# Patient Record
Sex: FEMALE | Race: WHITE | NOT HISPANIC OR LATINO | Employment: UNEMPLOYED | URBAN - NONMETROPOLITAN AREA
[De-identification: names, ages, dates, MRNs, and addresses within clinical notes are randomized per-mention and may not be internally consistent; named-entity substitution may affect disease eponyms.]

---

## 2017-06-05 ENCOUNTER — APPOINTMENT (EMERGENCY)
Dept: RADIOLOGY | Facility: HOSPITAL | Age: 47
End: 2017-06-05
Payer: MEDICARE

## 2017-06-05 ENCOUNTER — HOSPITAL ENCOUNTER (EMERGENCY)
Facility: HOSPITAL | Age: 47
Discharge: HOME | End: 2017-06-05
Admitting: EMERGENCY MEDICINE
Payer: MEDICARE

## 2017-06-05 VITALS
BODY MASS INDEX: 28.25 KG/M2 | TEMPERATURE: 99.4 F | HEART RATE: 96 BPM | RESPIRATION RATE: 18 BRPM | DIASTOLIC BLOOD PRESSURE: 72 MMHG | HEIGHT: 67 IN | WEIGHT: 180 LBS | SYSTOLIC BLOOD PRESSURE: 119 MMHG | OXYGEN SATURATION: 98 %

## 2017-06-05 DIAGNOSIS — M62.830 BACK MUSCLE SPASM: Primary | ICD-10-CM

## 2017-06-05 PROCEDURE — 96372 THER/PROPH/DIAG INJ SC/IM: CPT

## 2017-06-05 PROCEDURE — 99284 EMERGENCY DEPT VISIT MOD MDM: CPT

## 2017-06-05 PROCEDURE — 72100 X-RAY EXAM L-S SPINE 2/3 VWS: CPT

## 2017-06-05 PROCEDURE — 96374 THER/PROPH/DIAG INJ IV PUSH: CPT

## 2017-06-05 RX ORDER — ONDANSETRON 4 MG/1
4 TABLET, ORALLY DISINTEGRATING ORAL ONCE
Status: COMPLETED | OUTPATIENT
Start: 2017-06-05 | End: 2017-06-05

## 2017-06-05 RX ORDER — METHOCARBAMOL 500 MG/1
500 TABLET, FILM COATED ORAL 4 TIMES DAILY
Qty: 12 TABLET | Refills: 0 | Status: SHIPPED | OUTPATIENT
Start: 2017-06-05 | End: 2017-06-07 | Stop reason: HOSPADM

## 2017-06-05 RX ORDER — LIDOCAINE 50 MG/G
1 PATCH TOPICAL EVERY 24 HOURS
Qty: 7 PATCH | Refills: 0 | Status: SHIPPED | OUTPATIENT
Start: 2017-06-05 | End: 2017-06-07 | Stop reason: HOSPADM

## 2017-06-05 RX ORDER — MORPHINE SULFATE 4 MG/ML
4 INJECTION, SOLUTION INTRAMUSCULAR; INTRAVENOUS ONCE
Status: COMPLETED | OUTPATIENT
Start: 2017-06-05 | End: 2017-06-05

## 2017-06-05 RX ORDER — FENTANYL CITRATE 50 UG/ML
100 INJECTION, SOLUTION INTRAMUSCULAR; INTRAVENOUS ONCE
Status: COMPLETED | OUTPATIENT
Start: 2017-06-05 | End: 2017-06-05

## 2017-06-05 RX ORDER — LIDOCAINE 50 MG/G
1 PATCH TOPICAL ONCE
Status: DISCONTINUED | OUTPATIENT
Start: 2017-06-05 | End: 2017-06-05 | Stop reason: HOSPADM

## 2017-06-05 RX ORDER — OXYCODONE HYDROCHLORIDE AND ACETAMINOPHEN 5; 325 MG/1; MG/1
1 TABLET ORAL EVERY 4 HOURS PRN
Qty: 12 TABLET | Refills: 0 | Status: SHIPPED | OUTPATIENT
Start: 2017-06-05 | End: 2017-06-07

## 2017-06-05 RX ORDER — NAPROXEN 500 MG/1
500 TABLET ORAL 2 TIMES DAILY WITH MEALS
Qty: 20 TABLET | Refills: 0 | Status: SHIPPED | OUTPATIENT
Start: 2017-06-05 | End: 2017-06-07 | Stop reason: HOSPADM

## 2017-06-05 RX ORDER — KETOROLAC TROMETHAMINE 30 MG/ML
30 INJECTION, SOLUTION INTRAMUSCULAR; INTRAVENOUS ONCE
Status: COMPLETED | OUTPATIENT
Start: 2017-06-05 | End: 2017-06-05

## 2017-06-05 RX ORDER — DIAZEPAM 5 MG/1
5 TABLET ORAL ONCE
Status: COMPLETED | OUTPATIENT
Start: 2017-06-05 | End: 2017-06-05

## 2017-06-05 RX ADMIN — KETOROLAC TROMETHAMINE 30 MG: 30 INJECTION, SOLUTION INTRAMUSCULAR at 17:32

## 2017-06-05 RX ADMIN — MORPHINE SULFATE 4 MG: 4 INJECTION, SOLUTION INTRAMUSCULAR; INTRAVENOUS at 17:34

## 2017-06-05 RX ADMIN — ONDANSETRON 4 MG: 4 TABLET, ORALLY DISINTEGRATING ORAL at 17:30

## 2017-06-05 RX ADMIN — FENTANYL CITRATE 100 MCG: 50 INJECTION INTRAMUSCULAR; INTRAVENOUS at 19:02

## 2017-06-05 RX ADMIN — LIDOCAINE 1 PATCH: 50 PATCH CUTANEOUS at 17:36

## 2017-06-05 RX ADMIN — HYDROMORPHONE HYDROCHLORIDE 1 MG: 1 INJECTION, SOLUTION INTRAMUSCULAR; INTRAVENOUS; SUBCUTANEOUS at 18:21

## 2017-06-05 RX ADMIN — DIAZEPAM 5 MG: 5 TABLET ORAL at 17:31

## 2017-06-06 ENCOUNTER — APPOINTMENT (OUTPATIENT)
Dept: MRI IMAGING | Facility: HOSPITAL | Age: 47
End: 2017-06-06

## 2017-06-06 ENCOUNTER — HOSPITAL ENCOUNTER (OUTPATIENT)
Facility: HOSPITAL | Age: 47
Setting detail: OBSERVATION
Discharge: HOME | End: 2017-06-07
Attending: INTERNAL MEDICINE | Admitting: INTERNAL MEDICINE

## 2017-06-06 ENCOUNTER — APPOINTMENT (EMERGENCY)
Dept: OCCUPATIONAL THERAPY | Facility: HOSPITAL | Age: 47
End: 2017-06-06

## 2017-06-06 DIAGNOSIS — D72.829 LEUKOCYTOSIS: ICD-10-CM

## 2017-06-06 DIAGNOSIS — M54.50 LOW BACK PAIN: Primary | ICD-10-CM

## 2017-06-06 LAB
ALBUMIN SERPL BCP-MCNC: 3.4 G/DL (ref 3.5–5)
ALP SERPL-CCNC: 100 U/L (ref 46–116)
ALT SERPL W P-5'-P-CCNC: 30 U/L (ref 12–78)
ANION GAP SERPL CALCULATED.3IONS-SCNC: 8 MMOL/L (ref 4–13)
AST SERPL W P-5'-P-CCNC: 17 U/L (ref 5–45)
BASOPHILS # BLD AUTO: 0.02 THOUSANDS/ΜL (ref 0–0.1)
BASOPHILS NFR BLD AUTO: 0 % (ref 0–1)
BILIRUB SERPL-MCNC: 0.6 MG/DL (ref 0.2–1)
BILIRUB UR QL STRIP: NEGATIVE
BUN SERPL-MCNC: 15 MG/DL (ref 5–25)
CALCIUM SERPL-MCNC: 9 MG/DL (ref 8.3–10.1)
CHLORIDE SERPL-SCNC: 104 MMOL/L (ref 100–108)
CLARITY UR: ABNORMAL
CO2 SERPL-SCNC: 27 MMOL/L (ref 21–32)
COLOR UR: YELLOW
CREAT SERPL-MCNC: 0.67 MG/DL (ref 0.6–1.3)
EOSINOPHIL # BLD AUTO: 0.02 THOUSAND/ΜL (ref 0–0.61)
EOSINOPHIL NFR BLD AUTO: 0 % (ref 0–6)
ERYTHROCYTE [DISTWIDTH] IN BLOOD BY AUTOMATED COUNT: 15 % (ref 11.6–15.1)
GFR SERPL CREATININE-BSD FRML MDRD: >60 ML/MIN/1.73SQ M
GLUCOSE SERPL-MCNC: 98 MG/DL (ref 65–140)
GLUCOSE UR STRIP-MCNC: NEGATIVE MG/DL
HCG UR QL: NEGATIVE
HCT VFR BLD AUTO: 45.6 % (ref 34.8–46.1)
HGB BLD-MCNC: 15 G/DL (ref 11.5–15.4)
HGB UR QL STRIP.AUTO: NEGATIVE
KETONES UR STRIP-MCNC: ABNORMAL MG/DL
LEUKOCYTE ESTERASE UR QL STRIP: NEGATIVE
LYMPHOCYTES # BLD AUTO: 1.6 THOUSANDS/ΜL (ref 0.6–4.47)
LYMPHOCYTES NFR BLD AUTO: 11 % (ref 14–44)
MCH RBC QN AUTO: 30.8 PG (ref 26.8–34.3)
MCHC RBC AUTO-ENTMCNC: 32.9 G/DL (ref 31.4–37.4)
MCV RBC AUTO: 94 FL (ref 82–98)
MONOCYTES # BLD AUTO: 1.47 THOUSAND/ΜL (ref 0.17–1.22)
MONOCYTES NFR BLD AUTO: 10 % (ref 4–12)
NEUTROPHILS # BLD AUTO: 11.61 THOUSANDS/ΜL (ref 1.85–7.62)
NEUTS SEG NFR BLD AUTO: 79 % (ref 43–75)
NITRITE UR QL STRIP: NEGATIVE
PH UR STRIP.AUTO: 7 [PH] (ref 4.5–8)
PLATELET # BLD AUTO: 261 THOUSANDS/UL (ref 149–390)
PMV BLD AUTO: 10.2 FL (ref 8.9–12.7)
POTASSIUM SERPL-SCNC: 3.6 MMOL/L (ref 3.5–5.3)
PROT SERPL-MCNC: 6.9 G/DL (ref 6.4–8.2)
PROT UR STRIP-MCNC: NEGATIVE MG/DL
RBC # BLD AUTO: 4.87 MILLION/UL (ref 3.81–5.12)
SODIUM SERPL-SCNC: 139 MMOL/L (ref 136–145)
SP GR UR STRIP.AUTO: 1.01 (ref 1–1.03)
UROBILINOGEN UR QL STRIP.AUTO: 1 E.U./DL
WBC # BLD AUTO: 14.72 THOUSAND/UL (ref 4.31–10.16)

## 2017-06-06 PROCEDURE — G8978 MOBILITY CURRENT STATUS: HCPCS | Performed by: PHYSICAL THERAPIST

## 2017-06-06 PROCEDURE — 94762 N-INVAS EAR/PLS OXIMTRY CONT: CPT

## 2017-06-06 PROCEDURE — 80053 COMPREHEN METABOLIC PANEL: CPT | Performed by: PHYSICIAN ASSISTANT

## 2017-06-06 PROCEDURE — 97530 THERAPEUTIC ACTIVITIES: CPT

## 2017-06-06 PROCEDURE — 94760 N-INVAS EAR/PLS OXIMETRY 1: CPT

## 2017-06-06 PROCEDURE — 96375 TX/PRO/DX INJ NEW DRUG ADDON: CPT

## 2017-06-06 PROCEDURE — 81025 URINE PREGNANCY TEST: CPT | Performed by: PHYSICIAN ASSISTANT

## 2017-06-06 PROCEDURE — A9585 GADOBUTROL INJECTION: HCPCS | Performed by: INTERNAL MEDICINE

## 2017-06-06 PROCEDURE — G8988 SELF CARE GOAL STATUS: HCPCS

## 2017-06-06 PROCEDURE — 36415 COLL VENOUS BLD VENIPUNCTURE: CPT | Performed by: PHYSICIAN ASSISTANT

## 2017-06-06 PROCEDURE — G8979 MOBILITY GOAL STATUS: HCPCS | Performed by: PHYSICAL THERAPIST

## 2017-06-06 PROCEDURE — 96374 THER/PROPH/DIAG INJ IV PUSH: CPT

## 2017-06-06 PROCEDURE — G8987 SELF CARE CURRENT STATUS: HCPCS

## 2017-06-06 PROCEDURE — 99284 EMERGENCY DEPT VISIT MOD MDM: CPT

## 2017-06-06 PROCEDURE — 85025 COMPLETE CBC W/AUTO DIFF WBC: CPT | Performed by: PHYSICIAN ASSISTANT

## 2017-06-06 PROCEDURE — 97166 OT EVAL MOD COMPLEX 45 MIN: CPT

## 2017-06-06 PROCEDURE — 97162 PT EVAL MOD COMPLEX 30 MIN: CPT | Performed by: PHYSICAL THERAPIST

## 2017-06-06 PROCEDURE — 81003 URINALYSIS AUTO W/O SCOPE: CPT | Performed by: PHYSICIAN ASSISTANT

## 2017-06-06 PROCEDURE — 72158 MRI LUMBAR SPINE W/O & W/DYE: CPT

## 2017-06-06 RX ORDER — KETOROLAC TROMETHAMINE 30 MG/ML
15 INJECTION, SOLUTION INTRAMUSCULAR; INTRAVENOUS ONCE
Status: COMPLETED | OUTPATIENT
Start: 2017-06-06 | End: 2017-06-06

## 2017-06-06 RX ORDER — MORPHINE SULFATE 100 MG/5ML
15 SOLUTION ORAL EVERY 4 HOURS PRN
Status: DISCONTINUED | OUTPATIENT
Start: 2017-06-06 | End: 2017-06-07

## 2017-06-06 RX ORDER — DIAZEPAM 5 MG/ML
5 INJECTION, SOLUTION INTRAMUSCULAR; INTRAVENOUS ONCE
Status: COMPLETED | OUTPATIENT
Start: 2017-06-06 | End: 2017-06-06

## 2017-06-06 RX ORDER — ACETAMINOPHEN 325 MG/1
650 TABLET ORAL EVERY 6 HOURS PRN
Status: DISCONTINUED | OUTPATIENT
Start: 2017-06-06 | End: 2017-06-07 | Stop reason: HOSPADM

## 2017-06-06 RX ORDER — DOCUSATE SODIUM 100 MG/1
100 CAPSULE, LIQUID FILLED ORAL 2 TIMES DAILY PRN
Status: DISCONTINUED | OUTPATIENT
Start: 2017-06-06 | End: 2017-06-07 | Stop reason: HOSPADM

## 2017-06-06 RX ORDER — MORPHINE SULFATE 2 MG/ML
2 INJECTION, SOLUTION INTRAMUSCULAR; INTRAVENOUS ONCE
Status: DISCONTINUED | OUTPATIENT
Start: 2017-06-06 | End: 2017-06-06

## 2017-06-06 RX ORDER — OXYCODONE HYDROCHLORIDE 5 MG/1
5 TABLET ORAL ONCE
Status: COMPLETED | OUTPATIENT
Start: 2017-06-06 | End: 2017-06-06

## 2017-06-06 RX ORDER — LIDOCAINE 50 MG/G
3 PATCH TOPICAL EVERY 24 HOURS
Status: DISCONTINUED | OUTPATIENT
Start: 2017-06-07 | End: 2017-06-07

## 2017-06-06 RX ORDER — FENTANYL CITRATE 50 UG/ML
50 INJECTION, SOLUTION INTRAMUSCULAR; INTRAVENOUS ONCE
Status: COMPLETED | OUTPATIENT
Start: 2017-06-06 | End: 2017-06-06

## 2017-06-06 RX ORDER — MORPHINE SULFATE 4 MG/ML
4 INJECTION, SOLUTION INTRAMUSCULAR; INTRAVENOUS
Status: DISCONTINUED | OUTPATIENT
Start: 2017-06-06 | End: 2017-06-07

## 2017-06-06 RX ORDER — LIDOCAINE 50 MG/G
1 PATCH TOPICAL ONCE
Status: COMPLETED | OUTPATIENT
Start: 2017-06-06 | End: 2017-06-06

## 2017-06-06 RX ORDER — NICOTINE 21 MG/24HR
1 PATCH, TRANSDERMAL 24 HOURS TRANSDERMAL DAILY
Status: DISCONTINUED | OUTPATIENT
Start: 2017-06-07 | End: 2017-06-07 | Stop reason: HOSPADM

## 2017-06-06 RX ORDER — IBUPROFEN 600 MG/1
600 TABLET ORAL EVERY 6 HOURS PRN
Status: ON HOLD | COMMUNITY
End: 2017-06-07

## 2017-06-06 RX ORDER — MORPHINE SULFATE 2 MG/ML
2 INJECTION, SOLUTION INTRAMUSCULAR; INTRAVENOUS
Status: DISCONTINUED | OUTPATIENT
Start: 2017-06-06 | End: 2017-06-06

## 2017-06-06 RX ORDER — ONDANSETRON 2 MG/ML
4 INJECTION INTRAMUSCULAR; INTRAVENOUS EVERY 6 HOURS PRN
Status: DISCONTINUED | OUTPATIENT
Start: 2017-06-06 | End: 2017-06-07 | Stop reason: HOSPADM

## 2017-06-06 RX ORDER — DIAZEPAM 5 MG/1
5 TABLET ORAL 3 TIMES DAILY
Status: DISCONTINUED | OUTPATIENT
Start: 2017-06-06 | End: 2017-06-07

## 2017-06-06 RX ADMIN — DIAZEPAM 5 MG: 5 INJECTION, SOLUTION INTRAMUSCULAR; INTRAVENOUS at 11:27

## 2017-06-06 RX ADMIN — HYDROMORPHONE HYDROCHLORIDE 0.5 MG: 1 INJECTION, SOLUTION INTRAMUSCULAR; INTRAVENOUS; SUBCUTANEOUS at 19:28

## 2017-06-06 RX ADMIN — OXYCODONE HYDROCHLORIDE 5 MG: 5 TABLET ORAL at 13:13

## 2017-06-06 RX ADMIN — FENTANYL CITRATE 50 MCG: 50 INJECTION INTRAMUSCULAR; INTRAVENOUS at 11:28

## 2017-06-06 RX ADMIN — KETOROLAC TROMETHAMINE 15 MG: 30 INJECTION, SOLUTION INTRAMUSCULAR at 13:13

## 2017-06-06 RX ADMIN — HYDROMORPHONE HYDROCHLORIDE 1 MG: 1 INJECTION, SOLUTION INTRAMUSCULAR; INTRAVENOUS; SUBCUTANEOUS at 16:31

## 2017-06-06 RX ADMIN — MORPHINE SULFATE 2 MG: 2 INJECTION, SOLUTION INTRAMUSCULAR; INTRAVENOUS at 21:14

## 2017-06-06 RX ADMIN — GADOBUTROL 10 ML: 604.72 INJECTION INTRAVENOUS at 17:40

## 2017-06-06 RX ADMIN — DIAZEPAM 5 MG: 5 TABLET ORAL at 21:51

## 2017-06-06 RX ADMIN — LIDOCAINE 1 PATCH: 50 PATCH CUTANEOUS at 11:29

## 2017-06-06 RX ADMIN — CEFEPIME HYDROCHLORIDE 2000 MG: 2 INJECTION, POWDER, FOR SOLUTION INTRAVENOUS at 16:52

## 2017-06-06 RX ADMIN — SODIUM CHLORIDE 1000 ML: 0.9 INJECTION, SOLUTION INTRAVENOUS at 16:31

## 2017-06-07 ENCOUNTER — APPOINTMENT (OUTPATIENT)
Dept: PHYSICAL THERAPY | Facility: HOSPITAL | Age: 47
End: 2017-06-07

## 2017-06-07 ENCOUNTER — APPOINTMENT (OUTPATIENT)
Dept: OCCUPATIONAL THERAPY | Facility: HOSPITAL | Age: 47
End: 2017-06-07

## 2017-06-07 VITALS
BODY MASS INDEX: 36.16 KG/M2 | RESPIRATION RATE: 18 BRPM | TEMPERATURE: 98.2 F | OXYGEN SATURATION: 96 % | SYSTOLIC BLOOD PRESSURE: 134 MMHG | HEIGHT: 67 IN | WEIGHT: 230.38 LBS | HEART RATE: 88 BPM | DIASTOLIC BLOOD PRESSURE: 72 MMHG

## 2017-06-07 PROBLEM — M54.50 LOW BACK PAIN: Status: ACTIVE | Noted: 2017-06-07

## 2017-06-07 PROBLEM — M51.26 LUMBAR DISC HERNIATION: Status: ACTIVE | Noted: 2017-06-07

## 2017-06-07 LAB
ALBUMIN SERPL BCP-MCNC: 2.9 G/DL (ref 3.5–5)
ALP SERPL-CCNC: 83 U/L (ref 46–116)
ALT SERPL W P-5'-P-CCNC: 22 U/L (ref 12–78)
ANION GAP SERPL CALCULATED.3IONS-SCNC: 10 MMOL/L (ref 4–13)
AST SERPL W P-5'-P-CCNC: 9 U/L (ref 5–45)
BASOPHILS # BLD AUTO: 0.01 THOUSANDS/ΜL (ref 0–0.1)
BASOPHILS NFR BLD AUTO: 0 % (ref 0–1)
BILIRUB SERPL-MCNC: 0.4 MG/DL (ref 0.2–1)
BUN SERPL-MCNC: 12 MG/DL (ref 5–25)
CALCIUM SERPL-MCNC: 8.5 MG/DL (ref 8.3–10.1)
CHLORIDE SERPL-SCNC: 103 MMOL/L (ref 100–108)
CO2 SERPL-SCNC: 25 MMOL/L (ref 21–32)
CREAT SERPL-MCNC: 0.59 MG/DL (ref 0.6–1.3)
EOSINOPHIL # BLD AUTO: 0.08 THOUSAND/ΜL (ref 0–0.61)
EOSINOPHIL NFR BLD AUTO: 1 % (ref 0–6)
ERYTHROCYTE [DISTWIDTH] IN BLOOD BY AUTOMATED COUNT: 14.9 % (ref 11.6–15.1)
GFR SERPL CREATININE-BSD FRML MDRD: >60 ML/MIN/1.73SQ M
GLUCOSE SERPL-MCNC: 102 MG/DL (ref 65–140)
HCT VFR BLD AUTO: 42.5 % (ref 34.8–46.1)
HGB BLD-MCNC: 13.9 G/DL (ref 11.5–15.4)
LYMPHOCYTES # BLD AUTO: 1.7 THOUSANDS/ΜL (ref 0.6–4.47)
LYMPHOCYTES NFR BLD AUTO: 18 % (ref 14–44)
MAGNESIUM SERPL-MCNC: 1.6 MG/DL (ref 1.6–2.6)
MCH RBC QN AUTO: 30.8 PG (ref 26.8–34.3)
MCHC RBC AUTO-ENTMCNC: 32.7 G/DL (ref 31.4–37.4)
MCV RBC AUTO: 94 FL (ref 82–98)
MONOCYTES # BLD AUTO: 1.13 THOUSAND/ΜL (ref 0.17–1.22)
MONOCYTES NFR BLD AUTO: 12 % (ref 4–12)
NEUTROPHILS # BLD AUTO: 6.43 THOUSANDS/ΜL (ref 1.85–7.62)
NEUTS SEG NFR BLD AUTO: 69 % (ref 43–75)
PHOSPHATE SERPL-MCNC: 2.7 MG/DL (ref 2.7–4.5)
PLATELET # BLD AUTO: 247 THOUSANDS/UL (ref 149–390)
PMV BLD AUTO: 9.8 FL (ref 8.9–12.7)
POTASSIUM SERPL-SCNC: 3.8 MMOL/L (ref 3.5–5.3)
PROT SERPL-MCNC: 6.5 G/DL (ref 6.4–8.2)
RBC # BLD AUTO: 4.52 MILLION/UL (ref 3.81–5.12)
SODIUM SERPL-SCNC: 138 MMOL/L (ref 136–145)
WBC # BLD AUTO: 9.35 THOUSAND/UL (ref 4.31–10.16)

## 2017-06-07 PROCEDURE — 94762 N-INVAS EAR/PLS OXIMTRY CONT: CPT

## 2017-06-07 PROCEDURE — 84100 ASSAY OF PHOSPHORUS: CPT | Performed by: HOSPITALIST

## 2017-06-07 PROCEDURE — 83735 ASSAY OF MAGNESIUM: CPT | Performed by: HOSPITALIST

## 2017-06-07 PROCEDURE — 97535 SELF CARE MNGMENT TRAINING: CPT

## 2017-06-07 PROCEDURE — 94760 N-INVAS EAR/PLS OXIMETRY 1: CPT

## 2017-06-07 PROCEDURE — 97116 GAIT TRAINING THERAPY: CPT

## 2017-06-07 PROCEDURE — 85025 COMPLETE CBC W/AUTO DIFF WBC: CPT | Performed by: HOSPITALIST

## 2017-06-07 PROCEDURE — 80053 COMPREHEN METABOLIC PANEL: CPT | Performed by: HOSPITALIST

## 2017-06-07 RX ORDER — METHYLPREDNISOLONE 4 MG/1
TABLET ORAL
Qty: 21 TABLET | Refills: 0 | Status: SHIPPED | OUTPATIENT
Start: 2017-06-07 | End: 2018-02-19

## 2017-06-07 RX ORDER — KETOROLAC TROMETHAMINE 30 MG/ML
30 INJECTION, SOLUTION INTRAMUSCULAR; INTRAVENOUS ONCE
Status: COMPLETED | OUTPATIENT
Start: 2017-06-07 | End: 2017-06-07

## 2017-06-07 RX ORDER — CYCLOBENZAPRINE HCL 5 MG
5 TABLET ORAL 3 TIMES DAILY PRN
Qty: 30 TABLET | Refills: 0 | Status: SHIPPED | OUTPATIENT
Start: 2017-06-07 | End: 2018-02-19

## 2017-06-07 RX ORDER — IBUPROFEN 600 MG/1
600 TABLET ORAL EVERY 6 HOURS PRN
Qty: 30 TABLET | Refills: 0 | Status: SHIPPED | OUTPATIENT
Start: 2017-06-07 | End: 2018-02-19

## 2017-06-07 RX ORDER — METHYLPREDNISOLONE SODIUM SUCCINATE 125 MG/2ML
125 INJECTION, POWDER, LYOPHILIZED, FOR SOLUTION INTRAMUSCULAR; INTRAVENOUS ONCE
Status: COMPLETED | OUTPATIENT
Start: 2017-06-07 | End: 2017-06-07

## 2017-06-07 RX ORDER — OXYCODONE HYDROCHLORIDE AND ACETAMINOPHEN 5; 325 MG/1; MG/1
1 TABLET ORAL EVERY 4 HOURS PRN
Status: DISCONTINUED | OUTPATIENT
Start: 2017-06-07 | End: 2017-06-07 | Stop reason: HOSPADM

## 2017-06-07 RX ORDER — CYCLOBENZAPRINE HCL 10 MG
5 TABLET ORAL 3 TIMES DAILY PRN
Status: DISCONTINUED | OUTPATIENT
Start: 2017-06-07 | End: 2017-06-07 | Stop reason: HOSPADM

## 2017-06-07 RX ORDER — DOCUSATE SODIUM 100 MG/1
100 CAPSULE, LIQUID FILLED ORAL 2 TIMES DAILY PRN
Qty: 10 CAPSULE | Refills: 0 | Status: SHIPPED | OUTPATIENT
Start: 2017-06-07 | End: 2018-02-19

## 2017-06-07 RX ORDER — LIDOCAINE 50 MG/G
2 PATCH TOPICAL DAILY
Qty: 30 PATCH | Refills: 0 | Status: SHIPPED | OUTPATIENT
Start: 2017-06-07 | End: 2018-02-19

## 2017-06-07 RX ORDER — OXYCODONE HYDROCHLORIDE AND ACETAMINOPHEN 5; 325 MG/1; MG/1
1 TABLET ORAL EVERY 4 HOURS PRN
Qty: 18 TABLET | Refills: 0 | Status: SHIPPED | OUTPATIENT
Start: 2017-06-07 | End: 2017-06-17

## 2017-06-07 RX ORDER — LIDOCAINE 50 MG/G
2 PATCH TOPICAL DAILY
Status: DISCONTINUED | OUTPATIENT
Start: 2017-06-07 | End: 2017-06-07 | Stop reason: HOSPADM

## 2017-06-07 RX ADMIN — OXYCODONE HYDROCHLORIDE AND ACETAMINOPHEN 1 TABLET: 5; 325 TABLET ORAL at 12:41

## 2017-06-07 RX ADMIN — MORPHINE SULFATE 4 MG: 4 INJECTION, SOLUTION INTRAMUSCULAR; INTRAVENOUS at 03:49

## 2017-06-07 RX ADMIN — DIAZEPAM 5 MG: 5 TABLET ORAL at 08:50

## 2017-06-07 RX ADMIN — KETOROLAC TROMETHAMINE 30 MG: 30 INJECTION, SOLUTION INTRAMUSCULAR at 11:53

## 2017-06-07 RX ADMIN — CYCLOBENZAPRINE HYDROCHLORIDE 5 MG: 10 TABLET, FILM COATED ORAL at 12:41

## 2017-06-07 RX ADMIN — MORPHINE SULFATE 4 MG: 4 INJECTION, SOLUTION INTRAMUSCULAR; INTRAVENOUS at 08:40

## 2017-06-07 RX ADMIN — LIDOCAINE 2 PATCH: 50 PATCH CUTANEOUS at 11:55

## 2017-06-07 RX ADMIN — METHYLPREDNISOLONE SODIUM SUCCINATE 125 MG: 125 INJECTION, POWDER, FOR SOLUTION INTRAMUSCULAR; INTRAVENOUS at 11:51

## 2017-06-12 ENCOUNTER — ALLSCRIPTS OFFICE VISIT (OUTPATIENT)
Dept: OTHER | Facility: OTHER | Age: 47
End: 2017-06-12

## 2018-01-14 VITALS
WEIGHT: 226 LBS | HEIGHT: 66 IN | BODY MASS INDEX: 36.32 KG/M2 | DIASTOLIC BLOOD PRESSURE: 60 MMHG | SYSTOLIC BLOOD PRESSURE: 136 MMHG

## 2018-01-23 NOTE — MISCELLANEOUS
Chief Complaint  Chief Complaint Free Text Note Form: Update 12/7/16 Patient was called several times for [facility] appt but was rescheduled and no show  [id number]        1 Amended By: Luigi Harry; Dec 07 2016 8:21 AM EST    History of Present Illness  TCM Communication [facility]: The patient is being contacted for Called and left msg to schedule [facility] appt  She was hospitalized at [facility]  The date of admission: 11/16/16, date of discharge: 11/18/16  Diagnosis: Abdominal pain; Diverticulitis of large intestine without perforation or abscess without bleeding D/C dx - Acute diverticulitis; Fistula; Renal calculi;  Supraumbilical 1  hernia; Adrenal adenoma; Sepsis; Hyperglycemia; Tobacco abuse; Vitamin D deficiency  She was discharged to home  Medications were not reviewed today  She refused a follow up appointment due to No show2   Communication performed and completed by1  Jacqueline Branch        1 Amended By: Luigi Harry; Dec 07 2016 8:23 AM EST   2 Amended By: Luigi Harry; Dec 07 2016 8:24 AM EST    Active Problems   1  Acute tracheobronchitis (466 0) (J20 9)  2  Dysfunction of both eustachian tubes (381 81) (H69 83)  3  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)  4  Need for influenza vaccination (V04 81) (Z23)    Past Medical History   1  History of Back Pain  2  History of Encounter for screening mammogram for malignant neoplasm of breast   (V76 12) (Z12 31)  3  History of Encounter for smoking cessation counseling (V65 42,305 1) (Z71 6,Z72 0)  4  History of chest pain (V13 89) (Z87 898)  5  History of Limb swelling (729 81) (M79 89)  6  History of Sinusitis (473 9) (J32 9)  7  History of Viral syndrome (079 99) (B34 9)    Surgical History   1  History of Back Surgery    Family History  Family History   1  Family history of Adopted    Social History    · Former smoker (I69 79) (L67 739)   · Never Drank Alcohol    Current Meds  1  Gabapentin 300 MG Oral Capsule; Take 1 capsule twice daily Recorded  2  Ibuprofen 600 MG Oral Tablet; TAKE 1 TABLET 3 times daily Recorded  3  LevoFLOXacin 750 MG Oral Tablet; take 1 tablet daily for 5 days MDD:5 TDD:5;   Therapy: 05GGV1046 to (Last Rx:[id number])  Requested for: 73HKU9798 Ordered  4  MethylPREDNISolone (Solomon) 4 MG TABS; TAKE AS DIRECTED ON PATIENT   INSTRUCTION CARD; Therapy: 19YHF6833 to (Last Rx:62Jkj5802)  Requested for: 58Ujx1014 Ordered  5  ProAir  (90 Base) MCG/ACT Inhalation Aerosol Solution; INHALE 2 PUFFS   EVERY 4 HOURS AS NEEDED; Therapy: 68VXV9203 to (Last Rx:[id number])  Requested for: 31Cgk1200 Ordered    Allergies   1   No Known Drug Allergies    Future Appointments    Date/Time Provider Specialty Site   11/29/2016 04:45 PM Rupinder Nj DO Family Medicine [address] [place] [address]     Signatures   Electronically signed by : Janae Mata DO; Nov 30 2016  6:49AM EST                       (Author)    Electronically signed by : Gina Lyle, ; Dec  7 2016  8:23AM EST                       (Author)    Electronically signed by : Janae Mata DO; Dec  7 2016  8:32AM EST                       (Author)

## 2018-02-19 ENCOUNTER — OFFICE VISIT (OUTPATIENT)
Dept: FAMILY MEDICINE CLINIC | Facility: CLINIC | Age: 48
End: 2018-02-19
Payer: COMMERCIAL

## 2018-02-19 VITALS
HEIGHT: 66 IN | BODY MASS INDEX: 35.52 KG/M2 | WEIGHT: 221 LBS | SYSTOLIC BLOOD PRESSURE: 118 MMHG | DIASTOLIC BLOOD PRESSURE: 86 MMHG

## 2018-02-19 DIAGNOSIS — G89.29 CHRONIC LOW BACK PAIN, UNSPECIFIED BACK PAIN LATERALITY, WITH SCIATICA PRESENCE UNSPECIFIED: ICD-10-CM

## 2018-02-19 DIAGNOSIS — K43.9 ABDOMINAL WALL HERNIA: Primary | ICD-10-CM

## 2018-02-19 DIAGNOSIS — M54.5 CHRONIC LOW BACK PAIN, UNSPECIFIED BACK PAIN LATERALITY, WITH SCIATICA PRESENCE UNSPECIFIED: ICD-10-CM

## 2018-02-19 PROBLEM — M79.605 LUMBAR PAIN WITH RADIATION DOWN LEFT LEG: Status: ACTIVE | Noted: 2017-06-07

## 2018-02-19 PROCEDURE — 99214 OFFICE O/P EST MOD 30 MIN: CPT | Performed by: PHYSICIAN ASSISTANT

## 2018-02-19 RX ORDER — CYCLOBENZAPRINE HCL 5 MG
5 TABLET ORAL 3 TIMES DAILY PRN
Qty: 60 TABLET | Refills: 0 | Status: SHIPPED | OUTPATIENT
Start: 2018-02-19 | End: 2019-04-02

## 2018-02-19 RX ORDER — GABAPENTIN 300 MG/1
300 CAPSULE ORAL 3 TIMES DAILY
Qty: 270 CAPSULE | Refills: 0 | Status: SHIPPED | OUTPATIENT
Start: 2018-02-19 | End: 2019-09-03 | Stop reason: SDUPTHER

## 2018-02-19 RX ORDER — CYCLOBENZAPRINE HCL 5 MG
1 TABLET ORAL 3 TIMES DAILY
COMMUNITY
End: 2018-02-19

## 2018-02-19 RX ORDER — GABAPENTIN 300 MG/1
100 CAPSULE ORAL 3 TIMES DAILY
COMMUNITY
End: 2018-02-19 | Stop reason: SDUPTHER

## 2018-02-19 RX ORDER — IBUPROFEN 600 MG/1
600 TABLET ORAL EVERY 8 HOURS PRN
Qty: 270 TABLET | Refills: 0 | Status: SHIPPED | OUTPATIENT
Start: 2018-02-19 | End: 2019-09-03 | Stop reason: ALTCHOICE

## 2018-02-19 RX ORDER — IBUPROFEN 600 MG/1
1 TABLET ORAL 3 TIMES DAILY
COMMUNITY
End: 2018-02-19

## 2018-02-19 RX ORDER — OXYCODONE HYDROCHLORIDE AND ACETAMINOPHEN 5; 325 MG/1; MG/1
1 TABLET ORAL EVERY 4 HOURS PRN
COMMUNITY
End: 2018-02-19

## 2018-02-19 NOTE — PROGRESS NOTES
Assessment/Plan:    No problem-specific Assessment & Plan notes found for this encounter  Diagnoses and all orders for this visit:    Abdominal wall hernia  -     Ambulatory referral to General Surgery; Future    Chronic low back pain, unspecified back pain laterality, with sciatica presence unspecified  -     Ambulatory referral to Pain Management; Future  -     gabapentin (NEURONTIN) 300 mg capsule; Take 1 capsule (300 mg total) by mouth 3 (three) times a day  -     cyclobenzaprine (FLEXERIL) 5 mg tablet; Take 1 tablet (5 mg total) by mouth 3 (three) times a day as needed for muscle spasms  -     ibuprofen (MOTRIN) 600 mg tablet; Take 1 tablet (600 mg total) by mouth every 8 (eight) hours as needed for moderate pain Do not take with diclofenac  -     diclofenac sodium (VOLTAREN) 50 mg EC tablet; Take 1 tablet (50 mg total) by mouth 2 (two) times a day Do not take with ibuprofen    Other orders  -     Discontinue: diclofenac sodium (VOLTAREN) 50 mg EC tablet; Take 1 tablet by mouth 2 (two) times a day  -     Discontinue: oxyCODONE-acetaminophen (PERCOCET) 5-325 mg per tablet; Take 1 tablet by mouth every 4 (four) hours as needed  -     Discontinue: cyclobenzaprine (FLEXERIL) 5 mg tablet; Take 1 tablet by mouth 3 (three) times a day  -     Discontinue: ibuprofen (MOTRIN) 600 mg tablet; Take 1 tablet by mouth 3 (three) times a day  -     Discontinue: gabapentin (NEURONTIN) 300 mg capsule; Take 100 mg by mouth 3 (three) times a day        Will refill medications for Calista's back pain  I did warn her to not use ibuprofen and diclofenac simultaneously due to GI side effects  Subjective:      Patient ID: Nicole Stoddard is a 50 y o  female  Teodoro Boots is here today to ask for a referral for Pain Management and Gen surgery to address both her chronic back pain and abdominal hernias  Back Pain   This is a chronic problem  The current episode started more than 1 year ago   The problem occurs constantly  The problem is unchanged  The pain is present in the lumbar spine  The pain is severe  Stiffness is present all day  Associated symptoms include abdominal pain  Pertinent negatives include no bladder incontinence, bowel incontinence, chest pain, dysuria, fever, headaches or weakness  She has tried NSAIDs, muscle relaxant and analgesics for the symptoms  The treatment provided no relief  Abdominal Pain   This is a chronic problem  The current episode started more than 1 year ago  The onset quality is undetermined  The problem occurs constantly  The pain is located in the generalized abdominal region  The abdominal pain does not radiate  Pertinent negatives include no belching, constipation, diarrhea, dysuria, fever, headaches, hematochezia, hematuria, myalgias, nausea or vomiting  The pain is aggravated by palpation and certain positions  The following portions of the patient's history were reviewed and updated as appropriate: She  has no past medical history on file  She  does not have any pertinent problems on file  She  has a past surgical history that includes Back surgery; Cholecystectomy; Lumbar disc surgery; and Back surgery  Her family history is not on file  She was adopted  She  reports that she has quit smoking  Her smoking use included Cigars  She has a 30 00 pack-year smoking history  She has never used smokeless tobacco  She reports that she does not drink alcohol or use drugs    Current Outpatient Prescriptions   Medication Sig Dispense Refill    gabapentin (NEURONTIN) 300 mg capsule Take 1 capsule (300 mg total) by mouth 3 (three) times a day 270 capsule 0    cyclobenzaprine (FLEXERIL) 5 mg tablet Take 1 tablet (5 mg total) by mouth 3 (three) times a day as needed for muscle spasms 60 tablet 0    diclofenac sodium (VOLTAREN) 50 mg EC tablet Take 1 tablet (50 mg total) by mouth 2 (two) times a day Do not take with ibuprofen 180 tablet 0    ibuprofen (MOTRIN) 600 mg tablet Take 1 tablet (600 mg total) by mouth every 8 (eight) hours as needed for moderate pain Do not take with diclofenac 270 tablet 0     No current facility-administered medications for this visit  Current Outpatient Prescriptions on File Prior to Visit   Medication Sig    [DISCONTINUED] acetaminophen (TYLENOL) 325 mg tablet Take 2 tablets by mouth every 6 (six) hours as needed for mild pain, moderate pain, headaches or fever    [DISCONTINUED] cyclobenzaprine (FLEXERIL) 5 mg tablet Take 1 tablet by mouth 3 (three) times a day as needed for muscle spasms    [DISCONTINUED] docusate sodium (COLACE) 100 mg capsule Take 1 capsule by mouth 2 (two) times a day as needed for constipation    [DISCONTINUED] ibuprofen (MOTRIN) 600 mg tablet Take 1 tablet by mouth every 6 (six) hours as needed for mild pain (Recommend taking this medication every 6 hours scheduled for 2-3 days, then as needed afterwards )    [DISCONTINUED] lidocaine (LIDODERM) 5 % Place 2 patches on the skin daily Remove & Discard patch within 12 hours or as directed by MD    [DISCONTINUED] Methylprednisolone (MEDROL) 4 MG TBPK Use as directed on package     No current facility-administered medications on file prior to visit  She has No Known Allergies       Review of Systems   Constitutional: Negative for chills and fever  HENT: Negative for postnasal drip, rhinorrhea, sinus pain and sinus pressure  Respiratory: Negative for cough and shortness of breath  Cardiovascular: Negative for chest pain  Gastrointestinal: Positive for abdominal pain  Negative for bowel incontinence, constipation, diarrhea, hematochezia, nausea and vomiting  Genitourinary: Negative for bladder incontinence, dysuria and hematuria  Musculoskeletal: Positive for back pain  Negative for myalgias  Skin: Negative for rash  Neurological: Negative for weakness and headaches           Objective:      /86 (BP Location: Left arm, Patient Position: Sitting)   Ht 5' 6" (1 676 m)   Wt 100 kg (221 lb)   LMP 10/10/2016   BMI 35 67 kg/m²          Physical Exam   Constitutional: She appears well-developed and well-nourished  No distress  HENT:   Head: Normocephalic and atraumatic  Right Ear: External ear normal    Left Ear: External ear normal    Nose: Nose normal    Mouth/Throat: No oropharyngeal exudate  Eyes: Conjunctivae are normal  Pupils are equal, round, and reactive to light  Neck: Neck supple  No thyromegaly present  Cardiovascular: Normal rate, regular rhythm and normal heart sounds  Pulmonary/Chest: Effort normal and breath sounds normal  No respiratory distress  Abdominal: Soft  Bowel sounds are normal  She exhibits mass (multiple abdominal hernias)  There is no hepatosplenomegaly  There is tenderness (over hernias)  There is no CVA tenderness  A hernia is present  Lymphadenopathy:     She has no cervical adenopathy  Skin: She is not diaphoretic

## 2018-02-20 ENCOUNTER — TELEPHONE (OUTPATIENT)
Dept: FAMILY MEDICINE CLINIC | Facility: CLINIC | Age: 48
End: 2018-02-20

## 2018-02-20 DIAGNOSIS — M54.50 CHRONIC LOW BACK PAIN WITHOUT SCIATICA, UNSPECIFIED BACK PAIN LATERALITY: Primary | ICD-10-CM

## 2018-02-20 DIAGNOSIS — G89.29 CHRONIC LOW BACK PAIN WITHOUT SCIATICA, UNSPECIFIED BACK PAIN LATERALITY: Primary | ICD-10-CM

## 2018-02-20 NOTE — TELEPHONE ENCOUNTER
Patient said she received a call from pain management and they do not accept her insurance  She called and [facility] takes it   Would like referral and everything faxed to [phone]

## 2018-02-23 ENCOUNTER — APPOINTMENT (EMERGENCY)
Dept: CT IMAGING | Facility: HOSPITAL | Age: 48
End: 2018-02-23
Payer: COMMERCIAL

## 2018-02-23 ENCOUNTER — TELEPHONE (OUTPATIENT)
Dept: FAMILY MEDICINE CLINIC | Facility: CLINIC | Age: 48
End: 2018-02-23

## 2018-02-23 ENCOUNTER — HOSPITAL ENCOUNTER (EMERGENCY)
Facility: HOSPITAL | Age: 48
Discharge: HOME | End: 2018-02-23
Attending: EMERGENCY MEDICINE
Payer: COMMERCIAL

## 2018-02-23 VITALS
WEIGHT: 180 LBS | HEART RATE: 70 BPM | DIASTOLIC BLOOD PRESSURE: 82 MMHG | BODY MASS INDEX: 29.05 KG/M2 | OXYGEN SATURATION: 95 % | TEMPERATURE: 98.2 F | SYSTOLIC BLOOD PRESSURE: 138 MMHG | RESPIRATION RATE: 20 BRPM

## 2018-02-23 DIAGNOSIS — K42.9 UMBILICAL HERNIA WITHOUT OBSTRUCTION AND WITHOUT GANGRENE: Primary | ICD-10-CM

## 2018-02-23 LAB
ALBUMIN SERPL BCP-MCNC: 3.7 G/DL (ref 3.5–5)
ALP SERPL-CCNC: 85 U/L (ref 46–116)
ALT SERPL W P-5'-P-CCNC: 24 U/L (ref 12–78)
ANION GAP SERPL CALCULATED.3IONS-SCNC: 6 MMOL/L (ref 4–13)
AST SERPL W P-5'-P-CCNC: 11 U/L (ref 5–45)
ATRIAL RATE: 85 BPM
BACTERIA UR QL AUTO: ABNORMAL /HPF
BASOPHILS # BLD AUTO: 0.02 THOUSANDS/ΜL (ref 0–0.1)
BASOPHILS NFR BLD AUTO: 0 % (ref 0–1)
BILIRUB SERPL-MCNC: 0.3 MG/DL (ref 0.2–1)
BILIRUB UR QL STRIP: NEGATIVE
BUN SERPL-MCNC: 17 MG/DL (ref 5–25)
CALCIUM SERPL-MCNC: 9.4 MG/DL (ref 8.3–10.1)
CHLORIDE SERPL-SCNC: 105 MMOL/L (ref 100–108)
CLARITY UR: CLEAR
CO2 SERPL-SCNC: 32 MMOL/L (ref 21–32)
COLOR UR: YELLOW
CREAT SERPL-MCNC: 0.73 MG/DL (ref 0.6–1.3)
EOSINOPHIL # BLD AUTO: 0.18 THOUSAND/ΜL (ref 0–0.61)
EOSINOPHIL NFR BLD AUTO: 2 % (ref 0–6)
ERYTHROCYTE [DISTWIDTH] IN BLOOD BY AUTOMATED COUNT: 13.3 % (ref 11.6–15.1)
GFR SERPL CREATININE-BSD FRML MDRD: 97 ML/MIN/1.73SQ M
GLUCOSE SERPL-MCNC: 88 MG/DL (ref 65–140)
GLUCOSE UR STRIP-MCNC: NEGATIVE MG/DL
HCT VFR BLD AUTO: 45.4 % (ref 34.8–46.1)
HGB BLD-MCNC: 15.4 G/DL (ref 11.5–15.4)
HGB UR QL STRIP.AUTO: NEGATIVE
KETONES UR STRIP-MCNC: NEGATIVE MG/DL
LEUKOCYTE ESTERASE UR QL STRIP: ABNORMAL
LIPASE SERPL-CCNC: 113 U/L (ref 73–393)
LYMPHOCYTES # BLD AUTO: 2.5 THOUSANDS/ΜL (ref 0.6–4.47)
LYMPHOCYTES NFR BLD AUTO: 32 % (ref 14–44)
MCH RBC QN AUTO: 30.8 PG (ref 26.8–34.3)
MCHC RBC AUTO-ENTMCNC: 33.9 G/DL (ref 31.4–37.4)
MCV RBC AUTO: 91 FL (ref 82–98)
MONOCYTES # BLD AUTO: 0.62 THOUSAND/ΜL (ref 0.17–1.22)
MONOCYTES NFR BLD AUTO: 8 % (ref 4–12)
NEUTROPHILS # BLD AUTO: 4.61 THOUSANDS/ΜL (ref 1.85–7.62)
NEUTS SEG NFR BLD AUTO: 58 % (ref 43–75)
NITRITE UR QL STRIP: NEGATIVE
NON-SQ EPI CELLS URNS QL MICRO: ABNORMAL /HPF
P AXIS: 52 DEGREES
PH UR STRIP.AUTO: 6.5 [PH] (ref 4.5–8)
PLATELET # BLD AUTO: 258 THOUSANDS/UL (ref 149–390)
PMV BLD AUTO: 9.9 FL (ref 8.9–12.7)
POTASSIUM SERPL-SCNC: 4 MMOL/L (ref 3.5–5.3)
PR INTERVAL: 138 MS
PROT SERPL-MCNC: 7 G/DL (ref 6.4–8.2)
PROT UR STRIP-MCNC: NEGATIVE MG/DL
QRS AXIS: 66 DEGREES
QRSD INTERVAL: 94 MS
QT INTERVAL: 356 MS
QTC INTERVAL: 423 MS
RBC # BLD AUTO: 5 MILLION/UL (ref 3.81–5.12)
RBC #/AREA URNS AUTO: ABNORMAL /HPF
SODIUM SERPL-SCNC: 143 MMOL/L (ref 136–145)
SP GR UR STRIP.AUTO: 1.01 (ref 1–1.03)
T WAVE AXIS: 62 DEGREES
TROPONIN I SERPL-MCNC: <0.02 NG/ML
UROBILINOGEN UR QL STRIP.AUTO: 0.2 E.U./DL
VENTRICULAR RATE: 85 BPM
WBC # BLD AUTO: 7.93 THOUSAND/UL (ref 4.31–10.16)
WBC #/AREA URNS AUTO: ABNORMAL /HPF

## 2018-02-23 PROCEDURE — 93010 ELECTROCARDIOGRAM REPORT: CPT | Performed by: INTERNAL MEDICINE

## 2018-02-23 PROCEDURE — 81001 URINALYSIS AUTO W/SCOPE: CPT | Performed by: EMERGENCY MEDICINE

## 2018-02-23 PROCEDURE — 96374 THER/PROPH/DIAG INJ IV PUSH: CPT

## 2018-02-23 PROCEDURE — 96375 TX/PRO/DX INJ NEW DRUG ADDON: CPT

## 2018-02-23 PROCEDURE — 74177 CT ABD & PELVIS W/CONTRAST: CPT

## 2018-02-23 PROCEDURE — 80053 COMPREHEN METABOLIC PANEL: CPT | Performed by: EMERGENCY MEDICINE

## 2018-02-23 PROCEDURE — 96376 TX/PRO/DX INJ SAME DRUG ADON: CPT

## 2018-02-23 PROCEDURE — 85025 COMPLETE CBC W/AUTO DIFF WBC: CPT | Performed by: EMERGENCY MEDICINE

## 2018-02-23 PROCEDURE — 93005 ELECTROCARDIOGRAM TRACING: CPT | Performed by: EMERGENCY MEDICINE

## 2018-02-23 PROCEDURE — 99285 EMERGENCY DEPT VISIT HI MDM: CPT

## 2018-02-23 PROCEDURE — 84484 ASSAY OF TROPONIN QUANT: CPT | Performed by: EMERGENCY MEDICINE

## 2018-02-23 PROCEDURE — 83690 ASSAY OF LIPASE: CPT | Performed by: EMERGENCY MEDICINE

## 2018-02-23 RX ORDER — IBUPROFEN 600 MG/1
600 TABLET ORAL EVERY 6 HOURS PRN
Qty: 30 TABLET | Refills: 0 | Status: ON HOLD | OUTPATIENT
Start: 2018-02-23 | End: 2018-03-16 | Stop reason: SDUPTHER

## 2018-02-23 RX ORDER — FENTANYL CITRATE 50 UG/ML
100 INJECTION, SOLUTION INTRAMUSCULAR; INTRAVENOUS ONCE
Status: COMPLETED | OUTPATIENT
Start: 2018-02-23 | End: 2018-02-23

## 2018-02-23 RX ORDER — ONDANSETRON 2 MG/ML
4 INJECTION INTRAMUSCULAR; INTRAVENOUS ONCE
Status: COMPLETED | OUTPATIENT
Start: 2018-02-23 | End: 2018-02-23

## 2018-02-23 RX ADMIN — ONDANSETRON 4 MG: 2 INJECTION INTRAMUSCULAR; INTRAVENOUS at 15:18

## 2018-02-23 RX ADMIN — FENTANYL CITRATE 100 MCG: 50 INJECTION, SOLUTION INTRAMUSCULAR; INTRAVENOUS at 18:00

## 2018-02-23 RX ADMIN — IOHEXOL 100 ML: 350 INJECTION, SOLUTION INTRAVENOUS at 16:36

## 2018-02-23 RX ADMIN — FENTANYL CITRATE 100 MCG: 50 INJECTION, SOLUTION INTRAMUSCULAR; INTRAVENOUS at 15:22

## 2018-02-23 RX ADMIN — FENTANYL CITRATE 100 MCG: 50 INJECTION, SOLUTION INTRAMUSCULAR; INTRAVENOUS at 19:23

## 2018-02-23 RX ADMIN — ONDANSETRON 4 MG: 2 INJECTION INTRAMUSCULAR; INTRAVENOUS at 17:57

## 2018-02-23 NOTE — TELEPHONE ENCOUNTER
She was here on Monday, and to be set up with a surgeon  She is in a lot of pain, she has problems swallowing and her stomach is red  She wants to know what to do?

## 2018-02-24 NOTE — ED PROCEDURE NOTE
PROCEDURE  ECG 12 Lead Documentation  Date/Time: 2/23/2018 2:55 PM  Performed by: Coni Boss by: Alison Xiong     Indications / Diagnosis:  Abdominal pain  ECG reviewed by me, the ED Provider: yes    Patient location:  ED  Previous ECG:     Previous ECG:  Unavailable  Interpretation:     Interpretation: normal    Rate:     ECG rate:  85    ECG rate assessment: normal    Rhythm:     Rhythm: sinus rhythm    Ectopy:     Ectopy: none    QRS:     QRS axis:  Normal  Conduction:     Conduction: normal    ST segments:     ST segments:  Normal  T waves:     T waves: normal           Amy Berry MD  02/23/18 2028

## 2018-02-24 NOTE — DISCHARGE INSTRUCTIONS
Umbilical Hernia   WHAT YOU NEED TO KNOW:   An umbilical hernia is a bulge through the abdominal muscles in the area of the navel (belly button)  The hernia may contain tissue from the abdomen, part of an organ (such as the intestine), or fluid  Umbilical hernias usually happen because of a hole or a weak area in the muscles of the abdominal wall  DISCHARGE INSTRUCTIONS:   Medicines:  · Ibuprofen or acetaminophen:  These medicines decrease pain  They are available without a doctor's order  Ask your healthcare provider which medicine is right for you  Ask how much to take and how often to take it  Follow directions  These medicines can cause stomach bleeding if not taken correctly  Ibuprofen can cause kidney damage  Do not take ibuprofen if you have kidney disease, an ulcer, or allergies to aspirin  Acetaminophen can cause liver damage  Do not drink alcohol if you take acetaminophen  · Take your medicine as directed  Contact your healthcare provider if you think your medicine is not helping or if you have side effects  Tell him of her if you are allergic to any medicine  Keep a list of the medicines, vitamins, and herbs you take  Include the amounts, and when and why you take them  Bring the list or the pill bottles to follow-up visits  Carry your medicine list with you in case of an emergency  Follow up with your healthcare provider as directed:  Write down your questions so you remember to ask them during your visits  Self care:   · Activity:  Avoid lifting, bending, and straining  Try not to stand for long periods of time  If you have to cough, try to cough gently  Support the hernia by holding your hand or a pillow over it when coughing  Ask your healthcare provider if it is okay for you to have sexual intercourse  · Prevent constipation:  Straining to have a bowel movement may make your hernia worse  Eat foods that are high in fiber and drink at least 8 glasses of water a day   A high-fiber diet includes whole grains, bran, cereals, and uncooked fruit and vegetables  Walking or other exercise can also help  Your healthcare provider may give you fiber medicine or a stool softener to help make your bowel movements softer and more regular  Do not use an enema or a laxative unless your healthcare provider says it is okay  · What to wear:  Do not wear anything tight over your hernia  Ask your healthcare provider if you should wear support belt or girdle to keep the hernia in place  · Ask your healthcare provider how to reduce your hernia:  Sometimes your hernia will slip back into your abdomen if you lie flat for a while  Ask your healthcare provider if you should try to gently push your hernia back into place if lying flat does not work  If your hernia does not slide back into your abdomen easily, stop pushing on it and call your healthcare provider  Do not try to push the hernia back into place if it is painful or tender  Contact your healthcare provider if:   · You have nausea or vomiting  · You cannot gently push your hernia back into your abdomen  (Do this only if your healthcare provider has shown you how to do it )     · You are constipated or have blood in your bowel movements  · Your hernia is getting bigger  · You have questions or concerns about your condition or care  Return to the emergency department if:   · You have a fever  · Your hernia is stuck outside your abdomen and is painful, swollen, or feels hard  · You completely stop having bowel movements and stop passing gas  · Your abdominal pain is bad or getting worse  © 2017 [employer] Information is for End User's use only and may not be sold, redistributed or otherwise used for commercial purposes  All illustrations and images included in CareNotes® are the copyrighted property of [employer]  or [employer]  The above information is an  only   It is not intended as medical advice for individual conditions or treatments  Talk to your doctor, nurse or pharmacist before following any medical regimen to see if it is safe and effective for you

## 2018-03-07 NOTE — ED PROVIDER NOTES
History  Chief Complaint   Patient presents with    Abdominal Pain     pt has umbilical hernia  complaining of nausea and severe pain     Patient: Larisa Rausch  79 y o /female  YOB: 1970  MRN: [id number]  PCP: José Manuel Gleason DO  Date of evaluation: 2/23/2018    (DARCIE Barker may have been used in the preparation of this document )    Abdominal pain; site of known umbilical hernia  History provided by:  Patient, significant other and relative  Abdominal Pain   Pain location:  Periumbilical  Pain radiates to:  Does not radiate  Pain severity:  Severe  Timing:  Constant  Progression:  Worsening  Chronicity:  New  Context: not diet changes, not eating, not laxative use, not medication withdrawal and not trauma    Relieved by:  Nothing  Worsened by:  Nothing  Ineffective treatments:  None tried  Associated symptoms: nausea    Associated symptoms: no chest pain, no chills, no constipation, no cough, no diarrhea, no dysuria, no fever, no hematochezia, no hematuria, no melena, no shortness of breath, no vaginal bleeding, no vaginal discharge and no vomiting        Prior to Admission Medications   Prescriptions Last Dose Informant Patient Reported? Taking?    cyclobenzaprine (FLEXERIL) 5 mg tablet   No Yes   Sig: Take 1 tablet (5 mg total) by mouth 3 (three) times a day as needed for muscle spasms   diclofenac sodium (VOLTAREN) 50 mg EC tablet   No Yes   Sig: Take 1 tablet (50 mg total) by mouth 2 (two) times a day Do not take with ibuprofen   gabapentin (NEURONTIN) 300 mg capsule   No Yes   Sig: Take 1 capsule (300 mg total) by mouth 3 (three) times a day   ibuprofen (MOTRIN) 600 mg tablet   No Yes   Sig: Take 1 tablet (600 mg total) by mouth every 8 (eight) hours as needed for moderate pain Do not take with diclofenac      Facility-Administered Medications: None       Past Medical History:   Diagnosis Date    Back pain        Past Surgical History:   Procedure Laterality Date    BACK SURGERY      BACK SURGERY      CHOLECYSTECTOMY      LUMBAR [address] [facility] SURGERY         Family History   Problem Relation Age of Onset    Adopted: Yes     I have reviewed and agree with the history as documented  Social History   Substance Use Topics    Smoking status: Former Smoker     Packs/day: 1 00     Years: 30 00     Types: Cigars    Smokeless tobacco: Never Used      Comment: 1 ppd agreeable to cessation education and patch    Alcohol use No        Review of Systems   Constitutional: Negative for chills and fever  HENT: Negative for hearing loss, trouble swallowing and voice change  Eyes: Negative for pain, redness and visual disturbance  Respiratory: Negative for cough and shortness of breath  Cardiovascular: Negative for chest pain and palpitations  Gastrointestinal: Positive for abdominal pain and nausea  Negative for constipation, diarrhea, hematochezia, melena and vomiting  Genitourinary: Negative for dysuria, hematuria, vaginal bleeding and vaginal discharge  Musculoskeletal: Negative for back pain, gait problem and neck pain  Skin: Negative for color change and rash  Neurological: Negative for weakness and light-headedness  Psychiatric/Behavioral: Negative for confusion and decreased concentration  The patient is not nervous/anxious  All other systems reviewed and are negative        Physical Exam  ED Triage Vitals [02/23/18 1350]   Temperature Pulse Respirations Blood Pressure SpO2   98 2 °F (36 8 °C) 96 20 123/85 96 %      Temp Source Heart Rate Source Patient Position - Orthostatic VS BP Location FiO2 (%)   Temporal Left Sitting Left arm --      Pain Score       7           Orthostatic Vital Signs  Vitals:    02/23/18 1415 02/23/18 1653 02/23/18 1930 02/23/18 1935   BP: 156/80 137/83 138/82 138/82   Pulse: 90 82 70 70   Patient Position - Orthostatic VS: Sitting Sitting Lying Lying       Physical Exam   Constitutional: She is oriented to person, place, and time  She appears well-developed and well-nourished  HENT:   Mouth/Throat: Oropharynx is clear and moist and mucous membranes are normal    Voice normal   Eyes: EOM are normal  Pupils are equal, round, and reactive to light  Cardiovascular: Normal rate and regular rhythm  Pulmonary/Chest: Effort normal    Abdominal: Soft  Bowel sounds are decreased  There is generalized tenderness  There is no rigidity, no rebound and no guarding  A hernia is present  Hernia confirmed positive in the ventral area  Neurological: She is alert and oriented to person, place, and time  GCS eye subscore is 4  GCS verbal subscore is 5  GCS motor subscore is 6  Skin: Skin is warm and dry  Psychiatric: She has a normal mood and affect  Her speech is normal and behavior is normal    Nursing note and vitals reviewed        ED Medications  Medications   fentanyl citrate (PF) 100 MCG/2ML 100 mcg (100 mcg Intravenous Given 2/23/18 1522)   ondansetron (ZOFRAN) injection 4 mg (4 mg Intravenous Given 2/23/18 1518)   iohexol (OMNIPAQUE) 350 MG/ML injection (MULTI-DOSE) 100 mL (100 mL Intravenous Given 2/23/18 1636)   fentanyl citrate (PF) 100 MCG/2ML 100 mcg (100 mcg Intravenous Given 2/23/18 1800)   ondansetron (ZOFRAN) injection 4 mg (4 mg Intravenous Given 2/23/18 1757)   fentanyl citrate (PF) 100 MCG/2ML 100 mcg (100 mcg Intravenous Given 2/23/18 1923)       Diagnostic Studies  Results Reviewed     Procedure Component Value Units Date/Time    Troponin I [[id number]]  (Normal) Collected:  02/23/18 1458    Lab Status:  Final result Specimen:  Blood from Arm, Left Updated:  02/23/18 1522     Troponin I <0 02 ng/mL     Narrative:         [employer] Chemistry analyzer 99% cutoff is > 0 04 ng/mL in network labs    o cTnI 99% cutoff is useful only when applied to patients in the clinical setting of myocardial ischemia  o cTnI 99% cutoff should be interpreted in the context of clinical history, ECG findings and possibly cardiac imaging to establish correct diagnosis  o cTnI 99% cutoff may be suggestive but clearly not indicative of a coronary event without the clinical setting of myocardial ischemia  Comprehensive metabolic panel [[id number]] Collected:  02/23/18 1458    Lab Status:  Final result Specimen:  Blood from Arm, Left Updated:  02/23/18 1519     Sodium 143 mmol/L      Potassium 4 0 mmol/L      Chloride 105 mmol/L      CO2 32 mmol/L      Anion Gap 6 mmol/L      BUN 17 mg/dL      Creatinine 0 73 mg/dL      Glucose 88 mg/dL      Calcium 9 4 mg/dL      AST 11 U/L      ALT 24 U/L      Alkaline Phosphatase 85 U/L      Total Protein 7 0 g/dL      Albumin 3 7 g/dL      Total Bilirubin 0 30 mg/dL      eGFR 97 ml/min/1 73sq m     Narrative:         National Kidney Disease Education Program recommendations are as follows:  GFR calculation is accurate only with a steady state creatinine  Chronic Kidney disease less than 60 ml/min/1 73 sq  meters  Kidney failure less than 15 ml/min/1 73 sq  meters      Lipase [[id number]]  (Normal) Collected:  02/23/18 1458    Lab Status:  Final result Specimen:  Blood from Arm, Left Updated:  02/23/18 1519     Lipase 113 u/L     Urine Microscopic [[id number]]  (Abnormal) Collected:  02/23/18 1445    Lab Status:  Final result Specimen:  Urine from Urine, Clean Catch Updated:  02/23/18 1513     RBC, UA 0-1 (A) /hpf      WBC, UA 4-10 (A) /hpf      Epithelial Cells Occasional /hpf      Bacteria, UA Moderate (A) /hpf     CBC and differential [[id number]]  (Normal) Collected:  02/23/18 1458    Lab Status:  Final result Specimen:  Blood from Arm, Left Updated:  02/23/18 1504     WBC 7 93 Thousand/uL      RBC 5 00 Million/uL      Hemoglobin 15 4 g/dL      Hematocrit 45 4 %      MCV 91 fL      MCH 30 8 pg      MCHC 33 9 g/dL      RDW 13 3 %      MPV 9 9 fL      Platelets 998 Thousands/uL      Neutrophils Relative 58 %      Lymphocytes Relative 32 %      Monocytes Relative 8 %      Eosinophils Relative 2 %      Basophils Relative 0 % Neutrophils Absolute 4 61 Thousands/µL      Lymphocytes Absolute 2 50 Thousands/µL      Monocytes Absolute 0 62 Thousand/µL      Eosinophils Absolute 0 18 Thousand/µL      Basophils Absolute 0 02 Thousands/µL     UA w Reflex to Microscopic [[id number]]  (Abnormal) Collected:  02/23/18 1445    Lab Status:  Final result Specimen:  Urine from Urine, Clean Catch Updated:  02/23/18 1504     Color, UA Yellow     Clarity, UA Clear     Specific Gravity, UA 1 015     pH, UA 6 5     Leukocytes, UA Small (A)     Nitrite, UA Negative     Protein, UA Negative mg/dl      Glucose, UA Negative mg/dl      Ketones, UA Negative mg/dl      Urobilinogen, UA 0 2 E U /dl      Bilirubin, UA Negative     Blood, UA Negative                 CT abdomen pelvis with contrast   Final Result by Pat Barraza DO (02/23 1716)      Colonic diverticulosis without evidence for acute diverticulitis  Numerous nonobstructing intrarenal calculi are seen bilaterally  Normal CT appearance of the appendix  There are 2 adjacent periumbilical hernias containing fat  Workstation performed: [id number]                    Procedures  Procedures       Phone Contacts  ED Phone Contact    ED Course  ED Course as of Mar 07 0324   Fri Feb 23, 2018   1806 Pt re-evaluated and meds ordered  Hernia able to be reduced after some time in Trendelenburg      HEART Risk Score    Flowsheet Row Most Recent Value   History  0 Filed at: 02/23/2018 2028   ECG  0 Filed at: 02/23/2018 2028   Age  1 Filed at: 02/23/2018 2028   Risk Factors  0 Filed at: 02/23/2018 2028   Troponin  0 Filed at: 02/23/2018 2028   Heart Score Risk Calculator   History  0 Filed at: 02/23/2018 2028   ECG  0 Filed at: 02/23/2018 2028   Age  1 Filed at: 02/23/2018 2028   Risk Factors  0 Filed at: 02/23/2018 2028   Troponin  0 Filed at: 02/23/2018 2028   HEART Score  1 Filed at: 02/23/2018 2028   HEART Score  1 Filed at: 02/23/2018 2028 [facility]  CritCare Time    Disposition  Final diagnoses:   Umbilical hernia without obstruction and without gangrene     Time reflects when diagnosis was documented in both MDM as applicable and the Disposition within this note     Time User Action Codes Description Comment    2/23/2018  7:51 PM Vahid Gonzalez Add [Y39 5] Umbilical hernia without obstruction and without gangrene       ED Disposition     ED Disposition Condition Comment    Discharge  Gladys Dodson discharge to home/self care  Condition at discharge: Good        Follow-up Information     Follow up With Specialties Details [address], DO General Surgery   [facility] [place] [address]  [phone]      [facility]   [address] [id number]  [phone]        Discharge Medication List as of 2/23/2018  7:53 PM      START taking these medications    Details   !! ibuprofen (MOTRIN) 600 mg tablet Take 1 tablet (600 mg total) by mouth every 6 (six) hours as needed (pain, fever), Starting Fri 2/23/2018, Normal       !! - Potential duplicate medications found  Please discuss with provider        CONTINUE these medications which have NOT CHANGED    Details   cyclobenzaprine (FLEXERIL) 5 mg tablet Take 1 tablet (5 mg total) by mouth 3 (three) times a day as needed for muscle spasms, Starting Mon 2/19/2018, Normal      diclofenac sodium (VOLTAREN) 50 mg EC tablet Take 1 tablet (50 mg total) by mouth 2 (two) times a day Do not take with ibuprofen, Starting Mon 2/19/2018, Normal      gabapentin (NEURONTIN) 300 mg capsule Take 1 capsule (300 mg total) by mouth 3 (three) times a day, Starting Mon 2/19/2018, Normal      !! ibuprofen (MOTRIN) 600 mg tablet Take 1 tablet (600 mg total) by mouth every 8 (eight) hours as needed for moderate pain Do not take with diclofenac, Starting Mon 2/19/2018, Normal       !! - Potential duplicate medications found  Please discuss with provider  No discharge procedures on file      ED Provider  Electronically Signed by           Alma Harrington MD  03/07/18 6082

## 2018-03-12 ENCOUNTER — OFFICE VISIT (OUTPATIENT)
Dept: SURGERY | Facility: HOSPITAL | Age: 48
End: 2018-03-12
Payer: COMMERCIAL

## 2018-03-12 VITALS
HEART RATE: 94 BPM | SYSTOLIC BLOOD PRESSURE: 136 MMHG | WEIGHT: 220 LBS | TEMPERATURE: 98 F | BODY MASS INDEX: 35.36 KG/M2 | HEIGHT: 66 IN | DIASTOLIC BLOOD PRESSURE: 90 MMHG

## 2018-03-12 DIAGNOSIS — K43.9 SUPRAUMBILICAL HERNIA: Primary | ICD-10-CM

## 2018-03-12 PROCEDURE — 99204 OFFICE O/P NEW MOD 45 MIN: CPT | Performed by: SURGERY

## 2018-03-12 NOTE — LETTER
March 13, 2018     Fabio Ridley DO  [facility]    Patient: Viktor Stock   YOB: 1970   Date of Visit: 3/12/2018       Dear Dr Radha Robert: Thank you for referring Sami Mookie to me for evaluation  Below are my notes for this consultation  If you have questions, please do not hesitate to call me  I look forward to following your patient along with you  Sincerely,        Amina Smiley DO        CC: No Recipients  Amina Smiley DO  3/13/2018  8:43 PM  Sign at close encounter  Assessment/Plan:    Supraumbilical hernia  Multiple supra/periumbilical hernia that is asymptomatic  Patient wishes to have this fixed     -the procedure self including all associated risk and benefits were discussed at great deal with the patient  Patient verbalized understands risks is willing to proceed  -will schedule have the hernia fixed later this week  She will not require any additional preoperative workup  Diagnoses and all orders for this visit:    Supraumbilical hernia          Subjective:      Patient ID: Viktor Stock is a 50 y o  female  24-year-old female with no significant past medical history, presents today for evaluation of in abdominal hernia  Patient recently seen in the ER for significant abdominal pain just above the umbilicus associated nausea vomiting  At that time a CT scan was undertaken and showed multiple periumbilical hernias containing fat  At that time Dr Willett Ringer discuss case with surgery and she was able to reduce the hernia the patient subsequent out  Since that time the hernias continue to come back out and cause the patient significant discomfort, pain is mostly sharp and radiates down around throughout her belly  Along with the pain comes significant nausea associated vomiting  Patient is able to be very little    She is passing some flatus she states that she has a difficult time going to the bathroom and only has bowel movements maybe once a week  Denies any fevers or chills  No shortness of breath  No chest pain  She        The following portions of the patient's history were reviewed and updated as appropriate:   She  has a past medical history of Back pain  She   Patient Active Problem List    Diagnosis Date Noted    Lumbar pain with radiation down left leg 06/07/2017    Lumbar disc herniation 95/89/5395    Supraumbilical hernia 57/87/4388    Tobacco abuse 11/16/2016    Status post lumbar spinal fusion 04/20/2012     She  has a past surgical history that includes Back surgery; Cholecystectomy; Lumbar disc surgery; and Back surgery  Her family history is not on file  She was adopted  She  reports that she has quit smoking  Her smoking use included Cigars  She has a 30 00 pack-year smoking history  She has never used smokeless tobacco  She reports that she does not drink alcohol or use drugs  Current Outpatient Prescriptions   Medication Sig Dispense Refill    cyclobenzaprine (FLEXERIL) 5 mg tablet Take 1 tablet (5 mg total) by mouth 3 (three) times a day as needed for muscle spasms 60 tablet 0    diclofenac sodium (VOLTAREN) 50 mg EC tablet Take 1 tablet (50 mg total) by mouth 2 (two) times a day Do not take with ibuprofen 180 tablet 0    gabapentin (NEURONTIN) 300 mg capsule Take 1 capsule (300 mg total) by mouth 3 (three) times a day 270 capsule 0    ibuprofen (MOTRIN) 600 mg tablet Take 1 tablet (600 mg total) by mouth every 8 (eight) hours as needed for moderate pain Do not take with diclofenac 270 tablet 0    ibuprofen (MOTRIN) 600 mg tablet Take 1 tablet (600 mg total) by mouth every 6 (six) hours as needed (pain, fever) 30 tablet 0     No current facility-administered medications for this visit  She has No Known Allergies       Review of Systems      A 10 point review systems was conducted, all negative except as noted above HPI      Objective:      /90   Pulse 94   Temp 98 °F (36 7 °C)   Ht 5' 6" (1 676 m)   Wt 99 8 kg (220 lb)   LMP 10/10/2016   BMI 35 51 kg/m²           Physical Exam   Constitutional: She is oriented to person, place, and time  She appears well-developed and well-nourished  No distress  HENT:   Head: Normocephalic and atraumatic  Eyes: No scleral icterus  Neck: Normal range of motion  No tracheal deviation present  Cardiovascular: Normal rate and regular rhythm  Exam reveals friction rub  Exam reveals no gallop  No murmur heard  Pulmonary/Chest: Effort normal and breath sounds normal  No respiratory distress  She has no wheezes  She has no rales  She exhibits no tenderness  Abdominal: Soft  She exhibits mass (Palpable mass above the umbilicus, 2 masses to be exact which coincided with the CT findings of periumbilical hernia  These hernias are partially reducible and are painful palpation  No overlying skin changes  )  She exhibits no distension  There is tenderness (Overlying supraumbilical mass consistent with hernia )  There is no rebound and no guarding  Musculoskeletal: Normal range of motion  She exhibits no edema, tenderness or deformity  Lymphadenopathy:     She has no cervical adenopathy  Neurological: She is alert and oriented to person, place, and time  No cranial nerve deficit  Skin: Skin is warm  No rash noted  She is not diaphoretic  No erythema  No pallor  Psychiatric: She has a normal mood and affect   Her behavior is normal

## 2018-03-13 RX ORDER — SODIUM CHLORIDE, SODIUM LACTATE, POTASSIUM CHLORIDE, CALCIUM CHLORIDE 600; 310; 30; 20 MG/100ML; MG/100ML; MG/100ML; MG/100ML
125 INJECTION, SOLUTION INTRAVENOUS CONTINUOUS
Status: CANCELLED | OUTPATIENT
Start: 2018-03-13

## 2018-03-13 RX ORDER — CHLORHEXIDINE GLUCONATE 4 G/100ML
SOLUTION TOPICAL DAILY PRN
Status: CANCELLED | OUTPATIENT
Start: 2018-03-13

## 2018-03-13 RX ORDER — HEPARIN SODIUM 5000 [USP'U]/ML
5000 INJECTION, SOLUTION INTRAVENOUS; SUBCUTANEOUS ONCE
Status: CANCELLED | OUTPATIENT
Start: 2018-03-16

## 2018-03-14 NOTE — ASSESSMENT & PLAN NOTE
Multiple supra/periumbilical hernia that is asymptomatic  Patient wishes to have this fixed     -the procedure self including all associated risk and benefits were discussed at great deal with the patient  Patient verbalized understands risks is willing to proceed  -will schedule have the hernia fixed later this week  She will not require any additional preoperative workup

## 2018-03-14 NOTE — H&P
Pt present for painful hernia above belly button, have nausea vomiting     Assessment/Plan:    Supraumbilical hernia  Multiple supra/periumbilical hernia that is asymptomatic  Patient wishes to have this fixed     -the procedure self including all associated risk and benefits were discussed at great deal with the patient  Patient verbalized understands risks is willing to proceed  -will schedule have the hernia fixed later this week  She will not require any additional preoperative workup  Diagnoses and all orders for this visit:    Supraumbilical hernia          Subjective:      Patient ID: Nicole Stoddard is a 50 y o  female  57-year-old female with no significant past medical history, presents today for evaluation of in abdominal hernia  Patient recently seen in the ER for significant abdominal pain just above the umbilicus associated nausea vomiting  At that time a CT scan was undertaken and showed multiple periumbilical hernias containing fat  At that time Dr Mitchell Dent discuss case with surgery and she was able to reduce the hernia the patient subsequent out  Since that time the hernias continue to come back out and cause the patient significant discomfort, pain is mostly sharp and radiates down around throughout her belly  Along with the pain comes significant nausea associated vomiting  Patient is able to be very little  She is passing some flatus she states that she has a difficult time going to the bathroom and only has bowel movements maybe once a week  Denies any fevers or chills  No shortness of breath  No chest pain  She        The following portions of the patient's history were reviewed and updated as appropriate:   She  has a past medical history of Back pain    She   Patient Active Problem List    Diagnosis Date Noted    Lumbar pain with radiation down left leg 06/07/2017    Lumbar disc herniation 47/41/6877    Supraumbilical hernia 73/35/4713    Tobacco abuse 11/16/2016    Status post lumbar spinal fusion 04/20/2012     She  has a past surgical history that includes Back surgery; Cholecystectomy; Lumbar disc surgery; and Back surgery  Her family history is not on file  She was adopted  She  reports that she has quit smoking  Her smoking use included Cigars  She has a 30 00 pack-year smoking history  She has never used smokeless tobacco  She reports that she does not drink alcohol or use drugs  Current Outpatient Prescriptions   Medication Sig Dispense Refill    cyclobenzaprine (FLEXERIL) 5 mg tablet Take 1 tablet (5 mg total) by mouth 3 (three) times a day as needed for muscle spasms 60 tablet 0    diclofenac sodium (VOLTAREN) 50 mg EC tablet Take 1 tablet (50 mg total) by mouth 2 (two) times a day Do not take with ibuprofen 180 tablet 0    gabapentin (NEURONTIN) 300 mg capsule Take 1 capsule (300 mg total) by mouth 3 (three) times a day 270 capsule 0    ibuprofen (MOTRIN) 600 mg tablet Take 1 tablet (600 mg total) by mouth every 8 (eight) hours as needed for moderate pain Do not take with diclofenac 270 tablet 0    ibuprofen (MOTRIN) 600 mg tablet Take 1 tablet (600 mg total) by mouth every 6 (six) hours as needed (pain, fever) 30 tablet 0     No current facility-administered medications for this visit  She has No Known Allergies       Review of Systems      A 10 point review systems was conducted, all negative except as noted above HPI  Objective:      /90   Pulse 94   Temp 98 °F (36 7 °C)   Ht 5' 6" (1 676 m)   Wt 99 8 kg (220 lb)   LMP 10/10/2016   BMI 35 51 kg/m²           Physical Exam   Constitutional: She is oriented to person, place, and time  She appears well-developed and well-nourished  No distress  HENT:   Head: Normocephalic and atraumatic  Eyes: No scleral icterus  Neck: Normal range of motion  No tracheal deviation present  Cardiovascular: Normal rate and regular rhythm  Exam reveals friction rub  Exam reveals no gallop      No murmur heard   Pulmonary/Chest: Effort normal and breath sounds normal  No respiratory distress  She has no wheezes  She has no rales  She exhibits no tenderness  Abdominal: Soft  She exhibits mass (Palpable mass above the umbilicus, 2 masses to be exact which coincided with the CT findings of periumbilical hernia  These hernias are partially reducible and are painful palpation  No overlying skin changes  )  She exhibits no distension  There is tenderness (Overlying supraumbilical mass consistent with hernia )  There is no rebound and no guarding  Musculoskeletal: Normal range of motion  She exhibits no edema, tenderness or deformity  Lymphadenopathy:     She has no cervical adenopathy  Neurological: She is alert and oriented to person, place, and time  No cranial nerve deficit  Skin: Skin is warm  No rash noted  She is not diaphoretic  No erythema  No pallor  Psychiatric: She has a normal mood and affect   Her behavior is normal

## 2018-03-14 NOTE — PROGRESS NOTES
Assessment/Plan:    Supraumbilical hernia  Multiple supra/periumbilical hernia that is asymptomatic  Patient wishes to have this fixed     -the procedure self including all associated risk and benefits were discussed at great deal with the patient  Patient verbalized understands risks is willing to proceed  -will schedule have the hernia fixed later this week  She will not require any additional preoperative workup  Diagnoses and all orders for this visit:    Supraumbilical hernia          Subjective:      Patient ID: Macrina Suarez is a 50 y o  female  25-year-old female with no significant past medical history, presents today for evaluation of in abdominal hernia  Patient recently seen in the ER for significant abdominal pain just above the umbilicus associated nausea vomiting  At that time a CT scan was undertaken and showed multiple periumbilical hernias containing fat  At that time Dr Sherry Jacobs discuss case with surgery and she was able to reduce the hernia the patient subsequent out  Since that time the hernias continue to come back out and cause the patient significant discomfort, pain is mostly sharp and radiates down around throughout her belly  Along with the pain comes significant nausea associated vomiting  Patient is able to be very little  She is passing some flatus she states that she has a difficult time going to the bathroom and only has bowel movements maybe once a week  Denies any fevers or chills  No shortness of breath  No chest pain  She        The following portions of the patient's history were reviewed and updated as appropriate:   She  has a past medical history of Back pain    She   Patient Active Problem List    Diagnosis Date Noted    Lumbar pain with radiation down left leg 06/07/2017    Lumbar disc herniation 33/77/1269    Supraumbilical hernia 53/51/6471    Tobacco abuse 11/16/2016    Status post lumbar spinal fusion 04/20/2012     She  has a past surgical history that includes Back surgery; Cholecystectomy; Lumbar disc surgery; and Back surgery  Her family history is not on file  She was adopted  She  reports that she has quit smoking  Her smoking use included Cigars  She has a 30 00 pack-year smoking history  She has never used smokeless tobacco  She reports that she does not drink alcohol or use drugs  Current Outpatient Prescriptions   Medication Sig Dispense Refill    cyclobenzaprine (FLEXERIL) 5 mg tablet Take 1 tablet (5 mg total) by mouth 3 (three) times a day as needed for muscle spasms 60 tablet 0    diclofenac sodium (VOLTAREN) 50 mg EC tablet Take 1 tablet (50 mg total) by mouth 2 (two) times a day Do not take with ibuprofen 180 tablet 0    gabapentin (NEURONTIN) 300 mg capsule Take 1 capsule (300 mg total) by mouth 3 (three) times a day 270 capsule 0    ibuprofen (MOTRIN) 600 mg tablet Take 1 tablet (600 mg total) by mouth every 8 (eight) hours as needed for moderate pain Do not take with diclofenac 270 tablet 0    ibuprofen (MOTRIN) 600 mg tablet Take 1 tablet (600 mg total) by mouth every 6 (six) hours as needed (pain, fever) 30 tablet 0     No current facility-administered medications for this visit  She has No Known Allergies       Review of Systems      A 10 point review systems was conducted, all negative except as noted above HPI  Objective:      /90   Pulse 94   Temp 98 °F (36 7 °C)   Ht 5' 6" (1 676 m)   Wt 99 8 kg (220 lb)   LMP 10/10/2016   BMI 35 51 kg/m²          Physical Exam   Constitutional: She is oriented to person, place, and time  She appears well-developed and well-nourished  No distress  HENT:   Head: Normocephalic and atraumatic  Eyes: No scleral icterus  Neck: Normal range of motion  No tracheal deviation present  Cardiovascular: Normal rate and regular rhythm  Exam reveals friction rub  Exam reveals no gallop  No murmur heard    Pulmonary/Chest: Effort normal and breath sounds normal  No respiratory distress  She has no wheezes  She has no rales  She exhibits no tenderness  Abdominal: Soft  She exhibits mass (Palpable mass above the umbilicus, 2 masses to be exact which coincided with the CT findings of periumbilical hernia  These hernias are partially reducible and are painful palpation  No overlying skin changes  )  She exhibits no distension  There is tenderness (Overlying supraumbilical mass consistent with hernia )  There is no rebound and no guarding  Musculoskeletal: Normal range of motion  She exhibits no edema, tenderness or deformity  Lymphadenopathy:     She has no cervical adenopathy  Neurological: She is alert and oriented to person, place, and time  No cranial nerve deficit  Skin: Skin is warm  No rash noted  She is not diaphoretic  No erythema  No pallor  Psychiatric: She has a normal mood and affect   Her behavior is normal

## 2018-03-15 ENCOUNTER — ANESTHESIA EVENT (OUTPATIENT)
Dept: PERIOP | Facility: HOSPITAL | Age: 48
End: 2018-03-15
Payer: COMMERCIAL

## 2018-03-16 ENCOUNTER — ANESTHESIA (OUTPATIENT)
Dept: PERIOP | Facility: HOSPITAL | Age: 48
End: 2018-03-16
Payer: COMMERCIAL

## 2018-03-16 ENCOUNTER — HOSPITAL ENCOUNTER (OUTPATIENT)
Facility: HOSPITAL | Age: 48
Setting detail: OUTPATIENT SURGERY
Discharge: HOME | End: 2018-03-16
Attending: SURGERY | Admitting: SURGERY
Payer: COMMERCIAL

## 2018-03-16 VITALS
BODY MASS INDEX: 35.36 KG/M2 | WEIGHT: 220 LBS | HEART RATE: 65 BPM | TEMPERATURE: 97.2 F | SYSTOLIC BLOOD PRESSURE: 118 MMHG | OXYGEN SATURATION: 93 % | HEIGHT: 66 IN | RESPIRATION RATE: 18 BRPM | DIASTOLIC BLOOD PRESSURE: 71 MMHG

## 2018-03-16 DIAGNOSIS — K43.9 SUPRAUMBILICAL HERNIA: Primary | ICD-10-CM

## 2018-03-16 PROCEDURE — C1781 MESH (IMPLANTABLE): HCPCS | Performed by: SURGERY

## 2018-03-16 PROCEDURE — 49560 PR REPAIR INCISIONAL HERNIA,REDUCIBLE: CPT | Performed by: SURGERY

## 2018-03-16 PROCEDURE — 49560 PR REPAIR INCISIONAL HERNIA,REDUCIBLE: CPT

## 2018-03-16 PROCEDURE — 49568 PR IMPLANT MESH HERNIA REPAIR/DEBRIDEMENT CLOSURE: CPT

## 2018-03-16 PROCEDURE — 49568 PR IMPLANT MESH HERNIA REPAIR/DEBRIDEMENT CLOSURE: CPT | Performed by: SURGERY

## 2018-03-16 DEVICE — VENTRALEX HERNIA PATCH, 8.0 CM (3.2"), LARGE CIRCLE WITH STRAP
Type: IMPLANTABLE DEVICE | Site: UMBILICAL | Status: FUNCTIONAL
Brand: VENTRALEX

## 2018-03-16 RX ORDER — PROPOFOL 10 MG/ML
INJECTION, EMULSION INTRAVENOUS AS NEEDED
Status: DISCONTINUED | OUTPATIENT
Start: 2018-03-16 | End: 2018-03-16 | Stop reason: SURG

## 2018-03-16 RX ORDER — VECURONIUM BROMIDE 1 MG/ML
INJECTION, POWDER, LYOPHILIZED, FOR SOLUTION INTRAVENOUS AS NEEDED
Status: DISCONTINUED | OUTPATIENT
Start: 2018-03-16 | End: 2018-03-16 | Stop reason: SURG

## 2018-03-16 RX ORDER — METOCLOPRAMIDE HYDROCHLORIDE 5 MG/ML
10 INJECTION INTRAMUSCULAR; INTRAVENOUS ONCE AS NEEDED
Status: DISCONTINUED | OUTPATIENT
Start: 2018-03-16 | End: 2018-03-16 | Stop reason: HOSPADM

## 2018-03-16 RX ORDER — METOCLOPRAMIDE HYDROCHLORIDE 5 MG/ML
INJECTION INTRAMUSCULAR; INTRAVENOUS AS NEEDED
Status: DISCONTINUED | OUTPATIENT
Start: 2018-03-16 | End: 2018-03-16 | Stop reason: SURG

## 2018-03-16 RX ORDER — FENTANYL CITRATE 50 UG/ML
INJECTION, SOLUTION INTRAMUSCULAR; INTRAVENOUS AS NEEDED
Status: DISCONTINUED | OUTPATIENT
Start: 2018-03-16 | End: 2018-03-16 | Stop reason: SURG

## 2018-03-16 RX ORDER — ONDANSETRON 2 MG/ML
INJECTION INTRAMUSCULAR; INTRAVENOUS AS NEEDED
Status: DISCONTINUED | OUTPATIENT
Start: 2018-03-16 | End: 2018-03-16 | Stop reason: SURG

## 2018-03-16 RX ORDER — CHLORHEXIDINE GLUCONATE 4 G/100ML
SOLUTION TOPICAL DAILY PRN
Status: DISCONTINUED | OUTPATIENT
Start: 2018-03-16 | End: 2018-03-16 | Stop reason: HOSPADM

## 2018-03-16 RX ORDER — GLYCOPYRROLATE 0.2 MG/ML
INJECTION INTRAMUSCULAR; INTRAVENOUS AS NEEDED
Status: DISCONTINUED | OUTPATIENT
Start: 2018-03-16 | End: 2018-03-16 | Stop reason: SURG

## 2018-03-16 RX ORDER — MORPHINE SULFATE 4 MG/ML
2 INJECTION, SOLUTION INTRAMUSCULAR; INTRAVENOUS EVERY 4 HOURS PRN
Status: DISCONTINUED | OUTPATIENT
Start: 2018-03-16 | End: 2018-03-16 | Stop reason: HOSPADM

## 2018-03-16 RX ORDER — FENTANYL CITRATE/PF 50 MCG/ML
50 SYRINGE (ML) INJECTION
Status: DISCONTINUED | OUTPATIENT
Start: 2018-03-16 | End: 2018-03-16 | Stop reason: HOSPADM

## 2018-03-16 RX ORDER — OXYCODONE HYDROCHLORIDE AND ACETAMINOPHEN 5; 325 MG/1; MG/1
2 TABLET ORAL EVERY 4 HOURS PRN
Status: DISCONTINUED | OUTPATIENT
Start: 2018-03-16 | End: 2018-03-16 | Stop reason: HOSPADM

## 2018-03-16 RX ORDER — HEPARIN SODIUM 5000 [USP'U]/ML
5000 INJECTION, SOLUTION INTRAVENOUS; SUBCUTANEOUS ONCE
Status: COMPLETED | OUTPATIENT
Start: 2018-03-16 | End: 2018-03-16

## 2018-03-16 RX ORDER — SODIUM CHLORIDE, SODIUM LACTATE, POTASSIUM CHLORIDE, CALCIUM CHLORIDE 600; 310; 30; 20 MG/100ML; MG/100ML; MG/100ML; MG/100ML
125 INJECTION, SOLUTION INTRAVENOUS CONTINUOUS
Status: DISCONTINUED | OUTPATIENT
Start: 2018-03-16 | End: 2018-03-16

## 2018-03-16 RX ORDER — ONDANSETRON 2 MG/ML
4 INJECTION INTRAMUSCULAR; INTRAVENOUS EVERY 8 HOURS PRN
Status: DISCONTINUED | OUTPATIENT
Start: 2018-03-16 | End: 2018-03-16 | Stop reason: HOSPADM

## 2018-03-16 RX ORDER — BUPIVACAINE HYDROCHLORIDE 5 MG/ML
INJECTION, SOLUTION PERINEURAL AS NEEDED
Status: DISCONTINUED | OUTPATIENT
Start: 2018-03-16 | End: 2018-03-16 | Stop reason: HOSPADM

## 2018-03-16 RX ORDER — DIPHENHYDRAMINE HYDROCHLORIDE 50 MG/ML
12.5 INJECTION INTRAMUSCULAR; INTRAVENOUS ONCE AS NEEDED
Status: DISCONTINUED | OUTPATIENT
Start: 2018-03-16 | End: 2018-03-16 | Stop reason: HOSPADM

## 2018-03-16 RX ORDER — KETOROLAC TROMETHAMINE 30 MG/ML
INJECTION, SOLUTION INTRAMUSCULAR; INTRAVENOUS AS NEEDED
Status: DISCONTINUED | OUTPATIENT
Start: 2018-03-16 | End: 2018-03-16 | Stop reason: SURG

## 2018-03-16 RX ORDER — DEXAMETHASONE SODIUM PHOSPHATE 4 MG/ML
INJECTION, SOLUTION INTRA-ARTICULAR; INTRALESIONAL; INTRAMUSCULAR; INTRAVENOUS; SOFT TISSUE AS NEEDED
Status: DISCONTINUED | OUTPATIENT
Start: 2018-03-16 | End: 2018-03-16 | Stop reason: SURG

## 2018-03-16 RX ORDER — ONDANSETRON 2 MG/ML
4 INJECTION INTRAMUSCULAR; INTRAVENOUS ONCE AS NEEDED
Status: DISCONTINUED | OUTPATIENT
Start: 2018-03-16 | End: 2018-03-16 | Stop reason: HOSPADM

## 2018-03-16 RX ORDER — SODIUM CHLORIDE, SODIUM LACTATE, POTASSIUM CHLORIDE, CALCIUM CHLORIDE 600; 310; 30; 20 MG/100ML; MG/100ML; MG/100ML; MG/100ML
125 INJECTION, SOLUTION INTRAVENOUS CONTINUOUS
Status: DISCONTINUED | OUTPATIENT
Start: 2018-03-16 | End: 2018-03-16 | Stop reason: HOSPADM

## 2018-03-16 RX ORDER — MIDAZOLAM HYDROCHLORIDE 1 MG/ML
INJECTION INTRAMUSCULAR; INTRAVENOUS AS NEEDED
Status: DISCONTINUED | OUTPATIENT
Start: 2018-03-16 | End: 2018-03-16 | Stop reason: SURG

## 2018-03-16 RX ORDER — LIDOCAINE HYDROCHLORIDE 10 MG/ML
INJECTION, SOLUTION INFILTRATION; PERINEURAL AS NEEDED
Status: DISCONTINUED | OUTPATIENT
Start: 2018-03-16 | End: 2018-03-16 | Stop reason: SURG

## 2018-03-16 RX ORDER — OXYCODONE HYDROCHLORIDE AND ACETAMINOPHEN 5; 325 MG/1; MG/1
1-2 TABLET ORAL EVERY 4 HOURS PRN
Qty: 40 TABLET | Refills: 0 | Status: SHIPPED | OUTPATIENT
Start: 2018-03-16 | End: 2018-04-03 | Stop reason: SDUPTHER

## 2018-03-16 RX ADMIN — FENTANYL CITRATE 50 MCG: 50 INJECTION, SOLUTION INTRAMUSCULAR; INTRAVENOUS at 10:06

## 2018-03-16 RX ADMIN — VECURONIUM BROMIDE 7 MG: 1 INJECTION, POWDER, LYOPHILIZED, FOR SOLUTION INTRAVENOUS at 10:06

## 2018-03-16 RX ADMIN — ONDANSETRON HYDROCHLORIDE 4 MG: 2 INJECTION, SOLUTION INTRAVENOUS at 11:01

## 2018-03-16 RX ADMIN — METOCLOPRAMIDE HYDROCHLORIDE 10 MG: 5 INJECTION INTRAMUSCULAR; INTRAVENOUS at 11:46

## 2018-03-16 RX ADMIN — VECURONIUM BROMIDE 1 MG: 1 INJECTION, POWDER, LYOPHILIZED, FOR SOLUTION INTRAVENOUS at 10:44

## 2018-03-16 RX ADMIN — GLYCOPYRROLATE 0.4 MG: 0.2 INJECTION, SOLUTION INTRAMUSCULAR; INTRAVENOUS at 11:23

## 2018-03-16 RX ADMIN — LIDOCAINE HYDROCHLORIDE 100 MG: 10 INJECTION, SOLUTION INFILTRATION; PERINEURAL at 10:06

## 2018-03-16 RX ADMIN — CEFAZOLIN SODIUM 2000 MG: 2 SOLUTION INTRAVENOUS at 10:10

## 2018-03-16 RX ADMIN — SODIUM CHLORIDE, SODIUM LACTATE, POTASSIUM CHLORIDE, AND CALCIUM CHLORIDE: .6; .31; .03; .02 INJECTION, SOLUTION INTRAVENOUS at 11:06

## 2018-03-16 RX ADMIN — HEPARIN SODIUM 5000 UNITS: 5000 INJECTION, SOLUTION INTRAVENOUS; SUBCUTANEOUS at 09:26

## 2018-03-16 RX ADMIN — FENTANYL CITRATE 25 MCG: 50 INJECTION, SOLUTION INTRAMUSCULAR; INTRAVENOUS at 11:12

## 2018-03-16 RX ADMIN — HYDROMORPHONE HYDROCHLORIDE 0.5 MG: 1 INJECTION, SOLUTION INTRAMUSCULAR; INTRAVENOUS; SUBCUTANEOUS at 11:51

## 2018-03-16 RX ADMIN — MIDAZOLAM HYDROCHLORIDE 2 MG: 1 INJECTION, SOLUTION INTRAMUSCULAR; INTRAVENOUS at 10:02

## 2018-03-16 RX ADMIN — DEXAMETHASONE SODIUM PHOSPHATE 4 MG: 4 INJECTION, SOLUTION INTRAMUSCULAR; INTRAVENOUS at 10:17

## 2018-03-16 RX ADMIN — FENTANYL CITRATE 25 MCG: 50 INJECTION, SOLUTION INTRAMUSCULAR; INTRAVENOUS at 10:30

## 2018-03-16 RX ADMIN — PROPOFOL 200 MG: 10 INJECTION, EMULSION INTRAVENOUS at 10:06

## 2018-03-16 RX ADMIN — KETOROLAC TROMETHAMINE 30 MG: 30 INJECTION, SOLUTION INTRAMUSCULAR at 11:15

## 2018-03-16 RX ADMIN — NEOSTIGMINE METHYLSULFATE 3 MG: 1 INJECTION, SOLUTION INTRAMUSCULAR; INTRAVENOUS; SUBCUTANEOUS at 11:23

## 2018-03-16 RX ADMIN — SODIUM CHLORIDE, SODIUM LACTATE, POTASSIUM CHLORIDE, AND CALCIUM CHLORIDE 125 ML/HR: .6; .31; .03; .02 INJECTION, SOLUTION INTRAVENOUS at 09:00

## 2018-03-16 NOTE — ANESTHESIA POSTPROCEDURE EVALUATION
Post-Op Assessment Note      CV Status:  Stable    Mental Status:  Alert and awake    Hydration Status:  Euvolemic    PONV Controlled:  Controlled    Airway Patency:  Patent    Post Op Vitals Reviewed: Yes          Staff: Anesthesiologist       BP   115/67   Temp  98 5   Pulse  78   Resp   20   SpO2   99% 4 L FM

## 2018-03-16 NOTE — ANESTHESIA PREPROCEDURE EVALUATION
Review of Systems/Medical History  Patient summary reviewed  Chart reviewed  No history of anesthetic complications     Cardiovascular  EKG reviewed, Negative cardio ROS Exercise tolerance: good,     Pulmonary  Negative pulmonary ROS Smoker (former smoker - quit x 3 mos) , No sleep apnea ,        GI/Hepatic  Negative GI/hepatic ROS          Negative  ROS        Endo/Other    Obesity (BMI 35)    GYN  Negative gynecology ROS          Hematology  Negative hematology ROS      Musculoskeletal  Back pain ,        Neurology  Negative neurology ROS      Psychology   Negative psychology ROS            Physical Exam    Airway    Mallampati score: II  TM Distance: >3 FB  Neck ROM: full     Dental   No notable dental hx     Cardiovascular  Comment: Negative ROS,     Pulmonary      Other Findings      Lab Results   Component Value Date    WBC 7 93 02/23/2018    HGB 15 4 02/23/2018     02/23/2018     Lab Results   Component Value Date     02/23/2018    K 4 0 02/23/2018    BUN 17 02/23/2018    CREATININE 0 73 02/23/2018    GLUCOSE 88 02/23/2018       Anesthesia Plan  ASA Score- 2     Anesthesia Type- general with ASA Monitors  Additional Monitors:   Airway Plan: ETT  Plan Factors-    Induction- intravenous  Postoperative Plan- Plan for postoperative opioid use  Planned trial extubation    Informed Consent- Anesthetic plan and risks discussed with patient and spouse

## 2018-03-16 NOTE — DISCHARGE INSTRUCTIONS
Follow-up Dr Cosme Davies in 2 weeks as per appointment  Regular diet as tolerated  Low intensity activity as tolerated, no heavy lifting, nothing greater than 14 lb for 6 weeks  Dressings may be removed on Sunday  He may shower study  No baths or swimming  If developed fevers, worsening pain, redness or drainage at the incision and call the office or go to the ER

## 2018-03-16 NOTE — H&P (VIEW-ONLY)
Pt present for painful hernia above belly button, have nausea vomiting     Assessment/Plan:    Supraumbilical hernia  Multiple supra/periumbilical hernia that is asymptomatic  Patient wishes to have this fixed     -the procedure self including all associated risk and benefits were discussed at great deal with the patient  Patient verbalized understands risks is willing to proceed  -will schedule have the hernia fixed later this week  She will not require any additional preoperative workup  Diagnoses and all orders for this visit:    Supraumbilical hernia          Subjective:      Patient ID: Frank Reed is a 50 y o  female  26-year-old female with no significant past medical history, presents today for evaluation of in abdominal hernia  Patient recently seen in the ER for significant abdominal pain just above the umbilicus associated nausea vomiting  At that time a CT scan was undertaken and showed multiple periumbilical hernias containing fat  At that time Dr Soraya Chapin discuss case with surgery and she was able to reduce the hernia the patient subsequent out  Since that time the hernias continue to come back out and cause the patient significant discomfort, pain is mostly sharp and radiates down around throughout her belly  Along with the pain comes significant nausea associated vomiting  Patient is able to be very little  She is passing some flatus she states that she has a difficult time going to the bathroom and only has bowel movements maybe once a week  Denies any fevers or chills  No shortness of breath  No chest pain  She        The following portions of the patient's history were reviewed and updated as appropriate:   She  has a past medical history of Back pain    She   Patient Active Problem List    Diagnosis Date Noted    Lumbar pain with radiation down left leg 06/07/2017    Lumbar disc herniation 20/49/9993    Supraumbilical hernia 62/81/2411    Tobacco abuse 11/16/2016    Status post lumbar spinal fusion 04/20/2012     She  has a past surgical history that includes Back surgery; Cholecystectomy; Lumbar disc surgery; and Back surgery  Her family history is not on file  She was adopted  She  reports that she has quit smoking  Her smoking use included Cigars  She has a 30 00 pack-year smoking history  She has never used smokeless tobacco  She reports that she does not drink alcohol or use drugs  Current Outpatient Prescriptions   Medication Sig Dispense Refill    cyclobenzaprine (FLEXERIL) 5 mg tablet Take 1 tablet (5 mg total) by mouth 3 (three) times a day as needed for muscle spasms 60 tablet 0    diclofenac sodium (VOLTAREN) 50 mg EC tablet Take 1 tablet (50 mg total) by mouth 2 (two) times a day Do not take with ibuprofen 180 tablet 0    gabapentin (NEURONTIN) 300 mg capsule Take 1 capsule (300 mg total) by mouth 3 (three) times a day 270 capsule 0    ibuprofen (MOTRIN) 600 mg tablet Take 1 tablet (600 mg total) by mouth every 8 (eight) hours as needed for moderate pain Do not take with diclofenac 270 tablet 0    ibuprofen (MOTRIN) 600 mg tablet Take 1 tablet (600 mg total) by mouth every 6 (six) hours as needed (pain, fever) 30 tablet 0     No current facility-administered medications for this visit  She has No Known Allergies       Review of Systems      A 10 point review systems was conducted, all negative except as noted above HPI  Objective:      /90   Pulse 94   Temp 98 °F (36 7 °C)   Ht 5' 6" (1 676 m)   Wt 99 8 kg (220 lb)   LMP 10/10/2016   BMI 35 51 kg/m²           Physical Exam   Constitutional: She is oriented to person, place, and time  She appears well-developed and well-nourished  No distress  HENT:   Head: Normocephalic and atraumatic  Eyes: No scleral icterus  Neck: Normal range of motion  No tracheal deviation present  Cardiovascular: Normal rate and regular rhythm  Exam reveals friction rub  Exam reveals no gallop      No murmur heard   Pulmonary/Chest: Effort normal and breath sounds normal  No respiratory distress  She has no wheezes  She has no rales  She exhibits no tenderness  Abdominal: Soft  She exhibits mass (Palpable mass above the umbilicus, 2 masses to be exact which coincided with the CT findings of periumbilical hernia  These hernias are partially reducible and are painful palpation  No overlying skin changes  )  She exhibits no distension  There is tenderness (Overlying supraumbilical mass consistent with hernia )  There is no rebound and no guarding  Musculoskeletal: Normal range of motion  She exhibits no edema, tenderness or deformity  Lymphadenopathy:     She has no cervical adenopathy  Neurological: She is alert and oriented to person, place, and time  No cranial nerve deficit  Skin: Skin is warm  No rash noted  She is not diaphoretic  No erythema  No pallor  Psychiatric: She has a normal mood and affect   Her behavior is normal

## 2018-03-16 NOTE — INTERIM OP NOTE
REPAIR HERNIA VENTRAL with mesh  Postoperative Note  PATIENT NAME: Larisa Rausch  : 1970  MRN: [id number]  [facility] OR ROOM 01    Surgery Date: 3/16/2018    Preop Diagnosis:  Supraumbilical hernia [U98 3]    Post-Op Diagnosis Codes:     * Supraumbilical hernia [T78 0]    Procedure(s) (LRB):  REPAIR HERNIA VENTRAL with mesh (N/A)    Surgeon(s) and Role:      Gisell Mojica DO - Primary     * Rach Lyn PA-C - Assisting    Specimens:  * No specimens in log *    Estimated Blood Loss:   Minimal    Anesthesia Type:   General     Findings:    Umbilical hernia containing omentum measuring approximately 2 x 3 5 cm  Complications:   None    SIGNATURE: Jeffrey Bond DO   DATE: 2018   TIME: 11:52 AM

## 2018-03-16 NOTE — INTERVAL H&P NOTE
Patient seen and examined  Prior history reviewed  No change from prior  Will proceed to OR as planned

## 2018-03-22 NOTE — OP NOTE
OPERATIVE REPORT  PATIENT NAME: Durel Shone    :  1970  MRN: [id number]  Pt Location: [facility] OR ROOM 01    SURGERY DATE: 3/16/2018    Surgeon(s) and Role: Dean De León,  - Primary     * Connie Sue PA-C - Assisting    Preop Diagnosis:  Supraumbilical hernia [R38 8]    Post-Op Diagnosis Codes:     * Supraumbilical hernia [U72 5]    Procedure(s) (LRB):  REPAIR HERNIA VENTRAL with mesh (N/A)    Specimen(s):  * No specimens in log *    Estimated Blood Loss:   Minimal    Drains:       Anesthesia Type:   General    Operative Indications:  Supraumbilical hernia [E65 2]  Patient with symptomatic supraumbilical ventral hernia  CT scan showing hernia containing fat and noted to different Dawn the fascia  The patient opted to have the hernia fixed  The procedure self including all associated risks and benefits were discussed with the patient in great detail  The patient verbalized understands risks is willing to proceed, consent was signed    Operative Findings:  Supraumbilical hernia containing omentum which was noted to have 2 separate defects, and when combined and measuring approximately 2 x 3 5 cm  Complications:   None    Procedure and Technique:  Patient brought to operating arena and placed in supine position operating table  All the regular monitoring devices were then connected  The patient underwent general anesthesia with endotracheal intubation without complication  Patient received perioperative antibiotics  She also received bilateral lower sequential compression devices addition to subcutaneous heparin for DVT prophylaxis  The patient then prepped and draped in usual sterile fashion  Time-out was performed to verify correct patient, procedure, position, and site  The supraumbilical ventral hernial mass was then palpated  A midline incision overlying the mass was made using a 15 blade scalpel and measured approximately 4 cm in length    The incision was then carried down through the dermis and subcutaneous fat using electrocautery  Hemostasis was achieved  Once into the subcutaneous fat a large amount of bright yellow fat was wound and countered consistent with omentum  The hernia edges were then defined and the omentum partially reduced  A finger was then placed into the hernia defect and the underside of the fascia was then palpated  Additional defect was found just inferior  The fascial bridge was then transected which produces hernia defect measured approximately 2 x 3 5 cm  The omentum was then carefully dissected out with a combination of blunt and sharp technique  It was then safely reduced back into the abdomen  The fascial edges were then cleaned off  The hernia was then repaired using a Ventralex Bard mesh measuring approximately 6 4 cm in diameter  Mesh was then secured in place using multiple #1  Prolene sutures  The cavity was then irrigated with normal saline  Hemostasis was checked  The fascia was then closed over the mesh with interrupted figure-of-eight sutures using #1 Prolene stitches  Again the cavity was then irrigated with normal saline  Hemostasis was achieved  The wound was then closed in layers using interrupted 3 0 Vicryl for the deep dermis  The skin was then closed using a running 4 Monocryl suture  The patient tolerated procedure well was transported to the postanesthesia care unit stable condition  All lap, needle, and instrument counts were correct  I was present for the entire procedure and A qualified resident physician was not available, CHAVA Hunter was required for adequate exposure retraction of the case      Patient Disposition:  PACU     SIGNATURE: Brynn Piña DO  DATE: March 22, 2018  TIME: 10:47 AM

## 2018-04-03 ENCOUNTER — OFFICE VISIT (OUTPATIENT)
Dept: SURGERY | Facility: HOSPITAL | Age: 48
End: 2018-04-03

## 2018-04-03 VITALS
HEART RATE: 103 BPM | TEMPERATURE: 98 F | SYSTOLIC BLOOD PRESSURE: 133 MMHG | HEIGHT: 66 IN | WEIGHT: 221 LBS | BODY MASS INDEX: 35.52 KG/M2 | DIASTOLIC BLOOD PRESSURE: 104 MMHG

## 2018-04-03 DIAGNOSIS — Z09 POSTOP CHECK: ICD-10-CM

## 2018-04-03 DIAGNOSIS — K43.9 SUPRAUMBILICAL HERNIA: Primary | ICD-10-CM

## 2018-04-03 DIAGNOSIS — K43.9 ABDOMINAL WALL HERNIA: ICD-10-CM

## 2018-04-03 PROCEDURE — 99024 POSTOP FOLLOW-UP VISIT: CPT | Performed by: SURGERY

## 2018-04-03 RX ORDER — OXYCODONE HYDROCHLORIDE AND ACETAMINOPHEN 5; 325 MG/1; MG/1
1-2 TABLET ORAL EVERY 4 HOURS PRN
Qty: 30 TABLET | Refills: 0 | Status: SHIPPED | OUTPATIENT
Start: 2018-04-03 | End: 2019-04-02

## 2018-04-03 NOTE — PROGRESS NOTES
Assessment/Plan:    No problem-specific Assessment & Plan notes found for this encounter  Diagnoses and all orders for this visit:    Supraumbilical hernia  -     oxyCODONE-acetaminophen (PERCOCET) 5-325 mg per tablet; Take 1-2 tablets by mouth every 4 (four) hours as needed for moderate pain or severe pain Max Daily Amount: 12 tablets    Abdominal wall hernia  -     Ambulatory referral to General Surgery    Postop check          Subjective:      Patient ID: Alicia Albarado is a 50 y o  female  49-year-old female status post supraumbilical/ventral hernia repair with mesh, presents today for follow-up  Overall doing well  Tolerating diet  One episode of vomiting after ingesting on narcotic when into stomach but otherwise no nausea vomiting  Tolerating diet  Passing flatus having bowel movements  Still complains of significant juan incisional pain  Patient states that this pain is remarkably different from her pre operative hernia pain  Denies any redness or drainage from the incision  The following portions of the patient's history were reviewed and updated as appropriate:   She  has a past medical history of Back pain  She   Patient Active Problem List    Diagnosis Date Noted    Lumbar pain with radiation down left leg 06/07/2017    Lumbar disc herniation 22/10/9237    Supraumbilical hernia 58/73/6703    Tobacco abuse 11/16/2016    Status post lumbar spinal fusion 04/20/2012     She  has a past surgical history that includes Back surgery; Cholecystectomy; Lumbar disc surgery; Back surgery; and pr repair incisional hernia,reducible (N/A, 3/16/2018)  Her family history is not on file  She was adopted  She  reports that she has quit smoking  Her smoking use included Cigars  She has a 30 00 pack-year smoking history  She has never used smokeless tobacco  She reports that she does not drink alcohol or use drugs    Current Outpatient Prescriptions   Medication Sig Dispense Refill    cyclobenzaprine (FLEXERIL) 5 mg tablet Take 1 tablet (5 mg total) by mouth 3 (three) times a day as needed for muscle spasms 60 tablet 0    diclofenac sodium (VOLTAREN) 50 mg EC tablet Take 1 tablet (50 mg total) by mouth 2 (two) times a day Do not take with ibuprofen 180 tablet 0    gabapentin (NEURONTIN) 300 mg capsule Take 1 capsule (300 mg total) by mouth 3 (three) times a day 270 capsule 0    ibuprofen (MOTRIN) 600 mg tablet Take 1 tablet (600 mg total) by mouth every 8 (eight) hours as needed for moderate pain Do not take with diclofenac 270 tablet 0    oxyCODONE-acetaminophen (PERCOCET) 5-325 mg per tablet Take 1-2 tablets by mouth every 4 (four) hours as needed for moderate pain or severe pain Max Daily Amount: 12 tablets 30 tablet 0     No current facility-administered medications for this visit  She has No Known Allergies       Review of Systems   Constitutional: Negative  Gastrointestinal: Positive for abdominal pain (At the incision)  Skin: Negative  Objective:      BP (!) 133/104   Pulse 103   Temp 98 °F (36 7 °C)   Ht 5' 6" (1 676 m)   Wt 100 kg (221 lb)   LMP 10/10/2016   BMI 35 67 kg/m²          Physical Exam   Constitutional: She appears well-developed and well-nourished  No distress  Abdominal: She exhibits no distension and no mass  There is tenderness (Appropriately tender at the incision  The incision is clean, dry, intact no evidence of any erythema or active drainage  )  There is no rebound and no guarding  Skin: She is not diaphoretic

## 2018-04-03 NOTE — ASSESSMENT & PLAN NOTE
Status post supraumbilical/ventral hernia repair with mesh  Overall doing okay  Still significant pain with certain movements  No evidence of any erythema or drainage on exam   Incision healed well good cosmesis  - will give a few more Percocet for now  - continue to refrain from any heavy lifting, nothing greater than 14 lb for an additional 2-3 weeks  She should continue the not over do it limit her activity to low intensity activities

## 2018-04-03 NOTE — LETTER
April 3, 2018     Patient: Mari Burgos   YOB: 1970   Date of Visit: 4/3/2018       To Whom it May Concern:    Sunny Juanxavier is under my professional care  She was seen in my office on 4/3/2018  She may return to work on April 18, 2018  If you have any questions or concerns, please don't hesitate to call           Sincerely,          Kayli Rogers DO        CC: No Recipients

## 2018-11-08 ENCOUNTER — TELEPHONE (OUTPATIENT)
Dept: FAMILY MEDICINE CLINIC | Facility: CLINIC | Age: 48
End: 2018-11-08

## 2019-01-22 ENCOUNTER — APPOINTMENT (EMERGENCY)
Dept: MRI IMAGING | Facility: HOSPITAL | Age: 49
End: 2019-01-22
Payer: COMMERCIAL

## 2019-01-22 ENCOUNTER — HOSPITAL ENCOUNTER (EMERGENCY)
Facility: HOSPITAL | Age: 49
Discharge: HOME | End: 2019-01-22
Attending: EMERGENCY MEDICINE
Payer: COMMERCIAL

## 2019-01-22 VITALS
BODY MASS INDEX: 35.86 KG/M2 | OXYGEN SATURATION: 97 % | DIASTOLIC BLOOD PRESSURE: 75 MMHG | SYSTOLIC BLOOD PRESSURE: 130 MMHG | HEIGHT: 66 IN | HEART RATE: 83 BPM | WEIGHT: 223.11 LBS | TEMPERATURE: 97.6 F | RESPIRATION RATE: 18 BRPM

## 2019-01-22 DIAGNOSIS — M48.061 SPINAL STENOSIS OF LUMBAR REGION: Primary | ICD-10-CM

## 2019-01-22 DIAGNOSIS — M51.26 BULGE OF LUMBAR DISC WITHOUT MYELOPATHY: ICD-10-CM

## 2019-01-22 DIAGNOSIS — M48.061 LUMBAR FORAMINAL STENOSIS: ICD-10-CM

## 2019-01-22 LAB
ALBUMIN SERPL BCP-MCNC: 3.5 G/DL (ref 3.5–5)
ALP SERPL-CCNC: 98 U/L (ref 46–116)
ALT SERPL W P-5'-P-CCNC: 39 U/L (ref 12–78)
ANION GAP SERPL CALCULATED.3IONS-SCNC: 11 MMOL/L (ref 4–13)
AST SERPL W P-5'-P-CCNC: 22 U/L (ref 5–45)
BACTERIA UR QL AUTO: ABNORMAL /HPF
BASOPHILS # BLD AUTO: 0.01 THOUSANDS/ΜL (ref 0–0.1)
BASOPHILS NFR BLD AUTO: 0 % (ref 0–1)
BILIRUB SERPL-MCNC: 0.2 MG/DL (ref 0.2–1)
BILIRUB UR QL STRIP: NEGATIVE
BUN SERPL-MCNC: 14 MG/DL (ref 5–25)
CALCIUM SERPL-MCNC: 8.8 MG/DL (ref 8.3–10.1)
CHLORIDE SERPL-SCNC: 103 MMOL/L (ref 100–108)
CLARITY UR: CLEAR
CO2 SERPL-SCNC: 26 MMOL/L (ref 21–32)
COLOR UR: YELLOW
CREAT SERPL-MCNC: 0.9 MG/DL (ref 0.6–1.3)
EOSINOPHIL # BLD AUTO: 0.03 THOUSAND/ΜL (ref 0–0.61)
EOSINOPHIL NFR BLD AUTO: 1 % (ref 0–6)
ERYTHROCYTE [DISTWIDTH] IN BLOOD BY AUTOMATED COUNT: 14.5 % (ref 11.6–15.1)
GFR SERPL CREATININE-BSD FRML MDRD: 75 ML/MIN/1.73SQ M
GLUCOSE SERPL-MCNC: 101 MG/DL (ref 65–140)
GLUCOSE UR STRIP-MCNC: NEGATIVE MG/DL
HCT VFR BLD AUTO: 51.8 % (ref 34.8–46.1)
HGB BLD-MCNC: 16.6 G/DL (ref 11.5–15.4)
HGB UR QL STRIP.AUTO: NEGATIVE
IMM GRANULOCYTES # BLD AUTO: 0.04 THOUSAND/UL (ref 0–0.2)
IMM GRANULOCYTES NFR BLD AUTO: 1 % (ref 0–2)
KETONES UR STRIP-MCNC: NEGATIVE MG/DL
LEUKOCYTE ESTERASE UR QL STRIP: ABNORMAL
LYMPHOCYTES # BLD AUTO: 1.12 THOUSANDS/ΜL (ref 0.6–4.47)
LYMPHOCYTES NFR BLD AUTO: 23 % (ref 14–44)
MCH RBC QN AUTO: 29.6 PG (ref 26.8–34.3)
MCHC RBC AUTO-ENTMCNC: 32 G/DL (ref 31.4–37.4)
MCV RBC AUTO: 93 FL (ref 82–98)
MONOCYTES # BLD AUTO: 0.46 THOUSAND/ΜL (ref 0.17–1.22)
MONOCYTES NFR BLD AUTO: 9 % (ref 4–12)
NEUTROPHILS # BLD AUTO: 3.25 THOUSANDS/ΜL (ref 1.85–7.62)
NEUTS SEG NFR BLD AUTO: 66 % (ref 43–75)
NITRITE UR QL STRIP: NEGATIVE
NON-SQ EPI CELLS URNS QL MICRO: ABNORMAL /HPF
NRBC BLD AUTO-RTO: 0 /100 WBCS
PH UR STRIP.AUTO: 6.5 [PH] (ref 4.5–8)
PLATELET # BLD AUTO: 260 THOUSANDS/UL (ref 149–390)
PMV BLD AUTO: 9.7 FL (ref 8.9–12.7)
POTASSIUM SERPL-SCNC: 3.5 MMOL/L (ref 3.5–5.3)
PROT SERPL-MCNC: 7.3 G/DL (ref 6.4–8.2)
PROT UR STRIP-MCNC: NEGATIVE MG/DL
RBC # BLD AUTO: 5.6 MILLION/UL (ref 3.81–5.12)
RBC #/AREA URNS AUTO: ABNORMAL /HPF
SODIUM SERPL-SCNC: 140 MMOL/L (ref 136–145)
SP GR UR STRIP.AUTO: <=1.005 (ref 1–1.03)
UROBILINOGEN UR QL STRIP.AUTO: 0.2 E.U./DL
WBC # BLD AUTO: 4.91 THOUSAND/UL (ref 4.31–10.16)
WBC #/AREA URNS AUTO: ABNORMAL /HPF

## 2019-01-22 PROCEDURE — 96376 TX/PRO/DX INJ SAME DRUG ADON: CPT

## 2019-01-22 PROCEDURE — 51798 US URINE CAPACITY MEASURE: CPT

## 2019-01-22 PROCEDURE — 99284 EMERGENCY DEPT VISIT MOD MDM: CPT

## 2019-01-22 PROCEDURE — 85025 COMPLETE CBC W/AUTO DIFF WBC: CPT | Performed by: EMERGENCY MEDICINE

## 2019-01-22 PROCEDURE — 80053 COMPREHEN METABOLIC PANEL: CPT | Performed by: EMERGENCY MEDICINE

## 2019-01-22 PROCEDURE — 96361 HYDRATE IV INFUSION ADD-ON: CPT

## 2019-01-22 PROCEDURE — 72158 MRI LUMBAR SPINE W/O & W/DYE: CPT

## 2019-01-22 PROCEDURE — 36415 COLL VENOUS BLD VENIPUNCTURE: CPT | Performed by: EMERGENCY MEDICINE

## 2019-01-22 PROCEDURE — 96374 THER/PROPH/DIAG INJ IV PUSH: CPT

## 2019-01-22 PROCEDURE — A9585 GADOBUTROL INJECTION: HCPCS | Performed by: EMERGENCY MEDICINE

## 2019-01-22 PROCEDURE — 81001 URINALYSIS AUTO W/SCOPE: CPT | Performed by: EMERGENCY MEDICINE

## 2019-01-22 PROCEDURE — 96375 TX/PRO/DX INJ NEW DRUG ADDON: CPT

## 2019-01-22 RX ORDER — MORPHINE SULFATE 4 MG/ML
4 INJECTION, SOLUTION INTRAMUSCULAR; INTRAVENOUS ONCE
Status: COMPLETED | OUTPATIENT
Start: 2019-01-22 | End: 2019-01-22

## 2019-01-22 RX ORDER — KETOROLAC TROMETHAMINE 30 MG/ML
30 INJECTION, SOLUTION INTRAMUSCULAR; INTRAVENOUS ONCE
Status: COMPLETED | OUTPATIENT
Start: 2019-01-22 | End: 2019-01-22

## 2019-01-22 RX ORDER — DEXAMETHASONE SODIUM PHOSPHATE 10 MG/ML
10 INJECTION, SOLUTION INTRAMUSCULAR; INTRAVENOUS ONCE
Status: COMPLETED | OUTPATIENT
Start: 2019-01-22 | End: 2019-01-22

## 2019-01-22 RX ORDER — DIAZEPAM 5 MG/1
5 TABLET ORAL ONCE
Status: COMPLETED | OUTPATIENT
Start: 2019-01-22 | End: 2019-01-22

## 2019-01-22 RX ORDER — FENTANYL CITRATE 50 UG/ML
50 INJECTION, SOLUTION INTRAMUSCULAR; INTRAVENOUS EVERY 2 HOUR PRN
Status: DISCONTINUED | OUTPATIENT
Start: 2019-01-22 | End: 2019-01-22 | Stop reason: HOSPADM

## 2019-01-22 RX ORDER — LIDOCAINE 50 MG/G
1 PATCH TOPICAL DAILY
Qty: 5 PATCH | Refills: 0 | Status: SHIPPED | OUTPATIENT
Start: 2019-01-22 | End: 2019-04-02

## 2019-01-22 RX ORDER — OXYCODONE HYDROCHLORIDE AND ACETAMINOPHEN 5; 325 MG/1; MG/1
1 TABLET ORAL EVERY 4 HOURS PRN
Qty: 20 TABLET | Refills: 0 | Status: SHIPPED | OUTPATIENT
Start: 2019-01-22 | End: 2019-01-27

## 2019-01-22 RX ORDER — MORPHINE SULFATE 4 MG/ML
4 INJECTION, SOLUTION INTRAMUSCULAR; INTRAVENOUS EVERY 4 HOURS PRN
Status: DISCONTINUED | OUTPATIENT
Start: 2019-01-22 | End: 2019-01-22 | Stop reason: HOSPADM

## 2019-01-22 RX ORDER — DIAZEPAM 5 MG/1
5 TABLET ORAL EVERY 8 HOURS PRN
Qty: 15 TABLET | Refills: 0 | Status: SHIPPED | OUTPATIENT
Start: 2019-01-22 | End: 2019-04-02

## 2019-01-22 RX ORDER — LIDOCAINE 50 MG/G
1 PATCH TOPICAL ONCE
Status: DISCONTINUED | OUTPATIENT
Start: 2019-01-22 | End: 2019-01-22 | Stop reason: HOSPADM

## 2019-01-22 RX ADMIN — KETOROLAC TROMETHAMINE 30 MG: 30 INJECTION, SOLUTION INTRAMUSCULAR at 11:37

## 2019-01-22 RX ADMIN — SODIUM CHLORIDE 1000 ML: 0.9 INJECTION, SOLUTION INTRAVENOUS at 11:36

## 2019-01-22 RX ADMIN — MORPHINE SULFATE 4 MG: 4 INJECTION INTRAVENOUS at 17:04

## 2019-01-22 RX ADMIN — LIDOCAINE 1 PATCH: 50 PATCH TOPICAL at 11:38

## 2019-01-22 RX ADMIN — MORPHINE SULFATE 4 MG: 4 INJECTION INTRAVENOUS at 11:37

## 2019-01-22 RX ADMIN — DEXAMETHASONE SODIUM PHOSPHATE 10 MG: 10 INJECTION, SOLUTION INTRAMUSCULAR; INTRAVENOUS at 11:37

## 2019-01-22 RX ADMIN — DIAZEPAM 5 MG: 5 TABLET ORAL at 12:52

## 2019-01-22 RX ADMIN — GADOBUTROL 10 ML: 604.72 INJECTION INTRAVENOUS at 16:21

## 2019-01-22 RX ADMIN — FENTANYL CITRATE 50 MCG: 50 INJECTION INTRAMUSCULAR; INTRAVENOUS at 14:57

## 2019-01-22 NOTE — DISCHARGE INSTRUCTIONS
Make sure you call your PCP and/or Neurosurgery for follow-up  All taking any narcotics do not take any extra Tylenol  Make sure you take a stool softener and MiraLax daily to help prevent constipation    Lower Back Exercises   WHAT YOU NEED TO KNOW:   What do I need to know about lower back exercises? Lower back exercises help heal and strengthen your back muscles to prevent another injury  Ask your healthcare provider if you need to see a physical therapist for more advanced exercises  · Do the exercises on a mat or firm surface  (not on a bed) to support your spine and prevent low back pain  · Move slowly and smoothly  Avoid fast or jerky motions  · Breathe normally  Do not hold your breath  · Stop if you feel pain  It is normal to feel some discomfort at first  Regular exercise will help decrease your discomfort over time  How do I perform lower back exercises safely? Your healthcare provider may recommend that you do back exercises 10 to 30 minutes each day  He may also recommend that you do exercises 1 to 3 times each day  Ask your healthcare provider which exercises are best for you and how often to do them  · Ankle pumps:  Lie on your back  Move your foot up (with your toes pointing toward your head)  Then, move your foot down (with your toes pointing away from you)  Repeat this exercise 10 times on each side  · Heel slides:  Lie on your back  Slowly bend one leg and then straighten it  Next, bend the other leg and then straighten it  Repeat 10 times on each side  · Pelvic tilt:  Lie on your back with your knees bent and feet flat on the floor  Place your arms in a relaxed position beside your body  Tighten the muscles of your abdomen and flatten your back against the floor  Hold for 5 seconds  Repeat 5 times  · Back stretch:  Lie on your back with your hands behind your head  Bend your knees and turn the lower half of your body to one side   Hold this position for 10 seconds  Repeat 3 times on each side  · Straight leg raises:  Lie on your back with one leg straight  Bend the other knee  Tighten your abdomen and then slowly lift the straight leg up about 6 to 12 inches off the floor  Hold for 1 to 5 seconds  Lower your leg slowly  Repeat 10 times on each leg  · Knee-to-chest:  Lie on your back with your knees bent and feet flat on the floor  Pull one of your knees toward your chest and hold it there for 5 seconds  Return your leg to the starting position  Lift the other knee toward your chest and hold for 5 seconds  Do this 5 times on each side  · Cat and camel:  Place your hands and knees on the floor  Arch your back upward toward the ceiling and lower your head  Round out your spine as much as you can  Hold for 5 seconds  Lift your head upward and push your chest downward toward the floor  Hold for 5 seconds  Do 3 sets or as directed  · Wall squats:  Stand with your back against a wall  Tighten the muscles of your abdomen  Slowly lower your body until your knees are bent at a 45 degree angle  Hold this position for 5 seconds  Slowly move back up to a standing position  Repeat 10 times  · Curl up:  Lie on your back with your knees bent and feet flat on the floor  Place your hands, palms down, underneath the curve in your lower back  Next, with your elbows on the floor, lift your shoulders and chest 2 to 3 inches  Keep your head in line with your shoulders  Hold this position for 5 seconds  When you can do this exercise without pain for 10 to 15 seconds, you may add a rotation  While your shoulders and chest are lifted off the ground, turn slightly to the left and hold  Repeat on the other side  · Bird dog:  Place your hands and knees on the floor  Keep your wrists directly below your shoulders and your knees directly below your hips  Pull your belly button in toward your spine   Do not flatten or arch your back  Tighten your abdominal muscles  Raise one arm straight out so that it is aligned with your head  Next, raise the leg opposite your arm  Hold this position for 15 seconds  Lower your arm and leg slowly and change sides  Do 5 sets  When should I seek immediate care? · You have severe pain that prevents you from moving  When should I contact my healthcare provider? · Your pain becomes worse  · You have new pain  · You have questions or concerns about your condition or care  CARE AGREEMENT:   You have the right to help plan your care  Learn about your health condition and how it may be treated  Discuss treatment options with your caregivers to decide what care you want to receive  You always have the right to refuse treatment  The above information is an  only  It is not intended as medical advice for individual conditions or treatments  Talk to your doctor, nurse or pharmacist before following any medical regimen to see if it is safe and effective for you  © 2017 [address] Information is for End User's use only and may not be sold, redistributed or otherwise used for commercial purposes  All illustrations and images included in CareNotes® are the copyrighted property of [employer]  or [employer]  Lumbar Disc Herniation   AMBULATORY CARE:   Lumbar disc herniation  occurs when a disc in your lumbar spine (lower back) bulges out  Lumbar discs are spongy cushions between the vertebrae (bones) in your spine  The herniated disc may press on your nerves or spinal cord  Common signs and symptoms include the following:  Mild lumbar disc herniation may not cause any signs or symptoms   You may have any of the following if the herniated disc presses against your nerves or spinal cord:  · Pain in your lower back, buttocks, groin, or legs    · Burning, stabbing, or tingling pain that shoots down one or both of your legs    · Numbness or weakness in one leg    · Trouble walking or moving your feet or toes  Seek care immediately if:   · You cannot control when you urinate or have a bowel movement  · You are unable to move one or both of your legs  · You lose feeling in your groin or buttocks  Contact your healthcare provider if:   · You have numbness in one or both of your legs  · You have low back pain while resting  · You have trouble moving one or both of your legs  · You begin leaking urine or bowel movement, and it is not normal for you  · Your pain gets worse, even after you take medicine  · You have questions or concerns about your condition or care  Treatment:  Your healthcare provider may have you rest in bed for a few days  It is best to rest on your side with your knees bent  Put a cushion between your knees to help decrease the pressure on your spine and nerves  You may also need any of following:  · NSAIDs , such as ibuprofen, help decrease swelling, pain, and fever  NSAIDs can cause stomach bleeding or kidney problems in certain people  If you take blood thinner medicine, always ask your healthcare provider if NSAIDs are safe for you  Always read the medicine label and follow directions  · Prescription pain medicine  may be given  Ask how to take this medicine safely  · Muscle relaxers  decrease pain and muscle spasms  · A steroid injection  may be given to reduce inflammation  Steroid medicine is injected into the epidural space  The epidural space is between your spinal cord and vertebrae  You may be given pain medicine along with the steroids  · Physical therapy  may be recommended by your healthcare provider  A physical therapist teaches you exercises to help improve movement and strength, and to decrease pain  A physical therapist can teach you safe ways to bend, lift, sit, and stand to help relieve back pain  · Surgery  may be needed to fix your herniated disc if other treatments have failed   Surgery may be done to remove your herniated disc and make your spine stronger  Surgery may also be done to decrease pressure on your nerves and spinal cord  Manage pain from a lumbar disc herniation:   · Apply heat  on your lower back for 20 to 30 minutes every 2 hours for as many days as directed  Heat helps decrease pain and muscle spasms  · Do low-stress exercise and activity  Exercises that do not stress your back muscles may help decrease your pain  Examples of low-stress exercises are walking, swimming, and biking  Avoid heavy lifting while your back is healing  Try not to sit for long periods of time  Talk to your healthcare provider before you start any new exercise program   Follow up with your healthcare provider as directed:  Write down your questions so you remember to ask them during your visits  © 2017 [address] [facility] Information is for End User's use only and may not be sold, redistributed or otherwise used for commercial purposes  All illustrations and images included in CareNotes® are the copyrighted property of [employer]  or [employer]  The above information is an  only  It is not intended as medical advice for individual conditions or treatments  Talk to your doctor, nurse or pharmacist before following any medical regimen to see if it is safe and effective for you  Low Back Strain   AMBULATORY CARE:   Low back strain  is an injury to your lower back muscles or tendons  Tendons are strong tissues that connect muscles to bones  The lower back supports most of your body weight and helps you move, twist, and bend  Low back strain is usually caused by activities that increase stress on the lower back, such as exercise or injury     Common signs and symptoms include the following:   · Low back pain or muscle spasms    · Stiffness or limited movement    · Pain that goes down to the buttocks, groin, or legs    · Pain that is worse with activity  Seek care immediately if:   · You hear or feel a pop in your lower back  · You have increased swelling or pain in your lower back  · You have trouble moving your legs  · Your legs are numb  Contact your healthcare provider if:   · You have a fever  · Your pain does not go away, even after treatment  · You have questions or concerns about your condition or care  Treatment for low back strain:   · Acetaminophen decreases pain and fever  It is available without a doctor's order  Ask how much to take and how often to take it  Follow directions  Acetaminophen can cause liver damage if not taken correctly  · NSAIDs , such as ibuprofen, help decrease swelling, pain, and fever  This medicine is available with or without a doctor's order  NSAIDs can cause stomach bleeding or kidney problems in certain people  If you take blood thinner medicine, always ask your healthcare provider if NSAIDs are safe for you  Always read the medicine label and follow directions  · Muscle relaxers  help decrease pain and muscle spasms  · Prescription pain medicine  may be given  Ask how to take this medicine safely  · Surgery  may be needed if your strain is severe  Manage your symptoms:   · Rest  as directed  You may need to rest in bed for a period of time after your injury  Do not lift heavy objects  · Apply ice  on your back for 15 to 20 minutes every hour or as directed  Use an ice pack, or put crushed ice in a plastic bag  Cover it with a towel  Ice helps prevent tissue damage and decreases swelling and pain  · Apply heat  on your lower back for 20 to 30 minutes every 2 hours for as many days as directed  Heat helps decrease pain and muscle spasms  · Slowly start to increase your activity  as the pain decreases, or as directed  Prevent another low back strain:   · Use correct body movements  ¨ Bend at the hips and knees when you  objects   Do not bend from the waist  Use your leg muscles as you lift the load  Do not use your back  Keep the object close to your chest as you lift it  Try not to twist or lift anything above your waist     ¨ Change your position often when you stand for long periods of time  Rest one foot on a small box or footrest, and then switch to the other foot often  ¨ Try not to sit for long periods of time  When you do, sit in a straight-backed chair with your feet flat on the floor  ¨ Never reach, pull, or push while you are sitting  · Warm up before you exercise  Do exercises that strengthen your back muscles  Ask your healthcare provider about the best exercise plan for you  · Maintain a healthy weight  Ask your healthcare provider how much you should weigh  Ask him to help you create a weight loss plan if you are overweight  Follow up with your healthcare provider as directed:  Write down your questions so you remember to ask them during your visits  © 2017 [address] Information is for End User's use only and may not be sold, redistributed or otherwise used for commercial purposes  All illustrations and images included in CareNotes® are the copyrighted property of [employer]  or [employer] Lovell  The above information is an  only  It is not intended as medical advice for individual conditions or treatments  Talk to your doctor, nurse or pharmacist before following any medical regimen to see if it is safe and effective for you  Low Back Strain   WHAT YOU NEED TO KNOW:   Low back strain is an injury to your lower back muscles or tendons  Tendons are strong tissues that connect muscles to bones  The lower back supports most of your body weight and helps you move, twist, and bend  DISCHARGE INSTRUCTIONS:   Seek care immediately if:   · You hear or feel a pop in your lower back  · You have increased swelling or pain in your lower back  · You have trouble moving your legs  · Your legs are numb    Contact your healthcare provider if:   · You have a fever  · Your pain does not go away, even after treatment  · You have questions or concerns about your condition or care  Medicines: The following medicines may be ordered by your healthcare provider:  · Acetaminophen decreases pain and fever  It is available without a doctor's order  Ask how much to take and how often to take it  Follow directions  Acetaminophen can cause liver damage if not taken correctly  · NSAIDs , such as ibuprofen, help decrease swelling, pain, and fever  This medicine is available with or without a doctor's order  NSAIDs can cause stomach bleeding or kidney problems in certain people  If you take blood thinner medicine, always ask your healthcare provider if NSAIDs are safe for you  Always read the medicine label and follow directions  · Muscle relaxers  help decrease pain and muscle spasms  · Prescription pain medicine  may be given  Ask how to take this medicine safely  · Take your medicine as directed  Contact your healthcare provider if you think your medicine is not helping or if you have side effects  Tell him or her if you are allergic to any medicine  Keep a list of the medicines, vitamins, and herbs you take  Include the amounts, and when and why you take them  Bring the list or the pill bottles to follow-up visits  Carry your medicine list with you in case of an emergency  Self-care:   · Rest  as directed  You may need to rest in bed for a period of time after your injury  Do not lift heavy objects  · Apply ice  on your back for 15 to 20 minutes every hour or as directed  Use an ice pack, or put crushed ice in a plastic bag  Cover it with a towel  Ice helps prevent tissue damage and decreases swelling and pain  · Apply heat  on your lower back for 20 to 30 minutes every 2 hours for as many days as directed  Heat helps decrease pain and muscle spasms      · Slowly start to increase your activity  as the pain decreases, or as directed  Prevent another low back strain:   · Use correct body movements  ¨ Bend at the hips and knees when you  objects  Do not bend from the waist  Use your leg muscles as you lift the load  Do not use your back  Keep the object close to your chest as you lift it  Try not to twist or lift anything above your waist     ¨ Change your position often when you stand for long periods of time  Rest one foot on a small box or footrest, and then switch to the other foot often  ¨ Try not to sit for long periods of time  When you do, sit in a straight-backed chair with your feet flat on the floor  ¨ Never reach, pull, or push while you are sitting  · Warm up before you exercise  Do exercises that strengthen your back muscles  Ask your healthcare provider about the best exercise plan for you  · Maintain a healthy weight  Ask your healthcare provider how much you should weigh  Ask him to help you create a weight loss plan if you are overweight  Follow up with your healthcare provider as directed:  Write down your questions so you remember to ask them during your visits  © 2017 [address] Information is for End User's use only and may not be sold, redistributed or otherwise used for commercial purposes  All illustrations and images included in CareNotes® are the copyrighted property of [employer]  or [employer]  The above information is an  only  It is not intended as medical advice for individual conditions or treatments  Talk to your doctor, nurse or pharmacist before following any medical regimen to see if it is safe and effective for you  Core Strengthening Exercises   WHAT YOU NEED TO KNOW:   What do I need to know about core strengthening exercises? Core strengthening exercises help heal and strengthen muscles of your hips, back, and abdomen to prevent reinjury  They are beginning exercises to help support your spine   Ask your healthcare provider if you need to see a physical therapist for more advanced exercises  · Do the exercises on a mat or firm surface  (not on a bed) to support your spine and avoid low back pain  · Do the exercises in the same order every time  to train your muscles to work together  Your healthcare provider will show you how to perform these exercises  Do them every day, or as directed by your healthcare provider  · Move slowly and smoothly  Avoid fast or jerky motions  · Stop if you feel pain  It is normal to feel some discomfort at first  Regular exercise will help decrease your discomfort over time  How do I perform core strengthening exercises safely? Hold each exercise for 5 seconds  When you can do the exercise without pain for 5 seconds, increase your hold to 10 to 15 seconds  When you can do the exercise without pain for 10 to 15 seconds, add the next exercise  Increase the time you hold each exercise, or repeat the exercises as directed  As you do each exercise, breathe normally  Do not hold your breath  · Abdominal bracing:  Lie on your back with your knees bent and feet flat on the floor  Place your arms in a relaxed position beside your body  Pull your belly button in toward your spine  Do not flatten or arch your back  Tighten the abdominal muscles below your belly button  Hold for 5 seconds  Begin all of your exercises with abdominal bracing  You can also practice abdominal bracing throughout the day while you are sitting or standing  · Bridging:  Lie on your back with your knees bent and feet flat on the floor  Rest your arms at your side  Tighten your buttocks, and then lift your hips 1 inch off the floor  Hold for 5 seconds  When you can do this exercise without pain for 10 seconds, increase the distance you lift your hips  A good goal is to be able to lift your hips so that your shoulders, hips, and knees are in a straight line             · Curl up:  Lie on your back with your knees bent and feet flat on the floor  Place your hands, palms down, underneath the curve in your lower back  Next, with your elbows on the floor, lift your shoulders and chest 2 to 3 inches  Keep your head in line with your shoulders  Hold this position for 5 seconds  When you can do this exercise without pain for 10 to 15 seconds, you may add a rotation  While your shoulders and chest are lifted off the ground, turn slightly to the left and hold  Repeat on the other side  · Dead bug:  Lie on your back with your knees bent and feet flat on the floor  Place your arms in a relaxed position beside your body  Begin with abdominal bracing  Next, raise one leg, keeping your knee bent  Hold for 5 seconds  Repeat with the other leg  When you can do this exercise without pain for 10 to 15 seconds, you may raise one straight leg and hold  Repeat with the other leg  · Quadruped:  Place your hands and knees on the floor  Keep your wrists directly below your shoulders and your knees directly below your hips  Pull your belly button in toward your spine  Do not flatten or arch your back  Tighten your abdominal muscles below your belly button  Hold for 5 seconds  When you can do this exercise without pain for 10 to 15 seconds, you may extend one arm and hold  Repeat on the other side  · Side Bridge:      ¨ Standing side bridge:  Stand next to a wall and extend one arm toward the wall  Place your palm flat on the wall with your fingers pointing upward  Begin with abdominal bracing  Next, without moving your feet, slowly bend your arm to 90 degrees  Hold for 5 seconds  Repeat on the other side  When you can do this exercise without pain for 10 to 15 seconds, you may do the bent leg side bridge on the floor  ¨ Bent leg side bridge:  Lie on one side with your legs, hips, and shoulders in a straight line   Prop yourself up onto your forearm so your elbow is directly below your shoulder  Bend your knees back to 90 degrees  Begin with abdominal bracing  Next, lift your hips and balance yourself on your forearm and knees  Hold for 5 seconds  Repeat on the other side  When you can do this exercise without pain for 10 to 15 seconds, you may do the straight leg side bridge on the floor  ¨ Straight leg side bridge:  Lie on one side with your legs, hips, and shoulders in a straight line  Prop yourself up onto your forearm so your elbow is directly below your shoulder  Begin with abdominal bracing  Lift your hips off the floor and balance yourself on your forearm and the outside of your flexed foot  Do not let your ankle bend sideways  Hold for 5 seconds  Repeat on the other side  When you can do this exercise without pain for 10 to 15 seconds, ask your healthcare provider for more advanced exercises  When should I contact my healthcare provider? · Your pain becomes worse  · You have new pain  · You have questions or concerns about your condition, care, or exercise program   CARE AGREEMENT:   You have the right to help plan your care  Learn about your health condition and how it may be treated  Discuss treatment options with your caregivers to decide what care you want to receive  You always have the right to refuse treatment  The above information is an  only  It is not intended as medical advice for individual conditions or treatments  Talk to your doctor, nurse or pharmacist before following any medical regimen to see if it is safe and effective for you  © 2016 [address] is for End User's use only and may not be sold, redistributed or otherwise used for commercial purposes  All illustrations and images included in CareNotes® are the copyrighted property of [employer]  or [employer]    Acute Low Back Pain   WHAT YOU NEED TO KNOW:   Acute low back pain is sudden discomfort in your lower back area that lasts for up to 6 weeks  The discomfort makes it difficult to tolerate activity  DISCHARGE INSTRUCTIONS:   Seek care immediately or call 911 if:   · You have severe pain  · You have sudden stiffness and heaviness on both buttocks down to both legs  · You have numbness or weakness in one leg, or pain in both legs  · You have numbness in your genital area or across your lower back  · You cannot control your urine or bowel movements  Contact your healthcare provider if:   · You have a fever  · You have pain at night or when you rest     · Your pain does not get better with treatment  · You have pain that worsens when you cough or sneeze  · You suddenly feel something pop or snap in your back  · You have questions or concerns about your condition or care  Medicines: The following medicines may be ordered by your healthcare provider:  · Acetaminophen  decreases pain  It is available without a doctor's order  Ask how much to take and how often to take it  Follow directions  Acetaminophen can cause liver damage if not taken correctly  · NSAIDs  help decrease swelling and pain  This medicine is available with or without a doctor's order  NSAIDs can cause stomach bleeding or kidney problems in certain people  If you take blood thinner medicine, always ask your healthcare provider if NSAIDs are safe for you  Always read the medicine label and follow directions  · Prescription pain medicine  may be given  Ask your healthcare provider how to take this medicine safely  · Muscle relaxers  decrease pain by relaxing the muscles in your lower spine  · Take your medicine as directed  Contact your healthcare provider if you think your medicine is not helping or if you have side effects  Tell him of her if you are allergic to any medicine  Keep a list of the medicines, vitamins, and herbs you take  Include the amounts, and when and why you take them   Bring the list or the pill bottles to follow-up visits  Carry your medicine list with you in case of an emergency  Self-care:   · Stay active  as much as you can without causing more pain  Bed rest could make your back pain worse  Start with some light exercises such as walking  Avoid heavy lifting until your pain is gone  Ask for more information about the activities or exercises that are right for you  · Ice  helps decrease swelling, pain, and muscle spams  Put crushed ice in a plastic bag  Cover it with a towel  Place it on your lower back for 20 to 30 minutes every 2 hours  Do this for about 2 to 3 days after your pain starts, or as directed  · Heat  helps decrease pain and muscle spasms  Start to use heat after treatment with ice has stopped  Use a small towel dampened with warm water or a heating pad, or sit in a warm bath  Apply heat on the area for 20 to 30 minutes every 2 hours for as many days as directed  Alternate heat and ice  Prevent acute low back pain:   · Use proper body mechanics  ¨ Bend at the hips and knees when you  objects  Do not bend from the waist  Use your leg muscles as you lift the load  Do not use your back  Keep the object close to your chest as you lift it  Try not to twist or lift anything above your waist     ¨ Change your position often when you stand for long periods of time  Rest one foot on a small box or footrest, and then switch to the other foot often  ¨ Try not to sit for long periods of time  When you do, sit in a straight-backed chair with your feet flat on the floor  Never reach, pull, or push while you are sitting  · Do exercises that strengthen your back muscles  Warm up before you exercise  Ask your healthcare provider the best exercises for you  · Maintain a healthy weight  Ask your healthcare provider how much you should weigh  Ask him to help you create a weight loss plan if you are overweight    Follow up with your healthcare provider as directed:  Return for a follow-up visit if you still have pain after 1 to 3 weeks of treatment  You may need to visit an orthopedist if your back pain lasts more than 6 to 12 weeks  Write down your questions so you remember to ask them during your visits  © 2017 [employer] [employer] [employer] Information is for End User's use only and may not be sold, redistributed or otherwise used for commercial purposes  All illustrations and images included in CareNotes® are the copyrighted property of A D A M , Inc  or [employer] Lovell  The above information is an  only  It is not intended as medical advice for individual conditions or treatments  Talk to your doctor, nurse or pharmacist before following any medical regimen to see if it is safe and effective for you

## 2019-01-22 NOTE — ED PROVIDER NOTES
History  Chief Complaint   Patient presents with    Back Pain     Lower back pain that woke the patient from her sleep last night, no injury      Patient is a 78-year-old female with a history of back pain and prior discetomy coming in today with worsening back pain  Patient states approximately started 2 days ago nontraumatic in nature  She states that woke her up out of sleep and similar to her back pain in the past   She states she had had no twisting injuries or lifting injuries  She does do a lot heavy lifting for work but nothing abnormal or different during her job over the past several days  She states she went to bed fine woke up with pain  She reports that she was unable to get a bed for the past several days  She has had no fevers, chills, abdominal pain, nausea, vomiting or diarrhea  She states that she was able to get up and walk today  She did not call her PCP and has not followed up with neuro/orthopedic since the surgery which was approximately 6 years ago  She has no urinary retention, saddle anesthesia, weakness throughout the bilateral lower extremities  She denies any paresthesias throughout the bilateral lower extremities  Patient states the pain "is like a crushing sensation in my back"            History provided by:  Patient   used: No    Back Pain   Location:  Lumbar spine  Quality:  Unable to specify  Radiates to:  Does not radiate  Pain severity:  Moderate  Onset quality:  Gradual  Timing:  Constant  Progression:  Waxing and waning  Chronicity:  Recurrent  Context: not emotional stress, not falling, not jumping from heights, not lifting heavy objects, not MCA, not MVA, not occupational injury, not pedestrian accident, not physical stress, not recent illness, not recent injury and not twisting    Relieved by:  None tried  Worsened by:  Bending, movement and lying down  Ineffective treatments:  None tried  Associated symptoms: no abdominal pain, no chest pain, no dysuria, no fever, no pelvic pain and no weakness    Risk factors: obesity    Risk factors: no hx of cancer, no hx of osteoporosis, no lack of exercise, no recent surgery, no steroid use and no vascular disease        Prior to Admission Medications   Prescriptions Last Dose Informant Patient Reported? Taking? cyclobenzaprine (FLEXERIL) 5 mg tablet  Self No No   Sig: Take 1 tablet (5 mg total) by mouth 3 (three) times a day as needed for muscle spasms   diclofenac sodium (VOLTAREN) 50 mg EC tablet  Self No No   Sig: Take 1 tablet (50 mg total) by mouth 2 (two) times a day Do not take with ibuprofen   gabapentin (NEURONTIN) 300 mg capsule 1/21/2019 at Unknown time Self No Yes   Sig: Take 1 capsule (300 mg total) by mouth 3 (three) times a day   ibuprofen (MOTRIN) 600 mg tablet  Self No No   Sig: Take 1 tablet (600 mg total) by mouth every 8 (eight) hours as needed for moderate pain Do not take with diclofenac   oxyCODONE-acetaminophen (PERCOCET) 5-325 mg per tablet   No No   Sig: Take 1-2 tablets by mouth every 4 (four) hours as needed for moderate pain or severe pain Max Daily Amount: 12 tablets      Facility-Administered Medications: None       Past Medical History:   Diagnosis Date    Back pain        Past Surgical History:   Procedure Laterality Date    BACK SURGERY      BACK SURGERY      CHOLECYSTECTOMY      LUMBAR DISC SURGERY      VA REPAIR INCISIONAL HERNIA,REDUCIBLE N/A 3/16/2018    Procedure: REPAIR HERNIA VENTRAL with mesh;  Surgeon: Diaan Vegas DO;  Location: [facility];  Service: General       Family History   Problem Relation Age of Onset    Adopted: Yes     I have reviewed and agree with the history as documented      Social History   Substance Use Topics    Smoking status: Current Every Day Smoker     Packs/day: 1 00     Years: 30 00     Types: Cigars    Smokeless tobacco: Never Used      Comment: 1 ppd agreeable to cessation education and patch    Alcohol use No        Review of Systems   Constitutional: Negative for diaphoresis and fever  HENT: Negative for ear pain and sore throat  Eyes: Negative for visual disturbance  Respiratory: Negative for chest tightness and shortness of breath  Cardiovascular: Negative for chest pain and palpitations  Gastrointestinal: Negative for abdominal pain, nausea and vomiting  Genitourinary: Negative for difficulty urinating, dysuria and pelvic pain  Musculoskeletal: Positive for back pain  Negative for neck pain  Skin: Negative for rash  Neurological: Negative for weakness  Psychiatric/Behavioral: Negative for confusion  All other systems reviewed and are negative  Physical Exam  Physical Exam   Constitutional: She is oriented to person, place, and time  She appears well-developed and well-nourished  She appears distressed  Patient appears in pain   HENT:   Head: Normocephalic and atraumatic  Mouth/Throat: Oropharynx is clear and moist    Patient maintaining airway maintaining secretions uvula midline without edema   Eyes: Pupils are equal, round, and reactive to light  Conjunctivae and EOM are normal    Neck: Normal range of motion  Neck supple  Cardiovascular: Normal rate, regular rhythm, normal heart sounds and intact distal pulses  No murmur heard  Pulmonary/Chest: Effort normal and breath sounds normal  No stridor  No respiratory distress  Abdominal: Soft  Bowel sounds are normal  She exhibits no distension  There is no tenderness  Musculoskeletal: She exhibits no edema  Thoracic back: Normal         Lumbar back: She exhibits decreased range of motion, tenderness, bony tenderness and spasm  She exhibits no swelling, no edema, no deformity, no laceration, no pain and normal pulse  Back:    Pelvis is stable  Patient had a guarded gait but nonantalgic  Manual muscle grade 5/5 throughout the bilateral lower extremities    Full active range of motion of the bilateral lower extremities to the hips, knees and ankles  There was increase in pain especially with hip active range of motion  Negative straight leg raise bilaterally at 30° as well as 45°   Neurological: She is alert and oriented to person, place, and time  She has normal strength  She displays no atrophy, no tremor and normal reflexes  No cranial nerve deficit or sensory deficit  She exhibits normal muscle tone  Coordination normal  GCS eye subscore is 4  GCS verbal subscore is 5  GCS motor subscore is 6  Reflex Scores:       Patellar reflexes are 2+ on the right side and 2+ on the left side  Achilles reflexes are 2+ on the right side and 2+ on the left side  Skin: Skin is warm  Capillary refill takes less than 2 seconds  She is not diaphoretic  Nursing note and vitals reviewed        Vital Signs  ED Triage Vitals   Temperature Pulse Respirations Blood Pressure SpO2   01/22/19 1100 01/22/19 1055 01/22/19 1055 01/22/19 1055 01/22/19 1055   97 6 °F (36 4 °C) (!) 114 22 139/80 98 %      Temp Source Heart Rate Source Patient Position - Orthostatic VS BP Location FiO2 (%)   01/22/19 1100 01/22/19 1055 01/22/19 1100 01/22/19 1055 --   Temporal Monitor Lying Right arm       Pain Score       01/22/19 1055       Worst Possible Pain           Vitals:    01/22/19 1100 01/22/19 1245 01/22/19 1500 01/22/19 1742   BP: 139/80 128/80 121/70 130/75   Pulse: (!) 107 89 98 83   Patient Position - Orthostatic VS: Lying Sitting Sitting Sitting       Visual Acuity      ED Medications  Medications   lidocaine (LIDODERM) 5 % patch 1 patch (1 patch Topical Medication Applied 1/22/19 1138)   morphine (PF) 4 mg/mL injection 4 mg (4 mg Intravenous Given 1/22/19 1704)   fentanyl citrate (PF) 100 MCG/2ML 50 mcg (50 mcg Intravenous Given 1/22/19 1457)   sodium chloride 0 9 % bolus 1,000 mL (0 mL Intravenous Stopped 1/22/19 1253)   ketorolac (TORADOL) injection 30 mg (30 mg Intravenous Given 1/22/19 1137)   morphine (PF) 4 mg/mL injection 4 mg (4 mg Intravenous Given 1/22/19 1137)   dexamethasone (PF) (DECADRON) injection 10 mg (10 mg Intravenous Given 1/22/19 1137)   diazepam (VALIUM) tablet 5 mg (5 mg Oral Given 1/22/19 1252)   Gadobutrol injection (SINGLE-DOSE) SOLN 10 mL (10 mL Intravenous Given 1/22/19 1621)       Diagnostic Studies  Results Reviewed     Procedure Component Value Units Date/Time    Urine culture [[id number]]     Lab Status:  No result Specimen:  Urine from Urine, Clean Catch     Urine Microscopic [[id number]]  (Abnormal) Collected:  01/22/19 1502    Lab Status:  Final result Specimen:  Urine from Urine, Clean Catch Updated:  01/22/19 1519     RBC, UA None Seen /hpf      WBC, UA 4-10 (A) /hpf      Epithelial Cells Occasional /hpf      Bacteria, UA Moderate (A) /hpf     UA w Reflex to Microscopic [[id number]]  (Abnormal) Collected:  01/22/19 1502    Lab Status:  Final result Specimen:  Urine from Urine, Clean Catch Updated:  01/22/19 1507     Color, UA Yellow     Clarity, UA Clear     Specific Gravity, UA <=1 005     pH, UA 6 5     Leukocytes, UA Small (A)     Nitrite, UA Negative     Protein, UA Negative mg/dl      Glucose, UA Negative mg/dl      Ketones, UA Negative mg/dl      Urobilinogen, UA 0 2 E U /dl      Bilirubin, UA Negative     Blood, UA Negative    Comprehensive metabolic panel [[id number]] Collected:  01/22/19 1124    Lab Status:  Final result Specimen:  Blood from Arm, Left Updated:  01/22/19 1144     Sodium 140 mmol/L      Potassium 3 5 mmol/L      Chloride 103 mmol/L      CO2 26 mmol/L      ANION GAP 11 mmol/L      BUN 14 mg/dL      Creatinine 0 90 mg/dL      Glucose 101 mg/dL      Calcium 8 8 mg/dL      AST 22 U/L      ALT 39 U/L      Alkaline Phosphatase 98 U/L      Total Protein 7 3 g/dL      Albumin 3 5 g/dL      Total Bilirubin 0 20 mg/dL      eGFR 75 ml/min/1 73sq m     Narrative:         National Kidney Disease Education Program recommendations are as follows:  GFR calculation is accurate only with a steady state creatinine  Chronic Kidney disease less than 60 ml/min/1 73 sq  meters  Kidney failure less than 15 ml/min/1 73 sq  meters  CBC and differential [[id number]]  (Abnormal) Collected:  01/22/19 1124    Lab Status:  Final result Specimen:  Blood from Arm, Left Updated:  01/22/19 1129     WBC 4 91 Thousand/uL      RBC 5 60 (H) Million/uL      Hemoglobin 16 6 (H) g/dL      Hematocrit 51 8 (H) %      MCV 93 fL      MCH 29 6 pg      MCHC 32 0 g/dL      RDW 14 5 %      MPV 9 7 fL      Platelets 420 Thousands/uL      nRBC 0 /100 WBCs      Neutrophils Relative 66 %      Immat GRANS % 1 %      Lymphocytes Relative 23 %      Monocytes Relative 9 %      Eosinophils Relative 1 %      Basophils Relative 0 %      Neutrophils Absolute 3 25 Thousands/µL      Immature Grans Absolute 0 04 Thousand/uL      Lymphocytes Absolute 1 12 Thousands/µL      Monocytes Absolute 0 46 Thousand/µL      Eosinophils Absolute 0 03 Thousand/µL      Basophils Absolute 0 01 Thousands/µL                  MRI lumbar spine w wo contrast   Final Result by Terrance Camargo MD (01/22 4505)      1  Redemonstration of postsurgical changes from prior L4-5 laminectomy, posterior instrumented fusion and intervertebral disc spacer placement  2   Disc bulge, bilateral facet arthropathy and ligamentum flavum infolding at level L3-4 causing severe canal and mild to moderate bilateral foraminal stenosis, grossly unchanged from MRI 6/6/2017  Workstation performed: [id number]                    Procedures  Procedures       Phone Contacts  ED Phone Contact    ED Course                               MDM  Number of Diagnoses or Management Options  Bulge of lumbar disc without myelopathy:   Lumbar foraminal stenosis:   Spinal stenosis of lumbar region:   Diagnosis management comments: Patient is a 64-year-old female coming in today with complaints of worsening back pain  On exam she does appear and moderate to severe amount of pain  She is neurologically intact with no focal deficits  Will obtain UA, PVR as well as MRI of the lumbar spine  Will also obtain basic labs  DDx including but not limited to: sciatica, herniated disc, arthritis, spinal stenosis, strain, sprain, fracture, cauda equina syndrome, epidural abscess, AAA  Noted in care everywhere, patient's last visit to Neurosurgery at [facility] in 2012  Last surgeries were April 2011 where there was not L4-S1 micro diskectomy as well as a L5-S1 nerve root decompression   According to patient's PCP, patient has been seen in the past referred to pain management    12:08 PM  Patient lying on left side more comfortably after medication  She states the pain is not completely resolved however manageable  MRI not scheduled until 330 however patient agreeable for wait  3:25 PM  Patient to MRI    5:16 PM  Patient sitting up in bed  She change  I did watch patient ambulate  This was regarding gait but much improved since prior  She remains neurologically intact with no focal deficits  Patient with decrease in pain compared to prior  Patient and family updated on MRI reports as well will send urine culture  Patient does not have any symptoms of UTI at this time  Long discussion with patient regarding pain control with lumbar spine chronically  I discussed with her exercises as well as need to follow up with neurosurgeon  Discussed with patient regarding preventative measures such as MiraLax and/or stool softeners to prevent constipation  Patient updated and agreeable  She does states she has the neurosurgeons number that she has seen in the past   I instructed to call them tomorrow for follow-up  Portions of the record may have been created with voice recognition software  Occasional wrong word or "sound a like" substitutions may have occurred due to the inherent limitations of voice recognition software  Read the chart carefully and recognize, using context, where substitutions have occurred           Amount and/or Complexity of Data Reviewed  Clinical lab tests: ordered and reviewed  Tests in the radiology section of CPT®: ordered and reviewed  Tests in the medicine section of CPT®: ordered and reviewed  Independent visualization of images, tracings, or specimens: yes      CritCare Time    Disposition  Final diagnoses:   Spinal stenosis of lumbar region   Lumbar foraminal stenosis   Bulge of lumbar disc without myelopathy     Time reflects when diagnosis was documented in both MDM as applicable and the Disposition within this note     Time User Action Codes Description Comment    1/22/2019  5:03 PM Ford Lei Add [L90 732] Spinal stenosis of lumbar region     1/22/2019  5:03 PM Ford Lei Add [H00 61] Lumbar foraminal stenosis     1/22/2019  5:04 PM Vani Dardenjudy Blackwood Add [M51 [id number]] Bulge of lumbar disc without myelopathy       ED Disposition     ED Disposition Condition Comment    Discharge  Cuco Bustarylan discharge to home/self care      Condition at discharge: Stable        Follow-up Information     Follow up With Specialties Details Why Contact Info Additional Kayla 45, DO Family Medicine Schedule an appointment as soon as possible for a visit in 1 day  [phone] Myah Cooley 23 Pain Medicine Schedule an appointment as soon as possible for a visit in 2 days  [address] [facility] [address]  [address] [place] [address], [address] [place] [address], [phone]    Blaine Hicks MD Orthopedic Surgery Schedule an appointment as soon as possible for a visit in 2 days  [address] [place] [id number] [phone]             Discharge Medication List as of 1/22/2019  5:19 PM      START taking these medications    Details   diazepam (VALIUM) 5 mg tablet Take 1 tablet (5 mg total) by mouth every 8 (eight) hours as needed for muscle spasms (back pain) for up to 5 days, Starting Tue 1/22/2019, Until Sun 1/27/2019, Print      lidocaine (LIDODERM) 5 % Apply 1 patch topically daily for 5 days Remove & Discard patch within 12 hours or as directed by MD, Starting Tue 1/22/2019, Until Sun 1/27/2019, Print      !! oxyCODONE-acetaminophen (PERCOCET) 5-325 mg per tablet Take 1 tablet by mouth every 4 (four) hours as needed for moderate pain for up to 5 days Max Daily Amount: 6 tablets, Starting Tue 1/22/2019, Until Sun 1/27/2019, Print       !! - Potential duplicate medications found  Please discuss with provider  CONTINUE these medications which have NOT CHANGED    Details   gabapentin (NEURONTIN) 300 mg capsule Take 1 capsule (300 mg total) by mouth 3 (three) times a day, Starting Mon 2/19/2018, Normal      cyclobenzaprine (FLEXERIL) 5 mg tablet Take 1 tablet (5 mg total) by mouth 3 (three) times a day as needed for muscle spasms, Starting Mon 2/19/2018, Normal      diclofenac sodium (VOLTAREN) 50 mg EC tablet Take 1 tablet (50 mg total) by mouth 2 (two) times a day Do not take with ibuprofen, Starting Mon 2/19/2018, Normal      ibuprofen (MOTRIN) 600 mg tablet Take 1 tablet (600 mg total) by mouth every 8 (eight) hours as needed for moderate pain Do not take with diclofenac, Starting Mon 2/19/2018, Normal      !! oxyCODONE-acetaminophen (PERCOCET) 5-325 mg per tablet Take 1-2 tablets by mouth every 4 (four) hours as needed for moderate pain or severe pain Max Daily Amount: 12 tablets, Starting Tue 4/3/2018, Print       !! - Potential duplicate medications found  Please discuss with provider  No discharge procedures on file      ED Provider  Electronically Signed by           Jada Yepez DO  01/22/19 6983

## 2019-01-22 NOTE — ED NOTES
Patient being transported to MRI via [id number] [facility] [place] [facility] Karmen  01/22/19 Brigido Worthy [address] Hal[place], ANGE  01/22/19 7612

## 2019-04-02 ENCOUNTER — APPOINTMENT (EMERGENCY)
Dept: RADIOLOGY | Facility: HOSPITAL | Age: 49
End: 2019-04-02
Payer: COMMERCIAL

## 2019-04-02 ENCOUNTER — HOSPITAL ENCOUNTER (EMERGENCY)
Facility: HOSPITAL | Age: 49
Discharge: HOME | End: 2019-04-02
Payer: COMMERCIAL

## 2019-04-02 VITALS
BODY MASS INDEX: 37.1 KG/M2 | WEIGHT: 230.82 LBS | HEIGHT: 66 IN | HEART RATE: 80 BPM | SYSTOLIC BLOOD PRESSURE: 182 MMHG | TEMPERATURE: 98.2 F | OXYGEN SATURATION: 96 % | RESPIRATION RATE: 18 BRPM | DIASTOLIC BLOOD PRESSURE: 90 MMHG

## 2019-04-02 DIAGNOSIS — M54.9 MUSCULOSKELETAL BACK PAIN: Primary | ICD-10-CM

## 2019-04-02 LAB
ALBUMIN SERPL BCP-MCNC: 3.7 G/DL (ref 3.5–5)
ALP SERPL-CCNC: 103 U/L (ref 46–116)
ALT SERPL W P-5'-P-CCNC: 24 U/L (ref 12–78)
ANION GAP SERPL CALCULATED.3IONS-SCNC: 8 MMOL/L (ref 4–13)
AST SERPL W P-5'-P-CCNC: 11 U/L (ref 5–45)
BASOPHILS # BLD AUTO: 0.02 THOUSANDS/ΜL (ref 0–0.1)
BASOPHILS NFR BLD AUTO: 0 % (ref 0–1)
BILIRUB SERPL-MCNC: 0.2 MG/DL (ref 0.2–1)
BUN SERPL-MCNC: 14 MG/DL (ref 5–25)
CALCIUM SERPL-MCNC: 9 MG/DL (ref 8.3–10.1)
CHLORIDE SERPL-SCNC: 104 MMOL/L (ref 100–108)
CO2 SERPL-SCNC: 28 MMOL/L (ref 21–32)
CREAT SERPL-MCNC: 0.72 MG/DL (ref 0.6–1.3)
DEPRECATED D DIMER PPP: 394 NG/ML (FEU)
EOSINOPHIL # BLD AUTO: 0.08 THOUSAND/ΜL (ref 0–0.61)
EOSINOPHIL NFR BLD AUTO: 1 % (ref 0–6)
ERYTHROCYTE [DISTWIDTH] IN BLOOD BY AUTOMATED COUNT: 14.2 % (ref 11.6–15.1)
GFR SERPL CREATININE-BSD FRML MDRD: 98 ML/MIN/1.73SQ M
GLUCOSE SERPL-MCNC: 94 MG/DL (ref 65–140)
HCT VFR BLD AUTO: 47.5 % (ref 34.8–46.1)
HGB BLD-MCNC: 15.3 G/DL (ref 11.5–15.4)
IMM GRANULOCYTES # BLD AUTO: 0.05 THOUSAND/UL (ref 0–0.2)
IMM GRANULOCYTES NFR BLD AUTO: 1 % (ref 0–2)
LYMPHOCYTES # BLD AUTO: 2.32 THOUSANDS/ΜL (ref 0.6–4.47)
LYMPHOCYTES NFR BLD AUTO: 23 % (ref 14–44)
MCH RBC QN AUTO: 29.9 PG (ref 26.8–34.3)
MCHC RBC AUTO-ENTMCNC: 32.2 G/DL (ref 31.4–37.4)
MCV RBC AUTO: 93 FL (ref 82–98)
MONOCYTES # BLD AUTO: 0.52 THOUSAND/ΜL (ref 0.17–1.22)
MONOCYTES NFR BLD AUTO: 5 % (ref 4–12)
NEUTROPHILS # BLD AUTO: 7.21 THOUSANDS/ΜL (ref 1.85–7.62)
NEUTS SEG NFR BLD AUTO: 70 % (ref 43–75)
NRBC BLD AUTO-RTO: 0 /100 WBCS
PLATELET # BLD AUTO: 279 THOUSANDS/UL (ref 149–390)
PMV BLD AUTO: 9.7 FL (ref 8.9–12.7)
POTASSIUM SERPL-SCNC: 4.3 MMOL/L (ref 3.5–5.3)
PROT SERPL-MCNC: 7.1 G/DL (ref 6.4–8.2)
RBC # BLD AUTO: 5.12 MILLION/UL (ref 3.81–5.12)
SODIUM SERPL-SCNC: 140 MMOL/L (ref 136–145)
WBC # BLD AUTO: 10.2 THOUSAND/UL (ref 4.31–10.16)

## 2019-04-02 PROCEDURE — 85025 COMPLETE CBC W/AUTO DIFF WBC: CPT

## 2019-04-02 PROCEDURE — 96374 THER/PROPH/DIAG INJ IV PUSH: CPT

## 2019-04-02 PROCEDURE — 99285 EMERGENCY DEPT VISIT HI MDM: CPT | Performed by: EMERGENCY MEDICINE

## 2019-04-02 PROCEDURE — 36415 COLL VENOUS BLD VENIPUNCTURE: CPT

## 2019-04-02 PROCEDURE — 85379 FIBRIN DEGRADATION QUANT: CPT

## 2019-04-02 PROCEDURE — 80053 COMPREHEN METABOLIC PANEL: CPT

## 2019-04-02 PROCEDURE — 99284 EMERGENCY DEPT VISIT MOD MDM: CPT

## 2019-04-02 PROCEDURE — 71046 X-RAY EXAM CHEST 2 VIEWS: CPT

## 2019-04-02 RX ORDER — HYDROMORPHONE HCL/PF 1 MG/ML
1 SYRINGE (ML) INJECTION ONCE
Status: COMPLETED | OUTPATIENT
Start: 2019-04-02 | End: 2019-04-02

## 2019-04-02 RX ORDER — DIAZEPAM 5 MG/1
5 TABLET ORAL EVERY 8 HOURS PRN
Qty: 15 TABLET | Refills: 0 | Status: SHIPPED | OUTPATIENT
Start: 2019-04-02 | End: 2019-09-03 | Stop reason: ALTCHOICE

## 2019-04-02 RX ADMIN — HYDROMORPHONE HYDROCHLORIDE 1 MG: 1 INJECTION, SOLUTION INTRAMUSCULAR; INTRAVENOUS; SUBCUTANEOUS at 12:59

## 2019-04-03 ENCOUNTER — HOSPITAL ENCOUNTER (OUTPATIENT)
Dept: CT IMAGING | Facility: HOSPITAL | Age: 49
Discharge: HOME | End: 2019-04-03
Attending: FAMILY MEDICINE
Payer: COMMERCIAL

## 2019-04-03 ENCOUNTER — LAB (OUTPATIENT)
Dept: LAB | Facility: HOSPITAL | Age: 49
End: 2019-04-03
Attending: FAMILY MEDICINE
Payer: COMMERCIAL

## 2019-04-03 ENCOUNTER — OFFICE VISIT (OUTPATIENT)
Dept: FAMILY MEDICINE CLINIC | Facility: CLINIC | Age: 49
End: 2019-04-03
Payer: COMMERCIAL

## 2019-04-03 VITALS
WEIGHT: 225 LBS | HEIGHT: 66 IN | SYSTOLIC BLOOD PRESSURE: 162 MMHG | DIASTOLIC BLOOD PRESSURE: 100 MMHG | BODY MASS INDEX: 36.16 KG/M2

## 2019-04-03 DIAGNOSIS — R10.11 RIGHT UPPER QUADRANT ABDOMINAL PAIN: ICD-10-CM

## 2019-04-03 DIAGNOSIS — R07.81 PLEURITIC CHEST PAIN: ICD-10-CM

## 2019-04-03 DIAGNOSIS — R07.81 PLEURITIC CHEST PAIN: Primary | ICD-10-CM

## 2019-04-03 LAB
AMYLASE SERPL-CCNC: 41 IU/L (ref 25–115)
BASOPHILS # BLD AUTO: 0.02 THOUSANDS/ΜL (ref 0–0.1)
BASOPHILS NFR BLD AUTO: 0 % (ref 0–1)
DEPRECATED D DIMER PPP: 483 NG/ML (FEU)
EOSINOPHIL # BLD AUTO: 0.17 THOUSAND/ΜL (ref 0–0.61)
EOSINOPHIL NFR BLD AUTO: 2 % (ref 0–6)
ERYTHROCYTE [DISTWIDTH] IN BLOOD BY AUTOMATED COUNT: 14.2 % (ref 11.6–15.1)
HCT VFR BLD AUTO: 51.1 % (ref 34.8–46.1)
HGB BLD-MCNC: 16.5 G/DL (ref 11.5–15.4)
IMM GRANULOCYTES # BLD AUTO: 0.03 THOUSAND/UL (ref 0–0.2)
IMM GRANULOCYTES NFR BLD AUTO: 0 % (ref 0–2)
LYMPHOCYTES # BLD AUTO: 2.7 THOUSANDS/ΜL (ref 0.6–4.47)
LYMPHOCYTES NFR BLD AUTO: 31 % (ref 14–44)
MCH RBC QN AUTO: 29.8 PG (ref 26.8–34.3)
MCHC RBC AUTO-ENTMCNC: 32.3 G/DL (ref 31.4–37.4)
MCV RBC AUTO: 92 FL (ref 82–98)
MONOCYTES # BLD AUTO: 0.44 THOUSAND/ΜL (ref 0.17–1.22)
MONOCYTES NFR BLD AUTO: 5 % (ref 4–12)
NEUTROPHILS # BLD AUTO: 5.24 THOUSANDS/ΜL (ref 1.85–7.62)
NEUTS SEG NFR BLD AUTO: 62 % (ref 43–75)
NRBC BLD AUTO-RTO: 0 /100 WBCS
PLATELET # BLD AUTO: 296 THOUSANDS/UL (ref 149–390)
PMV BLD AUTO: 9.6 FL (ref 8.9–12.7)
RBC # BLD AUTO: 5.53 MILLION/UL (ref 3.81–5.12)
WBC # BLD AUTO: 8.6 THOUSAND/UL (ref 4.31–10.16)

## 2019-04-03 PROCEDURE — 85379 FIBRIN DEGRADATION QUANT: CPT

## 2019-04-03 PROCEDURE — 71260 CT THORAX DX C+: CPT

## 2019-04-03 PROCEDURE — 85025 COMPLETE CBC W/AUTO DIFF WBC: CPT

## 2019-04-03 PROCEDURE — 74177 CT ABD & PELVIS W/CONTRAST: CPT

## 2019-04-03 PROCEDURE — 3008F BODY MASS INDEX DOCD: CPT | Performed by: FAMILY MEDICINE

## 2019-04-03 PROCEDURE — 99213 OFFICE O/P EST LOW 20 MIN: CPT | Performed by: FAMILY MEDICINE

## 2019-04-03 PROCEDURE — 82150 ASSAY OF AMYLASE: CPT

## 2019-04-03 PROCEDURE — 36415 COLL VENOUS BLD VENIPUNCTURE: CPT

## 2019-04-03 RX ADMIN — IOHEXOL 100 ML: 350 INJECTION, SOLUTION INTRAVENOUS at 17:45

## 2019-04-04 DIAGNOSIS — S29.011A INTERCOSTAL MUSCLE STRAIN, INITIAL ENCOUNTER: Primary | ICD-10-CM

## 2019-04-04 RX ORDER — METHYLPREDNISOLONE 4 MG/1
TABLET ORAL
Qty: 21 EACH | Refills: 0 | Status: SHIPPED | OUTPATIENT
Start: 2019-04-04 | End: 2019-05-03 | Stop reason: ALTCHOICE

## 2019-04-04 RX ORDER — CYCLOBENZAPRINE HCL 5 MG
5 TABLET ORAL 3 TIMES DAILY
Qty: 12 TABLET | Refills: 0 | Status: SHIPPED | OUTPATIENT
Start: 2019-04-04 | End: 2019-05-03 | Stop reason: SDUPTHER

## 2019-05-03 DIAGNOSIS — S29.011A INTERCOSTAL MUSCLE STRAIN, INITIAL ENCOUNTER: ICD-10-CM

## 2019-05-03 RX ORDER — CYCLOBENZAPRINE HCL 5 MG
5 TABLET ORAL 3 TIMES DAILY
Qty: 12 TABLET | Refills: 0 | Status: SHIPPED | OUTPATIENT
Start: 2019-05-03 | End: 2020-11-17

## 2019-09-01 ENCOUNTER — APPOINTMENT (EMERGENCY)
Dept: RADIOLOGY | Facility: HOSPITAL | Age: 49
End: 2019-09-01
Payer: COMMERCIAL

## 2019-09-01 ENCOUNTER — HOSPITAL ENCOUNTER (EMERGENCY)
Facility: HOSPITAL | Age: 49
Discharge: HOME | End: 2019-09-01
Attending: EMERGENCY MEDICINE
Payer: COMMERCIAL

## 2019-09-01 VITALS
RESPIRATION RATE: 22 BRPM | HEART RATE: 111 BPM | HEIGHT: 66 IN | BODY MASS INDEX: 37.34 KG/M2 | WEIGHT: 232.37 LBS | TEMPERATURE: 98.7 F | DIASTOLIC BLOOD PRESSURE: 99 MMHG | OXYGEN SATURATION: 97 % | SYSTOLIC BLOOD PRESSURE: 157 MMHG

## 2019-09-01 DIAGNOSIS — M62.838 TRAPEZIUS MUSCLE SPASM: Primary | ICD-10-CM

## 2019-09-01 DIAGNOSIS — M54.12 LEFT CERVICAL RADICULOPATHY: ICD-10-CM

## 2019-09-01 PROCEDURE — 99284 EMERGENCY DEPT VISIT MOD MDM: CPT | Performed by: PHYSICIAN ASSISTANT

## 2019-09-01 PROCEDURE — 72050 X-RAY EXAM NECK SPINE 4/5VWS: CPT

## 2019-09-01 PROCEDURE — 99283 EMERGENCY DEPT VISIT LOW MDM: CPT

## 2019-09-01 RX ORDER — OXYCODONE HYDROCHLORIDE AND ACETAMINOPHEN 5; 325 MG/1; MG/1
1 TABLET ORAL ONCE
Status: COMPLETED | OUTPATIENT
Start: 2019-09-01 | End: 2019-09-01

## 2019-09-01 RX ORDER — DIAZEPAM 5 MG/1
5 TABLET ORAL ONCE
Status: COMPLETED | OUTPATIENT
Start: 2019-09-01 | End: 2019-09-01

## 2019-09-01 RX ORDER — METHOCARBAMOL 500 MG/1
500 TABLET, FILM COATED ORAL 2 TIMES DAILY PRN
Qty: 20 TABLET | Refills: 0 | Status: SHIPPED | OUTPATIENT
Start: 2019-09-01 | End: 2020-11-17

## 2019-09-01 RX ORDER — PREDNISONE 20 MG/1
40 TABLET ORAL ONCE
Status: COMPLETED | OUTPATIENT
Start: 2019-09-01 | End: 2019-09-01

## 2019-09-01 RX ORDER — PREDNISONE 10 MG/1
TABLET ORAL
Qty: 17 TABLET | Refills: 0 | Status: SHIPPED | OUTPATIENT
Start: 2019-09-01 | End: 2019-09-09

## 2019-09-01 RX ORDER — ACETAMINOPHEN 325 MG/1
650 TABLET ORAL ONCE
Status: COMPLETED | OUTPATIENT
Start: 2019-09-01 | End: 2019-09-01

## 2019-09-01 RX ORDER — LIDOCAINE 50 MG/G
1 PATCH TOPICAL ONCE
Status: DISCONTINUED | OUTPATIENT
Start: 2019-09-01 | End: 2019-09-01 | Stop reason: HOSPADM

## 2019-09-01 RX ORDER — ONDANSETRON 4 MG/1
4 TABLET, ORALLY DISINTEGRATING ORAL ONCE
Status: COMPLETED | OUTPATIENT
Start: 2019-09-01 | End: 2019-09-01

## 2019-09-01 RX ORDER — HYDROCODONE BITARTRATE AND ACETAMINOPHEN 5; 325 MG/1; MG/1
1 TABLET ORAL EVERY 6 HOURS PRN
Qty: 10 TABLET | Refills: 0 | Status: SHIPPED | OUTPATIENT
Start: 2019-09-01 | End: 2020-11-17

## 2019-09-01 RX ORDER — ACETAMINOPHEN 500 MG
500 TABLET ORAL EVERY 6 HOURS PRN
COMMUNITY
End: 2019-09-03 | Stop reason: ALTCHOICE

## 2019-09-01 RX ADMIN — OXYCODONE HYDROCHLORIDE AND ACETAMINOPHEN 1 TABLET: 5; 325 TABLET ORAL at 20:05

## 2019-09-01 RX ADMIN — ONDANSETRON 4 MG: 4 TABLET, ORALLY DISINTEGRATING ORAL at 20:10

## 2019-09-01 RX ADMIN — PREDNISONE 40 MG: 20 TABLET ORAL at 19:14

## 2019-09-01 RX ADMIN — DIAZEPAM 5 MG: 5 TABLET ORAL at 19:14

## 2019-09-01 RX ADMIN — ACETAMINOPHEN 650 MG: 325 TABLET, FILM COATED ORAL at 19:14

## 2019-09-01 RX ADMIN — LIDOCAINE 1 PATCH: 50 PATCH TOPICAL at 19:15

## 2019-09-01 NOTE — ED PROVIDER NOTES
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  She lifts heavy things on a daily basis and does along the rails to rest on her shoulders  She notes minimum weight lifted is 95 lbs  Denies specific injury or trauma at work  She notes intense pain upper back on the left  Pain aggravated by movement and lifting her left arm  Pain radiates at times into her left arm  Denies numbness or tingling  She denies prior neck problems but does note prior back problems with surgery  She tried ibuprofen without relief  History provided by:  Patient   used: No    Neck Pain   Pain location:  L side  Quality:  Shooting and aching  Pain radiates to:  L arm  Timing:  Constant  Progression:  Worsening  Chronicity:  New  Context: not fall, not lifting a heavy object and not recent injury    Relieved by:  Nothing  Worsened by:  Position and twisting  Ineffective treatments:  NSAIDs  Associated symptoms: no bladder incontinence, no bowel incontinence, no chest pain, no fever, no headaches, no leg pain, no numbness, no paresis, no photophobia, no syncope, no tingling, no visual change, no weakness and no weight loss    Risk factors: no hx of spinal trauma, no recent head injury and no recurrent falls        Prior to Admission Medications   Prescriptions Last Dose Informant Patient Reported?  Taking?   acetaminophen (TYLENOL) 500 mg tablet 9/1/2019 at Unknown time  Yes Yes   Sig: Take 500 mg by mouth every 6 (six) hours as needed for mild pain   cyclobenzaprine (FLEXERIL) 5 mg tablet Not Taking at Unknown time  No No   Sig: Take 1 tablet (5 mg total) by mouth 3 (three) times a day   Patient not taking: Reported on 9/1/2019   diazepam (VALIUM) 5 mg tablet Not Taking at Unknown time  No No   Sig: Take 1 tablet (5 mg total) by mouth every 8 (eight) hours as needed for muscle spasms for up to 10 doses   Patient not taking: Reported on 9/1/2019   gabapentin (NEURONTIN) 300 mg capsule Past Week at Unknown time Self No Yes   Sig: Take 1 capsule (300 mg total) by mouth 3 (three) times a day   ibuprofen (MOTRIN) 600 mg tablet Not Taking at Unknown time Self No No   Sig: Take 1 tablet (600 mg total) by mouth every 8 (eight) hours as needed for moderate pain Do not take with diclofenac   Patient not taking: Reported on 4/3/2019      Facility-Administered Medications: None       Past Medical History:   Diagnosis Date    Back pain        Past Surgical History:   Procedure Laterality Date    BACK SURGERY      BACK SURGERY      CHOLECYSTECTOMY      [address]UMBAR [address]      IN REPAIR INCISIONAL HERNIA,REDUCIBLE N/A 3/16/2018    Procedure: REPAIR HERNIA VENTRAL with mesh;  Surgeon: Roselyn Gonzalez DO;  Location: [facility];  Service: General       Family History   Adopted: Yes     I have reviewed and agree with the history as documented  Social History     Tobacco Use    Smoking status: Current Every Day Smoker     Packs/day: 1 00     Years: 30 00     Pack years: 30 00     Types: Cigars    Smokeless tobacco: Never Used    Tobacco comment: 1 ppd agreeable to cessation education and patch   Substance Use Topics    Alcohol use: No    Drug use: No        Review of Systems   Constitutional: Negative  Negative for chills, fatigue, fever and weight loss  HENT: Negative  Negative for rhinorrhea and sore throat  Eyes: Negative  Negative for photophobia and visual disturbance  Respiratory: Negative  Negative for cough, shortness of breath and wheezing  Cardiovascular: Negative  Negative for chest pain, palpitations, leg swelling and syncope     Gastrointestinal: Negative  Negative for abdominal pain, bowel incontinence, constipation, diarrhea, nausea and vomiting  Genitourinary: Negative  Negative for bladder incontinence, dysuria, flank pain and hematuria  Musculoskeletal: Positive for neck pain  Negative for arthralgias, back pain and myalgias  Skin: Negative  Negative for rash  Neurological: Negative  Negative for dizziness, tingling, weakness, light-headedness, numbness and headaches  Psychiatric/Behavioral: Negative  Negative for confusion  All other systems reviewed and are negative  Physical Exam  Physical Exam   Constitutional: She is oriented to person, place, and time  She appears well-developed and well-nourished  She appears distressed (appears uncomfortable in pain)  HENT:   Head: Normocephalic and atraumatic  Right Ear: Hearing, tympanic membrane, external ear and ear canal normal    Left Ear: Hearing, tympanic membrane, external ear and ear canal normal    Nose: Nose normal    Mouth/Throat: Uvula is midline, oropharynx is clear and moist and mucous membranes are normal  No oropharyngeal exudate  Eyes: Pupils are equal, round, and reactive to light  Conjunctivae and EOM are normal  No scleral icterus  Neck: Trachea normal  Neck supple  Muscular tenderness present  No spinous process tenderness present  Decreased range of motion present  No tracheal deviation present  Cardiovascular: Normal rate, regular rhythm, normal heart sounds, intact distal pulses and normal pulses  No murmur heard  Pulmonary/Chest: Effort normal and breath sounds normal  No respiratory distress  She has no wheezes  She has no rhonchi  She has no rales  Abdominal: Soft  Bowel sounds are normal  There is no tenderness  There is no rebound, no guarding and no CVA tenderness  Musculoskeletal: She exhibits no edema  Cervical back: She exhibits decreased range of motion, tenderness, pain and spasm   She exhibits no bony tenderness, no swelling, no deformity, no laceration and normal pulse  Thoracic back: Normal  She exhibits no bony tenderness and no pain  Back:    Pt has increased pain with lateral rotation of her head as well as attempting to lift left arm above shoulder level  Neurological: She is alert and oriented to person, place, and time  No cranial nerve deficit or sensory deficit  She exhibits normal muscle tone  Skin: Skin is warm and dry  Capillary refill takes less than 2 seconds  No rash noted  Psychiatric: She has a normal mood and affect  Her speech is normal and behavior is normal    Nursing note and vitals reviewed  Vital Signs  ED Triage Vitals [09/01/19 1842]   Temperature Pulse Respirations Blood Pressure SpO2   98 7 °F (37 1 °C) (!) 111 22 157/99 97 %      Temp Source Heart Rate Source Patient Position - Orthostatic VS BP Location FiO2 (%)   Temporal Monitor -- -- --      Pain Score       Worst Possible Pain           Vitals:    09/01/19 1842   BP: 157/99   Pulse: (!) 111         Visual Acuity      ED Medications  Medications   lidocaine (LIDODERM) 5 % patch 1 patch (1 patch Topical Medication Applied 9/1/19 1915)   diazepam (VALIUM) tablet 5 mg (5 mg Oral Given 9/1/19 1914)   predniSONE tablet 40 mg (40 mg Oral Given 9/1/19 1914)   acetaminophen (TYLENOL) tablet 650 mg (650 mg Oral Given 9/1/19 1914)   oxyCODONE-acetaminophen (PERCOCET) 5-325 mg per tablet 1 tablet (1 tablet Oral Given 9/1/19 2005)   ondansetron (ZOFRAN-ODT) dispersible tablet 4 mg (4 mg Oral Given 9/1/19 2010)       Diagnostic Studies  Results Reviewed     None                 XR spine cervical complete 4 or 5 vw non injury    (Results Pending)              Procedures  Procedures       ED Course  ED Course as of Sep 01 2020   Sun Sep 01, 2019   1940 Independently viewed and interpreted by me - no acute osseous findings; straightening of normal curvature; pending official read     XR spine cervical complete 4 or 5 vw non injury   1945 Upon subsequent reassessment, pt laying down  She notes the medicine helped some as she is now able to change position to lay down  Repeat exam - she remains tender to palpation and has increased pain with movement and positional changes  Pt given dose of PO percocet and also given zofran ODT as she notes this makes her nauseated  Discussed usual course and treatment of musculoskeletal back pain and muscle spasm  Recommended acute period of rest, alternating ice/heat, etc   She has an extremely physical job and I do feel this triggered her current exacerbation of symptoms  She was advised to avoid heavy lifting until symptoms improve  Will Rx steroid, muscle relaxant, short course of opioid  Risks/benefits/side effects discussed  PDMP was reviewed and no suspicious behaviors noted  Advised outpatient follow up with PCP in the next few days for recheck  Consider further outpatient imaging such as MRI, physical therapy, etc   Consider referral to pain/spine  Pt and significant other verbalized understanding and had no further questions                            MDM  Number of Diagnoses or Management Options  Left cervical radiculopathy: new and requires workup  Trapezius muscle spasm: new and requires workup     Amount and/or Complexity of Data Reviewed  Tests in the radiology section of CPT®: ordered and reviewed  Decide to obtain previous medical records or to obtain history from someone other than the patient: yes  Obtain history from someone other than the patient: yes  Review and summarize past medical records: yes  Independent visualization of images, tracings, or specimens: yes    Patient Progress  Patient progress: improved      Disposition  Final diagnoses:   Trapezius muscle spasm   Left cervical radiculopathy     Time reflects when diagnosis was documented in both MDM as applicable and the Disposition within this note     Time User Action Codes Description Comment    9/1/2019  7:53 PM Kellie Rebeca Mckinney Add [C77 776] Trapezius muscle spasm     9/1/2019  7:54 PM Arely Ambriz Add [G16 11] Left cervical radiculopathy       ED Disposition     ED Disposition Condition Date/Time Comment    Discharge Stable [place] Sep 1, 2019  7:53 PM Parris Scherer discharge to home/self care  Follow-up Information     Follow up With Specialties Details Why Contact Info    Nany Fall DO Family Medicine Schedule an appointment as soon as possible for a visit   [address]  [place] [address]  [phone]            Discharge Medication List as of 9/1/2019  8:02 PM      START taking these medications    Details   HYDROcodone-acetaminophen (NORCO) 5-325 mg per tablet Take 1 tablet by mouth every 6 (six) hours as needed for painMax Daily Amount: 4 tablets, Starting Sun 9/1/2019, Print      methocarbamol (ROBAXIN) 500 mg tablet Take 1 tablet (500 mg total) by mouth 2 (two) times a day as needed for muscle spasms, Starting Sun 9/1/2019, Print      predniSONE 10 mg tablet Multiple Dosages:Starting Sun 9/1/2019, Last dose on Mon 9/2/2019, THEN Starting Tue 9/3/2019, Last dose on Thu 9/5/2019, THEN Starting Fri 9/6/2019, Last dose on Sun 9/8/2019Take 4 tablets (40 mg total) by mouth daily for 2 days, THEN 2 tablets (20  mg total) daily for 3 days, THEN 1 tablet (10 mg total) daily for 3 days  , Print         CONTINUE these medications which have NOT CHANGED    Details   acetaminophen (TYLENOL) 500 mg tablet Take 500 mg by mouth every 6 (six) hours as needed for mild pain, Historical Med      gabapentin (NEURONTIN) 300 mg capsule Take 1 capsule (300 mg total) by mouth 3 (three) times a day, Starting Mon 2/19/2018, Normal      cyclobenzaprine (FLEXERIL) 5 mg tablet Take 1 tablet (5 mg total) by mouth 3 (three) times a day, Starting Fri 5/3/2019, Normal      diazepam (VALIUM) 5 mg tablet Take 1 tablet (5 mg total) by mouth every 8 (eight) hours as needed for muscle spasms for up to 10 doses, Starting Tue 4/2/2019, Print      ibuprofen (MOTRIN) 600 mg tablet Take 1 tablet (600 mg total) by mouth every 8 (eight) hours as needed for moderate pain Do not take with diclofenac, Starting Mon 2/19/2018, Normal           No discharge procedures on file      ED Provider  Electronically Signed by           Jae Perez PA-C  09/01/19 2021

## 2019-09-02 NOTE — DISCHARGE INSTRUCTIONS
Brief period of rest, alternating ice/heat  Take steroid as directed with food  Caution sedation with muscle relaxant  No driving while taking  Caution sedation with pain medication  Call to schedule follow up with PCP - consider further outpatient imaging, physical therapy

## 2019-09-03 ENCOUNTER — OFFICE VISIT (OUTPATIENT)
Dept: FAMILY MEDICINE CLINIC | Facility: CLINIC | Age: 49
End: 2019-09-03
Payer: COMMERCIAL

## 2019-09-03 VITALS
WEIGHT: 230.4 LBS | DIASTOLIC BLOOD PRESSURE: 98 MMHG | BODY MASS INDEX: 37.03 KG/M2 | HEIGHT: 66 IN | SYSTOLIC BLOOD PRESSURE: 158 MMHG

## 2019-09-03 DIAGNOSIS — M54.5 CHRONIC LOW BACK PAIN, UNSPECIFIED BACK PAIN LATERALITY, WITH SCIATICA PRESENCE UNSPECIFIED: ICD-10-CM

## 2019-09-03 DIAGNOSIS — M54.12 CERVICAL RADICULOPATHY: ICD-10-CM

## 2019-09-03 DIAGNOSIS — S46.812D STRAIN OF LEFT TRAPEZIUS MUSCLE, SUBSEQUENT ENCOUNTER: Primary | ICD-10-CM

## 2019-09-03 DIAGNOSIS — G89.29 CHRONIC LOW BACK PAIN, UNSPECIFIED BACK PAIN LATERALITY, WITH SCIATICA PRESENCE UNSPECIFIED: ICD-10-CM

## 2019-09-03 PROCEDURE — 99213 OFFICE O/P EST LOW 20 MIN: CPT | Performed by: PHYSICIAN ASSISTANT

## 2019-09-03 PROCEDURE — 3008F BODY MASS INDEX DOCD: CPT | Performed by: PHYSICIAN ASSISTANT

## 2019-09-03 RX ORDER — GABAPENTIN 300 MG/1
300 CAPSULE ORAL 3 TIMES DAILY
Qty: 270 CAPSULE | Refills: 0 | Status: SHIPPED | OUTPATIENT
Start: 2019-09-03 | End: 2020-11-17

## 2019-09-03 NOTE — PROGRESS NOTES
Assessment/Plan:    Problem List Items Addressed This Visit     None      Visit Diagnoses     Strain of left trapezius muscle, subsequent encounter    -  Primary    Relevant Orders    Ambulatory referral to Physical Therapy    Cervical radiculopathy        Relevant Orders    Ambulatory referral to Physical Therapy    Chronic low back pain, unspecified back pain laterality, with sciatica presence unspecified        Relevant Medications    gabapentin (NEURONTIN) 300 mg capsule           Diagnoses and all orders for this visit:    Strain of left trapezius muscle, subsequent encounter  -     Ambulatory referral to Physical Therapy; Future    Cervical radiculopathy  -     Ambulatory referral to Physical Therapy; Future    Chronic low back pain, unspecified back pain laterality, with sciatica presence unspecified  -     gabapentin (NEURONTIN) 300 mg capsule; Take 1 capsule (300 mg total) by mouth 3 (three) times a day        Instructed to restart ibuprofen along with gabapentin along with finishing prednisone taper  I've placed ASAP referral for PT  For now, Heena Zamorano will remain out of work for the rest of the week, re-evaluate next week  She is eager to return to work  Subjective:      Patient ID: Gianna Cardoza is a 44 y o  female  Heena Zamorano is here today to follow up from ER visit 9/1/19  Complaining of intense pain in L neck/shoulder since Friday night  She has tenderness over L trapezius and radiating pain into L arm  Currently is taking prednisone from ER, stopped taking Gabapentin and ibuprofen because she was worried it would interact with prednisone  She works with [employer] in [employer]  Lightest load she lifts is 95 lbs  The following portions of the patient's history were reviewed and updated as appropriate:   She has a past medical history of Back pain  ,  does not have any pertinent problems on file  ,   has a past surgical history that includes Back surgery;  Cholecystectomy; Lumbar disc surgery; Back surgery; and pr repair incisional hernia,reducible (N/A, 3/16/2018)  ,  family history is not on file  She was adopted  ,   reports that she has been smoking cigars  She has a 30 00 pack-year smoking history  She has never used smokeless tobacco  She reports that she does not drink alcohol or use drugs  ,  has No Known Allergies     Current Outpatient Medications   Medication Sig Dispense Refill    gabapentin (NEURONTIN) 300 mg capsule Take 1 capsule (300 mg total) by mouth 3 (three) times a day 270 capsule 0    HYDROcodone-acetaminophen (NORCO) 5-325 mg per tablet Take 1 tablet by mouth every 6 (six) hours as needed for painMax Daily Amount: 4 tablets 10 tablet 0    methocarbamol (ROBAXIN) 500 mg tablet Take 1 tablet (500 mg total) by mouth 2 (two) times a day as needed for muscle spasms 20 tablet 0    predniSONE 10 mg tablet Take 4 tablets (40 mg total) by mouth daily for 2 days, THEN 2 tablets (20 mg total) daily for 3 days, THEN 1 tablet (10 mg total) daily for 3 days  17 tablet 0    cyclobenzaprine (FLEXERIL) 5 mg tablet Take 1 tablet (5 mg total) by mouth 3 (three) times a day (Patient not taking: Reported on 9/1/2019) 12 tablet 0     No current facility-administered medications for this visit  Review of Systems   Constitutional: Negative for activity change, appetite change, chills, diaphoresis, fatigue, fever and unexpected weight change  HENT: Negative for congestion, ear pain, postnasal drip, rhinorrhea, sinus pressure, sinus pain, sneezing, sore throat, tinnitus and voice change  Eyes: Negative for pain, redness and visual disturbance  Respiratory: Negative for cough, chest tightness, shortness of breath and wheezing  Cardiovascular: Negative for chest pain, palpitations and leg swelling  Gastrointestinal: Negative for abdominal pain, blood in stool, constipation, diarrhea, nausea and vomiting     Genitourinary: Negative for difficulty urinating, dysuria, frequency, hematuria and urgency  Musculoskeletal: Positive for myalgias, neck pain and neck stiffness  Negative for arthralgias, back pain, gait problem and joint swelling  Skin: Negative for color change, pallor, rash and wound  Neurological: Negative for dizziness, tremors, weakness, light-headedness and headaches  Psychiatric/Behavioral: Negative for dysphoric mood, self-injury, sleep disturbance and suicidal ideas  The patient is not nervous/anxious  Objective:  Vitals:    09/03/19 1023   BP: 158/98   BP Location: Right arm   Patient Position: Sitting   Weight: 105 kg (230 lb 6 4 oz)   Height: 5' 6" (1 676 m)     Body mass index is 37 19 kg/m²  Physical Exam   Constitutional: She is oriented to person, place, and time  She appears well-developed and well-nourished  She appears distressed (in acute pain, tearful)  HENT:   Head: Normocephalic and atraumatic  Right Ear: Hearing, tympanic membrane, external ear and ear canal normal    Left Ear: Hearing, tympanic membrane, external ear and ear canal normal    Mouth/Throat: Uvula is midline, oropharynx is clear and moist and mucous membranes are normal  No oropharyngeal exudate  Eyes: Conjunctivae are normal  Right eye exhibits no discharge  Left eye exhibits no discharge  No scleral icterus  Neck: Neck supple  Carotid bruit is not present  No thyromegaly present  Cardiovascular: Normal rate, regular rhythm and normal heart sounds  No murmur heard  Pulmonary/Chest: Effort normal and breath sounds normal  No respiratory distress  She has no wheezes  Abdominal: Soft  Bowel sounds are normal  She exhibits no distension and no mass  There is no tenderness  There is no rebound and no guarding  Musculoskeletal: She exhibits tenderness  She exhibits no edema  Cervical back: She exhibits decreased range of motion, tenderness and pain  Back:    Lymphadenopathy:     She has no cervical adenopathy     Neurological: She is alert and oriented to person, place, and time  Skin: Skin is warm and dry  No rash noted  She is not diaphoretic  No erythema  Psychiatric: She has a normal mood and affect  Her behavior is normal  Judgment and thought content normal    Vitals reviewed

## 2020-03-25 ENCOUNTER — TELEMEDICINE (OUTPATIENT)
Dept: FAMILY MEDICINE CLINIC | Facility: CLINIC | Age: 50
End: 2020-03-25

## 2020-03-25 ENCOUNTER — TELEPHONE (OUTPATIENT)
Dept: FAMILY MEDICINE CLINIC | Facility: CLINIC | Age: 50
End: 2020-03-25

## 2020-03-25 DIAGNOSIS — R50.9 ACUTE FEBRILE ILLNESS: Primary | ICD-10-CM

## 2020-03-25 DIAGNOSIS — Z20.828 EXPOSURE TO SARS-ASSOCIATED CORONAVIRUS: ICD-10-CM

## 2020-03-25 DIAGNOSIS — J22 ACUTE LOWER RESPIRATORY INFECTION: Primary | ICD-10-CM

## 2020-03-25 PROCEDURE — 99212 OFFICE O/P EST SF 10 MIN: CPT | Performed by: FAMILY MEDICINE

## 2020-03-25 PROCEDURE — 87635 SARS-COV-2 COVID-19 AMP PRB: CPT

## 2020-03-25 RX ORDER — OSELTAMIVIR PHOSPHATE 75 MG/1
75 CAPSULE ORAL EVERY 12 HOURS SCHEDULED
Qty: 10 CAPSULE | Refills: 0 | Status: SHIPPED | OUTPATIENT
Start: 2020-03-25 | End: 2020-03-30

## 2020-03-25 RX ORDER — LEVOFLOXACIN 750 MG/1
750 TABLET ORAL EVERY 24 HOURS
Qty: 5 TABLET | Refills: 0 | Status: SHIPPED | OUTPATIENT
Start: 2020-03-25 | End: 2020-03-30

## 2020-03-25 NOTE — TELEPHONE ENCOUNTER
RANDELL WOULD LIKE AN APPT AND OR BE TESTED FOR COVID    SHE HAS A SORE THROAT,BODY ACHES,HEADACHE,COUGH,SWEATING AND HAS CHILLS(FEVER?) HAS HEAVY CHEST AND SOB  [place] [employer] [place]  HER PHONE -[phone]

## 2020-03-25 NOTE — TELEPHONE ENCOUNTER
Set this patient up right now for a telephone visit so that I can screen her and possibly get her tested

## 2020-03-25 NOTE — PROGRESS NOTES
COVID-19 Virtual Visit     This virtual check-in was done via telephone  Encounter provider Bettie Ramirez DO    Provider located at [address] [address]    Recent Visits  No visits were found meeting these conditions  Showing recent visits within past 7 days and meeting all other requirements     Today's Visits  Date Type Provider Dept   03/25/20 Telephone June [facility]   Showing today's visits and meeting all other requirements     Future Appointments  No visits were found meeting these conditions  Showing future appointments within next 150 days and meeting all other requirements        Patient agrees to participate in a virtual check in via telephone or video visit instead of presenting to the office to address urgent/immediate medical needs  Patient is aware this is a billable service  After connecting through telephone, the patient was identified by name and date of birth  Elva Chen was informed that this was a telemedicine visit and that the exam was being conducted confidentially over secure lines  My office door was closed  No one else was in the room  Elva Chen acknowledged consent and understanding of privacy and security of the telemedicine visit  I informed the patient that I have reviewed her record in Epic and presented the opportunity for her to ask any questions regarding the visit today  The patient agreed to participate  Elva Chen is a 44 y o  female who is concerned about COVID-19  She reports fever, cough and shortness of breath  She has not traveled outside the [place]  within the last 14 days    She has not had contact with a person who is under investigation for or who is positive for COVID-19 within the last 14 days  She has not been hospitalized recently for fever and/or lower respiratory symptoms      Past Medical History:   Diagnosis Date    Back pain        Past Surgical History:   Procedure Laterality Date    BACK SURGERY      BACK SURGERY      CHOLECYSTECTOMY      LUMBAR [address] SURGERY      [place] REPAIR INCISIONAL HERNIA,REDUCIBLE N/A 3/16/2018    Procedure: REPAIR HERNIA VENTRAL with mesh;  Surgeon: Che Toribio DO;  Location: [facility];  Service: General       Current Outpatient Medications   Medication Sig Dispense Refill    cyclobenzaprine (FLEXERIL) 5 mg tablet Take 1 tablet (5 mg total) by mouth 3 (three) times a day (Patient not taking: Reported on 9/1/2019) 12 tablet 0    gabapentin (NEURONTIN) 300 mg capsule Take 1 capsule (300 mg total) by mouth 3 (three) times a day 270 capsule 0    HYDROcodone-acetaminophen (NORCO) 5-325 mg per tablet Take 1 tablet by mouth every 6 (six) hours as needed for painMax Daily Amount: 4 tablets 10 tablet 0    methocarbamol (ROBAXIN) 500 mg tablet Take 1 tablet (500 mg total) by mouth 2 (two) times a day as needed for muscle spasms 20 tablet 0     No current facility-administered medications for this visit  No Known Allergies    Video Exam         Disposition:      I recommended self-quarantine for 14 days and to call back for worsening symptoms or development of shortness of breath  I spent 15 minutes with the patient during this virtual check-in visit

## 2020-03-30 LAB — SARS-COV-2 RNA SPEC QL NAA+PROBE: NOT DETECTED

## 2020-06-01 ENCOUNTER — TELEMEDICINE (OUTPATIENT)
Dept: FAMILY MEDICINE CLINIC | Facility: CLINIC | Age: 50
End: 2020-06-01
Payer: COMMERCIAL

## 2020-06-01 VITALS — BODY MASS INDEX: 36.96 KG/M2 | HEIGHT: 66 IN | WEIGHT: 230 LBS

## 2020-06-01 DIAGNOSIS — Z20.828 EXPOSURE TO SARS-ASSOCIATED CORONAVIRUS: Primary | ICD-10-CM

## 2020-06-01 DIAGNOSIS — L73.9 FOLLICULITIS: ICD-10-CM

## 2020-06-01 PROCEDURE — U0003 INFECTIOUS AGENT DETECTION BY NUCLEIC ACID (DNA OR RNA); SEVERE ACUTE RESPIRATORY SYNDROME CORONAVIRUS 2 (SARS-COV-2) (CORONAVIRUS DISEASE [COVID-19]), AMPLIFIED PROBE TECHNIQUE, MAKING USE OF HIGH THROUGHPUT TECHNOLOGIES AS DESCRIBED BY CMS-2020-01-R: HCPCS | Performed by: PHYSICIAN ASSISTANT

## 2020-06-01 PROCEDURE — 99213 OFFICE O/P EST LOW 20 MIN: CPT | Performed by: PHYSICIAN ASSISTANT

## 2020-06-01 RX ORDER — AMOXICILLIN AND CLAVULANATE POTASSIUM 875; 125 MG/1; MG/1
1 TABLET, FILM COATED ORAL EVERY 12 HOURS SCHEDULED
Qty: 20 TABLET | Refills: 0 | Status: SHIPPED | OUTPATIENT
Start: 2020-06-01 | End: 2020-06-03

## 2020-06-03 ENCOUNTER — TELEPHONE (OUTPATIENT)
Dept: OTHER | Facility: OTHER | Age: 50
End: 2020-06-03

## 2020-06-03 DIAGNOSIS — L73.9 FOLLICULITIS: Primary | ICD-10-CM

## 2020-06-03 LAB — SARS-COV-2 RNA SPEC QL NAA+PROBE: NOT DETECTED

## 2020-06-03 RX ORDER — SULFAMETHOXAZOLE AND TRIMETHOPRIM 800; 160 MG/1; MG/1
1 TABLET ORAL EVERY 12 HOURS SCHEDULED
Qty: 20 TABLET | Refills: 0 | Status: SHIPPED | OUTPATIENT
Start: 2020-06-03 | End: 2020-06-13

## 2020-06-05 ENCOUNTER — TELEPHONE (OUTPATIENT)
Dept: FAMILY MEDICINE CLINIC | Facility: CLINIC | Age: 50
End: 2020-06-05

## 2020-06-08 ENCOUNTER — OFFICE VISIT (OUTPATIENT)
Dept: FAMILY MEDICINE CLINIC | Facility: CLINIC | Age: 50
End: 2020-06-08
Payer: COMMERCIAL

## 2020-06-08 VITALS
BODY MASS INDEX: 37.61 KG/M2 | DIASTOLIC BLOOD PRESSURE: 80 MMHG | HEART RATE: 81 BPM | HEIGHT: 66 IN | TEMPERATURE: 97 F | SYSTOLIC BLOOD PRESSURE: 164 MMHG | WEIGHT: 234 LBS | OXYGEN SATURATION: 96 %

## 2020-06-08 DIAGNOSIS — L30.9 FACIAL DERMATITIS: Primary | ICD-10-CM

## 2020-06-08 PROCEDURE — 3008F BODY MASS INDEX DOCD: CPT | Performed by: PHYSICIAN ASSISTANT

## 2020-06-08 PROCEDURE — 99214 OFFICE O/P EST MOD 30 MIN: CPT | Performed by: PHYSICIAN ASSISTANT

## 2020-06-08 RX ORDER — CLINDAMYCIN PHOSPHATE 10 MG/G
GEL TOPICAL 2 TIMES DAILY
Qty: 60 G | Refills: 0 | Status: SHIPPED | OUTPATIENT
Start: 2020-06-08 | End: 2020-11-17

## 2020-08-27 ENCOUNTER — TELEPHONE (OUTPATIENT)
Dept: FAMILY MEDICINE CLINIC | Facility: CLINIC | Age: 50
End: 2020-08-27

## 2020-08-27 DIAGNOSIS — R91.1 LUNG NODULE: Primary | ICD-10-CM

## 2020-08-27 NOTE — TELEPHONE ENCOUNTER
RECEIVED LETTER FROM DEPT OF RADIOLOGY REGARDING CT OF CHEST ABDOMEN, PELVIS WITH CONTRAST  RECOMMENDED A FOLLOW UP CHEST CT IN 12 MONTHS    PLEASE REVIEW IMPRESSION  THANK YOU

## 2020-09-03 ENCOUNTER — HOSPITAL ENCOUNTER (OUTPATIENT)
Dept: CT IMAGING | Facility: HOSPITAL | Age: 50
Discharge: HOME | End: 2020-09-03
Attending: FAMILY MEDICINE
Payer: COMMERCIAL

## 2020-09-03 DIAGNOSIS — R91.1 LUNG NODULE: ICD-10-CM

## 2020-09-03 PROCEDURE — 71250 CT THORAX DX C-: CPT

## 2020-09-03 PROCEDURE — G1004 CDSM NDSC: HCPCS

## 2020-09-08 NOTE — RESULT ENCOUNTER NOTE
Call patient to notify normal results all of the patient's pulmonary nodules are stable and have not grown also no new nodules patient does have some emphysema and a few bullae from emphysema

## 2020-10-02 ENCOUNTER — OFFICE VISIT (OUTPATIENT)
Dept: FAMILY MEDICINE CLINIC | Facility: CLINIC | Age: 50
End: 2020-10-02
Payer: COMMERCIAL

## 2020-10-02 VITALS
TEMPERATURE: 96.8 F | HEIGHT: 66 IN | DIASTOLIC BLOOD PRESSURE: 100 MMHG | SYSTOLIC BLOOD PRESSURE: 162 MMHG | HEART RATE: 89 BPM | OXYGEN SATURATION: 96 % | WEIGHT: 241 LBS | BODY MASS INDEX: 38.73 KG/M2

## 2020-10-02 DIAGNOSIS — M25.619 LIMITED RANGE OF MOTION OF SHOULDER: ICD-10-CM

## 2020-10-02 DIAGNOSIS — F41.9 ANXIETY AND DEPRESSION: ICD-10-CM

## 2020-10-02 DIAGNOSIS — M54.32 BILATERAL SCIATICA: Primary | ICD-10-CM

## 2020-10-02 DIAGNOSIS — Z23 NEED FOR INFLUENZA VACCINATION: ICD-10-CM

## 2020-10-02 DIAGNOSIS — M54.31 BILATERAL SCIATICA: Primary | ICD-10-CM

## 2020-10-02 DIAGNOSIS — F32.A ANXIETY AND DEPRESSION: ICD-10-CM

## 2020-10-02 DIAGNOSIS — M67.912 ROTATOR CUFF DYSFUNCTION, LEFT: ICD-10-CM

## 2020-10-02 DIAGNOSIS — M25.512 ACUTE PAIN OF LEFT SHOULDER: ICD-10-CM

## 2020-10-02 DIAGNOSIS — Z13.31 DEPRESSION SCREENING NEGATIVE: ICD-10-CM

## 2020-10-02 PROCEDURE — 90682 RIV4 VACC RECOMBINANT DNA IM: CPT | Performed by: PHYSICIAN ASSISTANT

## 2020-10-02 PROCEDURE — 3725F SCREEN DEPRESSION PERFORMED: CPT | Performed by: PHYSICIAN ASSISTANT

## 2020-10-02 PROCEDURE — 99214 OFFICE O/P EST MOD 30 MIN: CPT | Performed by: PHYSICIAN ASSISTANT

## 2020-10-02 PROCEDURE — 90471 IMMUNIZATION ADMIN: CPT | Performed by: PHYSICIAN ASSISTANT

## 2020-10-02 RX ORDER — SERTRALINE HYDROCHLORIDE 25 MG/1
25 TABLET, FILM COATED ORAL DAILY
Qty: 30 TABLET | Refills: 5 | Status: SHIPPED | OUTPATIENT
Start: 2020-10-02 | End: 2020-11-03 | Stop reason: DRUGHIGH

## 2020-10-02 RX ORDER — PREDNISONE 10 MG/1
TABLET ORAL
Qty: 20 TABLET | Refills: 0 | Status: SHIPPED | OUTPATIENT
Start: 2020-10-02 | End: 2020-11-17

## 2020-10-09 ENCOUNTER — HOSPITAL ENCOUNTER (OUTPATIENT)
Dept: MRI IMAGING | Facility: HOSPITAL | Age: 50
Discharge: HOME | End: 2020-10-09
Payer: COMMERCIAL

## 2020-10-09 DIAGNOSIS — M54.31 BILATERAL SCIATICA: ICD-10-CM

## 2020-10-09 DIAGNOSIS — M54.32 BILATERAL SCIATICA: ICD-10-CM

## 2020-10-09 DIAGNOSIS — M25.512 ACUTE PAIN OF LEFT SHOULDER: ICD-10-CM

## 2020-10-09 DIAGNOSIS — M25.619 LIMITED RANGE OF MOTION OF SHOULDER: ICD-10-CM

## 2020-10-09 DIAGNOSIS — M67.912 ROTATOR CUFF DYSFUNCTION, LEFT: ICD-10-CM

## 2020-10-09 PROCEDURE — 72148 MRI LUMBAR SPINE W/O DYE: CPT

## 2020-10-09 PROCEDURE — G1004 CDSM NDSC: HCPCS

## 2020-10-09 PROCEDURE — 73221 MRI JOINT UPR EXTREM W/O DYE: CPT

## 2020-10-13 DIAGNOSIS — M48.061 FORAMINAL STENOSIS OF LUMBAR REGION: Primary | ICD-10-CM

## 2020-10-13 DIAGNOSIS — S46.212A RUPTURE OF LEFT BICEPS TENDON, INITIAL ENCOUNTER: ICD-10-CM

## 2020-10-13 DIAGNOSIS — M19.012 ARTHRITIS OF LEFT SHOULDER REGION: ICD-10-CM

## 2020-10-22 ENCOUNTER — OFFICE VISIT (OUTPATIENT)
Dept: LAB | Facility: HOSPITAL | Age: 50
End: 2020-10-22
Attending: PHYSICIAN ASSISTANT
Payer: COMMERCIAL

## 2020-10-22 ENCOUNTER — CONSULT (OUTPATIENT)
Dept: PAIN MEDICINE | Facility: CLINIC | Age: 50
End: 2020-10-22
Payer: COMMERCIAL

## 2020-10-22 ENCOUNTER — HOSPITAL ENCOUNTER (OUTPATIENT)
Dept: RADIOLOGY | Facility: HOSPITAL | Age: 50
Discharge: HOME | End: 2020-10-22
Attending: PHYSICIAN ASSISTANT
Payer: COMMERCIAL

## 2020-10-22 ENCOUNTER — APPOINTMENT (OUTPATIENT)
Dept: RADIOLOGY | Facility: MEDICAL CENTER | Age: 50
End: 2020-10-22
Payer: COMMERCIAL

## 2020-10-22 ENCOUNTER — CONSULT (OUTPATIENT)
Dept: OBGYN CLINIC | Facility: CLINIC | Age: 50
End: 2020-10-22
Payer: COMMERCIAL

## 2020-10-22 VITALS
BODY MASS INDEX: 38.54 KG/M2 | SYSTOLIC BLOOD PRESSURE: 142 MMHG | DIASTOLIC BLOOD PRESSURE: 84 MMHG | WEIGHT: 239.8 LBS | TEMPERATURE: 97.8 F | HEIGHT: 66 IN

## 2020-10-22 VITALS
SYSTOLIC BLOOD PRESSURE: 121 MMHG | TEMPERATURE: 97.9 F | WEIGHT: 241 LBS | BODY MASS INDEX: 38.73 KG/M2 | HEIGHT: 66 IN | DIASTOLIC BLOOD PRESSURE: 78 MMHG | HEART RATE: 94 BPM

## 2020-10-22 DIAGNOSIS — G90.529 COMPLEX REGIONAL PAIN SYNDROME TYPE 1 OF LOWER EXTREMITY, UNSPECIFIED LATERALITY: ICD-10-CM

## 2020-10-22 DIAGNOSIS — M48.061 FORAMINAL STENOSIS OF LUMBAR REGION: ICD-10-CM

## 2020-10-22 DIAGNOSIS — S46.012A TRAUMATIC INCOMPLETE TEAR OF LEFT ROTATOR CUFF, INITIAL ENCOUNTER: ICD-10-CM

## 2020-10-22 DIAGNOSIS — M75.42 IMPINGEMENT SYNDROME OF LEFT SHOULDER: ICD-10-CM

## 2020-10-22 DIAGNOSIS — M19.012 ARTHRITIS OF LEFT SHOULDER REGION: ICD-10-CM

## 2020-10-22 DIAGNOSIS — M54.16 LUMBAR RADICULOPATHY: Primary | ICD-10-CM

## 2020-10-22 DIAGNOSIS — S46.212A RUPTURE OF LEFT BICEPS TENDON, INITIAL ENCOUNTER: ICD-10-CM

## 2020-10-22 DIAGNOSIS — G89.4 CHRONIC PAIN SYNDROME: ICD-10-CM

## 2020-10-22 DIAGNOSIS — Z98.1 STATUS POST LUMBAR SPINAL FUSION: ICD-10-CM

## 2020-10-22 DIAGNOSIS — S46.012A TRAUMATIC INCOMPLETE TEAR OF LEFT ROTATOR CUFF, INITIAL ENCOUNTER: Primary | ICD-10-CM

## 2020-10-22 DIAGNOSIS — M51.26 LUMBAR DISC HERNIATION: ICD-10-CM

## 2020-10-22 DIAGNOSIS — M54.9 MID BACK PAIN: ICD-10-CM

## 2020-10-22 LAB
ALBUMIN SERPL BCP-MCNC: 4.4 G/DL (ref 3.5–5.7)
ALP SERPL-CCNC: 78 U/L (ref 40–150)
ALT SERPL W P-5'-P-CCNC: 17 U/L (ref 7–52)
ANION GAP SERPL CALCULATED.3IONS-SCNC: 8 MMOL/L (ref 4–13)
AST SERPL W P-5'-P-CCNC: 10 U/L (ref 13–39)
BASOPHILS # BLD AUTO: 0 THOUSANDS/ΜL (ref 0–0.1)
BASOPHILS NFR BLD AUTO: 0 % (ref 0–2)
BILIRUB SERPL-MCNC: 0.3 MG/DL (ref 0.2–1)
BUN SERPL-MCNC: 20 MG/DL (ref 7–25)
CALCIUM SERPL-MCNC: 9.7 MG/DL (ref 8.6–10.5)
CHLORIDE SERPL-SCNC: 102 MMOL/L (ref 98–107)
CO2 SERPL-SCNC: 30 MMOL/L (ref 21–31)
CREAT SERPL-MCNC: 0.67 MG/DL (ref 0.6–1.2)
EOSINOPHIL # BLD AUTO: 0.2 THOUSAND/ΜL (ref 0–0.61)
EOSINOPHIL NFR BLD AUTO: 2 % (ref 0–5)
ERYTHROCYTE [DISTWIDTH] IN BLOOD BY AUTOMATED COUNT: 13.8 % (ref 11.5–14.5)
GFR SERPL CREATININE-BSD FRML MDRD: 101 ML/MIN/1.73SQ M
GLUCOSE P FAST SERPL-MCNC: 100 MG/DL (ref 65–99)
HCT VFR BLD AUTO: 48.7 % (ref 42–47)
HGB BLD-MCNC: 16.3 G/DL (ref 12–16)
LYMPHOCYTES # BLD AUTO: 2.3 THOUSANDS/ΜL (ref 0.6–4.47)
LYMPHOCYTES NFR BLD AUTO: 30 % (ref 21–51)
MCH RBC QN AUTO: 31 PG (ref 26–34)
MCHC RBC AUTO-ENTMCNC: 33.5 G/DL (ref 31–37)
MCV RBC AUTO: 93 FL (ref 81–99)
MONOCYTES # BLD AUTO: 0.6 THOUSAND/ΜL (ref 0.17–1.22)
MONOCYTES NFR BLD AUTO: 7 % (ref 2–12)
NEUTROPHILS # BLD AUTO: 4.6 THOUSANDS/ΜL (ref 1.4–6.5)
NEUTS SEG NFR BLD AUTO: 60 % (ref 42–75)
PLATELET # BLD AUTO: 249 THOUSANDS/UL (ref 149–390)
PMV BLD AUTO: 9.4 FL (ref 8.6–11.7)
POTASSIUM SERPL-SCNC: 4 MMOL/L (ref 3.5–5.5)
PROT SERPL-MCNC: 7.4 G/DL (ref 6.4–8.9)
RBC # BLD AUTO: 5.26 MILLION/UL (ref 3.9–5.2)
SODIUM SERPL-SCNC: 140 MMOL/L (ref 134–143)
WBC # BLD AUTO: 7.7 THOUSAND/UL (ref 4.8–10.8)

## 2020-10-22 PROCEDURE — 36415 COLL VENOUS BLD VENIPUNCTURE: CPT

## 2020-10-22 PROCEDURE — 73030 X-RAY EXAM OF SHOULDER: CPT

## 2020-10-22 PROCEDURE — 1036F TOBACCO NON-USER: CPT | Performed by: ORTHOPAEDIC SURGERY

## 2020-10-22 PROCEDURE — 99244 OFF/OP CNSLTJ NEW/EST MOD 40: CPT | Performed by: ANESTHESIOLOGY

## 2020-10-22 PROCEDURE — 85025 COMPLETE CBC W/AUTO DIFF WBC: CPT

## 2020-10-22 PROCEDURE — 71046 X-RAY EXAM CHEST 2 VIEWS: CPT

## 2020-10-22 PROCEDURE — 80053 COMPREHEN METABOLIC PANEL: CPT

## 2020-10-22 PROCEDURE — 99213 OFFICE O/P EST LOW 20 MIN: CPT | Performed by: ORTHOPAEDIC SURGERY

## 2020-10-22 RX ORDER — CEFAZOLIN SODIUM 2 G/50ML
2000 SOLUTION INTRAVENOUS ONCE
Status: CANCELLED | OUTPATIENT
Start: 2020-11-18 | End: 2020-10-22

## 2020-10-22 RX ORDER — CHLORHEXIDINE GLUCONATE 4 G/100ML
SOLUTION TOPICAL DAILY PRN
Status: CANCELLED | OUTPATIENT
Start: 2020-11-18

## 2020-10-23 LAB
ATRIAL RATE: 80 BPM
P AXIS: 41 DEGREES
PR INTERVAL: 150 MS
QRS AXIS: 41 DEGREES
QRSD INTERVAL: 92 MS
QT INTERVAL: 410 MS
QTC INTERVAL: 472 MS
T WAVE AXIS: 71 DEGREES
VENTRICULAR RATE: 80 BPM

## 2020-10-23 PROCEDURE — 93010 ELECTROCARDIOGRAM REPORT: CPT | Performed by: INTERNAL MEDICINE

## 2020-11-02 ENCOUNTER — HOSPITAL ENCOUNTER (OUTPATIENT)
Dept: MRI IMAGING | Facility: HOSPITAL | Age: 50
Discharge: HOME | End: 2020-11-02
Attending: ANESTHESIOLOGY
Payer: COMMERCIAL

## 2020-11-02 DIAGNOSIS — G89.4 CHRONIC PAIN SYNDROME: ICD-10-CM

## 2020-11-02 DIAGNOSIS — M51.26 LUMBAR DISC HERNIATION: ICD-10-CM

## 2020-11-02 DIAGNOSIS — G90.529 COMPLEX REGIONAL PAIN SYNDROME TYPE 1 OF LOWER EXTREMITY, UNSPECIFIED LATERALITY: ICD-10-CM

## 2020-11-02 DIAGNOSIS — Z98.1 STATUS POST LUMBAR SPINAL FUSION: ICD-10-CM

## 2020-11-02 DIAGNOSIS — M54.16 LUMBAR RADICULOPATHY: ICD-10-CM

## 2020-11-02 PROCEDURE — G1004 CDSM NDSC: HCPCS

## 2020-11-02 PROCEDURE — 72146 MRI CHEST SPINE W/O DYE: CPT

## 2020-11-03 ENCOUNTER — OFFICE VISIT (OUTPATIENT)
Dept: FAMILY MEDICINE CLINIC | Facility: CLINIC | Age: 50
End: 2020-11-03
Payer: COMMERCIAL

## 2020-11-03 VITALS
OXYGEN SATURATION: 98 % | SYSTOLIC BLOOD PRESSURE: 130 MMHG | BODY MASS INDEX: 39.5 KG/M2 | DIASTOLIC BLOOD PRESSURE: 72 MMHG | TEMPERATURE: 96.3 F | HEIGHT: 66 IN | WEIGHT: 245.8 LBS | HEART RATE: 104 BPM

## 2020-11-03 DIAGNOSIS — F41.1 GENERALIZED ANXIETY DISORDER: Primary | ICD-10-CM

## 2020-11-03 DIAGNOSIS — Z01.818 PREOP EXAMINATION: Primary | ICD-10-CM

## 2020-11-03 DIAGNOSIS — S46.012A TRAUMATIC COMPLETE TEAR OF LEFT ROTATOR CUFF, INITIAL ENCOUNTER: ICD-10-CM

## 2020-11-03 PROCEDURE — 99242 OFF/OP CONSLTJ NEW/EST SF 20: CPT | Performed by: FAMILY MEDICINE

## 2020-11-03 PROCEDURE — 3725F SCREEN DEPRESSION PERFORMED: CPT | Performed by: FAMILY MEDICINE

## 2020-11-03 PROCEDURE — 1036F TOBACCO NON-USER: CPT | Performed by: FAMILY MEDICINE

## 2020-11-09 ENCOUNTER — TELEPHONE (OUTPATIENT)
Dept: OBGYN CLINIC | Facility: HOSPITAL | Age: 50
End: 2020-11-09

## 2020-11-11 ENCOUNTER — TELEPHONE (OUTPATIENT)
Dept: RADIOLOGY | Facility: CLINIC | Age: 50
End: 2020-11-11

## 2020-11-11 DIAGNOSIS — Z11.59 SCREENING FOR VIRAL DISEASE: Primary | ICD-10-CM

## 2020-11-12 DIAGNOSIS — Z11.59 SCREENING FOR VIRAL DISEASE: ICD-10-CM

## 2020-11-12 PROCEDURE — U0003 INFECTIOUS AGENT DETECTION BY NUCLEIC ACID (DNA OR RNA); SEVERE ACUTE RESPIRATORY SYNDROME CORONAVIRUS 2 (SARS-COV-2) (CORONAVIRUS DISEASE [COVID-19]), AMPLIFIED PROBE TECHNIQUE, MAKING USE OF HIGH THROUGHPUT TECHNOLOGIES AS DESCRIBED BY CMS-2020-01-R: HCPCS | Performed by: ANESTHESIOLOGY

## 2020-11-13 LAB — SARS-COV-2 RNA SPEC QL NAA+PROBE: NOT DETECTED

## 2020-11-17 ENCOUNTER — ANESTHESIA EVENT (OUTPATIENT)
Dept: PERIOP | Facility: HOSPITAL | Age: 50
End: 2020-11-17
Payer: COMMERCIAL

## 2020-11-17 ENCOUNTER — TELEPHONE (OUTPATIENT)
Dept: OBGYN CLINIC | Facility: CLINIC | Age: 50
End: 2020-11-17

## 2020-11-18 ENCOUNTER — HOSPITAL ENCOUNTER (OUTPATIENT)
Facility: HOSPITAL | Age: 50
Setting detail: OUTPATIENT SURGERY
Discharge: HOME | End: 2020-11-18
Attending: ORTHOPAEDIC SURGERY | Admitting: ORTHOPAEDIC SURGERY
Payer: COMMERCIAL

## 2020-11-18 ENCOUNTER — ANESTHESIA (OUTPATIENT)
Dept: PERIOP | Facility: HOSPITAL | Age: 50
End: 2020-11-18
Payer: COMMERCIAL

## 2020-11-18 ENCOUNTER — TELEPHONE (OUTPATIENT)
Dept: OBGYN CLINIC | Facility: HOSPITAL | Age: 50
End: 2020-11-18

## 2020-11-18 VITALS
OXYGEN SATURATION: 91 % | DIASTOLIC BLOOD PRESSURE: 80 MMHG | TEMPERATURE: 96.5 F | SYSTOLIC BLOOD PRESSURE: 140 MMHG | RESPIRATION RATE: 18 BRPM | WEIGHT: 249 LBS | BODY MASS INDEX: 40.02 KG/M2 | HEART RATE: 97 BPM | HEIGHT: 66 IN

## 2020-11-18 VITALS — HEART RATE: 103 BPM

## 2020-11-18 DIAGNOSIS — S46.012D TRAUMATIC INCOMPLETE TEAR OF LEFT ROTATOR CUFF, SUBSEQUENT ENCOUNTER: Primary | ICD-10-CM

## 2020-11-18 DIAGNOSIS — S46.012A TRAUMATIC INCOMPLETE TEAR OF LEFT ROTATOR CUFF, INITIAL ENCOUNTER: ICD-10-CM

## 2020-11-18 PROCEDURE — 88304 TISSUE EXAM BY PATHOLOGIST: CPT | Performed by: PATHOLOGY

## 2020-11-18 PROCEDURE — 29826 SHO ARTHRS SRG DECOMPRESSION: CPT | Performed by: PHYSICIAN ASSISTANT

## 2020-11-18 PROCEDURE — 88311 DECALCIFY TISSUE: CPT | Performed by: PATHOLOGY

## 2020-11-18 PROCEDURE — C9290 INJ, BUPIVACAINE LIPOSOME: HCPCS | Performed by: ANESTHESIOLOGY

## 2020-11-18 PROCEDURE — 29827 SHO ARTHRS SRG RT8TR CUF RPR: CPT | Performed by: PHYSICIAN ASSISTANT

## 2020-11-18 PROCEDURE — C1713 ANCHOR/SCREW BN/BN,TIS/BN: HCPCS | Performed by: ORTHOPAEDIC SURGERY

## 2020-11-18 PROCEDURE — NC001 PR NO CHARGE: Performed by: PHYSICIAN ASSISTANT

## 2020-11-18 DEVICE — BIO-COMP SWIVELOCK C, CLD 5.5X19.1MM
Type: IMPLANTABLE DEVICE | Site: SHOULDER | Status: FUNCTIONAL
Brand: ARTHREX®

## 2020-11-18 RX ORDER — BUPIVACAINE HYDROCHLORIDE AND EPINEPHRINE 5; 5 MG/ML; UG/ML
INJECTION, SOLUTION PERINEURAL AS NEEDED
Status: DISCONTINUED | OUTPATIENT
Start: 2020-11-18 | End: 2020-11-18 | Stop reason: HOSPADM

## 2020-11-18 RX ORDER — ROCURONIUM BROMIDE 10 MG/ML
INJECTION, SOLUTION INTRAVENOUS AS NEEDED
Status: DISCONTINUED | OUTPATIENT
Start: 2020-11-18 | End: 2020-11-18

## 2020-11-18 RX ORDER — ONDANSETRON 2 MG/ML
4 INJECTION INTRAMUSCULAR; INTRAVENOUS EVERY 6 HOURS PRN
Status: DISCONTINUED | OUTPATIENT
Start: 2020-11-18 | End: 2020-11-18 | Stop reason: HOSPADM

## 2020-11-18 RX ORDER — OXYCODONE HYDROCHLORIDE AND ACETAMINOPHEN 5; 325 MG/1; MG/1
1 TABLET ORAL EVERY 4 HOURS PRN
Qty: 40 TABLET | Refills: 0 | Status: SHIPPED | OUTPATIENT
Start: 2020-11-18 | End: 2020-11-28

## 2020-11-18 RX ORDER — PROPOFOL 10 MG/ML
INJECTION, EMULSION INTRAVENOUS AS NEEDED
Status: DISCONTINUED | OUTPATIENT
Start: 2020-11-18 | End: 2020-11-18

## 2020-11-18 RX ORDER — MELOXICAM 15 MG/1
15 TABLET ORAL DAILY
Qty: 30 TABLET | Refills: 0 | Status: SHIPPED | OUTPATIENT
Start: 2020-11-18 | End: 2021-01-26 | Stop reason: ALTCHOICE

## 2020-11-18 RX ORDER — CHLORHEXIDINE GLUCONATE 4 G/100ML
SOLUTION TOPICAL DAILY PRN
Status: DISCONTINUED | OUTPATIENT
Start: 2020-11-18 | End: 2020-11-18 | Stop reason: HOSPADM

## 2020-11-18 RX ORDER — ONDANSETRON 2 MG/ML
4 INJECTION INTRAMUSCULAR; INTRAVENOUS ONCE AS NEEDED
Status: DISCONTINUED | OUTPATIENT
Start: 2020-11-18 | End: 2020-11-18 | Stop reason: HOSPADM

## 2020-11-18 RX ORDER — CEFAZOLIN SODIUM 2 G/50ML
SOLUTION INTRAVENOUS AS NEEDED
Status: DISCONTINUED | OUTPATIENT
Start: 2020-11-18 | End: 2020-11-18

## 2020-11-18 RX ORDER — OXYCODONE HYDROCHLORIDE AND ACETAMINOPHEN 5; 325 MG/1; MG/1
1 TABLET ORAL EVERY 4 HOURS PRN
Status: DISCONTINUED | OUTPATIENT
Start: 2020-11-18 | End: 2020-11-18 | Stop reason: HOSPADM

## 2020-11-18 RX ORDER — EPHEDRINE SULFATE 50 MG/ML
INJECTION INTRAVENOUS AS NEEDED
Status: DISCONTINUED | OUTPATIENT
Start: 2020-11-18 | End: 2020-11-18

## 2020-11-18 RX ORDER — CEFAZOLIN SODIUM 2 G/50ML
2000 SOLUTION INTRAVENOUS ONCE
Status: DISCONTINUED | OUTPATIENT
Start: 2020-11-18 | End: 2020-11-18 | Stop reason: HOSPADM

## 2020-11-18 RX ORDER — ONDANSETRON 2 MG/ML
INJECTION INTRAMUSCULAR; INTRAVENOUS AS NEEDED
Status: DISCONTINUED | OUTPATIENT
Start: 2020-11-18 | End: 2020-11-18

## 2020-11-18 RX ORDER — SODIUM CHLORIDE, SODIUM LACTATE, POTASSIUM CHLORIDE, CALCIUM CHLORIDE 600; 310; 30; 20 MG/100ML; MG/100ML; MG/100ML; MG/100ML
125 INJECTION, SOLUTION INTRAVENOUS CONTINUOUS
Status: DISCONTINUED | OUTPATIENT
Start: 2020-11-18 | End: 2020-11-18 | Stop reason: HOSPADM

## 2020-11-18 RX ORDER — NEOSTIGMINE METHYLSULFATE 1 MG/ML
INJECTION INTRAVENOUS AS NEEDED
Status: DISCONTINUED | OUTPATIENT
Start: 2020-11-18 | End: 2020-11-18

## 2020-11-18 RX ORDER — MAGNESIUM HYDROXIDE 1200 MG/15ML
LIQUID ORAL AS NEEDED
Status: DISCONTINUED | OUTPATIENT
Start: 2020-11-18 | End: 2020-11-18 | Stop reason: HOSPADM

## 2020-11-18 RX ORDER — LIDOCAINE HYDROCHLORIDE 10 MG/ML
INJECTION, SOLUTION EPIDURAL; INFILTRATION; INTRACAUDAL; PERINEURAL AS NEEDED
Status: DISCONTINUED | OUTPATIENT
Start: 2020-11-18 | End: 2020-11-18

## 2020-11-18 RX ORDER — FENTANYL CITRATE/PF 50 MCG/ML
25 SYRINGE (ML) INJECTION
Status: DISCONTINUED | OUTPATIENT
Start: 2020-11-18 | End: 2020-11-18 | Stop reason: HOSPADM

## 2020-11-18 RX ORDER — BUPIVACAINE HYDROCHLORIDE 5 MG/ML
INJECTION, SOLUTION PERINEURAL
Status: COMPLETED | OUTPATIENT
Start: 2020-11-18 | End: 2020-11-18

## 2020-11-18 RX ORDER — FENTANYL CITRATE 50 UG/ML
INJECTION, SOLUTION INTRAMUSCULAR; INTRAVENOUS AS NEEDED
Status: DISCONTINUED | OUTPATIENT
Start: 2020-11-18 | End: 2020-11-18

## 2020-11-18 RX ORDER — DEXAMETHASONE SODIUM PHOSPHATE 10 MG/ML
INJECTION, SOLUTION INTRAMUSCULAR; INTRAVENOUS AS NEEDED
Status: DISCONTINUED | OUTPATIENT
Start: 2020-11-18 | End: 2020-11-18

## 2020-11-18 RX ORDER — MIDAZOLAM HYDROCHLORIDE 2 MG/2ML
INJECTION, SOLUTION INTRAMUSCULAR; INTRAVENOUS AS NEEDED
Status: DISCONTINUED | OUTPATIENT
Start: 2020-11-18 | End: 2020-11-18

## 2020-11-18 RX ORDER — GLYCOPYRROLATE 0.2 MG/ML
INJECTION INTRAMUSCULAR; INTRAVENOUS AS NEEDED
Status: DISCONTINUED | OUTPATIENT
Start: 2020-11-18 | End: 2020-11-18

## 2020-11-18 RX ORDER — CEPHALEXIN 500 MG/1
500 CAPSULE ORAL EVERY 8 HOURS SCHEDULED
Qty: 9 CAPSULE | Refills: 0 | Status: SHIPPED | OUTPATIENT
Start: 2020-11-18 | End: 2020-11-21 | Stop reason: HOSPADM

## 2020-11-18 RX ORDER — POVIDONE-IODINE 10 MG/G
OINTMENT TOPICAL AS NEEDED
Status: DISCONTINUED | OUTPATIENT
Start: 2020-11-18 | End: 2020-11-18 | Stop reason: HOSPADM

## 2020-11-18 RX ADMIN — PHENYLEPHRINE HYDROCHLORIDE 100 MCG: 10 INJECTION INTRAVENOUS at 10:30

## 2020-11-18 RX ADMIN — PHENYLEPHRINE HYDROCHLORIDE 100 MCG: 10 INJECTION INTRAVENOUS at 10:19

## 2020-11-18 RX ADMIN — BUPIVACAINE HYDROCHLORIDE 5 ML: 5 INJECTION, SOLUTION PERINEURAL at 09:25

## 2020-11-18 RX ADMIN — SODIUM CHLORIDE, SODIUM LACTATE, POTASSIUM CHLORIDE, AND CALCIUM CHLORIDE 125 ML/HR: .6; .31; .03; .02 INJECTION, SOLUTION INTRAVENOUS at 08:18

## 2020-11-18 RX ADMIN — ROCURONIUM BROMIDE 50 MG: 10 INJECTION INTRAVENOUS at 09:40

## 2020-11-18 RX ADMIN — PHENYLEPHRINE HYDROCHLORIDE 100 MCG: 10 INJECTION INTRAVENOUS at 10:57

## 2020-11-18 RX ADMIN — ONDANSETRON 4 MG: 2 INJECTION INTRAMUSCULAR; INTRAVENOUS at 10:17

## 2020-11-18 RX ADMIN — SODIUM CHLORIDE, SODIUM LACTATE, POTASSIUM CHLORIDE, AND CALCIUM CHLORIDE: .6; .31; .03; .02 INJECTION, SOLUTION INTRAVENOUS at 08:59

## 2020-11-18 RX ADMIN — FENTANYL CITRATE 100 MCG: 50 INJECTION INTRAMUSCULAR; INTRAVENOUS at 09:19

## 2020-11-18 RX ADMIN — LIDOCAINE HYDROCHLORIDE 100 MG: 10 INJECTION, SOLUTION EPIDURAL; INFILTRATION; INTRACAUDAL; PERINEURAL at 09:39

## 2020-11-18 RX ADMIN — MIDAZOLAM HYDROCHLORIDE 2 MG: 1 INJECTION, SOLUTION INTRAMUSCULAR; INTRAVENOUS at 09:18

## 2020-11-18 RX ADMIN — DEXAMETHASONE SODIUM PHOSPHATE 4 MG: 10 INJECTION, SOLUTION INTRAMUSCULAR; INTRAVENOUS at 10:18

## 2020-11-18 RX ADMIN — EPHEDRINE SULFATE 10 MG: 50 INJECTION, SOLUTION INTRAVENOUS at 09:50

## 2020-11-18 RX ADMIN — EPHEDRINE SULFATE 10 MG: 50 INJECTION, SOLUTION INTRAVENOUS at 09:48

## 2020-11-18 RX ADMIN — NEOSTIGMINE METHYLSULFATE 4 MG: 1 INJECTION, SOLUTION INTRAVENOUS at 11:05

## 2020-11-18 RX ADMIN — BUPIVACAINE 20 ML: 13.3 INJECTION, SUSPENSION, LIPOSOMAL INFILTRATION at 09:25

## 2020-11-18 RX ADMIN — PROPOFOL 200 MG: 10 INJECTION, EMULSION INTRAVENOUS at 09:40

## 2020-11-18 RX ADMIN — PHENYLEPHRINE HYDROCHLORIDE 100 MCG: 10 INJECTION INTRAVENOUS at 10:44

## 2020-11-18 RX ADMIN — CEFAZOLIN SODIUM 2000 MG: 2 SOLUTION INTRAVENOUS at 09:35

## 2020-11-18 RX ADMIN — GLYCOPYRROLATE 0.4 MG: 0.2 INJECTION, SOLUTION INTRAMUSCULAR; INTRAVENOUS at 11:05

## 2020-11-19 ENCOUNTER — HOSPITAL ENCOUNTER (INPATIENT)
Facility: HOSPITAL | Age: 50
LOS: 1 days | Discharge: HOME | DRG: 193 | End: 2020-11-21
Attending: EMERGENCY MEDICINE | Admitting: HOSPITALIST
Payer: COMMERCIAL

## 2020-11-19 ENCOUNTER — TELEPHONE (OUTPATIENT)
Dept: OBGYN CLINIC | Facility: HOSPITAL | Age: 50
End: 2020-11-19

## 2020-11-19 ENCOUNTER — APPOINTMENT (EMERGENCY)
Dept: CT IMAGING | Facility: HOSPITAL | Age: 50
DRG: 193 | End: 2020-11-19
Payer: COMMERCIAL

## 2020-11-19 ENCOUNTER — APPOINTMENT (EMERGENCY)
Dept: RADIOLOGY | Facility: HOSPITAL | Age: 50
DRG: 193 | End: 2020-11-19
Payer: COMMERCIAL

## 2020-11-19 DIAGNOSIS — Z98.890 H/O SHOULDER SURGERY: ICD-10-CM

## 2020-11-19 DIAGNOSIS — J98.01 BRONCHOSPASM: ICD-10-CM

## 2020-11-19 DIAGNOSIS — R06.00 DYSPNEA: Primary | ICD-10-CM

## 2020-11-19 DIAGNOSIS — J18.9 RIGHT UPPER LOBE PNEUMONIA: ICD-10-CM

## 2020-11-19 DIAGNOSIS — R09.02 HYPOXIA: ICD-10-CM

## 2020-11-19 DIAGNOSIS — R06.00 DYSPNEA, UNSPECIFIED TYPE: ICD-10-CM

## 2020-11-19 PROBLEM — R06.02 SOB (SHORTNESS OF BREATH): Status: ACTIVE | Noted: 2020-11-19

## 2020-11-19 LAB
ALBUMIN SERPL BCP-MCNC: 3.4 G/DL (ref 3.5–5)
ALP SERPL-CCNC: 89 U/L (ref 46–116)
ALT SERPL W P-5'-P-CCNC: 26 U/L (ref 12–78)
ANION GAP SERPL CALCULATED.3IONS-SCNC: 5 MMOL/L (ref 4–13)
APTT PPP: 29 SECONDS (ref 23–37)
ARTERIAL PATENCY WRIST A: YES
AST SERPL W P-5'-P-CCNC: 17 U/L (ref 5–45)
BASE EXCESS BLDA CALC-SCNC: -2.3 MMOL/L
BASOPHILS # BLD AUTO: 0.02 THOUSANDS/ΜL (ref 0–0.1)
BASOPHILS NFR BLD AUTO: 0 % (ref 0–1)
BILIRUB SERPL-MCNC: 0.3 MG/DL (ref 0.2–1)
BUN SERPL-MCNC: 16 MG/DL (ref 5–25)
CALCIUM ALBUM COR SERPL-MCNC: 9.5 MG/DL (ref 8.3–10.1)
CALCIUM SERPL-MCNC: 9 MG/DL (ref 8.3–10.1)
CHLORIDE SERPL-SCNC: 107 MMOL/L (ref 100–108)
CO2 SERPL-SCNC: 32 MMOL/L (ref 21–32)
CREAT SERPL-MCNC: 0.68 MG/DL (ref 0.6–1.3)
D DIMER PPP FEU-MCNC: 0.35 UG/ML FEU
EOSINOPHIL # BLD AUTO: 0.09 THOUSAND/ΜL (ref 0–0.61)
EOSINOPHIL NFR BLD AUTO: 1 % (ref 0–6)
ERYTHROCYTE [DISTWIDTH] IN BLOOD BY AUTOMATED COUNT: 13.9 % (ref 11.6–15.1)
FLUAV RNA RESP QL NAA+PROBE: NEGATIVE
FLUBV RNA RESP QL NAA+PROBE: NEGATIVE
GFR SERPL CREATININE-BSD FRML MDRD: 101 ML/MIN/1.73SQ M
GLUCOSE SERPL-MCNC: 86 MG/DL (ref 65–140)
HCO3 BLDA-SCNC: 25.2 MMOL/L (ref 22–28)
HCT VFR BLD AUTO: 44.5 % (ref 34.8–46.1)
HGB BLD-MCNC: 13.7 G/DL (ref 11.5–15.4)
IMM GRANULOCYTES # BLD AUTO: 0.07 THOUSAND/UL (ref 0–0.2)
IMM GRANULOCYTES NFR BLD AUTO: 1 % (ref 0–2)
INR PPP: 1.05 (ref 0.84–1.19)
LYMPHOCYTES # BLD AUTO: 2.38 THOUSANDS/ΜL (ref 0.6–4.47)
LYMPHOCYTES NFR BLD AUTO: 20 % (ref 14–44)
MAGNESIUM SERPL-MCNC: 1.8 MG/DL (ref 1.6–2.6)
MCH RBC QN AUTO: 30 PG (ref 26.8–34.3)
MCHC RBC AUTO-ENTMCNC: 30.8 G/DL (ref 31.4–37.4)
MCV RBC AUTO: 97 FL (ref 82–98)
MONOCYTES # BLD AUTO: 0.97 THOUSAND/ΜL (ref 0.17–1.22)
MONOCYTES NFR BLD AUTO: 8 % (ref 4–12)
NASAL CANNULA: 2
NEUTROPHILS # BLD AUTO: 8.5 THOUSANDS/ΜL (ref 1.85–7.62)
NEUTS SEG NFR BLD AUTO: 70 % (ref 43–75)
NRBC BLD AUTO-RTO: 0 /100 WBCS
NT-PROBNP SERPL-MCNC: 110 PG/ML
O2 CT BLDA-SCNC: 17.5 ML/DL (ref 16–23)
OXYHGB MFR BLDA: 87.3 % (ref 94–97)
PCO2 BLDA: 54.2 MM HG (ref 36–44)
PH BLDA: 7.29 [PH] (ref 7.35–7.45)
PLATELET # BLD AUTO: 218 THOUSANDS/UL (ref 149–390)
PMV BLD AUTO: 10.5 FL (ref 8.9–12.7)
PO2 BLDA: 61.8 MM HG (ref 75–129)
POTASSIUM SERPL-SCNC: 4 MMOL/L (ref 3.5–5.3)
PROT SERPL-MCNC: 6.4 G/DL (ref 6.4–8.2)
PROTHROMBIN TIME: 13.5 SECONDS (ref 11.6–14.5)
RBC # BLD AUTO: 4.57 MILLION/UL (ref 3.81–5.12)
RSV RNA RESP QL NAA+PROBE: NEGATIVE
SARS-COV-2 RNA RESP QL NAA+PROBE: NEGATIVE
SODIUM SERPL-SCNC: 144 MMOL/L (ref 136–145)
SPECIMEN SOURCE: ABNORMAL
TROPONIN I SERPL-MCNC: <0.02 NG/ML
TROPONIN I SERPL-MCNC: <0.02 NG/ML
TSH SERPL DL<=0.05 MIU/L-ACNC: 0.41 UIU/ML (ref 0.36–3.74)
WBC # BLD AUTO: 12.03 THOUSAND/UL (ref 4.31–10.16)

## 2020-11-19 PROCEDURE — 85730 THROMBOPLASTIN TIME PARTIAL: CPT | Performed by: EMERGENCY MEDICINE

## 2020-11-19 PROCEDURE — G1004 CDSM NDSC: HCPCS

## 2020-11-19 PROCEDURE — 84484 ASSAY OF TROPONIN QUANT: CPT | Performed by: EMERGENCY MEDICINE

## 2020-11-19 PROCEDURE — 96365 THER/PROPH/DIAG IV INF INIT: CPT

## 2020-11-19 PROCEDURE — 96361 HYDRATE IV INFUSION ADD-ON: CPT

## 2020-11-19 PROCEDURE — 84443 ASSAY THYROID STIM HORMONE: CPT | Performed by: EMERGENCY MEDICINE

## 2020-11-19 PROCEDURE — 83880 ASSAY OF NATRIURETIC PEPTIDE: CPT | Performed by: EMERGENCY MEDICINE

## 2020-11-19 PROCEDURE — 80053 COMPREHEN METABOLIC PANEL: CPT | Performed by: EMERGENCY MEDICINE

## 2020-11-19 PROCEDURE — 71045 X-RAY EXAM CHEST 1 VIEW: CPT

## 2020-11-19 PROCEDURE — 85025 COMPLETE CBC W/AUTO DIFF WBC: CPT | Performed by: EMERGENCY MEDICINE

## 2020-11-19 PROCEDURE — 96375 TX/PRO/DX INJ NEW DRUG ADDON: CPT

## 2020-11-19 PROCEDURE — 94640 AIRWAY INHALATION TREATMENT: CPT

## 2020-11-19 PROCEDURE — 99285 EMERGENCY DEPT VISIT HI MDM: CPT

## 2020-11-19 PROCEDURE — 36600 WITHDRAWAL OF ARTERIAL BLOOD: CPT

## 2020-11-19 PROCEDURE — 85610 PROTHROMBIN TIME: CPT | Performed by: EMERGENCY MEDICINE

## 2020-11-19 PROCEDURE — 0241U HB NFCT DS VIR RESP RNA 4 TRGT: CPT | Performed by: EMERGENCY MEDICINE

## 2020-11-19 PROCEDURE — 94002 VENT MGMT INPAT INIT DAY: CPT

## 2020-11-19 PROCEDURE — 83735 ASSAY OF MAGNESIUM: CPT | Performed by: EMERGENCY MEDICINE

## 2020-11-19 PROCEDURE — 94760 N-INVAS EAR/PLS OXIMETRY 1: CPT

## 2020-11-19 PROCEDURE — 93005 ELECTROCARDIOGRAM TRACING: CPT

## 2020-11-19 PROCEDURE — 85379 FIBRIN DEGRADATION QUANT: CPT | Performed by: EMERGENCY MEDICINE

## 2020-11-19 PROCEDURE — 71275 CT ANGIOGRAPHY CHEST: CPT

## 2020-11-19 PROCEDURE — 99220 PR INITIAL OBSERVATION CARE/DAY 70 MINUTES: CPT | Performed by: HOSPITALIST

## 2020-11-19 PROCEDURE — 82805 BLOOD GASES W/O2 SATURATION: CPT | Performed by: HOSPITALIST

## 2020-11-19 PROCEDURE — 96366 THER/PROPH/DIAG IV INF ADDON: CPT

## 2020-11-19 PROCEDURE — 36415 COLL VENOUS BLD VENIPUNCTURE: CPT | Performed by: EMERGENCY MEDICINE

## 2020-11-19 PROCEDURE — 99291 CRITICAL CARE FIRST HOUR: CPT | Performed by: EMERGENCY MEDICINE

## 2020-11-19 RX ORDER — FENTANYL CITRATE 50 UG/ML
50 INJECTION, SOLUTION INTRAMUSCULAR; INTRAVENOUS ONCE
Status: COMPLETED | OUTPATIENT
Start: 2020-11-19 | End: 2020-11-19

## 2020-11-19 RX ORDER — HEPARIN SODIUM 10000 [USP'U]/100ML
3-30 INJECTION, SOLUTION INTRAVENOUS
Status: DISCONTINUED | OUTPATIENT
Start: 2020-11-19 | End: 2020-11-19

## 2020-11-19 RX ORDER — METHYLPREDNISOLONE SODIUM SUCCINATE 40 MG/ML
40 INJECTION, POWDER, LYOPHILIZED, FOR SOLUTION INTRAMUSCULAR; INTRAVENOUS EVERY 12 HOURS SCHEDULED
Status: DISCONTINUED | OUTPATIENT
Start: 2020-11-20 | End: 2020-11-21 | Stop reason: HOSPADM

## 2020-11-19 RX ORDER — LEVALBUTEROL 1.25 MG/.5ML
1.25 SOLUTION, CONCENTRATE RESPIRATORY (INHALATION)
Status: DISCONTINUED | OUTPATIENT
Start: 2020-11-19 | End: 2020-11-21 | Stop reason: HOSPADM

## 2020-11-19 RX ORDER — DEXAMETHASONE SODIUM PHOSPHATE 10 MG/ML
10 INJECTION, SOLUTION INTRAMUSCULAR; INTRAVENOUS ONCE
Status: COMPLETED | OUTPATIENT
Start: 2020-11-19 | End: 2020-11-19

## 2020-11-19 RX ORDER — IPRATROPIUM BROMIDE AND ALBUTEROL SULFATE 2.5; .5 MG/3ML; MG/3ML
3 SOLUTION RESPIRATORY (INHALATION)
Status: DISCONTINUED | OUTPATIENT
Start: 2020-11-19 | End: 2020-11-19

## 2020-11-19 RX ORDER — OXYCODONE HYDROCHLORIDE AND ACETAMINOPHEN 5; 325 MG/1; MG/1
1 TABLET ORAL ONCE
Status: COMPLETED | OUTPATIENT
Start: 2020-11-19 | End: 2020-11-19

## 2020-11-19 RX ORDER — SACCHAROMYCES BOULARDII 250 MG
250 CAPSULE ORAL 2 TIMES DAILY
Status: DISCONTINUED | OUTPATIENT
Start: 2020-11-19 | End: 2020-11-21 | Stop reason: HOSPADM

## 2020-11-19 RX ORDER — MAGNESIUM SULFATE HEPTAHYDRATE 40 MG/ML
2 INJECTION, SOLUTION INTRAVENOUS ONCE
Status: COMPLETED | OUTPATIENT
Start: 2020-11-19 | End: 2020-11-19

## 2020-11-19 RX ORDER — LEVALBUTEROL 1.25 MG/.5ML
SOLUTION, CONCENTRATE RESPIRATORY (INHALATION)
Status: COMPLETED
Start: 2020-11-19 | End: 2020-11-19

## 2020-11-19 RX ORDER — METHYLPREDNISOLONE SODIUM SUCCINATE 40 MG/ML
40 INJECTION, POWDER, LYOPHILIZED, FOR SOLUTION INTRAMUSCULAR; INTRAVENOUS EVERY 6 HOURS SCHEDULED
Status: DISCONTINUED | OUTPATIENT
Start: 2020-11-19 | End: 2020-11-19

## 2020-11-19 RX ORDER — HEPARIN SODIUM 1000 [USP'U]/ML
8800 INJECTION, SOLUTION INTRAVENOUS; SUBCUTANEOUS
Status: DISCONTINUED | OUTPATIENT
Start: 2020-11-19 | End: 2020-11-19

## 2020-11-19 RX ORDER — HEPARIN SODIUM 1000 [USP'U]/ML
8800 INJECTION, SOLUTION INTRAVENOUS; SUBCUTANEOUS ONCE
Status: COMPLETED | OUTPATIENT
Start: 2020-11-19 | End: 2020-11-19

## 2020-11-19 RX ORDER — PANTOPRAZOLE SODIUM 40 MG/1
40 TABLET, DELAYED RELEASE ORAL
Status: DISCONTINUED | OUTPATIENT
Start: 2020-11-20 | End: 2020-11-21 | Stop reason: HOSPADM

## 2020-11-19 RX ORDER — ALBUTEROL SULFATE 2.5 MG/3ML
2.5 SOLUTION RESPIRATORY (INHALATION) EVERY 4 HOURS PRN
Status: DISCONTINUED | OUTPATIENT
Start: 2020-11-19 | End: 2020-11-21 | Stop reason: HOSPADM

## 2020-11-19 RX ORDER — MELOXICAM 7.5 MG/1
15 TABLET ORAL DAILY
Status: DISCONTINUED | OUTPATIENT
Start: 2020-11-20 | End: 2020-11-21 | Stop reason: HOSPADM

## 2020-11-19 RX ORDER — HEPARIN SODIUM 1000 [USP'U]/ML
4400 INJECTION, SOLUTION INTRAVENOUS; SUBCUTANEOUS
Status: DISCONTINUED | OUTPATIENT
Start: 2020-11-19 | End: 2020-11-19

## 2020-11-19 RX ORDER — CEFTRIAXONE 1 G/50ML
1000 INJECTION, SOLUTION INTRAVENOUS EVERY 24 HOURS
Status: DISCONTINUED | OUTPATIENT
Start: 2020-11-19 | End: 2020-11-21 | Stop reason: HOSPADM

## 2020-11-19 RX ORDER — ONDANSETRON 2 MG/ML
4 INJECTION INTRAMUSCULAR; INTRAVENOUS ONCE
Status: COMPLETED | OUTPATIENT
Start: 2020-11-19 | End: 2020-11-19

## 2020-11-19 RX ORDER — OXYCODONE HYDROCHLORIDE AND ACETAMINOPHEN 5; 325 MG/1; MG/1
1 TABLET ORAL EVERY 4 HOURS PRN
Status: DISCONTINUED | OUTPATIENT
Start: 2020-11-19 | End: 2020-11-21 | Stop reason: HOSPADM

## 2020-11-19 RX ORDER — SODIUM CHLORIDE 9 MG/ML
100 INJECTION, SOLUTION INTRAVENOUS CONTINUOUS
Status: DISCONTINUED | OUTPATIENT
Start: 2020-11-19 | End: 2020-11-20

## 2020-11-19 RX ADMIN — MAGNESIUM SULFATE IN WATER 2 G: 40 INJECTION, SOLUTION INTRAVENOUS at 18:39

## 2020-11-19 RX ADMIN — IPRATROPIUM BROMIDE AND ALBUTEROL SULFATE 3 ML: .5; 3 SOLUTION RESPIRATORY (INHALATION) at 18:45

## 2020-11-19 RX ADMIN — IPRATROPIUM BROMIDE 0.5 MG: 0.5 SOLUTION RESPIRATORY (INHALATION) at 21:21

## 2020-11-19 RX ADMIN — HEPARIN SODIUM 8800 UNITS: 1000 INJECTION INTRAVENOUS; SUBCUTANEOUS at 16:18

## 2020-11-19 RX ADMIN — Medication 250 MG: at 21:40

## 2020-11-19 RX ADMIN — ONDANSETRON 4 MG: 2 INJECTION INTRAMUSCULAR; INTRAVENOUS at 18:52

## 2020-11-19 RX ADMIN — SODIUM CHLORIDE 1000 ML: 0.9 INJECTION, SOLUTION INTRAVENOUS at 19:43

## 2020-11-19 RX ADMIN — DEXAMETHASONE SODIUM PHOSPHATE 10 MG: 10 INJECTION, SOLUTION INTRAMUSCULAR; INTRAVENOUS at 19:47

## 2020-11-19 RX ADMIN — IOHEXOL 85 ML: 350 INJECTION, SOLUTION INTRAVENOUS at 17:23

## 2020-11-19 RX ADMIN — OXYCODONE HYDROCHLORIDE AND ACETAMINOPHEN 1 TABLET: 5; 325 TABLET ORAL at 18:52

## 2020-11-19 RX ADMIN — FENTANYL CITRATE 50 MCG: 50 INJECTION, SOLUTION INTRAMUSCULAR; INTRAVENOUS at 18:55

## 2020-11-19 RX ADMIN — LEVALBUTEROL HYDROCHLORIDE 1.25 MG: 1.25 SOLUTION, CONCENTRATE RESPIRATORY (INHALATION) at 21:21

## 2020-11-19 RX ADMIN — SODIUM CHLORIDE 100 ML/HR: 0.9 INJECTION, SOLUTION INTRAVENOUS at 21:26

## 2020-11-19 RX ADMIN — HEPARIN SODIUM 18 UNITS/KG/HR: 10000 INJECTION, SOLUTION INTRAVENOUS at 16:19

## 2020-11-19 RX ADMIN — CEFTRIAXONE 1000 MG: 1 INJECTION, SOLUTION INTRAVENOUS at 21:32

## 2020-11-20 PROBLEM — J18.9 RIGHT UPPER LOBE PNEUMONIA: Status: ACTIVE | Noted: 2020-11-20

## 2020-11-20 PROBLEM — J96.01 ACUTE RESPIRATORY FAILURE WITH HYPOXIA (HCC): Status: ACTIVE | Noted: 2020-11-20

## 2020-11-20 LAB
ALBUMIN SERPL BCP-MCNC: 3 G/DL (ref 3.5–5)
ALP SERPL-CCNC: 81 U/L (ref 46–116)
ALT SERPL W P-5'-P-CCNC: 23 U/L (ref 12–78)
ANION GAP SERPL CALCULATED.3IONS-SCNC: 7 MMOL/L (ref 4–13)
AST SERPL W P-5'-P-CCNC: 13 U/L (ref 5–45)
ATRIAL RATE: 83 BPM
ATRIAL RATE: 87 BPM
BILIRUB SERPL-MCNC: 0.2 MG/DL (ref 0.2–1)
BUN SERPL-MCNC: 20 MG/DL (ref 5–25)
CALCIUM ALBUM COR SERPL-MCNC: 9.6 MG/DL (ref 8.3–10.1)
CALCIUM SERPL-MCNC: 8.8 MG/DL (ref 8.3–10.1)
CHLORIDE SERPL-SCNC: 109 MMOL/L (ref 100–108)
CO2 SERPL-SCNC: 26 MMOL/L (ref 21–32)
CREAT SERPL-MCNC: 0.71 MG/DL (ref 0.6–1.3)
ERYTHROCYTE [DISTWIDTH] IN BLOOD BY AUTOMATED COUNT: 14.2 % (ref 11.6–15.1)
GFR SERPL CREATININE-BSD FRML MDRD: 98 ML/MIN/1.73SQ M
GLUCOSE SERPL-MCNC: 157 MG/DL (ref 65–140)
HCT VFR BLD AUTO: 41.9 % (ref 34.8–46.1)
HGB BLD-MCNC: 12.7 G/DL (ref 11.5–15.4)
MCH RBC QN AUTO: 29.9 PG (ref 26.8–34.3)
MCHC RBC AUTO-ENTMCNC: 30.3 G/DL (ref 31.4–37.4)
MCV RBC AUTO: 99 FL (ref 82–98)
P AXIS: 42 DEGREES
P AXIS: 50 DEGREES
PLATELET # BLD AUTO: 196 THOUSANDS/UL (ref 149–390)
PMV BLD AUTO: 11.1 FL (ref 8.9–12.7)
POTASSIUM SERPL-SCNC: 5.2 MMOL/L (ref 3.5–5.3)
PR INTERVAL: 134 MS
PR INTERVAL: 146 MS
PROT SERPL-MCNC: 6.3 G/DL (ref 6.4–8.2)
QRS AXIS: 27 DEGREES
QRS AXIS: 31 DEGREES
QRSD INTERVAL: 88 MS
QRSD INTERVAL: 92 MS
QT INTERVAL: 380 MS
QT INTERVAL: 400 MS
QTC INTERVAL: 457 MS
QTC INTERVAL: 470 MS
RBC # BLD AUTO: 4.25 MILLION/UL (ref 3.81–5.12)
SODIUM SERPL-SCNC: 142 MMOL/L (ref 136–145)
T WAVE AXIS: 68 DEGREES
T WAVE AXIS: 74 DEGREES
VENTRICULAR RATE: 83 BPM
VENTRICULAR RATE: 87 BPM
WBC # BLD AUTO: 9.97 THOUSAND/UL (ref 4.31–10.16)

## 2020-11-20 PROCEDURE — 94760 N-INVAS EAR/PLS OXIMETRY 1: CPT

## 2020-11-20 PROCEDURE — 99222 1ST HOSP IP/OBS MODERATE 55: CPT | Performed by: INTERNAL MEDICINE

## 2020-11-20 PROCEDURE — 99233 SBSQ HOSP IP/OBS HIGH 50: CPT | Performed by: FAMILY MEDICINE

## 2020-11-20 PROCEDURE — 85027 COMPLETE CBC AUTOMATED: CPT | Performed by: HOSPITALIST

## 2020-11-20 PROCEDURE — 99024 POSTOP FOLLOW-UP VISIT: CPT | Performed by: PHYSICIAN ASSISTANT

## 2020-11-20 PROCEDURE — 93010 ELECTROCARDIOGRAM REPORT: CPT | Performed by: INTERNAL MEDICINE

## 2020-11-20 PROCEDURE — 80053 COMPREHEN METABOLIC PANEL: CPT | Performed by: HOSPITALIST

## 2020-11-20 PROCEDURE — 94640 AIRWAY INHALATION TREATMENT: CPT

## 2020-11-20 RX ORDER — ONDANSETRON 2 MG/ML
4 INJECTION INTRAMUSCULAR; INTRAVENOUS EVERY 6 HOURS PRN
Status: DISCONTINUED | OUTPATIENT
Start: 2020-11-20 | End: 2020-11-21 | Stop reason: HOSPADM

## 2020-11-20 RX ADMIN — SODIUM CHLORIDE 100 ML/HR: 0.9 INJECTION, SOLUTION INTRAVENOUS at 01:54

## 2020-11-20 RX ADMIN — METHYLPREDNISOLONE SODIUM SUCCINATE 40 MG: 40 INJECTION, POWDER, FOR SOLUTION INTRAMUSCULAR; INTRAVENOUS at 20:56

## 2020-11-20 RX ADMIN — MELOXICAM 15 MG: 7.5 TABLET ORAL at 09:40

## 2020-11-20 RX ADMIN — Medication 250 MG: at 09:25

## 2020-11-20 RX ADMIN — AZITHROMYCIN MONOHYDRATE 500 MG: 500 INJECTION, POWDER, LYOPHILIZED, FOR SOLUTION INTRAVENOUS at 00:01

## 2020-11-20 RX ADMIN — CEFTRIAXONE 1000 MG: 1 INJECTION, SOLUTION INTRAVENOUS at 20:56

## 2020-11-20 RX ADMIN — LEVALBUTEROL HYDROCHLORIDE 1.25 MG: 1.25 SOLUTION, CONCENTRATE RESPIRATORY (INHALATION) at 07:26

## 2020-11-20 RX ADMIN — SERTRALINE HYDROCHLORIDE 50 MG: 50 TABLET ORAL at 09:25

## 2020-11-20 RX ADMIN — METHYLPREDNISOLONE SODIUM SUCCINATE 40 MG: 40 INJECTION, POWDER, FOR SOLUTION INTRAMUSCULAR; INTRAVENOUS at 09:25

## 2020-11-20 RX ADMIN — PANTOPRAZOLE SODIUM 40 MG: 40 TABLET, DELAYED RELEASE ORAL at 06:21

## 2020-11-20 RX ADMIN — OXYCODONE HYDROCHLORIDE AND ACETAMINOPHEN 1 TABLET: 5; 325 TABLET ORAL at 15:32

## 2020-11-20 RX ADMIN — IPRATROPIUM BROMIDE 0.5 MG: 0.5 SOLUTION RESPIRATORY (INHALATION) at 07:26

## 2020-11-20 RX ADMIN — ENOXAPARIN SODIUM 40 MG: 40 INJECTION SUBCUTANEOUS at 09:25

## 2020-11-20 RX ADMIN — IPRATROPIUM BROMIDE 0.5 MG: 0.5 SOLUTION RESPIRATORY (INHALATION) at 17:30

## 2020-11-20 RX ADMIN — AZITHROMYCIN MONOHYDRATE 500 MG: 500 INJECTION, POWDER, LYOPHILIZED, FOR SOLUTION INTRAVENOUS at 19:54

## 2020-11-20 RX ADMIN — LEVALBUTEROL HYDROCHLORIDE 1.25 MG: 1.25 SOLUTION, CONCENTRATE RESPIRATORY (INHALATION) at 21:00

## 2020-11-20 RX ADMIN — LEVALBUTEROL HYDROCHLORIDE 1.25 MG: 1.25 SOLUTION, CONCENTRATE RESPIRATORY (INHALATION) at 17:30

## 2020-11-20 RX ADMIN — Medication 250 MG: at 17:58

## 2020-11-20 RX ADMIN — IPRATROPIUM BROMIDE 0.5 MG: 0.5 SOLUTION RESPIRATORY (INHALATION) at 21:00

## 2020-11-21 VITALS
DIASTOLIC BLOOD PRESSURE: 99 MMHG | BODY MASS INDEX: 40 KG/M2 | RESPIRATION RATE: 20 BRPM | HEART RATE: 94 BPM | SYSTOLIC BLOOD PRESSURE: 133 MMHG | TEMPERATURE: 98 F | HEIGHT: 66 IN | WEIGHT: 248.9 LBS | OXYGEN SATURATION: 92 %

## 2020-11-21 LAB
ALBUMIN SERPL BCP-MCNC: 3.1 G/DL (ref 3.5–5)
ALP SERPL-CCNC: 78 U/L (ref 46–116)
ALT SERPL W P-5'-P-CCNC: 23 U/L (ref 12–78)
ANION GAP SERPL CALCULATED.3IONS-SCNC: 8 MMOL/L (ref 4–13)
AST SERPL W P-5'-P-CCNC: 13 U/L (ref 5–45)
BASOPHILS # BLD AUTO: 0.02 THOUSANDS/ΜL (ref 0–0.1)
BASOPHILS NFR BLD AUTO: 0 % (ref 0–1)
BILIRUB DIRECT SERPL-MCNC: 0.08 MG/DL (ref 0–0.2)
BILIRUB SERPL-MCNC: 0.3 MG/DL (ref 0.2–1)
BUN SERPL-MCNC: 18 MG/DL (ref 5–25)
CALCIUM SERPL-MCNC: 9.1 MG/DL (ref 8.3–10.1)
CHLORIDE SERPL-SCNC: 106 MMOL/L (ref 100–108)
CO2 SERPL-SCNC: 26 MMOL/L (ref 21–32)
CREAT SERPL-MCNC: 0.6 MG/DL (ref 0.6–1.3)
EOSINOPHIL # BLD AUTO: 0 THOUSAND/ΜL (ref 0–0.61)
EOSINOPHIL NFR BLD AUTO: 0 % (ref 0–6)
ERYTHROCYTE [DISTWIDTH] IN BLOOD BY AUTOMATED COUNT: 13.9 % (ref 11.6–15.1)
GFR SERPL CREATININE-BSD FRML MDRD: 105 ML/MIN/1.73SQ M
GLUCOSE SERPL-MCNC: 190 MG/DL (ref 65–140)
HCT VFR BLD AUTO: 39 % (ref 34.8–46.1)
HGB BLD-MCNC: 12.1 G/DL (ref 11.5–15.4)
IMM GRANULOCYTES # BLD AUTO: 0.12 THOUSAND/UL (ref 0–0.2)
IMM GRANULOCYTES NFR BLD AUTO: 1 % (ref 0–2)
LACTATE SERPL-SCNC: 2 MMOL/L (ref 0.5–2)
LYMPHOCYTES # BLD AUTO: 1.07 THOUSANDS/ΜL (ref 0.6–4.47)
LYMPHOCYTES NFR BLD AUTO: 6 % (ref 14–44)
MAGNESIUM SERPL-MCNC: 1.8 MG/DL (ref 1.6–2.6)
MCH RBC QN AUTO: 30 PG (ref 26.8–34.3)
MCHC RBC AUTO-ENTMCNC: 31 G/DL (ref 31.4–37.4)
MCV RBC AUTO: 97 FL (ref 82–98)
MONOCYTES # BLD AUTO: 0.69 THOUSAND/ΜL (ref 0.17–1.22)
MONOCYTES NFR BLD AUTO: 4 % (ref 4–12)
NEUTROPHILS # BLD AUTO: 15.71 THOUSANDS/ΜL (ref 1.85–7.62)
NEUTS SEG NFR BLD AUTO: 89 % (ref 43–75)
NRBC BLD AUTO-RTO: 0 /100 WBCS
PLATELET # BLD AUTO: 230 THOUSANDS/UL (ref 149–390)
PMV BLD AUTO: 10.6 FL (ref 8.9–12.7)
POTASSIUM SERPL-SCNC: 4.4 MMOL/L (ref 3.5–5.3)
PROCALCITONIN SERPL-MCNC: <0.05 NG/ML
PROT SERPL-MCNC: 6.6 G/DL (ref 6.4–8.2)
RBC # BLD AUTO: 4.04 MILLION/UL (ref 3.81–5.12)
SODIUM SERPL-SCNC: 140 MMOL/L (ref 136–145)
WBC # BLD AUTO: 17.61 THOUSAND/UL (ref 4.31–10.16)

## 2020-11-21 PROCEDURE — 80076 HEPATIC FUNCTION PANEL: CPT | Performed by: INTERNAL MEDICINE

## 2020-11-21 PROCEDURE — 84145 PROCALCITONIN (PCT): CPT | Performed by: FAMILY MEDICINE

## 2020-11-21 PROCEDURE — 83605 ASSAY OF LACTIC ACID: CPT | Performed by: INTERNAL MEDICINE

## 2020-11-21 PROCEDURE — 80048 BASIC METABOLIC PNL TOTAL CA: CPT | Performed by: FAMILY MEDICINE

## 2020-11-21 PROCEDURE — 83735 ASSAY OF MAGNESIUM: CPT | Performed by: FAMILY MEDICINE

## 2020-11-21 PROCEDURE — 85025 COMPLETE CBC W/AUTO DIFF WBC: CPT | Performed by: FAMILY MEDICINE

## 2020-11-21 PROCEDURE — 99239 HOSP IP/OBS DSCHRG MGMT >30: CPT | Performed by: FAMILY MEDICINE

## 2020-11-21 RX ORDER — ALBUTEROL SULFATE 90 UG/1
2 AEROSOL, METERED RESPIRATORY (INHALATION) EVERY 6 HOURS PRN
Qty: 18 G | Refills: 0 | Status: SHIPPED | OUTPATIENT
Start: 2020-11-21 | End: 2021-12-07

## 2020-11-21 RX ORDER — METHYLPREDNISOLONE 4 MG/1
TABLET ORAL
Qty: 1 EACH | Refills: 0 | Status: SHIPPED | OUTPATIENT
Start: 2020-11-21 | End: 2021-01-26 | Stop reason: ALTCHOICE

## 2020-11-21 RX ORDER — CEFUROXIME AXETIL 500 MG/1
500 TABLET ORAL EVERY 12 HOURS SCHEDULED
Qty: 8 TABLET | Refills: 0 | Status: SHIPPED | OUTPATIENT
Start: 2020-11-21 | End: 2020-11-25

## 2020-11-21 RX ADMIN — PANTOPRAZOLE SODIUM 40 MG: 40 TABLET, DELAYED RELEASE ORAL at 05:54

## 2020-11-23 ENCOUNTER — TELEPHONE (OUTPATIENT)
Dept: PULMONOLOGY | Facility: CLINIC | Age: 50
End: 2020-11-23

## 2020-11-23 ENCOUNTER — TRANSITIONAL CARE MANAGEMENT (OUTPATIENT)
Dept: FAMILY MEDICINE CLINIC | Facility: CLINIC | Age: 50
End: 2020-11-23

## 2020-11-24 ENCOUNTER — EVALUATION (OUTPATIENT)
Dept: PHYSICAL THERAPY | Facility: CLINIC | Age: 50
End: 2020-11-24
Payer: COMMERCIAL

## 2020-11-24 DIAGNOSIS — S46.012D STRAIN OF TENDON OF LEFT ROTATOR CUFF, SUBSEQUENT ENCOUNTER: Primary | ICD-10-CM

## 2020-11-24 PROCEDURE — 97112 NEUROMUSCULAR REEDUCATION: CPT | Performed by: PHYSICAL THERAPIST

## 2020-11-24 PROCEDURE — 97161 PT EVAL LOW COMPLEX 20 MIN: CPT | Performed by: PHYSICAL THERAPIST

## 2020-11-24 PROCEDURE — 97110 THERAPEUTIC EXERCISES: CPT | Performed by: PHYSICAL THERAPIST

## 2020-11-24 PROCEDURE — 97530 THERAPEUTIC ACTIVITIES: CPT | Performed by: PHYSICAL THERAPIST

## 2020-11-25 ENCOUNTER — OFFICE VISIT (OUTPATIENT)
Dept: FAMILY MEDICINE CLINIC | Facility: CLINIC | Age: 50
End: 2020-11-25
Payer: COMMERCIAL

## 2020-11-25 VITALS
SYSTOLIC BLOOD PRESSURE: 148 MMHG | TEMPERATURE: 97.7 F | DIASTOLIC BLOOD PRESSURE: 84 MMHG | HEART RATE: 86 BPM | HEIGHT: 66 IN | WEIGHT: 248 LBS | OXYGEN SATURATION: 97 % | BODY MASS INDEX: 39.86 KG/M2

## 2020-11-25 DIAGNOSIS — F41.1 GENERALIZED ANXIETY DISORDER: ICD-10-CM

## 2020-11-25 PROCEDURE — 1111F DSCHRG MED/CURRENT MED MERGE: CPT | Performed by: PHYSICIAN ASSISTANT

## 2020-11-25 PROCEDURE — 99496 TRANSJ CARE MGMT HIGH F2F 7D: CPT | Performed by: PHYSICIAN ASSISTANT

## 2020-11-25 PROCEDURE — 3008F BODY MASS INDEX DOCD: CPT | Performed by: FAMILY MEDICINE

## 2020-12-01 ENCOUNTER — OFFICE VISIT (OUTPATIENT)
Dept: OBGYN CLINIC | Facility: CLINIC | Age: 50
End: 2020-12-01

## 2020-12-01 ENCOUNTER — APPOINTMENT (OUTPATIENT)
Dept: RADIOLOGY | Facility: CLINIC | Age: 50
End: 2020-12-01
Payer: COMMERCIAL

## 2020-12-01 VITALS
SYSTOLIC BLOOD PRESSURE: 176 MMHG | BODY MASS INDEX: 39.86 KG/M2 | DIASTOLIC BLOOD PRESSURE: 103 MMHG | WEIGHT: 248 LBS | HEIGHT: 66 IN | HEART RATE: 87 BPM

## 2020-12-01 DIAGNOSIS — S46.012A TRAUMATIC INCOMPLETE TEAR OF LEFT ROTATOR CUFF, INITIAL ENCOUNTER: ICD-10-CM

## 2020-12-01 DIAGNOSIS — S46.012A TRAUMATIC INCOMPLETE TEAR OF LEFT ROTATOR CUFF, INITIAL ENCOUNTER: Primary | ICD-10-CM

## 2020-12-01 PROCEDURE — 3008F BODY MASS INDEX DOCD: CPT | Performed by: PHYSICIAN ASSISTANT

## 2020-12-01 PROCEDURE — 99024 POSTOP FOLLOW-UP VISIT: CPT | Performed by: ORTHOPAEDIC SURGERY

## 2020-12-01 PROCEDURE — 73030 X-RAY EXAM OF SHOULDER: CPT

## 2020-12-07 PROBLEM — R06.83 SNORING: Status: ACTIVE | Noted: 2020-12-07

## 2020-12-07 PROBLEM — R06.00 DYSPNEA ON EXERTION: Status: ACTIVE | Noted: 2020-11-19

## 2020-12-07 PROBLEM — R06.09 DYSPNEA ON EXERTION: Status: ACTIVE | Noted: 2020-11-19

## 2020-12-11 ENCOUNTER — TELEPHONE (OUTPATIENT)
Dept: SLEEP CENTER | Facility: CLINIC | Age: 50
End: 2020-12-11

## 2020-12-14 ENCOUNTER — TELEPHONE (OUTPATIENT)
Dept: FAMILY MEDICINE CLINIC | Facility: CLINIC | Age: 50
End: 2020-12-14

## 2020-12-14 DIAGNOSIS — F41.1 GENERALIZED ANXIETY DISORDER: ICD-10-CM

## 2020-12-17 DIAGNOSIS — F41.1 GENERALIZED ANXIETY DISORDER: ICD-10-CM

## 2020-12-21 DIAGNOSIS — F41.1 GENERALIZED ANXIETY DISORDER: ICD-10-CM

## 2020-12-22 ENCOUNTER — HOSPITAL ENCOUNTER (OUTPATIENT)
Dept: PULMONOLOGY | Facility: HOSPITAL | Age: 50
Discharge: HOME | End: 2020-12-22
Payer: COMMERCIAL

## 2020-12-22 ENCOUNTER — TELEPHONE (OUTPATIENT)
Dept: PULMONOLOGY | Facility: CLINIC | Age: 50
End: 2020-12-22

## 2020-12-22 DIAGNOSIS — R06.00 DYSPNEA ON EXERTION: ICD-10-CM

## 2020-12-22 PROCEDURE — 94729 DIFFUSING CAPACITY: CPT

## 2020-12-22 PROCEDURE — 94729 DIFFUSING CAPACITY: CPT | Performed by: INTERNAL MEDICINE

## 2020-12-22 PROCEDURE — 94760 N-INVAS EAR/PLS OXIMETRY 1: CPT

## 2020-12-22 PROCEDURE — 94727 GAS DIL/WSHOT DETER LNG VOL: CPT

## 2020-12-22 PROCEDURE — 94010 BREATHING CAPACITY TEST: CPT

## 2020-12-22 PROCEDURE — 94727 GAS DIL/WSHOT DETER LNG VOL: CPT | Performed by: INTERNAL MEDICINE

## 2020-12-22 PROCEDURE — 94010 BREATHING CAPACITY TEST: CPT | Performed by: INTERNAL MEDICINE

## 2020-12-28 DIAGNOSIS — J98.4 RESTRICTIVE LUNG DISEASE: Primary | ICD-10-CM

## 2021-01-12 DIAGNOSIS — G57.71 COMPLEX REGIONAL PAIN SYNDROME TYPE 2 OF BOTH LOWER EXTREMITIES: ICD-10-CM

## 2021-01-12 DIAGNOSIS — Z79.899 ENCOUNTER FOR LONG-TERM (CURRENT) USE OF OTHER MEDICATIONS: ICD-10-CM

## 2021-01-12 DIAGNOSIS — M51.26 DISPLACEMENT OF LUMBAR INTERVERTEBRAL DISC WITHOUT MYELOPATHY: ICD-10-CM

## 2021-01-12 DIAGNOSIS — Z79.01 CHRONIC ANTICOAGULATION: ICD-10-CM

## 2021-01-12 DIAGNOSIS — G57.72 COMPLEX REGIONAL PAIN SYNDROME TYPE 2 OF BOTH LOWER EXTREMITIES: ICD-10-CM

## 2021-01-12 DIAGNOSIS — M54.16 LUMBAR RADICULOPATHY: ICD-10-CM

## 2021-01-12 DIAGNOSIS — G89.4 CHRONIC PAIN SYNDROME: Primary | ICD-10-CM

## 2021-01-14 ENCOUNTER — OFFICE VISIT (OUTPATIENT)
Dept: PAIN MEDICINE | Facility: CLINIC | Age: 51
End: 2021-01-14
Payer: COMMERCIAL

## 2021-01-14 DIAGNOSIS — G89.4 CHRONIC PAIN SYNDROME: Primary | ICD-10-CM

## 2021-01-14 DIAGNOSIS — M51.26 LUMBAR DISC HERNIATION: ICD-10-CM

## 2021-01-14 DIAGNOSIS — Z98.1 STATUS POST LUMBAR SPINAL FUSION: ICD-10-CM

## 2021-01-14 DIAGNOSIS — M54.16 LUMBAR RADICULOPATHY: ICD-10-CM

## 2021-01-14 PROCEDURE — 99213 OFFICE O/P EST LOW 20 MIN: CPT | Performed by: NURSE PRACTITIONER

## 2021-01-14 NOTE — PROGRESS NOTES
Assessment    1  Chronic pain syndrome     2  Lumbar radiculopathy     3  Lumbar disc herniation     4  Status post lumbar spinal fusion         Plan   patient was scheduled today for a pre trial spinal cord stimulator visit  Unfortunately, she is having some significant pulmonary issues  She tells me that she had shoulder surgery on 11/18/2020 and was placed under general anesthesia  The next day she woke up with severe shortness of breath  She was evaluated in the emergency room and ended up being admitted and placed on a ventilator for few days  She has been having shortness of breath and CADET ever since  She is following with Pulmonary  She had a recent PFT which was significantly abnormal   She is scheduled to have a high-resolution CT scan of her chest       After discussing this with Dr Jostin Dhillon we decided to hold off on the trial spinal cord stimulator until she has completed the pulmonary workup and followed up with her pulmonologist     History of Present Illness  The patient is a 44 y o  female   With chronic pain syndrome related to chronic low back pain, lumbar radiculopathy  Current pain level is a 6/10  Pain is described as sharp, shooting, throbbing, numb, pins and needle    Pain Assessment Measures   Numeric Rating Scale 6   Oswestry Disability Index Score/Neck Disability Index Score 20   PROMIS-29   - Physical Function 10   - Anxiety 8   - Depression 4   - Fatigue 9   - Sleep Disturbance 12   - Ability to Participate in Social Roles And Activities 16   - Pain Interference 16         I have personally reviewed and/or updated the patient's past medical history, past surgical history, family history, social history, current medications, allergies, and vital signs today  Review of Systems   Constitutional: Negative for fatigue and unexpected weight change  HENT: Negative for dental problem, ear pain, hearing loss and sneezing  Eyes: Negative for visual disturbance     Respiratory: Negative for cough and chest tightness  Cardiovascular: Negative for leg swelling  Gastrointestinal: Negative for anal bleeding  Endocrine: Negative for heat intolerance  Genitourinary: Negative for flank pain and genital sores  Musculoskeletal: Positive for gait problem  Pain in extremity: left leg   Skin: Negative for wound  Allergic/Immunologic: Negative for immunocompromised state  Neurological: Negative for speech difficulty and light-headedness  Hematological: Negative for adenopathy  Psychiatric/Behavioral: Negative for confusion  The patient is not hyperactive  All other systems reviewed and are negative         Past Medical History:   Diagnosis Date    Anxiety     Back pain     Depression        Past Surgical History:   Procedure Laterality Date    BACK SURGERY  2006    cage in lumbar L5 area    BACK SURGERY      CHOLECYSTECTOMY      L[address]      VA REPAIR INCISIONAL HERNIA,REDUCIBLE N/A 3/16/2018    Procedure: REPAIR HERNIA VENTRAL with mesh;  Surgeon: Sera Ponce DO;  Location: [facility];  Service: General    VA SHLDR ARTHROSCOP,SURG,W/ROTAT CUFF REPR Left 2020    Procedure: SHOULDER ARTHROSCOPY, ROTATOR CUFF REPAIR, SYNEVECTOMY, ACROMIOPLASTY DEBRIDEMENT, INJECTION;  Surgeon: Tenisha Tovar DO;  Location: [address] [place] [address] MAIN OR;  Service: Orthopedics       Family History   Adopted: Yes   Family history unknown: Yes       Social History     Occupational History    Not on file   Tobacco Use    Smoking status: Former Smoker     Packs/day: 0 50     Years: 0 00     Pack years: 0 00     Types: Cigarettes     Quit date: 10/17/2020     Years since quittin 2    Smokeless tobacco: Never Used    Tobacco comment: 1 pack a day for 20 years   Substance and Sexual Activity    Alcohol use: Never     Alcohol/week: 0 0 standard drinks     Frequency: Never     Drinks per session: Patient refused     Binge frequency: Never    Drug use: No    Sexual activity: Not Currently         Current Outpatient Medications:     albuterol (Ventolin HFA) 90 mcg/act inhaler, Inhale 2 puffs every 6 (six) hours as needed for wheezing, Disp: 18 g, Rfl: 0    meloxicam (MOBIC) 15 mg tablet, Take 1 tablet (15 mg total) by mouth daily, Disp: 30 tablet, Rfl: 0    methylPREDNISolone 4 MG tablet therapy pack, Use as directed on package, Disp: 1 each, Rfl: 0    sertraline (ZOLOFT) 50 mg tablet, Take 1 5 tablets (75 mg total) by mouth daily, Disp: 45 tablet, Rfl: 2    sertraline (ZOLOFT) 50 mg tablet, Take 1 5 tablets (75 mg total) by mouth daily, Disp: 45 tablet, Rfl: 5    No Known Allergies    Physical Exam    LMP 10/10/2016     Constitutional:overweight  Eyes:anicteric  HEENT:grossly intact  Neck:supple, symmetric, trachea midline and no masses   Pulmonary:even and unlabored  Cardiovascular:No edema or pitting edema present  Skin:Normal without rashes or lesions and well hydrated  Psychiatric:Mood and affect appropriate  Neurologic:Cranial Nerves II-XII grossly intact  Musculoskeletal:antalgic    Imaging

## 2021-01-26 ENCOUNTER — OFFICE VISIT (OUTPATIENT)
Dept: FAMILY MEDICINE CLINIC | Facility: CLINIC | Age: 51
End: 2021-01-26

## 2021-01-26 ENCOUNTER — OFFICE VISIT (OUTPATIENT)
Dept: PULMONOLOGY | Facility: CLINIC | Age: 51
End: 2021-01-26

## 2021-01-26 VITALS
BODY MASS INDEX: 39.34 KG/M2 | SYSTOLIC BLOOD PRESSURE: 133 MMHG | TEMPERATURE: 96.5 F | HEART RATE: 103 BPM | WEIGHT: 244.8 LBS | OXYGEN SATURATION: 97 % | DIASTOLIC BLOOD PRESSURE: 87 MMHG | HEIGHT: 66 IN

## 2021-01-26 VITALS
OXYGEN SATURATION: 98 % | HEIGHT: 66 IN | DIASTOLIC BLOOD PRESSURE: 100 MMHG | TEMPERATURE: 95.6 F | WEIGHT: 250 LBS | BODY MASS INDEX: 40.18 KG/M2 | SYSTOLIC BLOOD PRESSURE: 146 MMHG

## 2021-01-26 DIAGNOSIS — R06.00 DYSPNEA ON EXERTION: ICD-10-CM

## 2021-01-26 DIAGNOSIS — Z12.31 ENCOUNTER FOR SCREENING MAMMOGRAM FOR MALIGNANT NEOPLASM OF BREAST: ICD-10-CM

## 2021-01-26 DIAGNOSIS — E66.9 OBESITY (BMI 30-39.9): ICD-10-CM

## 2021-01-26 DIAGNOSIS — J98.4 RESTRICTIVE LUNG DISEASE: Primary | ICD-10-CM

## 2021-01-26 DIAGNOSIS — R06.00 DYSPNEA ON EXERTION: Primary | ICD-10-CM

## 2021-01-26 DIAGNOSIS — J98.4 RESTRICTIVE LUNG DISEASE: ICD-10-CM

## 2021-01-26 PROBLEM — R06.83 SNORING: Status: RESOLVED | Noted: 2020-12-07 | Resolved: 2021-01-26

## 2021-01-26 PROBLEM — F17.210 CIGARETTE NICOTINE DEPENDENCE WITHOUT COMPLICATION: Status: ACTIVE | Noted: 2021-01-26

## 2021-01-26 PROCEDURE — 99213 OFFICE O/P EST LOW 20 MIN: CPT | Performed by: PHYSICIAN ASSISTANT

## 2021-01-26 PROCEDURE — 94618 PULMONARY STRESS TESTING: CPT | Performed by: PHYSICIAN ASSISTANT

## 2021-01-26 PROCEDURE — 99214 OFFICE O/P EST MOD 30 MIN: CPT | Performed by: PHYSICIAN ASSISTANT

## 2021-01-26 PROCEDURE — 3008F BODY MASS INDEX DOCD: CPT | Performed by: PHYSICIAN ASSISTANT

## 2021-01-26 PROCEDURE — 1036F TOBACCO NON-USER: CPT | Performed by: PHYSICIAN ASSISTANT

## 2021-01-26 PROCEDURE — 99406 BEHAV CHNG SMOKING 3-10 MIN: CPT | Performed by: PHYSICIAN ASSISTANT

## 2021-01-26 NOTE — ASSESSMENT & PLAN NOTE
Patient continues to have dyspnea on exertion  Given the worsening symptoms over last 2 weeks I instructed her to get a chest x-ray now to rule out acute abnormalities  Otherwise our differential includes interstitial lung disease, pulmonary hypertension, luz marina diaphragm paralysis given recent shoulder surgery  Will check high-resolution CT chest, echocardiogram, and sniff test  She may discontinue inhaler if she does not find it helpful   If diagnostics are all normal would proceed with stress test

## 2021-01-26 NOTE — PROGRESS NOTES
Pulmonary Follow Up Note   Hunter Dumont 44 y o  female MRN: [id number]  1/26/2021      Assessment:    Dyspnea on exertion  Patient continues to have dyspnea on exertion  Given the worsening symptoms over last 2 weeks I instructed her to get a chest x-ray now to rule out acute abnormalities  Otherwise our differential includes interstitial lung disease, pulmonary hypertension, luz marina diaphragm paralysis given recent shoulder surgery  Will check high-resolution CT chest, echocardiogram, and sniff test  She may discontinue inhaler if she does not find it helpful  If diagnostics are all normal would proceed with stress test      Restrictive lung disease  Reviewed results of pulmonary function test with patient and patient's partner today in the office  They are consistent with moderately severe restriction with impaired diffusion capacity  Despite diffusion impairment 6 minutes walk performed in the office did not indicate that she requires supplemental oxygen  Will check a high-resolution CT to rule out interstitial lung disease such as RB-ILD given smoking history and ground-glass upper lobe predominant infiltrates  Ordered a sniff test as well to rule out hemidiaphragm paralysis in the setting of recent shoulder surgery  She is also obese and her weight can certainly be contributing but will have to rule out other causes first      Obesity (BMI 30-39  9)  Weight loss encouraged  Cigarette nicotine dependence without complication  Smoking cessation encouraged  Smoking cessation discussed for 4 minutes today  While she does not have any evidence of chronic obstruction at this time did educate her that smoking complete other detrimental health issues  Can discuss further at next follow up  Plan:    Diagnoses and all orders for this visit:    Dyspnea on exertion  -     FL sniff test; Future  -     Echo complete with contrast if indicated;  Future  -     XR chest pa & lateral; Future  -     POCT 6 minute walk    Restrictive lung disease  -     FL sniff test; Future  -     XR chest pa & lateral; Future  -     POCT 6 minute walk    Obesity (BMI 30-39  9)        Return in about 4 weeks (around 2/23/2021)  History of Present Illness   HPI:  Lyubov Washington is a 44 y o  female who presents to the office today for sick visit  Patient was told to come here by her PCP after reports of increasing shortness of breath ongoing for several months but exacerbated over last 2 weeks  Patient was last seen via virtual visit in December by myself  She has a 69-year-old female past medical history positive for depression, anxiety, back pain and morbid obesity  She recently underwent left rotator cuff repair and November  Two days after postop she returned back to the ED and was found to be hypoxic and hypercapnic that resolved with BiPAP  She was treated with nebulizers, antibiotics and steroids  She was sent home on antibiotics a Medrol Dosepak and a rescue inhaler  Since discharge she has had no improvement in her symptoms  She had pulmonary function tests that were negative for obstruction despite years of smoking and evidence of mild emphysema on CT scan  Also mention on the CT scan with some mild ground-glass opacities by apically and atelectasis  During my evaluation, patient is very upset and frustrated at this stage that her respiratory system is in  She reports that before November she was a very active person working for [employer] daughter able to carry sandbag follow up hill while running now she cannot walk more than 50 ft  Her partner is present with her today and also does notice significant decline in her symptoms  Patient denies cough but does endorse constant chest tightness and wheezing  She does not feel that the rescue inhaler helps at all  Denies chest pain  Denies fevers, chills, sick contacts  She has been tested for COVID repeatedly and always found to be negative    She is a current everyday smoker stating that she smokes a half pack a day for last 30 years  Denies hemoptysis, sputum production, cough  Review of Systems   All other systems reviewed and are negative  Historical Information   Past Medical History:   Diagnosis Date    Anxiety     Back pain     Depression      Past Surgical History:   Procedure Laterality Date    BACK SURGERY  2006    cage in lumbar L5 area    BACK SURGERY      CHOLECYSTECTOMY      LUMBAR [address]      [place] REPAIR INCISIONAL HERNIA,REDUCIBLE N/A 3/16/2018    Procedure: REPAIR HERNIA VENTRAL with mesh;  Surgeon: Jason Brown DO;  Location: [facility];  Service: General    TX SHLDR ARTHROSCOP,SURG,W/ROTAT CUFF REPR Left 2020    Procedure: SHOULDER ARTHROSCOPY, ROTATOR CUFF REPAIR, SYNEVECTOMY, ACROMIOPLASTY DEBRIDEMENT, INJECTION;  Surgeon: Zeina Scales DO;  Location: [address] [place] [address] MAIN OR;  Service: Orthopedics     Family History   Adopted: Yes   Family history unknown: Yes       Social History     Tobacco Use   Smoking Status Former Smoker    Packs/day: 0 50    Years: 0 00    Pack years: 0 00    Types: Cigarettes    Quit date: 10/17/2020    Years since quittin 2   Smokeless Tobacco Never Used   Tobacco Comment    1 pack a day for 20 years         Meds/Allergies     Current Outpatient Medications:     sertraline (ZOLOFT) 50 mg tablet, Take 1 5 tablets (75 mg total) by mouth daily, Disp: 45 tablet, Rfl: 2    albuterol (Ventolin HFA) 90 mcg/act inhaler, Inhale 2 puffs every 6 (six) hours as needed for wheezing (Patient not taking: Reported on 2021), Disp: 18 g, Rfl: 0  No Known Allergies    Vitals: Blood pressure 133/87, pulse 103, temperature (!) 96 5 °F (35 8 °C), temperature source Tympanic, height 5' 6" (1 676 m), weight 111 kg (244 lb 12 8 oz), last menstrual period 10/10/2016, SpO2 97 %  Body mass index is 39 51 kg/m²  Oxygen Therapy  SpO2: 97 %    Physical Exam  Physical Exam  Vitals signs reviewed     Constitutional:       Appearance: Normal appearance  She is well-developed  She is obese  HENT:      Head: Normocephalic and atraumatic  Right Ear: External ear normal       Left Ear: External ear normal       Nose: Nose normal       Mouth/Throat:      Mouth: Mucous membranes are moist       Pharynx: Oropharynx is clear  Eyes:      Extraocular Movements: Extraocular movements intact  Pupils: Pupils are equal, round, and reactive to light  Neck:      Musculoskeletal: Normal range of motion and neck supple  Cardiovascular:      Rate and Rhythm: Normal rate and regular rhythm  Pulses: Normal pulses  Heart sounds: Normal heart sounds  No murmur  Pulmonary:      Effort: Pulmonary effort is normal  No respiratory distress  Breath sounds: Normal breath sounds  No stridor  No wheezing, rhonchi or rales  Abdominal:      Palpations: Abdomen is soft  Tenderness: There is no abdominal tenderness  Hernia: No hernia is present  Musculoskeletal: Normal range of motion  General: No swelling, tenderness or deformity  Skin:     General: Skin is warm and dry  Capillary Refill: Capillary refill takes less than 2 seconds  Neurological:      General: No focal deficit present  Mental Status: She is alert and oriented to person, place, and time  Mental status is at baseline  Psychiatric:         Behavior: Behavior normal          Thought Content: Thought content normal          Judgment: Judgment normal          Labs: I have personally reviewed pertinent lab results  , ABG: No results found for: PHART, TUK5GDE, PO2ART, DMM7VSR, C8OXFPOB, BEART, SOURCE, BNP: No results found for: BNP, CBC: No results found for: WBC, HGB, HCT, MCV, PLT, ADJUSTEDWBC, MCH, MCHC, RDW, MPV, NRBC, CMP: No results found for: SODIUM, K, CL, CO2, ANIONGAP, BUN, CREATININE, GLUCOSE, CALCIUM, AST, ALT, ALKPHOS, PROT, BILITOT, EGFR, PT/INR: No results found for: PT, INR, Troponin: No results found for: TROPONINI  Lab Results Component Value Date    WBC 17 61 (H) 11/21/2020    HGB 12 1 11/21/2020    HCT 39 0 11/21/2020    MCV 97 11/21/2020     11/21/2020     Lab Results   Component Value Date    GLUCOSE 90 04/10/2014    CALCIUM 9 1 11/21/2020     04/10/2014    K 4 4 11/21/2020    CO2 26 11/21/2020     11/21/2020    BUN 18 11/21/2020    CREATININE 0 60 11/21/2020     No results found for: IGE  Lab Results   Component Value Date    ALT 23 11/21/2020    AST 13 11/21/2020    ALKPHOS 78 11/21/2020    BILITOT 0 3 04/09/2014       Imaging and other studies: I have personally reviewed pertinent reports  and I have personally reviewed pertinent films in PACS     CTA chest PE study 11/19/2020  Patchy upper lobe predominant ground-glass opacities  No pulmonary embolism  Mild left lower lobe atelectasis  No pneumothorax  Mild emphysema  Cardiomegaly  Enlarged PA diameter  Pulmonary function testing:  Performed 12/22/2020  FEV1/FVC ratio 82 %   FEV1 63 % predicted  FVC 58 % predicted  TLC 58 % predicted  RV 35 % predicted  DLCO corrected for hemoglobin 53 % predicted  Moderately severe restrictive airflow limitation with decreased vital capacity  Decreased lung volumes indicate moderately severe restriction  Mildly impaired diffusion capacity  Restrictive flow volume loop with abnormal inspiratory limb  6 minutes walk 01/26/2021  Resting SpO2 95% with heart rate 95 beats per minute  Patient ambulated for a total 6 minutes on room air without SpO2 dropping below 94% with max heart rate of 103 2 2 beats per minute  Total distance 240 m

## 2021-01-26 NOTE — PROGRESS NOTES
Assessment/Plan:    Problem List Items Addressed This Visit        Other    Dyspnea on exertion      Other Visit Diagnoses     Restrictive lung disease    -  Primary    Encounter for screening mammogram for malignant neoplasm of breast        Relevant Orders    Mammo screening bilateral w 3d & cad           Diagnoses and all orders for this visit:    Restrictive lung disease    Dyspnea on exertion    Encounter for screening mammogram for malignant neoplasm of breast  -     Mammo screening bilateral w 3d & cad; Future        I called pulmonology office and they will see her for an appointment today in [place] office at 2:00 PM        Subjective:      Patient ID: Tammie Ma is a 44 y o  female  [place] is here today in follow up  She had PFTs done end of 2020 which showed moderate to severe restrictive lung disease  Pulmonology ordered CT chest for further evaluation end of December, but test was never done, [place] states she was not aware of CT being ordered, no one ever called her  She is currently not scheduled to see pulmonology until March  She is SOB/CADET, not better or worse than when I saw her for this previously  She is frustrated as it is taking so long to see pulmonology  The following portions of the patient's history were reviewed and updated as appropriate:   She has a past medical history of Anxiety, Back pain, and Depression  ,  does not have any pertinent problems on file  ,   has a past surgical history that includes Cholecystectomy; Lumbar disc surgery; pr repair incisional hernia,reducible (N/A, 3/16/2018); Back surgery (2006); Back surgery; and pr shldr arthroscop,surg,w/rotat cuff repr (Left, 11/18/2020)  ,  She was adopted  Family history is unknown by patient  ,   reports that she quit smoking about 3 months ago  Her smoking use included cigarettes  She smoked 0 50 packs per day for 0 00 years   She has never used smokeless tobacco  She reports that she does not drink alcohol or use drugs ,  has No Known Allergies     Current Outpatient Medications   Medication Sig Dispense Refill    sertraline (ZOLOFT) 50 mg tablet Take 1 5 tablets (75 mg total) by mouth daily 45 tablet 2    albuterol (Ventolin HFA) 90 mcg/act inhaler Inhale 2 puffs every 6 (six) hours as needed for wheezing (Patient not taking: Reported on 1/26/2021) 18 g 0     No current facility-administered medications for this visit  Review of Systems   Constitutional: Negative for activity change, appetite change, chills, diaphoresis, fatigue, fever and unexpected weight change  HENT: Negative for congestion, ear pain, postnasal drip, rhinorrhea, sinus pressure, sinus pain, sneezing, sore throat, tinnitus and voice change  Eyes: Negative for pain, redness and visual disturbance  Respiratory: Positive for cough, chest tightness and shortness of breath  Negative for wheezing  Cardiovascular: Negative for chest pain, palpitations and leg swelling  Gastrointestinal: Negative for abdominal pain, blood in stool, constipation, diarrhea, nausea and vomiting  Genitourinary: Negative for difficulty urinating, dysuria, frequency, hematuria and urgency  Musculoskeletal: Negative for arthralgias, back pain, gait problem, joint swelling, myalgias, neck pain and neck stiffness  Skin: Negative for color change, pallor, rash and wound  Neurological: Negative for dizziness, tremors, weakness, light-headedness and headaches  Psychiatric/Behavioral: Negative for dysphoric mood, self-injury, sleep disturbance and suicidal ideas  The patient is not nervous/anxious  Objective:  Vitals:    01/26/21 0858   BP: 146/100   BP Location: Left arm   Patient Position: Sitting   Cuff Size: Large   Temp: (!) 95 6 °F (35 3 °C)   TempSrc: Tympanic   SpO2: 98%   Weight: 113 kg (250 lb)   Height: 5' 6" (1 676 m)     Body mass index is 40 35 kg/m²  Physical Exam  Vitals signs reviewed     Constitutional:       General: She is not in acute distress  Appearance: She is well-developed  She is not diaphoretic  HENT:      Head: Normocephalic and atraumatic  Right Ear: Hearing, tympanic membrane, ear canal and external ear normal       Left Ear: Hearing, tympanic membrane, ear canal and external ear normal       Mouth/Throat:      Pharynx: Uvula midline  No oropharyngeal exudate  Eyes:      General: No scleral icterus  Right eye: No discharge  Left eye: No discharge  Conjunctiva/sclera: Conjunctivae normal    Neck:      Musculoskeletal: Neck supple  Thyroid: No thyromegaly  Vascular: No carotid bruit  Cardiovascular:      Rate and Rhythm: Normal rate and regular rhythm  Heart sounds: Normal heart sounds  No murmur  Pulmonary:      Effort: Pulmonary effort is normal  No respiratory distress  Breath sounds: Decreased breath sounds present  No wheezing  Abdominal:      General: Bowel sounds are normal  There is no distension  Palpations: Abdomen is soft  There is no mass  Tenderness: There is no abdominal tenderness  There is no guarding or rebound  Musculoskeletal: Normal range of motion  General: No tenderness  Lymphadenopathy:      Cervical: No cervical adenopathy  Skin:     General: Skin is warm and dry  Findings: No erythema or rash  Neurological:      Mental Status: She is alert and oriented to person, place, and time  Psychiatric:         Behavior: Behavior normal          Thought Content:  Thought content normal          Judgment: Judgment normal

## 2021-01-26 NOTE — ASSESSMENT & PLAN NOTE
Smoking cessation encouraged  Smoking cessation discussed for 4 minutes today  While she does not have any evidence of chronic obstruction at this time did educate her that smoking complete other detrimental health issues  Can discuss further at next follow up

## 2021-01-26 NOTE — ASSESSMENT & PLAN NOTE
Reviewed results of pulmonary function test with patient and patient's partner today in the office  They are consistent with moderately severe restriction with impaired diffusion capacity  Despite diffusion impairment 6 minutes walk performed in the office did not indicate that she requires supplemental oxygen  Will check a high-resolution CT to rule out interstitial lung disease such as RB-ILD given smoking history and ground-glass upper lobe predominant infiltrates  Ordered a sniff test as well to rule out hemidiaphragm paralysis in the setting of recent shoulder surgery   She is also obese and her weight can certainly be contributing but will have to rule out other causes first

## 2021-01-26 NOTE — PATIENT INSTRUCTIONS
You have restrictive lung disease on your PFTs  We need to figure out why  I would like to repeat CT chest with high resolution to rule out interstitial lung disease, check a sniff study to rule out diaphragmatic paralysis, check chest x-ray to rule out any acute abnormalities at this time right now, and check echocardiogram given low diffusion capacity and increased pulmonary artery diameter on last CT

## 2021-03-04 ENCOUNTER — HOSPITAL ENCOUNTER (OUTPATIENT)
Dept: NON INVASIVE DIAGNOSTICS | Facility: HOSPITAL | Age: 51
Discharge: HOME | End: 2021-03-04
Payer: COMMERCIAL

## 2021-03-04 ENCOUNTER — HOSPITAL ENCOUNTER (OUTPATIENT)
Dept: CT IMAGING | Facility: HOSPITAL | Age: 51
Discharge: HOME | End: 2021-03-04
Payer: COMMERCIAL

## 2021-03-04 DIAGNOSIS — R06.00 DYSPNEA ON EXERTION: ICD-10-CM

## 2021-03-04 DIAGNOSIS — J98.4 RESTRICTIVE LUNG DISEASE: ICD-10-CM

## 2021-03-04 PROCEDURE — 93306 TTE W/DOPPLER COMPLETE: CPT | Performed by: INTERNAL MEDICINE

## 2021-03-04 PROCEDURE — 93306 TTE W/DOPPLER COMPLETE: CPT

## 2021-03-04 PROCEDURE — 71250 CT THORAX DX C-: CPT

## 2021-03-04 PROCEDURE — G1004 CDSM NDSC: HCPCS

## 2021-03-05 ENCOUNTER — OFFICE VISIT (OUTPATIENT)
Dept: FAMILY MEDICINE CLINIC | Facility: CLINIC | Age: 51
End: 2021-03-05
Payer: COMMERCIAL

## 2021-03-05 VITALS
HEIGHT: 66 IN | OXYGEN SATURATION: 93 % | BODY MASS INDEX: 40.98 KG/M2 | SYSTOLIC BLOOD PRESSURE: 138 MMHG | TEMPERATURE: 94.6 F | DIASTOLIC BLOOD PRESSURE: 92 MMHG | HEART RATE: 104 BPM | WEIGHT: 255 LBS

## 2021-03-05 DIAGNOSIS — Z13.31 DEPRESSION SCREENING NEGATIVE: ICD-10-CM

## 2021-03-05 DIAGNOSIS — B02.9 HERPES ZOSTER WITHOUT COMPLICATION: Primary | ICD-10-CM

## 2021-03-05 PROBLEM — F17.210 CIGARETTE NICOTINE DEPENDENCE WITHOUT COMPLICATION: Status: RESOLVED | Noted: 2021-01-26 | Resolved: 2021-03-05

## 2021-03-05 PROCEDURE — 99214 OFFICE O/P EST MOD 30 MIN: CPT | Performed by: PHYSICIAN ASSISTANT

## 2021-03-05 PROCEDURE — 3725F SCREEN DEPRESSION PERFORMED: CPT | Performed by: PHYSICIAN ASSISTANT

## 2021-03-05 RX ORDER — ACYCLOVIR 800 MG/1
800 TABLET ORAL
Qty: 50 TABLET | Refills: 0 | Status: SHIPPED | OUTPATIENT
Start: 2021-03-05 | End: 2021-12-30

## 2021-03-05 RX ORDER — PREDNISONE 10 MG/1
TABLET ORAL
Qty: 20 TABLET | Refills: 0 | Status: SHIPPED | OUTPATIENT
Start: 2021-03-05 | End: 2021-12-07

## 2021-03-05 RX ORDER — TRAMADOL HYDROCHLORIDE 50 MG/1
50 TABLET ORAL 2 TIMES DAILY PRN
Qty: 20 TABLET | Refills: 0 | Status: SHIPPED | OUTPATIENT
Start: 2021-03-05 | End: 2021-12-07

## 2021-03-05 NOTE — PROGRESS NOTES
Assessment/Plan:    Problem List Items Addressed This Visit     None      Visit Diagnoses     Herpes zoster without complication    -  Primary    Relevant Medications    predniSONE 10 mg tablet    acyclovir (ZOVIRAX) 800 mg tablet    traMADol (ULTRAM) 50 mg tablet    Depression screening negative               Diagnoses and all orders for this visit:    Herpes zoster without complication  -     predniSONE 10 mg tablet; Days 1 and 2 take 4 tablets daily, days 3 and 4 take 3 tablets daily, days 5 and 6 and 2 tablets daily, days 7 and 8 take 1 tablet daily  -     acyclovir (ZOVIRAX) 800 mg tablet; Take 1 tablet (800 mg total) by mouth 5 (five) times a day for 10 days  -     traMADol (ULTRAM) 50 mg tablet; Take 1 tablet (50 mg total) by mouth 2 (two) times a day as needed for moderate pain or severe pain for up to 20 doses    Depression screening negative              Subjective:      Patient ID: Jeffrey Magaña is a 44 y o  female  Ludmila Jump is here today with a very painful, itchy rash along her R shoulder, R upper back, and down R arm  This started 2 days ago  Rash is blistered  Pt declining screening for cervical, breast, colorectal CA  The following portions of the patient's history were reviewed and updated as appropriate:   She has a past medical history of Anxiety, Back pain, and Depression  ,  does not have any pertinent problems on file  ,   has a past surgical history that includes Cholecystectomy; Lumbar disc surgery; pr repair incisional hernia,reducible (N/A, 3/16/2018); Back surgery (2006); Back surgery; and pr shldr arthroscop,surg,w/rotat cuff repr (Left, 11/18/2020)  ,  She was adopted  Family history is unknown by patient  ,   reports that she quit smoking about 4 months ago  Her smoking use included cigarettes  She smoked 0 50 packs per day for 0 00 years  She has never used smokeless tobacco  She reports that she does not drink alcohol or use drugs  ,  has No Known Allergies     Current Outpatient Medications   Medication Sig Dispense Refill    sertraline (ZOLOFT) 50 mg tablet Take 1 5 tablets (75 mg total) by mouth daily 45 tablet 2    acyclovir (ZOVIRAX) 800 mg tablet Take 1 tablet (800 mg total) by mouth 5 (five) times a day for 10 days 50 tablet 0    albuterol (Ventolin HFA) 90 mcg/act inhaler Inhale 2 puffs every 6 (six) hours as needed for wheezing (Patient not taking: Reported on 1/26/2021) 18 g 0    predniSONE 10 mg tablet Days 1 and 2 take 4 tablets daily, days 3 and 4 take 3 tablets daily, days 5 and 6 and 2 tablets daily, days 7 and 8 take 1 tablet daily  20 tablet 0    traMADol (ULTRAM) 50 mg tablet Take 1 tablet (50 mg total) by mouth 2 (two) times a day as needed for moderate pain or severe pain for up to 20 doses 20 tablet 0     No current facility-administered medications for this visit  Review of Systems   Constitutional: Negative for activity change, appetite change, chills, diaphoresis, fatigue, fever and unexpected weight change  HENT: Negative for congestion, ear pain, postnasal drip, rhinorrhea, sinus pressure, sinus pain, sneezing, sore throat, tinnitus and voice change  Eyes: Negative for pain, redness and visual disturbance  Respiratory: Negative for cough, chest tightness, shortness of breath and wheezing  Cardiovascular: Negative for chest pain, palpitations and leg swelling  Gastrointestinal: Negative for abdominal pain, blood in stool, constipation, diarrhea, nausea and vomiting  Genitourinary: Negative for difficulty urinating, dysuria, frequency, hematuria and urgency  Musculoskeletal: Negative for arthralgias, back pain, gait problem, joint swelling, myalgias, neck pain and neck stiffness  Skin: Positive for rash  Negative for color change, pallor and wound  Neurological: Negative for dizziness, tremors, weakness, light-headedness and headaches  Psychiatric/Behavioral: Positive for sleep disturbance   Negative for dysphoric mood, self-injury and suicidal ideas  The patient is nervous/anxious  Objective:  Vitals:    03/05/21 0912 03/05/21 0932   BP: (!) 168/102 138/92   Pulse: 104    Temp: (!) 94 6 °F (34 8 °C)    SpO2: 93%    Weight: 116 kg (255 lb)    Height: 5' 6" (1 676 m)      Body mass index is 41 16 kg/m²  Physical Exam  Vitals signs reviewed  Constitutional:       General: She is not in acute distress  Appearance: She is well-developed  She is not diaphoretic  HENT:      Head: Normocephalic and atraumatic  Right Ear: Hearing, tympanic membrane, ear canal and external ear normal       Left Ear: Hearing, tympanic membrane, ear canal and external ear normal       Mouth/Throat:      Pharynx: Uvula midline  No oropharyngeal exudate  Eyes:      General: No scleral icterus  Right eye: No discharge  Left eye: No discharge  Conjunctiva/sclera: Conjunctivae normal    Neck:      Musculoskeletal: Neck supple  Thyroid: No thyromegaly  Vascular: No carotid bruit  Cardiovascular:      Rate and Rhythm: Normal rate and regular rhythm  Heart sounds: Normal heart sounds  No murmur  Pulmonary:      Effort: Pulmonary effort is normal  No respiratory distress  Breath sounds: Normal breath sounds  No wheezing  Abdominal:      General: Bowel sounds are normal  There is no distension  Palpations: Abdomen is soft  There is no mass  Tenderness: There is no abdominal tenderness  There is no guarding or rebound  Musculoskeletal: Normal range of motion  General: No tenderness  Lymphadenopathy:      Cervical: No cervical adenopathy  Skin:     General: Skin is warm and dry  Findings: Rash (painful, itchy, vesicular rash R upper back, shoulder, axilla, arm) present  No erythema  Neurological:      Mental Status: She is alert and oriented to person, place, and time  Psychiatric:         Behavior: Behavior normal          Thought Content:  Thought content normal  Judgment: Judgment normal        Controlled Substance Review    [place] PDMP or [place]  reviewed: No red flags were identified; safe to proceed with prescription  [place]     PDMP Review       Value Time User    PDMP Reviewed  Yes 3/5/2021  9:40 AM Jules Avitia PA-C        PHQ-9 Depression Screening    PHQ-9:   Frequency of the following problems over the past two weeks:      Little interest or pleasure in doing things: 0 - not at all  Feeling down, depressed, or hopeless: 0 - not at all  PHQ-2 Score: 0

## 2021-03-08 DIAGNOSIS — F41.1 GENERALIZED ANXIETY DISORDER: ICD-10-CM

## 2021-03-09 ENCOUNTER — HOSPITAL ENCOUNTER (OUTPATIENT)
Dept: RADIOLOGY | Facility: HOSPITAL | Age: 51
Discharge: HOME | End: 2021-03-09
Payer: COMMERCIAL

## 2021-03-09 ENCOUNTER — TELEPHONE (OUTPATIENT)
Dept: PULMONOLOGY | Facility: CLINIC | Age: 51
End: 2021-03-09

## 2021-03-09 DIAGNOSIS — J98.4 RESTRICTIVE LUNG DISEASE: ICD-10-CM

## 2021-03-09 DIAGNOSIS — R06.00 DYSPNEA ON EXERTION: ICD-10-CM

## 2021-03-09 PROCEDURE — 76000 FLUOROSCOPY <1 HR PHYS/QHP: CPT

## 2021-03-10 ENCOUNTER — TELEPHONE (OUTPATIENT)
Dept: OTHER | Facility: HOSPITAL | Age: 51
End: 2021-03-10

## 2021-03-11 ENCOUNTER — IMMUNIZATIONS (OUTPATIENT)
Dept: FAMILY MEDICINE CLINIC | Facility: HOSPITAL | Age: 51
End: 2021-03-11

## 2021-03-11 DIAGNOSIS — Z23 ENCOUNTER FOR IMMUNIZATION: Primary | ICD-10-CM

## 2021-03-11 PROCEDURE — 91301 SARS-COV-2 / COVID-19 MRNA VACCINE (MODERNA) 100 MCG: CPT

## 2021-03-11 PROCEDURE — 0011A SARS-COV-2 / COVID-19 MRNA VACCINE (MODERNA) 100 MCG: CPT

## 2021-03-22 ENCOUNTER — OFFICE VISIT (OUTPATIENT)
Dept: PULMONOLOGY | Facility: CLINIC | Age: 51
End: 2021-03-22
Payer: COMMERCIAL

## 2021-03-22 VITALS
DIASTOLIC BLOOD PRESSURE: 87 MMHG | HEART RATE: 90 BPM | WEIGHT: 256.4 LBS | HEIGHT: 66 IN | BODY MASS INDEX: 41.21 KG/M2 | RESPIRATION RATE: 18 BRPM | TEMPERATURE: 97.2 F | OXYGEN SATURATION: 94 % | SYSTOLIC BLOOD PRESSURE: 152 MMHG

## 2021-03-22 DIAGNOSIS — R07.89 OTHER CHEST PAIN: ICD-10-CM

## 2021-03-22 DIAGNOSIS — R06.00 DYSPNEA: Primary | ICD-10-CM

## 2021-03-22 PROCEDURE — 3008F BODY MASS INDEX DOCD: CPT | Performed by: INTERNAL MEDICINE

## 2021-03-22 PROCEDURE — 1036F TOBACCO NON-USER: CPT | Performed by: INTERNAL MEDICINE

## 2021-03-22 PROCEDURE — 99215 OFFICE O/P EST HI 40 MIN: CPT | Performed by: INTERNAL MEDICINE

## 2021-03-22 NOTE — ASSESSMENT & PLAN NOTE
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 secondary to body habitus  High-resolution CT scan without evidence of I LD  Sniff study without evidence of diaphragmatic dysfunction

## 2021-03-22 NOTE — ASSESSMENT & PLAN NOTE
Patient has chronic exertional dyspnea that occurred after she had rotator cuff surgery  Unclear etiology  However the chest pressure that she has been experiencing is concerning feature  For further evaluation have ordered nuclear medicine stress test   If that study is unremarkable then could consider CPET though unclear if that would add any more benefit to workup  I also recommended weight loss    After cardiology evaluation may benefit from formal rehab program

## 2021-03-22 NOTE — PROGRESS NOTES
Pulmonary Follow Up Note   Pat Douglass 44 y o  female MRN: [id number]  3/22/2021      Assessment:    Dyspnea on exertion    Patient has chronic exertional dyspnea that occurred after she had rotator cuff surgery  Unclear etiology  However the chest pressure that she has been experiencing is concerning feature  For further evaluation have ordered nuclear medicine stress test   If that study is unremarkable then could consider CPET though unclear if that would add any more benefit to workup  I also recommended weight loss  After cardiology evaluation may benefit from formal rehab program     Obesity (BMI 30-39  9)    BMI noted  Patient has obesity  Lifestyle modification recommended  Restrictive lung disease    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 secondary to body habitus  High-resolution CT scan without evidence of I LD  Sniff study without evidence of diaphragmatic dysfunction  Plan:    Diagnoses and all orders for this visit:    Dyspnea  -     Pulmonary exercise stress test (Cardiopulmonary exercise stress test); Future  -     NM myocardial perfusion spect (rx stress and/or rest); Future    Other chest pain  -     NM myocardial perfusion spect (rx stress and/or rest); Future        No follow-ups on file  History of Present Illness   HPI:  Pat Douglass is a 44 y o  female has been evaluated in the pulmonary office for chronic sob  Has had extensive workup   High-resolution CT of the chest showed no evidence of interstitial lung disease, sniff study without evidence of diaphragmatic dysfunction, echocardiogram that was essentially normal (grade 1 diastolic dysfunction)  States that she previously was very active and did not have any sob  States symptoms started after rotator cuff surgery  Patient is very frustrated by her current condition and states that she was much more active previously  She thinks she has gained approximately 10 lb during the winter months    She has also been experiencing central anterior chest pain that she describes as a pressure  It does not radiate  The pain occurs both at rest and with exertion  States she quit smoking months agao  Review of Systems   Constitutional: Negative for chills, fatigue and fever  HENT: Negative for congestion, nosebleeds, postnasal drip, rhinorrhea, sinus pressure and sore throat  Eyes: Negative for discharge, redness and itching  Respiratory: Positive for shortness of breath and wheezing  Negative for cough, choking, chest tightness and stridor  Cardiovascular: Positive for chest pain  Negative for palpitations and leg swelling  Gastrointestinal: Negative for blood in stool  Genitourinary: Negative for difficulty urinating and dysuria  Musculoskeletal: Negative for arthralgias, joint swelling and myalgias  Skin: Negative for color change and rash  Neurological: Negative for light-headedness and headaches  Hematological: Negative for adenopathy         Historical Information   Past Medical History:   Diagnosis Date    Anxiety     Back pain     Depression      Past Surgical History:   Procedure Laterality Date    BACK SURGERY  2006    cage in lumbar L5 area    BACK SURGERY      CHOLECYSTECTOMY      [address]UMBAR [address]      MO REPAIR INCISIONAL HERNIA,REDUCIBLE N/A 3/16/2018    Procedure: REPAIR HERNIA VENTRAL with mesh;  Surgeon: Sera Ponce DO;  Location: [facility];  Service: General    MO SHLDR ARTHROSCOP,SURG,W/ROTAT CUFF REPR Left 11/18/2020    Procedure: SHOULDER ARTHROSCOPY, ROTATOR CUFF REPAIR, SYNEVECTOMY, ACROMIOPLASTY DEBRIDEMENT, INJECTION;  Surgeon: Tenisha Tovar DO;  Location: [address] [place] [address] MAIN OR;  Service: Orthopedics     Family History   Adopted: Yes   Family history unknown: Yes         Meds/Allergies     Current Outpatient Medications:     albuterol (Ventolin HFA) 90 mcg/act inhaler, Inhale 2 puffs every 6 (six) hours as needed for wheezing, Disp: 18 g, Rfl: 0    acyclovir (ZOVIRAX) 800 mg tablet, Take 1 tablet (800 mg total) by mouth 5 (five) times a day for 10 days, Disp: 50 tablet, Rfl: 0    predniSONE 10 mg tablet, Days 1 and 2 take 4 tablets daily, days 3 and 4 take 3 tablets daily, days 5 and 6 and 2 tablets daily, days 7 and 8 take 1 tablet daily  (Patient not taking: Reported on 3/22/2021), Disp: 20 tablet, Rfl: 0    sertraline (ZOLOFT) 50 mg tablet, TAKE ONE TABLET BY MOUTH ONCE DAILY (Patient not taking: Reported on 3/22/2021), Disp: 30 tablet, Rfl: 0    traMADol (ULTRAM) 50 mg tablet, Take 1 tablet (50 mg total) by mouth 2 (two) times a day as needed for moderate pain or severe pain for up to 20 doses (Patient not taking: Reported on 3/22/2021), Disp: 20 tablet, Rfl: 0  No Known Allergies    Vitals: Blood pressure 152/87, pulse 90, temperature (!) 97 2 °F (36 2 °C), temperature source Tympanic, resp  rate 18, height 5' 6" (1 676 m), weight 116 kg (256 lb 6 4 oz), last menstrual period 10/10/2016, SpO2 94 %  Body mass index is 41 38 kg/m²  Oxygen Therapy  SpO2: 94 %      Physical Exam  Physical Exam  Vitals signs reviewed  Constitutional:       General: She is not in acute distress  Appearance: Normal appearance  She is well-developed and normal weight  She is not ill-appearing or toxic-appearing  HENT:      Head: Normocephalic and atraumatic  Right Ear: External ear normal       Left Ear: External ear normal       Nose: Nose normal       Mouth/Throat:      Pharynx: No oropharyngeal exudate or posterior oropharyngeal erythema  Eyes:      General: No scleral icterus  Right eye: No discharge  Left eye: No discharge  Conjunctiva/sclera: Conjunctivae normal       Pupils: Pupils are equal, round, and reactive to light  Neck:      Musculoskeletal: Normal range of motion  Trachea: No tracheal deviation  Cardiovascular:      Rate and Rhythm: Normal rate and regular rhythm  Heart sounds: Normal heart sounds  No murmur  No friction rub  No gallop  Pulmonary:      Effort: Pulmonary effort is normal       Breath sounds: Normal breath sounds  No stridor  No wheezing or rales  Musculoskeletal:      Right lower leg: No edema  Left lower leg: No edema  Lymphadenopathy:      Head:      Right side of head: No submental, submandibular, preauricular or posterior auricular adenopathy  Left side of head: No submental, submandibular, preauricular or posterior auricular adenopathy  Cervical: No cervical adenopathy  Skin:     General: Skin is warm and dry  Neurological:      Mental Status: She is alert and oriented to person, place, and time  Labs: I have personally reviewed pertinent lab results  Lab Results   Component Value Date    WBC 17 61 (H) 11/21/2020    HGB 12 1 11/21/2020    HCT 39 0 11/21/2020    MCV 97 11/21/2020     11/21/2020     Lab Results   Component Value Date    GLUCOSE 90 04/10/2014    CALCIUM 9 1 11/21/2020     04/10/2014    K 4 4 11/21/2020    CO2 26 11/21/2020     11/21/2020    BUN 18 11/21/2020    CREATININE 0 60 11/21/2020     No results found for: IGE  Lab Results   Component Value Date    ALT 23 11/21/2020    AST 13 11/21/2020    ALKPHOS 78 11/21/2020    BILITOT 0 3 04/09/2014       Imaging and other studies: I have personally reviewed pertinent reports  and I have personally reviewed pertinent films in PACS     sniff study March 9, 2021  IMPRESSION:     No evidence of diaphragmatic paralysis or elevation          CT chest  March 4, 2021    IMPRESSION:     No CT evidence of interstitial lung disease      The previously seen upper lobe predominant groundglass opacities have resolved      Mild cardiomegaly        CT angiogram November 2020    IMPRESSION:     Patchy upper lobe predominant groundglass opacities favored to be infectious or inflammatory      No pulmonary embolism        Pulmonary function testing:   Spirometry:  FEV1/FVC Ratio is 82 %  FEV1 is 1 65 L/63 % of predicted   FVC is 2 02 L/58 % of predicted      Lung volumes:  RV 1 10 L/57 % of predicted, TLC 3 12 L/58 % of predicted, RV/TLC ratio 35 %     Diffusing capacity:  53 % of predicted     IMPRESSION:  1  Moderately severe restrictive airflow limitation on spirometry with decreased vital capacity, no obstruction  2  Decreased lung volumes indicating moderately severe restriction  3  Moderately impaired diffusion capacity  4  Restrictive flow volume loop but the inspiratory limb is abnormal with hesitency or interrupted inspiration       EKG, Pathology, and Other Studies: I have personally reviewed pertinent reports        echocardiogram March 4, 5144  Grade 1 diastolic dysfunction, no valvular stenosis, no indirect evidence of  Moderate or severe pulmonary hypertension  CHADWICK Espino    [facility]  Answers for HPI/ROS submitted by the patient on 3/22/2021   Primary symptoms  Do you have difficulty breathing?: Yes  Chronicity: new  When did you first notice your symptoms?: more than 1 month ago  How often do your symptoms occur?: constantly  Since you first noticed this problem, how has it changed?: unchanged  Do you have shortness of breath that occurs with effort or exertion?: Yes  Do you have fatigue?: Yes  Do you have shortness of breath when lying flat?: Yes  Do you have shortness of breath when you wake up?: Yes  Which of the following makes your symptoms worse?: any activity  Which of the following makes your symptoms better?: rest

## 2021-04-02 ENCOUNTER — HOSPITAL ENCOUNTER (OUTPATIENT)
Dept: NUCLEAR MEDICINE | Facility: HOSPITAL | Age: 51
Discharge: HOME | End: 2021-04-02
Attending: INTERNAL MEDICINE
Payer: COMMERCIAL

## 2021-04-02 DIAGNOSIS — R07.89 OTHER CHEST PAIN: ICD-10-CM

## 2021-04-02 DIAGNOSIS — R06.00 DYSPNEA: ICD-10-CM

## 2021-04-02 PROCEDURE — 78452 HT MUSCLE IMAGE SPECT MULT: CPT | Performed by: INTERNAL MEDICINE

## 2021-04-02 PROCEDURE — A9502 TC99M TETROFOSMIN: HCPCS

## 2021-04-02 PROCEDURE — 93017 CV STRESS TEST TRACING ONLY: CPT

## 2021-04-02 PROCEDURE — 78452 HT MUSCLE IMAGE SPECT MULT: CPT

## 2021-04-02 PROCEDURE — 93018 CV STRESS TEST I&R ONLY: CPT | Performed by: INTERNAL MEDICINE

## 2021-04-02 PROCEDURE — 93016 CV STRESS TEST SUPVJ ONLY: CPT | Performed by: INTERNAL MEDICINE

## 2021-04-02 PROCEDURE — G1004 CDSM NDSC: HCPCS

## 2021-04-02 RX ADMIN — REGADENOSON 0.4 MG: 0.08 INJECTION, SOLUTION INTRAVENOUS at 09:35

## 2021-04-06 LAB
CHEST PAIN STATEMENT: NORMAL
MAX DIASTOLIC BP: 70 MMHG
MAX HEART RATE: 123 BPM
MAX PREDICTED HEART RATE: 168 BPM
MAX. SYSTOLIC BP: 120 MMHG
PROTOCOL NAME: NORMAL
REASON FOR TERMINATION: NORMAL
TARGET HR FORMULA: NORMAL
TEST INDICATION: NORMAL
TIME IN EXERCISE PHASE: NORMAL

## 2021-04-08 ENCOUNTER — IMMUNIZATIONS (OUTPATIENT)
Dept: FAMILY MEDICINE CLINIC | Facility: HOSPITAL | Age: 51
End: 2021-04-08

## 2021-04-08 DIAGNOSIS — Z23 ENCOUNTER FOR IMMUNIZATION: Primary | ICD-10-CM

## 2021-04-08 PROCEDURE — 91301 SARS-COV-2 / COVID-19 MRNA VACCINE (MODERNA) 100 MCG: CPT

## 2021-04-08 PROCEDURE — 0012A SARS-COV-2 / COVID-19 MRNA VACCINE (MODERNA) 100 MCG: CPT

## 2021-04-13 ENCOUNTER — APPOINTMENT (OUTPATIENT)
Dept: LAB | Facility: MEDICAL CENTER | Age: 51
End: 2021-04-13
Payer: COMMERCIAL

## 2021-04-13 ENCOUNTER — OFFICE VISIT (OUTPATIENT)
Dept: FAMILY MEDICINE CLINIC | Facility: CLINIC | Age: 51
End: 2021-04-13
Payer: COMMERCIAL

## 2021-04-13 VITALS
TEMPERATURE: 97.8 F | BODY MASS INDEX: 40.88 KG/M2 | DIASTOLIC BLOOD PRESSURE: 98 MMHG | OXYGEN SATURATION: 95 % | WEIGHT: 254.4 LBS | HEIGHT: 66 IN | HEART RATE: 105 BPM | SYSTOLIC BLOOD PRESSURE: 142 MMHG

## 2021-04-13 DIAGNOSIS — E66.01 MORBID OBESITY WITH BMI OF 40.0-44.9, ADULT (HCC): ICD-10-CM

## 2021-04-13 DIAGNOSIS — R53.83 FATIGUE, UNSPECIFIED TYPE: ICD-10-CM

## 2021-04-13 DIAGNOSIS — R53.83 FATIGUE, UNSPECIFIED TYPE: Primary | ICD-10-CM

## 2021-04-13 DIAGNOSIS — Z53.20 SCREENING MAMMOGRAPHY DECLINED: ICD-10-CM

## 2021-04-13 DIAGNOSIS — R06.00 DYSPNEA ON EXERTION: ICD-10-CM

## 2021-04-13 PROBLEM — E66.9 OBESITY (BMI 30-39.9): Status: RESOLVED | Noted: 2021-01-26 | Resolved: 2021-04-13

## 2021-04-13 PROBLEM — J18.9 RIGHT UPPER LOBE PNEUMONIA: Status: RESOLVED | Noted: 2020-11-20 | Resolved: 2021-04-13

## 2021-04-13 PROCEDURE — 1036F TOBACCO NON-USER: CPT | Performed by: PHYSICIAN ASSISTANT

## 2021-04-13 PROCEDURE — 99213 OFFICE O/P EST LOW 20 MIN: CPT | Performed by: PHYSICIAN ASSISTANT

## 2021-04-13 PROCEDURE — 3725F SCREEN DEPRESSION PERFORMED: CPT | Performed by: PHYSICIAN ASSISTANT

## 2021-04-13 PROCEDURE — 36415 COLL VENOUS BLD VENIPUNCTURE: CPT

## 2021-04-13 PROCEDURE — 86618 LYME DISEASE ANTIBODY: CPT

## 2021-04-13 PROCEDURE — 3008F BODY MASS INDEX DOCD: CPT | Performed by: PHYSICIAN ASSISTANT

## 2021-04-13 NOTE — PROGRESS NOTES
Assessment/Plan:    Problem List Items Addressed This Visit        Other    Dyspnea on exertion      Other Visit Diagnoses     Fatigue, unspecified type    -  Primary    Relevant Orders    Lyme Total Antibody Profile with reflex to WB    Screening mammography declined        Morbid obesity with BMI of 40 0-44 9, adult ([facility] [address] )               Diagnoses and all orders for this visit:    Fatigue, unspecified type  -     Lyme Total Antibody Profile with reflex to WB; Future    Dyspnea on exertion    Screening mammography declined    Morbid obesity with BMI of 40 0-44 9, adult ([facility] [address] )        No problem-specific Assessment & Plan notes found for this encounter  Subjective:      Patient ID: Payal Craig is a 44 y o  female  Jairo Garza is here today for follow up  She had a nuclear stress test completed which did not show ischemia  She continues to follow with pulmonology, Dr Willem Mueller mentioned possibly CPET as well as formal pulm rehab  Jairo Garza sees him again in 1 month  She remains frustrated, some days her SOB is worse than others  She cut out all soda and has lost 2 lbs, this also is frustrating to her but she continues to try and eat a low calorie diet ([employer] meals)  The following portions of the patient's history were reviewed and updated as appropriate:   She has a past medical history of Anxiety, Back pain, and Depression  ,  does not have any pertinent problems on file  ,   has a past surgical history that includes Cholecystectomy; Lumbar disc surgery; pr repair incisional hernia,reducible (N/A, 3/16/2018); Back surgery (2006); Back surgery; and pr shldr arthroscop,surg,w/rotat cuff repr (Left, 11/18/2020)  ,  She was adopted  Family history is unknown by patient  ,   reports that she quit smoking about 5 months ago  Her smoking use included cigarettes  She smoked 0 50 packs per day for 0 00 years  She has never used smokeless tobacco  She reports that she does not drink alcohol or use drugs  ,  has No Known Allergies     Current Outpatient Medications   Medication Sig Dispense Refill    albuterol (Ventolin HFA) 90 mcg/act inhaler Inhale 2 puffs every 6 (six) hours as needed for wheezing 18 g 0    acyclovir (ZOVIRAX) 800 mg tablet Take 1 tablet (800 mg total) by mouth 5 (five) times a day for 10 days 50 tablet 0    predniSONE 10 mg tablet Days 1 and 2 take 4 tablets daily, days 3 and 4 take 3 tablets daily, days 5 and 6 and 2 tablets daily, days 7 and 8 take 1 tablet daily  (Patient not taking: Reported on 3/22/2021) 20 tablet 0    sertraline (ZOLOFT) 50 mg tablet TAKE ONE TABLET BY MOUTH ONCE DAILY (Patient not taking: Reported on 3/22/2021) 30 tablet 0    traMADol (ULTRAM) 50 mg tablet Take 1 tablet (50 mg total) by mouth 2 (two) times a day as needed for moderate pain or severe pain for up to 20 doses (Patient not taking: Reported on 3/22/2021) 20 tablet 0     No current facility-administered medications for this visit  Review of Systems   Constitutional: Negative for activity change, appetite change, chills, diaphoresis, fatigue, fever and unexpected weight change  HENT: Negative for congestion, ear pain, postnasal drip, rhinorrhea, sinus pressure, sinus pain, sneezing, sore throat, tinnitus and voice change  Eyes: Negative for pain, redness and visual disturbance  Respiratory: Positive for shortness of breath  Negative for cough, chest tightness and wheezing  Cardiovascular: Negative for chest pain, palpitations and leg swelling  Gastrointestinal: Negative for abdominal pain, blood in stool, constipation, diarrhea, nausea and vomiting  Genitourinary: Negative for difficulty urinating, dysuria, frequency, hematuria and urgency  Musculoskeletal: Negative for arthralgias, back pain, gait problem, joint swelling, myalgias, neck pain and neck stiffness  Skin: Negative for color change, pallor, rash and wound     Neurological: Negative for dizziness, tremors, weakness, light-headedness and headaches  Psychiatric/Behavioral: Negative for dysphoric mood, self-injury, sleep disturbance and suicidal ideas  The patient is not nervous/anxious  Objective:  Vitals:    04/13/21 0823   BP: 142/98   Pulse: 105   Temp: 97 8 °F (36 6 °C)   SpO2: 95%   Weight: 115 kg (254 lb 6 4 oz)   Height: 5' 6" (1 676 m)     Body mass index is 41 06 kg/m²  Physical Exam  Vitals signs reviewed  Constitutional:       General: She is not in acute distress  Appearance: She is well-developed  She is not diaphoretic  HENT:      Head: Normocephalic and atraumatic  Right Ear: Hearing, tympanic membrane, ear canal and external ear normal       Left Ear: Hearing, tympanic membrane, ear canal and external ear normal       Mouth/Throat:      Pharynx: Uvula midline  No oropharyngeal exudate  Eyes:      General: No scleral icterus  Right eye: No discharge  Left eye: No discharge  Conjunctiva/sclera: Conjunctivae normal    Neck:      Musculoskeletal: Neck supple  Thyroid: No thyromegaly  Vascular: No carotid bruit  Cardiovascular:      Rate and Rhythm: Normal rate and regular rhythm  Heart sounds: Normal heart sounds  No murmur  Pulmonary:      Effort: Pulmonary effort is normal  No respiratory distress  Breath sounds: Normal breath sounds  No wheezing  Abdominal:      General: Bowel sounds are normal  There is no distension  Palpations: Abdomen is soft  There is no mass  Tenderness: There is no abdominal tenderness  There is no guarding or rebound  Musculoskeletal: Normal range of motion  General: No tenderness  Lymphadenopathy:      Cervical: No cervical adenopathy  Skin:     General: Skin is warm and dry  Findings: No erythema or rash  Neurological:      Mental Status: She is alert and oriented to person, place, and time  Psychiatric:         Behavior: Behavior normal          Thought Content:  Thought content normal  Judgment: Judgment normal          BMI Counseling: Body mass index is 41 06 kg/m²  The BMI is above normal  Nutrition recommendations include reducing portion sizes, decreasing overall calorie intake and 3-5 servings of fruits/vegetables daily  Exercise recommendations include exercising 3-5 times per week

## 2021-04-14 LAB — B BURGDOR IGG+IGM SER-ACNC: -6

## 2021-06-03 ENCOUNTER — TELEPHONE (OUTPATIENT)
Dept: UROLOGY | Facility: AMBULATORY SURGERY CENTER | Age: 51
End: 2021-06-03

## 2021-06-03 ENCOUNTER — OFFICE VISIT (OUTPATIENT)
Dept: FAMILY MEDICINE CLINIC | Facility: CLINIC | Age: 51
End: 2021-06-03
Payer: COMMERCIAL

## 2021-06-03 ENCOUNTER — APPOINTMENT (OUTPATIENT)
Dept: LAB | Facility: MEDICAL CENTER | Age: 51
End: 2021-06-03
Payer: COMMERCIAL

## 2021-06-03 VITALS
SYSTOLIC BLOOD PRESSURE: 138 MMHG | DIASTOLIC BLOOD PRESSURE: 88 MMHG | OXYGEN SATURATION: 98 % | HEIGHT: 66 IN | TEMPERATURE: 96.5 F | HEART RATE: 88 BPM | WEIGHT: 244 LBS | BODY MASS INDEX: 39.21 KG/M2

## 2021-06-03 DIAGNOSIS — F41.1 GENERALIZED ANXIETY DISORDER: ICD-10-CM

## 2021-06-03 DIAGNOSIS — J98.4 RESTRICTIVE LUNG DISEASE: ICD-10-CM

## 2021-06-03 DIAGNOSIS — R31.0 GROSS HEMATURIA: Primary | ICD-10-CM

## 2021-06-03 DIAGNOSIS — R06.00 DYSPNEA ON EXERTION: ICD-10-CM

## 2021-06-03 DIAGNOSIS — R31.0 GROSS HEMATURIA: ICD-10-CM

## 2021-06-03 LAB
ALBUMIN SERPL BCP-MCNC: 3.8 G/DL (ref 3.5–5)
ALP SERPL-CCNC: 99 U/L (ref 46–116)
ALT SERPL W P-5'-P-CCNC: 22 U/L (ref 12–78)
ANION GAP SERPL CALCULATED.3IONS-SCNC: 5 MMOL/L (ref 4–13)
AST SERPL W P-5'-P-CCNC: 6 U/L (ref 5–45)
BILIRUB SERPL-MCNC: 0.28 MG/DL (ref 0.2–1)
BUN SERPL-MCNC: 16 MG/DL (ref 5–25)
CALCIUM SERPL-MCNC: 9.6 MG/DL (ref 8.3–10.1)
CHLORIDE SERPL-SCNC: 108 MMOL/L (ref 100–108)
CO2 SERPL-SCNC: 28 MMOL/L (ref 21–32)
CREAT SERPL-MCNC: 0.64 MG/DL (ref 0.6–1.3)
ERYTHROCYTE [DISTWIDTH] IN BLOOD BY AUTOMATED COUNT: 14.7 % (ref 11.6–15.1)
GFR SERPL CREATININE-BSD FRML MDRD: 103 ML/MIN/1.73SQ M
GLUCOSE P FAST SERPL-MCNC: 100 MG/DL (ref 65–99)
HCT VFR BLD AUTO: 52.7 % (ref 34.8–46.1)
HGB BLD-MCNC: 16.4 G/DL (ref 11.5–15.4)
MCH RBC QN AUTO: 29.5 PG (ref 26.8–34.3)
MCHC RBC AUTO-ENTMCNC: 31.1 G/DL (ref 31.4–37.4)
MCV RBC AUTO: 95 FL (ref 82–98)
PLATELET # BLD AUTO: 260 THOUSANDS/UL (ref 149–390)
PMV BLD AUTO: 11 FL (ref 8.9–12.7)
POTASSIUM SERPL-SCNC: 4 MMOL/L (ref 3.5–5.3)
PROT SERPL-MCNC: 7 G/DL (ref 6.4–8.2)
RBC # BLD AUTO: 5.56 MILLION/UL (ref 3.81–5.12)
SODIUM SERPL-SCNC: 141 MMOL/L (ref 136–145)
WBC # BLD AUTO: 8.24 THOUSAND/UL (ref 4.31–10.16)

## 2021-06-03 PROCEDURE — 3008F BODY MASS INDEX DOCD: CPT | Performed by: PHYSICIAN ASSISTANT

## 2021-06-03 PROCEDURE — 36415 COLL VENOUS BLD VENIPUNCTURE: CPT

## 2021-06-03 PROCEDURE — 80053 COMPREHEN METABOLIC PANEL: CPT

## 2021-06-03 PROCEDURE — 3725F SCREEN DEPRESSION PERFORMED: CPT | Performed by: PHYSICIAN ASSISTANT

## 2021-06-03 PROCEDURE — 85027 COMPLETE CBC AUTOMATED: CPT

## 2021-06-03 PROCEDURE — 1036F TOBACCO NON-USER: CPT | Performed by: PHYSICIAN ASSISTANT

## 2021-06-03 PROCEDURE — 99214 OFFICE O/P EST MOD 30 MIN: CPT | Performed by: PHYSICIAN ASSISTANT

## 2021-06-03 RX ORDER — MONTELUKAST SODIUM 10 MG/1
10 TABLET ORAL
Qty: 30 TABLET | Refills: 1 | Status: SHIPPED | OUTPATIENT
Start: 2021-06-03 | End: 2021-12-07

## 2021-06-03 RX ORDER — LORATADINE 10 MG/1
10 TABLET ORAL DAILY
Qty: 30 TABLET | Refills: 1 | Status: SHIPPED | OUTPATIENT
Start: 2021-06-03 | End: 2021-12-07

## 2021-06-03 NOTE — PROGRESS NOTES
Assessment/Plan:    Problem List Items Addressed This Visit        Respiratory    Restrictive lung disease    Relevant Medications    loratadine (CLARITIN) 10 mg tablet    montelukast (SINGULAIR) 10 mg tablet    Other Relevant Orders    Ambulatory referral to Pulmonology       Other    Dyspnea on exertion    Relevant Medications    loratadine (CLARITIN) 10 mg tablet    montelukast (SINGULAIR) 10 mg tablet    Other Relevant Orders    Ambulatory referral to Pulmonology      Other Visit Diagnoses     Gross hematuria    -  Primary    Relevant Orders    CBC    Comprehensive metabolic panel    Ambulatory referral to Urology    US kidney and bladder    Generalized anxiety disorder        Relevant Medications    sertraline (ZOLOFT) 50 mg tablet           Diagnoses and all orders for this visit:    Gross hematuria  -     CBC; Future  -     Comprehensive metabolic panel; Future  -     Ambulatory referral to Urology; Future  -     US kidney and bladder; Future    Restrictive lung disease  -     Ambulatory referral to Pulmonology; Future    Dyspnea on exertion  -     Ambulatory referral to Pulmonology; Future  -     loratadine (CLARITIN) 10 mg tablet; Take 1 tablet (10 mg total) by mouth daily  -     montelukast (SINGULAIR) 10 mg tablet; Take 1 tablet (10 mg total) by mouth daily at bedtime    Generalized anxiety disorder  -     sertraline (ZOLOFT) 50 mg tablet; Take 1 tablet (50 mg total) by mouth daily        Will try adding Claritin and Singulair to see if there is an allergic component to her respiratory complaints  I am basing this on the significant improvement when she went to [place]  Also, placed referral for 2nd opinion as well as referral to urology for gross hematuria evaluation  Subjective:      Patient ID: John Arreola is a 44 y o  female  Herlene Sovereign is here today complaining of:  1  Gross, painless hematuria x 2 weeks  Happens every time she urinates  H/O tobacco abuse, quit 7 months ago    2  Breathing problems "back " Pt went to [place] for 1 week and noticed a significant improvement in breathing 1-2 days into her trip  She was able to take multiple walks per day without issue  Upon return to [place], symptoms returned  She is unhappy with [facility] service, they cancel her appointments and change providers  She does not feel they are helping and would like a 2nd opinion outside of the [facility]  Pt not using any inhalers as she did not gain benefit  The following portions of the patient's history were reviewed and updated as appropriate:   She has a past medical history of Anxiety, Back pain, and Depression  ,  does not have any pertinent problems on file  ,   has a past surgical history that includes Cholecystectomy; Lumbar disc surgery; pr repair incisional hernia,reducible (N/A, 3/16/2018); Back surgery (2006); Back surgery; and pr shldr arthroscop,surg,w/rotat cuff repr (Left, 11/18/2020)  ,  She was adopted  Family history is unknown by patient  ,   reports that she quit smoking about 7 months ago  Her smoking use included cigarettes  She smoked 0 50 packs per day for 0 00 years  She has never used smokeless tobacco  She reports that she does not drink alcohol or use drugs  ,  has No Known Allergies     Current Outpatient Medications   Medication Sig Dispense Refill    acyclovir (ZOVIRAX) 800 mg tablet Take 1 tablet (800 mg total) by mouth 5 (five) times a day for 10 days 50 tablet 0    albuterol (Ventolin HFA) 90 mcg/act inhaler Inhale 2 puffs every 6 (six) hours as needed for wheezing (Patient not taking: Reported on 6/3/2021) 18 g 0    loratadine (CLARITIN) 10 mg tablet Take 1 tablet (10 mg total) by mouth daily 30 tablet 1    montelukast (SINGULAIR) 10 mg tablet Take 1 tablet (10 mg total) by mouth daily at bedtime 30 tablet 1    predniSONE 10 mg tablet Days 1 and 2 take 4 tablets daily, days 3 and 4 take 3 tablets daily, days 5 and 6 and 2 tablets daily, days 7 and 8 take 1 tablet daily  (Patient not taking: Reported on 3/22/2021) 20 tablet 0    sertraline (ZOLOFT) 50 mg tablet Take 1 tablet (50 mg total) by mouth daily 30 tablet 1    traMADol (ULTRAM) 50 mg tablet Take 1 tablet (50 mg total) by mouth 2 (two) times a day as needed for moderate pain or severe pain for up to 20 doses (Patient not taking: Reported on 3/22/2021) 20 tablet 0     No current facility-administered medications for this visit  Review of Systems   Constitutional: Negative for activity change, appetite change, chills, diaphoresis, fatigue, fever and unexpected weight change  HENT: Negative for congestion, ear pain, postnasal drip, rhinorrhea, sinus pressure, sinus pain, sneezing, sore throat, tinnitus and voice change  Eyes: Negative for pain, redness and visual disturbance  Respiratory: Positive for cough, chest tightness, shortness of breath and wheezing  Cardiovascular: Negative for chest pain, palpitations and leg swelling  Gastrointestinal: Negative for abdominal pain, blood in stool, constipation, diarrhea, nausea and vomiting  Genitourinary: Positive for hematuria  Negative for decreased urine volume, difficulty urinating, dysuria, flank pain, frequency, pelvic pain and urgency  Musculoskeletal: Negative for arthralgias, back pain, gait problem, joint swelling, myalgias, neck pain and neck stiffness  Skin: Negative for color change, pallor, rash and wound  Neurological: Negative for dizziness, tremors, weakness, light-headedness and headaches  Psychiatric/Behavioral: Negative for dysphoric mood, self-injury, sleep disturbance and suicidal ideas  The patient is not nervous/anxious  Objective:  Vitals:    06/03/21 0714   BP: 138/88   Pulse: 88   Temp: (!) 96 5 °F (35 8 °C)   SpO2: 98%   Weight: 111 kg (244 lb)   Height: 5' 6" (1 676 m)     Body mass index is 39 38 kg/m²  Physical Exam  Vitals signs reviewed  Constitutional:       General: She is not in acute distress  Appearance: She is well-developed  She is not diaphoretic  HENT:      Head: Normocephalic and atraumatic  Right Ear: Hearing, tympanic membrane, ear canal and external ear normal       Left Ear: Hearing, tympanic membrane, ear canal and external ear normal       Mouth/Throat:      Pharynx: Uvula midline  No oropharyngeal exudate  Eyes:      General: No scleral icterus  Right eye: No discharge  Left eye: No discharge  Conjunctiva/sclera: Conjunctivae normal    Neck:      Musculoskeletal: Neck supple  Thyroid: No thyromegaly  Vascular: No carotid bruit  Cardiovascular:      Rate and Rhythm: Normal rate and regular rhythm  Heart sounds: Normal heart sounds  No murmur  Pulmonary:      Effort: Pulmonary effort is normal  No respiratory distress  Breath sounds: Decreased breath sounds present  No wheezing  Abdominal:      General: Bowel sounds are normal  There is no distension  Palpations: Abdomen is soft  There is no mass  Tenderness: There is no abdominal tenderness  There is no guarding or rebound  Musculoskeletal: Normal range of motion  General: No tenderness  Lymphadenopathy:      Cervical: No cervical adenopathy  Skin:     General: Skin is warm and dry  Findings: No erythema or rash  Neurological:      Mental Status: She is alert and oriented to person, place, and time  Psychiatric:         Behavior: Behavior normal          Thought Content:  Thought content normal          Judgment: Judgment normal

## 2021-06-03 NOTE — TELEPHONE ENCOUNTER
Please Triage - [address] Patient-     What is the reason for the patients appointment? Gross Hematuria       Imaging/Lab Results: labs (6/3)      Do we accept the patient's insurance or is the patient Self-Pay? Provider & Plan:   Member ID#:       Has the patient had any previous urologist(s)? no       Have patient records been requested? epic       Has the patient had any outside testing done? epic      Does the patient have a personal history of cancer? no      Patient can be reached at :[phone]

## 2021-06-04 ENCOUNTER — HOSPITAL ENCOUNTER (OUTPATIENT)
Dept: ULTRASOUND IMAGING | Facility: HOSPITAL | Age: 51
Discharge: HOME | End: 2021-06-04
Payer: COMMERCIAL

## 2021-06-04 DIAGNOSIS — R31.0 GROSS HEMATURIA: ICD-10-CM

## 2021-06-04 PROCEDURE — 76770 US EXAM ABDO BACK WALL COMP: CPT

## 2021-06-08 ENCOUNTER — TELEPHONE (OUTPATIENT)
Dept: PULMONOLOGY | Facility: CLINIC | Age: 51
End: 2021-06-08

## 2021-06-08 NOTE — TELEPHONE ENCOUNTER
Attempted to call patient with results of nuclear medicine stress test   However she was not available  I left a voice message asking her to call me back

## 2021-06-09 ENCOUNTER — RA CDI HCC (OUTPATIENT)
Dept: OTHER | Facility: HOSPITAL | Age: 51
End: 2021-06-09

## 2021-06-09 NOTE — PROGRESS NOTES
[facility]  coding opportunities             Chart reviewed, (number of) suggestions sent to provider: 1                  Patients insurance company: [employer] (Medicare Advantage and Commercial)     Visit status: Patient canceled the appointment     Provider never responded to [facility]  coding request     [facility]  coding opportunities             Chart reviewed, (number of) suggestions sent to provider: 1           Patients insurance company: [employer] (Medicare Advantage and Commercial)           Found on active problem  - please assess for 2021 - last assessed are 12/7/2020  J96 01 Acute respiratory failure with hypoxia ([facility] )

## 2021-06-11 NOTE — TELEPHONE ENCOUNTER
Patient was contacted by Brennon Anderson urging her to call our office back to make appointment  She is reluctant to make appointment because she is not in any pain, and only sees blood in her urine occasionally  U/S shows that she has L nephrolithiasis, and the patient stated that she has had stones in the past as well (multiple CT results in Epic)  Please call the patient back to discuss  She assured me that she will have her phone on and available all day so that we can contact her appropriately

## 2021-07-20 DIAGNOSIS — F41.1 GENERALIZED ANXIETY DISORDER: ICD-10-CM

## 2021-07-20 NOTE — TELEPHONE ENCOUNTER
Pt called regarding her refill on Zoloft  Pt stated her dosage should be 75mg   Pt stated the pharmacy had it incorrectly

## 2021-09-13 ENCOUNTER — OFFICE VISIT (OUTPATIENT)
Dept: FAMILY MEDICINE CLINIC | Facility: CLINIC | Age: 51
End: 2021-09-13
Payer: COMMERCIAL

## 2021-09-13 VITALS
SYSTOLIC BLOOD PRESSURE: 138 MMHG | HEART RATE: 87 BPM | DIASTOLIC BLOOD PRESSURE: 98 MMHG | BODY MASS INDEX: 38.25 KG/M2 | TEMPERATURE: 96.8 F | HEIGHT: 66 IN | WEIGHT: 238 LBS | OXYGEN SATURATION: 95 %

## 2021-09-13 DIAGNOSIS — R06.00 DYSPNEA ON EXERTION: ICD-10-CM

## 2021-09-13 DIAGNOSIS — F32.A ANXIETY AND DEPRESSION: Primary | ICD-10-CM

## 2021-09-13 DIAGNOSIS — F41.9 ANXIETY AND DEPRESSION: Primary | ICD-10-CM

## 2021-09-13 PROCEDURE — 3725F SCREEN DEPRESSION PERFORMED: CPT | Performed by: PHYSICIAN ASSISTANT

## 2021-09-13 PROCEDURE — 99213 OFFICE O/P EST LOW 20 MIN: CPT | Performed by: PHYSICIAN ASSISTANT

## 2021-09-13 PROCEDURE — 3008F BODY MASS INDEX DOCD: CPT | Performed by: PHYSICIAN ASSISTANT

## 2021-09-13 PROCEDURE — 1036F TOBACCO NON-USER: CPT | Performed by: PHYSICIAN ASSISTANT

## 2021-09-13 RX ORDER — BUSPIRONE HYDROCHLORIDE 5 MG/1
5 TABLET ORAL 3 TIMES DAILY
Qty: 90 TABLET | Refills: 0 | Status: SHIPPED | OUTPATIENT
Start: 2021-09-13 | End: 2021-10-14 | Stop reason: SDUPTHER

## 2021-09-13 RX ORDER — ESCITALOPRAM OXALATE 10 MG/1
10 TABLET ORAL DAILY
Qty: 30 TABLET | Refills: 0 | Status: SHIPPED | OUTPATIENT
Start: 2021-09-13 | End: 2021-10-14 | Stop reason: SDUPTHER

## 2021-09-13 NOTE — PROGRESS NOTES
Assessment/Plan:    Problem List Items Addressed This Visit        Other    Dyspnea on exertion      Other Visit Diagnoses     Anxiety and depression    -  Primary    Relevant Medications    escitalopram (LEXAPRO) 10 mg tablet    busPIRone (BUSPAR) 5 mg tablet           Diagnoses and all orders for this visit:    Anxiety and depression  -     escitalopram (LEXAPRO) 10 mg tablet; Take 1 tablet (10 mg total) by mouth daily  -     busPIRone (BUSPAR) 5 mg tablet; Take 1 tablet (5 mg total) by mouth 3 (three) times a day    Dyspnea on exertion        I did reach out to pulmonology office today, no answer  Pt states that sometimes when exerts herself, Sp02 drops to 70s-80s  I feel October is too long to wait for follow up and wonder if she would be a candidate for a lung biopsy? Will continue to try contacting pulmonologist    Change sertraline to lexapro and buspar  Subjective:      Patient ID: Tirso Codyr is a 46 y o  female  Aime Sportsman is here today complaining of worsening anxiety and depression  Has been taking sertraline 100 mg without relief  She continues to see [facility] pulmonology as well as ENT, is going to speech and pulmonary therapy  Pt does not believe it is helping  Has pulmonology follow up scheduled for end of October  Pt complains that her voice strains with lengthy amounts of speaking  ENT feels that she may have had tracheal damage during intubation for shoulder surgery  The following portions of the patient's history were reviewed and updated as appropriate:   She has a past medical history of Anxiety, Back pain, and Depression  ,  does not have any pertinent problems on file  ,   has a past surgical history that includes Cholecystectomy; Lumbar disc surgery; pr repair incisional hernia,reducible (N/A, 3/16/2018); Back surgery (2006); Back surgery; and pr shldr arthroscop,surg,w/rotat cuff repr (Left, 11/18/2020)  ,  She was adopted  Family history is unknown by patient  ,   reports that she quit smoking about 10 months ago  Her smoking use included cigarettes  She smoked 0 50 packs per day for 0 00 years  She has never used smokeless tobacco  She reports that she does not drink alcohol and does not use drugs  ,  has No Known Allergies     Current Outpatient Medications   Medication Sig Dispense Refill    albuterol (Ventolin HFA) 90 mcg/act inhaler Inhale 2 puffs every 6 (six) hours as needed for wheezing 18 g 0    acyclovir (ZOVIRAX) 800 mg tablet Take 1 tablet (800 mg total) by mouth 5 (five) times a day for 10 days (Patient not taking: Reported on 9/13/2021) 50 tablet 0    busPIRone (BUSPAR) 5 mg tablet Take 1 tablet (5 mg total) by mouth 3 (three) times a day 90 tablet 0    escitalopram (LEXAPRO) 10 mg tablet Take 1 tablet (10 mg total) by mouth daily 30 tablet 0    loratadine (CLARITIN) 10 mg tablet Take 1 tablet (10 mg total) by mouth daily (Patient not taking: Reported on 9/13/2021) 30 tablet 1    montelukast (SINGULAIR) 10 mg tablet Take 1 tablet (10 mg total) by mouth daily at bedtime (Patient not taking: Reported on 9/13/2021) 30 tablet 1    predniSONE 10 mg tablet Days 1 and 2 take 4 tablets daily, days 3 and 4 take 3 tablets daily, days 5 and 6 and 2 tablets daily, days 7 and 8 take 1 tablet daily  (Patient not taking: Reported on 3/22/2021) 20 tablet 0    traMADol (ULTRAM) 50 mg tablet Take 1 tablet (50 mg total) by mouth 2 (two) times a day as needed for moderate pain or severe pain for up to 20 doses (Patient not taking: Reported on 3/22/2021) 20 tablet 0     No current facility-administered medications for this visit  Review of Systems   Constitutional: Negative for activity change, appetite change, chills, diaphoresis, fatigue, fever and unexpected weight change  HENT: Negative for congestion, ear pain, postnasal drip, rhinorrhea, sinus pressure, sinus pain, sneezing, sore throat, tinnitus and voice change  Eyes: Negative for pain, redness and visual disturbance  Respiratory: Positive for shortness of breath  Negative for cough, chest tightness and wheezing  Cardiovascular: Negative for chest pain, palpitations and leg swelling  Gastrointestinal: Negative for abdominal pain, blood in stool, constipation, diarrhea, nausea and vomiting  Genitourinary: Negative for difficulty urinating, dysuria, frequency, hematuria and urgency  Musculoskeletal: Negative for arthralgias, back pain, gait problem, joint swelling, myalgias, neck pain and neck stiffness  Skin: Negative for color change, pallor, rash and wound  Neurological: Negative for dizziness, tremors, weakness, light-headedness and headaches  Psychiatric/Behavioral: Positive for dysphoric mood  Negative for self-injury, sleep disturbance and suicidal ideas  The patient is nervous/anxious  Objective:  Vitals:    09/13/21 1318   BP: 138/98   Pulse: 87   Temp: (!) 96 8 °F (36 °C)   SpO2: 95%   Weight: 108 kg (238 lb)   Height: 5' 6" (1 676 m)     Body mass index is 38 41 kg/m²  Physical Exam  Vitals reviewed  Constitutional:       General: She is not in acute distress  Appearance: She is well-developed  She is not diaphoretic  HENT:      Head: Normocephalic and atraumatic  Right Ear: Hearing, tympanic membrane, ear canal and external ear normal       Left Ear: Hearing, tympanic membrane, ear canal and external ear normal       Mouth/Throat:      Pharynx: Uvula midline  No oropharyngeal exudate  Eyes:      General: No scleral icterus  Right eye: No discharge  Left eye: No discharge  Conjunctiva/sclera: Conjunctivae normal    Neck:      Thyroid: No thyromegaly  Vascular: No carotid bruit  Cardiovascular:      Rate and Rhythm: Normal rate and regular rhythm  Heart sounds: Normal heart sounds  No murmur heard  Pulmonary:      Effort: Pulmonary effort is normal  No respiratory distress  Breath sounds: Normal breath sounds  No wheezing     Abdominal: General: Bowel sounds are normal  There is no distension  Palpations: Abdomen is soft  There is no mass  Tenderness: There is no abdominal tenderness  There is no guarding or rebound  Musculoskeletal:         General: No tenderness  Normal range of motion  Cervical back: Neck supple  Lymphadenopathy:      Cervical: No cervical adenopathy  Skin:     General: Skin is warm and dry  Findings: No erythema or rash  Neurological:      Mental Status: She is alert and oriented to person, place, and time  Psychiatric:         Behavior: Behavior normal          Thought Content:  Thought content normal          Judgment: Judgment normal

## 2021-10-05 ENCOUNTER — RA CDI HCC (OUTPATIENT)
Dept: OTHER | Facility: HOSPITAL | Age: 51
End: 2021-10-05

## 2021-10-14 ENCOUNTER — OFFICE VISIT (OUTPATIENT)
Dept: FAMILY MEDICINE CLINIC | Facility: CLINIC | Age: 51
End: 2021-10-14
Payer: COMMERCIAL

## 2021-10-14 VITALS
DIASTOLIC BLOOD PRESSURE: 88 MMHG | BODY MASS INDEX: 37.77 KG/M2 | SYSTOLIC BLOOD PRESSURE: 142 MMHG | HEIGHT: 66 IN | HEART RATE: 71 BPM | OXYGEN SATURATION: 96 % | WEIGHT: 235 LBS | TEMPERATURE: 97.4 F

## 2021-10-14 DIAGNOSIS — F32.A ANXIETY AND DEPRESSION: Primary | ICD-10-CM

## 2021-10-14 DIAGNOSIS — F41.9 ANXIETY AND DEPRESSION: Primary | ICD-10-CM

## 2021-10-14 DIAGNOSIS — Z23 NEED FOR VACCINATION: ICD-10-CM

## 2021-10-14 DIAGNOSIS — M54.30 SCIATICA, UNSPECIFIED LATERALITY: ICD-10-CM

## 2021-10-14 PROCEDURE — 90471 IMMUNIZATION ADMIN: CPT | Performed by: PHYSICIAN ASSISTANT

## 2021-10-14 PROCEDURE — 1036F TOBACCO NON-USER: CPT | Performed by: PHYSICIAN ASSISTANT

## 2021-10-14 PROCEDURE — 3008F BODY MASS INDEX DOCD: CPT | Performed by: PHYSICIAN ASSISTANT

## 2021-10-14 PROCEDURE — 3725F SCREEN DEPRESSION PERFORMED: CPT | Performed by: PHYSICIAN ASSISTANT

## 2021-10-14 PROCEDURE — 99213 OFFICE O/P EST LOW 20 MIN: CPT | Performed by: PHYSICIAN ASSISTANT

## 2021-10-14 PROCEDURE — 90682 RIV4 VACC RECOMBINANT DNA IM: CPT | Performed by: PHYSICIAN ASSISTANT

## 2021-10-14 RX ORDER — ESCITALOPRAM OXALATE 20 MG/1
20 TABLET ORAL DAILY
Qty: 30 TABLET | Refills: 0 | Status: SHIPPED | OUTPATIENT
Start: 2021-10-14 | End: 2021-11-12 | Stop reason: SDUPTHER

## 2021-10-14 RX ORDER — PREDNISONE 10 MG/1
TABLET ORAL
Qty: 20 TABLET | Refills: 0 | Status: SHIPPED | OUTPATIENT
Start: 2021-10-14 | End: 2021-12-07

## 2021-10-14 RX ORDER — BUSPIRONE HYDROCHLORIDE 7.5 MG/1
7.5 TABLET ORAL 3 TIMES DAILY
Qty: 90 TABLET | Refills: 0 | Status: SHIPPED | OUTPATIENT
Start: 2021-10-14 | End: 2021-11-12 | Stop reason: SDUPTHER

## 2021-11-04 ENCOUNTER — APPOINTMENT (OUTPATIENT)
Dept: RADIOLOGY | Facility: MEDICAL CENTER | Age: 51
End: 2021-11-04
Payer: COMMERCIAL

## 2021-11-04 ENCOUNTER — OFFICE VISIT (OUTPATIENT)
Dept: FAMILY MEDICINE CLINIC | Facility: CLINIC | Age: 51
End: 2021-11-04
Payer: COMMERCIAL

## 2021-11-04 VITALS
OXYGEN SATURATION: 97 % | DIASTOLIC BLOOD PRESSURE: 104 MMHG | SYSTOLIC BLOOD PRESSURE: 140 MMHG | TEMPERATURE: 96.8 F | HEIGHT: 66 IN | HEART RATE: 82 BPM | WEIGHT: 237.2 LBS | BODY MASS INDEX: 38.12 KG/M2

## 2021-11-04 DIAGNOSIS — M54.6 ACUTE BILATERAL THORACIC BACK PAIN: Primary | ICD-10-CM

## 2021-11-04 DIAGNOSIS — M54.6 ACUTE BILATERAL THORACIC BACK PAIN: ICD-10-CM

## 2021-11-04 DIAGNOSIS — R06.02 SOB (SHORTNESS OF BREATH): ICD-10-CM

## 2021-11-04 PROCEDURE — 71046 X-RAY EXAM CHEST 2 VIEWS: CPT

## 2021-11-04 PROCEDURE — 1036F TOBACCO NON-USER: CPT | Performed by: PHYSICIAN ASSISTANT

## 2021-11-04 PROCEDURE — 99213 OFFICE O/P EST LOW 20 MIN: CPT | Performed by: PHYSICIAN ASSISTANT

## 2021-11-04 PROCEDURE — U0003 INFECTIOUS AGENT DETECTION BY NUCLEIC ACID (DNA OR RNA); SEVERE ACUTE RESPIRATORY SYNDROME CORONAVIRUS 2 (SARS-COV-2) (CORONAVIRUS DISEASE [COVID-19]), AMPLIFIED PROBE TECHNIQUE, MAKING USE OF HIGH THROUGHPUT TECHNOLOGIES AS DESCRIBED BY CMS-2020-01-R: HCPCS | Performed by: PHYSICIAN ASSISTANT

## 2021-11-04 PROCEDURE — 3725F SCREEN DEPRESSION PERFORMED: CPT | Performed by: PHYSICIAN ASSISTANT

## 2021-11-04 PROCEDURE — U0005 INFEC AGEN DETEC AMPLI PROBE: HCPCS | Performed by: PHYSICIAN ASSISTANT

## 2021-11-04 PROCEDURE — 3008F BODY MASS INDEX DOCD: CPT | Performed by: PHYSICIAN ASSISTANT

## 2021-11-04 RX ORDER — FLUTICASONE FUROATE AND VILANTEROL TRIFENATATE 100; 25 UG/1; UG/1
POWDER RESPIRATORY (INHALATION)
COMMUNITY
Start: 2021-10-28 | End: 2021-12-07

## 2021-11-04 RX ORDER — PREDNISONE 10 MG/1
TABLET ORAL
Qty: 20 TABLET | Refills: 0 | Status: SHIPPED | OUTPATIENT
Start: 2021-11-04 | End: 2021-12-07

## 2021-11-05 LAB — SARS-COV-2 RNA RESP QL NAA+PROBE: NEGATIVE

## 2021-11-12 DIAGNOSIS — F32.A ANXIETY AND DEPRESSION: ICD-10-CM

## 2021-11-12 DIAGNOSIS — F41.9 ANXIETY AND DEPRESSION: ICD-10-CM

## 2021-11-12 RX ORDER — BUSPIRONE HYDROCHLORIDE 7.5 MG/1
7.5 TABLET ORAL 3 TIMES DAILY
Qty: 90 TABLET | Refills: 0 | Status: SHIPPED | OUTPATIENT
Start: 2021-11-12 | End: 2021-11-12 | Stop reason: SDUPTHER

## 2021-11-12 RX ORDER — ESCITALOPRAM OXALATE 20 MG/1
20 TABLET ORAL DAILY
Qty: 30 TABLET | Refills: 0 | Status: SHIPPED | OUTPATIENT
Start: 2021-11-12 | End: 2021-12-15 | Stop reason: SDUPTHER

## 2021-11-12 RX ORDER — ESCITALOPRAM OXALATE 20 MG/1
20 TABLET ORAL DAILY
Qty: 30 TABLET | Refills: 0 | Status: SHIPPED | OUTPATIENT
Start: 2021-11-12 | End: 2021-11-12 | Stop reason: SDUPTHER

## 2021-11-12 RX ORDER — BUSPIRONE HYDROCHLORIDE 7.5 MG/1
7.5 TABLET ORAL 3 TIMES DAILY
Qty: 90 TABLET | Refills: 0 | Status: SHIPPED | OUTPATIENT
Start: 2021-11-12 | End: 2021-12-07 | Stop reason: SDUPTHER

## 2021-12-07 ENCOUNTER — OFFICE VISIT (OUTPATIENT)
Dept: FAMILY MEDICINE CLINIC | Facility: CLINIC | Age: 51
End: 2021-12-07
Payer: COMMERCIAL

## 2021-12-07 VITALS
TEMPERATURE: 96.9 F | BODY MASS INDEX: 38.25 KG/M2 | DIASTOLIC BLOOD PRESSURE: 88 MMHG | HEIGHT: 66 IN | SYSTOLIC BLOOD PRESSURE: 154 MMHG | WEIGHT: 238 LBS

## 2021-12-07 DIAGNOSIS — M54.16 LUMBAR RADICULOPATHY: ICD-10-CM

## 2021-12-07 DIAGNOSIS — J98.4 RESTRICTIVE LUNG DISEASE: ICD-10-CM

## 2021-12-07 DIAGNOSIS — F32.A ANXIETY AND DEPRESSION: Primary | ICD-10-CM

## 2021-12-07 DIAGNOSIS — G90.529 COMPLEX REGIONAL PAIN SYNDROME TYPE 1 OF LOWER EXTREMITY, UNSPECIFIED LATERALITY: ICD-10-CM

## 2021-12-07 DIAGNOSIS — R06.00 DYSPNEA ON EXERTION: ICD-10-CM

## 2021-12-07 DIAGNOSIS — F41.9 ANXIETY AND DEPRESSION: Primary | ICD-10-CM

## 2021-12-07 PROCEDURE — 99214 OFFICE O/P EST MOD 30 MIN: CPT | Performed by: PHYSICIAN ASSISTANT

## 2021-12-07 RX ORDER — BUSPIRONE HYDROCHLORIDE 10 MG/1
10 TABLET ORAL 3 TIMES DAILY
Qty: 90 TABLET | Refills: 2 | Status: SHIPPED | OUTPATIENT
Start: 2021-12-07 | End: 2022-01-18 | Stop reason: SDUPTHER

## 2021-12-15 ENCOUNTER — TELEPHONE (OUTPATIENT)
Dept: PULMONOLOGY | Facility: CLINIC | Age: 51
End: 2021-12-15

## 2021-12-15 DIAGNOSIS — F41.9 ANXIETY AND DEPRESSION: ICD-10-CM

## 2021-12-15 DIAGNOSIS — F32.A ANXIETY AND DEPRESSION: ICD-10-CM

## 2021-12-15 RX ORDER — ESCITALOPRAM OXALATE 20 MG/1
20 TABLET ORAL DAILY
Qty: 30 TABLET | Refills: 0 | Status: SHIPPED | OUTPATIENT
Start: 2021-12-15 | End: 2022-01-18 | Stop reason: SDUPTHER

## 2021-12-16 ENCOUNTER — TELEPHONE (OUTPATIENT)
Dept: PULMONOLOGY | Facility: CLINIC | Age: 51
End: 2021-12-16

## 2021-12-16 ENCOUNTER — OFFICE VISIT (OUTPATIENT)
Dept: PAIN MEDICINE | Facility: CLINIC | Age: 51
End: 2021-12-16
Payer: COMMERCIAL

## 2021-12-16 VITALS
WEIGHT: 236 LBS | DIASTOLIC BLOOD PRESSURE: 83 MMHG | SYSTOLIC BLOOD PRESSURE: 152 MMHG | HEIGHT: 66 IN | HEART RATE: 91 BPM | BODY MASS INDEX: 37.93 KG/M2

## 2021-12-16 DIAGNOSIS — M51.26 LUMBAR DISC HERNIATION: ICD-10-CM

## 2021-12-16 DIAGNOSIS — G89.4 CHRONIC PAIN SYNDROME: Primary | ICD-10-CM

## 2021-12-16 DIAGNOSIS — M54.16 LUMBAR RADICULOPATHY: ICD-10-CM

## 2021-12-16 DIAGNOSIS — Z98.1 STATUS POST LUMBAR SPINAL FUSION: ICD-10-CM

## 2021-12-16 PROCEDURE — 3008F BODY MASS INDEX DOCD: CPT | Performed by: PHYSICIAN ASSISTANT

## 2021-12-16 PROCEDURE — 99214 OFFICE O/P EST MOD 30 MIN: CPT | Performed by: NURSE PRACTITIONER

## 2021-12-16 RX ORDER — GABAPENTIN 300 MG/1
CAPSULE ORAL
Qty: 90 CAPSULE | Refills: 1 | Status: SHIPPED | OUTPATIENT
Start: 2021-12-16 | End: 2022-01-25

## 2021-12-16 RX ORDER — NAPROXEN 500 MG/1
500 TABLET ORAL 2 TIMES DAILY WITH MEALS
Qty: 60 TABLET | Refills: 1 | Status: SHIPPED | OUTPATIENT
Start: 2021-12-16 | End: 2022-01-20 | Stop reason: SDUPTHER

## 2021-12-28 ENCOUNTER — TELEPHONE (OUTPATIENT)
Dept: PAIN MEDICINE | Facility: CLINIC | Age: 51
End: 2021-12-28

## 2021-12-30 ENCOUNTER — TELEPHONE (OUTPATIENT)
Dept: FAMILY MEDICINE CLINIC | Facility: CLINIC | Age: 51
End: 2021-12-30

## 2021-12-30 DIAGNOSIS — U07.1 COVID: Primary | ICD-10-CM

## 2021-12-30 DIAGNOSIS — Z20.822 EXPOSURE TO COVID-19 VIRUS: ICD-10-CM

## 2021-12-30 DIAGNOSIS — B34.9 VIRAL INFECTION, UNSPECIFIED: ICD-10-CM

## 2021-12-30 PROCEDURE — U0003 INFECTIOUS AGENT DETECTION BY NUCLEIC ACID (DNA OR RNA); SEVERE ACUTE RESPIRATORY SYNDROME CORONAVIRUS 2 (SARS-COV-2) (CORONAVIRUS DISEASE [COVID-19]), AMPLIFIED PROBE TECHNIQUE, MAKING USE OF HIGH THROUGHPUT TECHNOLOGIES AS DESCRIBED BY CMS-2020-01-R: HCPCS | Performed by: FAMILY MEDICINE

## 2021-12-30 PROCEDURE — U0005 INFEC AGEN DETEC AMPLI PROBE: HCPCS | Performed by: FAMILY MEDICINE

## 2021-12-30 RX ORDER — BUDESONIDE 180 UG/1
2 AEROSOL, POWDER RESPIRATORY (INHALATION) 2 TIMES DAILY
Qty: 1 EACH | Refills: 1 | Status: SHIPPED | OUTPATIENT
Start: 2021-12-30 | End: 2022-05-25

## 2021-12-30 RX ORDER — ALBUTEROL SULFATE 90 UG/1
2 AEROSOL, METERED RESPIRATORY (INHALATION) EVERY 4 HOURS PRN
Qty: 18 G | Refills: 5 | Status: SHIPPED | OUTPATIENT
Start: 2021-12-30 | End: 2022-05-25

## 2022-01-14 DIAGNOSIS — F41.9 ANXIETY AND DEPRESSION: ICD-10-CM

## 2022-01-14 DIAGNOSIS — F32.A ANXIETY AND DEPRESSION: ICD-10-CM

## 2022-01-14 RX ORDER — ESCITALOPRAM OXALATE 20 MG/1
TABLET ORAL
Qty: 30 TABLET | Refills: 5 | OUTPATIENT
Start: 2022-01-14

## 2022-01-18 ENCOUNTER — OFFICE VISIT (OUTPATIENT)
Dept: FAMILY MEDICINE CLINIC | Facility: CLINIC | Age: 52
End: 2022-01-18
Payer: COMMERCIAL

## 2022-01-18 VITALS
OXYGEN SATURATION: 92 % | TEMPERATURE: 96.6 F | DIASTOLIC BLOOD PRESSURE: 88 MMHG | SYSTOLIC BLOOD PRESSURE: 152 MMHG | BODY MASS INDEX: 38.47 KG/M2 | WEIGHT: 239.4 LBS | HEIGHT: 66 IN | HEART RATE: 90 BPM

## 2022-01-18 DIAGNOSIS — F41.9 ANXIETY AND DEPRESSION: ICD-10-CM

## 2022-01-18 DIAGNOSIS — G47.00 INSOMNIA, UNSPECIFIED TYPE: Primary | ICD-10-CM

## 2022-01-18 DIAGNOSIS — R06.00 DYSPNEA ON EXERTION: ICD-10-CM

## 2022-01-18 DIAGNOSIS — F32.A ANXIETY AND DEPRESSION: ICD-10-CM

## 2022-01-18 PROCEDURE — 99214 OFFICE O/P EST MOD 30 MIN: CPT | Performed by: PHYSICIAN ASSISTANT

## 2022-01-18 RX ORDER — ESCITALOPRAM OXALATE 20 MG/1
20 TABLET ORAL DAILY
Qty: 30 TABLET | Refills: 2 | Status: SHIPPED | OUTPATIENT
Start: 2022-01-18 | End: 2022-03-29 | Stop reason: SDUPTHER

## 2022-01-18 RX ORDER — BUSPIRONE HYDROCHLORIDE 15 MG/1
15 TABLET ORAL 3 TIMES DAILY
Qty: 30 TABLET | Refills: 0 | Status: SHIPPED | OUTPATIENT
Start: 2022-01-18 | End: 2022-03-29 | Stop reason: SDUPTHER

## 2022-01-18 RX ORDER — TRAZODONE HYDROCHLORIDE 100 MG/1
100 TABLET ORAL
Qty: 30 TABLET | Refills: 0 | Status: SHIPPED | OUTPATIENT
Start: 2022-01-18 | End: 2022-04-29 | Stop reason: SDUPTHER

## 2022-01-18 NOTE — PROGRESS NOTES
Assessment/Plan:    Problem List Items Addressed This Visit        Other    Dyspnea on exertion      Other Visit Diagnoses     Insomnia, unspecified type    -  Primary    Discontinue melatonin, will try trazodone 100 mg HS  Relevant Medications    traZODone (DESYREL) 100 mg tablet    Anxiety and depression        Continue Lexapro, increase buspirone to 15 mg TID  Relevant Medications    escitalopram (LEXAPRO) 20 mg tablet    traZODone (DESYREL) 100 mg tablet    busPIRone (BUSPAR) 15 mg tablet    Anxiety and depression        Increase buspar to 10 mg TID  Relevant Medications    escitalopram (LEXAPRO) 20 mg tablet    traZODone (DESYREL) 100 mg tablet    busPIRone (BUSPAR) 15 mg tablet           Diagnoses and all orders for this visit:    Insomnia, unspecified type  Comments:  Discontinue melatonin, will try trazodone 100 mg HS  Orders:  -     traZODone (DESYREL) 100 mg tablet; Take 1 tablet (100 mg total) by mouth daily at bedtime    Anxiety and depression  Comments:  Continue Lexapro, increase buspirone to 15 mg TID  Orders:  -     escitalopram (LEXAPRO) 20 mg tablet; Take 1 tablet (20 mg total) by mouth daily  -     busPIRone (BUSPAR) 15 mg tablet; Take 1 tablet (15 mg total) by mouth 3 (three) times a day    Anxiety and depression  Comments:  Increase buspar to 10 mg TID  Orders:  -     escitalopram (LEXAPRO) 20 mg tablet; Take 1 tablet (20 mg total) by mouth daily  -     busPIRone (BUSPAR) 15 mg tablet; Take 1 tablet (15 mg total) by mouth 3 (three) times a day    Dyspnea on exertion  Comments:  Keep pulmonary testing appointment as well as follow up appointment  No problem-specific Assessment & Plan notes found for this encounter  Subjective:      Patient ID: Carl Horan is a 46 y o  female  Thomasena Doing is here today for follow up appointment    She is scheduled for further pulmonary testing this week and has follow up scheduled later this month with pulmonologist   She states that today is the first time she left her home since her last appointment with me on 12/7/21  She feels depressed and anxious, does not want to leave the house due to her own breathing issues as well as Covid 19  She continues to struggle with sleep, takes melatonin 30 mg at bedtime, sleeps in 2 hour increments, not taking any daytime naps  The following portions of the patient's history were reviewed and updated as appropriate:   She has a past medical history of Anxiety, Back pain, and Depression  ,  does not have any pertinent problems on file  ,   has a past surgical history that includes Cholecystectomy; Lumbar disc surgery; pr repair incisional hernia,reducible (N/A, 3/16/2018); Back surgery (2006); Back surgery; and pr shldr arthroscop,surg,w/rotat cuff repr (Left, 11/18/2020)  ,  She was adopted  Family history is unknown by patient  ,   reports that she quit smoking about 15 months ago  Her smoking use included cigarettes  She smoked 0 50 packs per day for 0 00 years  She has never used smokeless tobacco  She reports that she does not drink alcohol and does not use drugs  ,  has No Known Allergies     Current Outpatient Medications   Medication Sig Dispense Refill    albuterol (Ventolin HFA) 90 mcg/act inhaler Inhale 2 puffs every 4 (four) hours as needed for wheezing 18 g 5    budesonide (Pulmicort Flexhaler) 180 MCG/ACT inhaler Inhale 2 puffs 2 (two) times a day Rinse mouth after use   1 each 1    busPIRone (BUSPAR) 15 mg tablet Take 1 tablet (15 mg total) by mouth 3 (three) times a day 30 tablet 0    escitalopram (LEXAPRO) 20 mg tablet Take 1 tablet (20 mg total) by mouth daily 30 tablet 2    gabapentin (NEURONTIN) 300 mg capsule 1 PO BID x 1 week, then 1 PO TID 90 capsule 1    naproxen (NAPROSYN) 500 mg tablet Take 1 tablet (500 mg total) by mouth 2 (two) times a day with meals 60 tablet 1    traZODone (DESYREL) 100 mg tablet Take 1 tablet (100 mg total) by mouth daily at bedtime 30 tablet 0     No current facility-administered medications for this visit  Review of Systems   Constitutional: Negative for activity change, appetite change, chills, diaphoresis, fatigue, fever and unexpected weight change  HENT: Negative for congestion, ear pain, postnasal drip, rhinorrhea, sinus pressure, sinus pain, sneezing, sore throat, tinnitus and voice change  Eyes: Negative for pain, redness and visual disturbance  Respiratory: Positive for chest tightness and shortness of breath  Negative for cough and wheezing  Cardiovascular: Negative for chest pain, palpitations and leg swelling  Gastrointestinal: Negative for abdominal pain, blood in stool, constipation, diarrhea, nausea and vomiting  Genitourinary: Negative for difficulty urinating, dysuria, frequency, hematuria and urgency  Musculoskeletal: Positive for arthralgias and back pain  Negative for gait problem, joint swelling, myalgias, neck pain and neck stiffness  Seeing pain management, taking naprosyn and gabapentin without much benefit   Skin: Negative for color change, pallor, rash and wound  Neurological: Negative for dizziness, tremors, weakness, light-headedness and headaches  Psychiatric/Behavioral: Positive for dysphoric mood and sleep disturbance  Negative for self-injury and suicidal ideas  The patient is nervous/anxious  Objective:  Vitals:    01/18/22 0805   BP: 152/88   Pulse: 90   Temp: (!) 96 6 °F (35 9 °C)   SpO2: 92%   Weight: 109 kg (239 lb 6 4 oz)   Height: 5' 6" (1 676 m)     Body mass index is 38 64 kg/m²  Physical Exam  Vitals reviewed  Constitutional:       General: She is not in acute distress  Appearance: She is well-developed  She is not diaphoretic  HENT:      Head: Normocephalic and atraumatic        Right Ear: Hearing, tympanic membrane, ear canal and external ear normal       Left Ear: Hearing, tympanic membrane, ear canal and external ear normal       Mouth/Throat:      Pharynx: Uvula midline  No oropharyngeal exudate  Eyes:      General: No scleral icterus  Right eye: No discharge  Left eye: No discharge  Conjunctiva/sclera: Conjunctivae normal    Neck:      Thyroid: No thyromegaly  Vascular: No carotid bruit  Cardiovascular:      Rate and Rhythm: Normal rate and regular rhythm  Heart sounds: Normal heart sounds  No murmur heard  Pulmonary:      Effort: Pulmonary effort is normal  No respiratory distress  Breath sounds: Normal breath sounds  No wheezing  Abdominal:      General: Bowel sounds are normal  There is no distension  Palpations: Abdomen is soft  There is no mass  Tenderness: There is no abdominal tenderness  There is no guarding or rebound  Musculoskeletal:         General: No tenderness  Normal range of motion  Cervical back: Neck supple  Lymphadenopathy:      Cervical: No cervical adenopathy  Skin:     General: Skin is warm and dry  Findings: No erythema or rash  Neurological:      Mental Status: She is alert and oriented to person, place, and time  Psychiatric:         Behavior: Behavior normal          Thought Content:  Thought content normal          Judgment: Judgment normal

## 2022-01-20 ENCOUNTER — OFFICE VISIT (OUTPATIENT)
Dept: URGENT CARE | Facility: CLINIC | Age: 52
End: 2022-01-20
Payer: COMMERCIAL

## 2022-01-20 ENCOUNTER — APPOINTMENT (OUTPATIENT)
Dept: RADIOLOGY | Facility: CLINIC | Age: 52
End: 2022-01-20
Payer: COMMERCIAL

## 2022-01-20 VITALS
SYSTOLIC BLOOD PRESSURE: 159 MMHG | BODY MASS INDEX: 38.58 KG/M2 | WEIGHT: 239 LBS | TEMPERATURE: 98.2 F | HEART RATE: 92 BPM | OXYGEN SATURATION: 98 % | DIASTOLIC BLOOD PRESSURE: 86 MMHG | RESPIRATION RATE: 20 BRPM
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
## 2022-01-20 DIAGNOSIS — S90.32XA CONTUSION OF LEFT FOOT, INITIAL ENCOUNTER: Primary | ICD-10-CM

## 2022-01-20 DIAGNOSIS — S80.02XA CONTUSION OF LEFT KNEE, INITIAL ENCOUNTER: ICD-10-CM

## 2022-01-20 DIAGNOSIS — M79.672 LEFT FOOT PAIN: ICD-10-CM

## 2022-01-20 DIAGNOSIS — M25.562 ACUTE PAIN OF LEFT KNEE: ICD-10-CM

## 2022-01-20 DIAGNOSIS — G89.4 CHRONIC PAIN SYNDROME: ICD-10-CM

## 2022-01-20 DIAGNOSIS — M54.40 ACUTE BILATERAL LOW BACK PAIN WITH SCIATICA, SCIATICA LATERALITY UNSPECIFIED: ICD-10-CM

## 2022-01-20 PROCEDURE — 99214 OFFICE O/P EST MOD 30 MIN: CPT | Performed by: NURSE PRACTITIONER

## 2022-01-20 PROCEDURE — 73630 X-RAY EXAM OF FOOT: CPT

## 2022-01-20 PROCEDURE — 73564 X-RAY EXAM KNEE 4 OR MORE: CPT

## 2022-01-20 RX ORDER — LIDOCAINE 50 MG/G
1 PATCH TOPICAL DAILY
Qty: 6 PATCH | Refills: 0 | Status: SHIPPED | OUTPATIENT
Start: 2022-01-20 | End: 2022-05-25

## 2022-01-20 RX ORDER — NAPROXEN 500 MG/1
500 TABLET ORAL 2 TIMES DAILY WITH MEALS
Qty: 60 TABLET | Refills: 1 | Status: SHIPPED | OUTPATIENT
Start: 2022-01-20 | End: 2022-05-25

## 2022-01-20 NOTE — PATIENT INSTRUCTIONS
Your xray was preliminarily read by your provider  A radiologist will read the xray and you will be notified if it is abnormal     You are to Rest, Ice, compression (ace wrap, splint) elevate  Take tylenol with the naproxen as prescribed  Use the lidoderm patches as prescribed  You are to see your PCP   Go to the ED if symptoms worsen  You are to see your orthopedist or pain specialist for follow up     Contusion in [facility]:   A contusion is a bruise that appears on your skin after an injury  A bruise happens when small blood vessels tear but skin does not  Blood leaks into nearby tissue, such as soft tissue or muscle  DISCHARGE INSTRUCTIONS:   Return to the emergency department if:   · You have new trouble moving the injured area  · You have tingling or numbness in or near the injured area  · Your hand or foot below the bruise gets cold or turns pale  Call your doctor if:   · You find a new lump in the injured area  · Your symptoms do not improve with treatment after 4 to 5 days  · You have questions or concerns about your condition or care  Medicines: You may need any of the following:  · NSAIDs  help decrease swelling and pain or fever  This medicine is available with or without a doctor's order  NSAIDs can cause stomach bleeding or kidney problems in certain people  If you take blood thinner medicine, always ask your healthcare provider if NSAIDs are safe for you  Always read the medicine label and follow directions  · Prescription pain medicine  may be given  Ask your healthcare provider how to take this medicine safely  Some prescription pain medicines contain acetaminophen  Do not take other medicines that contain acetaminophen without talking to your healthcare provider  Too much acetaminophen may cause liver damage  Prescription pain medicine may cause constipation  Ask your healthcare provider how to prevent or treat constipation       · Take your medicine as directed  Contact your healthcare provider if you think your medicine is not helping or if you have side effects  Tell him of her if you are allergic to any medicine  Keep a list of the medicines, vitamins, and herbs you take  Include the amounts, and when and why you take them  Bring the list or the pill bottles to follow-up visits  Carry your medicine list with you in case of an emergency  Help a contusion heal:   · Rest the injured area  or use it less than usual  If you bruised your leg or foot, you may need crutches or a cane to help you walk  This will help you keep weight off your injured body part  · Apply ice  to decrease swelling and pain  Ice may also help prevent tissue damage  Use an ice pack, or put crushed ice in a plastic bag  Cover it with a towel and place it on your bruise for 15 to 20 minutes every hour or as directed  · Use compression  to support the area and decrease swelling  Wrap an elastic bandage around the area over the bruised muscle  Make sure the bandage is not too tight  You should be able to fit 1 finger between the bandage and your skin  · Elevate (raise) your injured body part  above the level of your heart to help decrease pain and swelling  Use pillows, blankets, or rolled towels to elevate the area as often as you can  · Do not drink alcohol  as directed  Alcohol may slow healing  · Do not stretch injured muscles  right after your injury  Ask your healthcare provider when and how you may safely stretch after your injury  Gentle stretches can help increase your flexibility  · Do not massage the area or put heating pads  on the bruise right after your injury  Heat and massage may slow healing  Your healthcare provider may tell you to apply heat after several days  At that time, heat will start to help the injury heal     Prevent another contusion:   · Stretch and warm up before you play sports or exercise  · Wear protective gear when you play sports  Examples are shin guards and padding  · If you begin a new physical activity, start slowly to give your body a chance to adjust     Follow up with your doctor as directed:  Write down your questions so you remember to ask them during your visits  © Copyright [employer] 2021 Information is for End User's use only and may not be sold, redistributed or otherwise used for commercial purposes  All illustrations and images included in CareNotes® are the copyrighted property of A D A M , Inc  or Crystal Callejas   The above information is an  only  It is not intended as medical advice for individual conditions or treatments  Talk to your doctor, nurse or pharmacist before following any medical regimen to see if it is safe and effective for you  Knee Pain   WHAT YOU NEED TO KNOW:   Knee pain may start suddenly, or it may be a long-term problem  You may have pain on the side, front, or back of your knee  You may have knee stiffness and swelling  You may hear popping sounds or feel like your knee is giving way or locking up as you walk  You may feel pain when you sit, stand, walk, or climb up and down stairs  Knee pain can be caused by conditions such as obesity, inflammation, or strains or tears in ligaments or tendons  DISCHARGE INSTRUCTIONS:   Return to the emergency department if:   · Your pain is worse, even after treatment  · You cannot bend or straighten your leg completely  · The swelling around your knee does not go down even with treatment  · Your knee is painful and hot to the touch  Contact your healthcare provider if:   · You have questions or concerns about your condition or care  Medicines: You may need any of the following:  · NSAIDs  help decrease swelling and pain or fever  This medicine is available with or without a doctor's order  NSAIDs can cause stomach bleeding or kidney problems in certain people   If you take blood thinner medicine, always ask your healthcare provider if NSAIDs are safe for you  Always read the medicine label and follow directions  · Acetaminophen  decreases pain and fever  It is available without a doctor's order  Ask how much to take and how often to take it  Follow directions  Read the labels of all other medicines you are using to see if they also contain acetaminophen, or ask your doctor or pharmacist  Acetaminophen can cause liver damage if not taken correctly  Do not use more than 4 grams (4,000 milligrams) total of acetaminophen in one day  · Prescription pain medicine  may be given  Ask your healthcare provider how to take this medicine safely  Some prescription pain medicines contain acetaminophen  Do not take other medicines that contain acetaminophen without talking to your healthcare provider  Too much acetaminophen may cause liver damage  Prescription pain medicine may cause constipation  Ask your healthcare provider how to prevent or treat constipation  · Take your medicine as directed  Contact your healthcare provider if you think your medicine is not helping or if you have side effects  Tell him or her if you are allergic to any medicine  Keep a list of the medicines, vitamins, and herbs you take  Include the amounts, and when and why you take them  Bring the list or the pill bottles to follow-up visits  Carry your medicine list with you in case of an emergency  What you can do to manage your symptoms:   · Rest your knee so it can heal   Limit activities that increase your pain  Do low-impact exercises, such as walking or swimming  · Apply ice to help reduce swelling and pain  Use an ice pack, or put crushed ice in a plastic bag  Cover it with a towel before you apply it to your knee  Apply ice for 15 to 20 minutes every hour, or as directed  · Apply compression to help reduce swelling  Use a brace or bandage only as directed  · Elevate your knee to help decrease pain and swelling    Elevate your knee while you are sitting or lying down  Prop your leg on pillows to keep your knee above the level of your heart  · Prevent your knee from moving as directed  Your healthcare provider may put on a cast or splint  You may need to wear a leg brace to stabilize your knee  A leg brace can be adjusted to increase your range of motion as your knee heals  What you can do to prevent knee pain:   · Maintain a healthy weight  Extra weight increases your risk for knee pain  Ask your healthcare provider how much you should weigh  He or she can help you create a safe weight loss plan if you need to lose weight  · Exercise or train properly  Use the correct equipment for sports  Wear shoes that provide good support  Check your posture often as you exercise, play sports, or train for an event  This can help prevent stress and strain on your knees  Rest between sessions so you do not overwork your knees  Follow up with your healthcare provider within 24 hours or as directed: You may need follow-up treatments, such as steroid injections to decrease pain  Write down your questions so you remember to ask them during your visits  © Copyright [employer] 2021 Information is for End User's use only and may not be sold, redistributed or otherwise used for commercial purposes  All illustrations and images included in CareNotes® are the copyrighted property of A D A M , Inc  or [address] [place], [place] [address]jose ramon   The above information is an  only  It is not intended as medical advice for individual conditions or treatments  Talk to your doctor, nurse or pharmacist before following any medical regimen to see if it is safe and effective for you  Swollen Joint   AMBULATORY CARE:   Joint swelling  may occur in one or more joints  You may have other symptoms, such as pain, tenderness, or stiffness  A swollen joint may be caused by a variety of conditions such as arthritis, pseudogout, gout, tendinitis, or injury    Seek care immediately if:   · You cannot move your joint at all  · You have severe pain that does not get better with medicine  Contact your healthcare provider if:   · You have a fever  · You have redness or warmth over the joint  · The swelling does not decrease with treatment  · You have questions or concerns about your condition or care  Treatment for a swollen joint  depends on the cause of your swollen joint  Your healthcare provider may recommend any of the following:  · Rest  your swollen joint  Avoid activities that make the swelling or pain worse  You may need to avoid putting weight on your joint while you have pain  Crutches or a walker can be used to avoid putting weight on joints in your lower body  · Apply ice  on your swollen joint for 15 to 20 minutes every hour or as directed  Use an ice pack, or put crushed ice in a plastic bag  Cover it with a towel  Ice helps prevent tissue damage and decreases swelling and pain  · Apply heat  on your swollen joint for 20 to 30 minutes every 2 hours for as many days as directed  Heat helps decrease pain  · Elevate  your swollen joint above the level of your heart as often as you can  This will help decrease swelling and pain  Prop your joint on pillows or blankets to keep it elevated comfortably  · NSAIDs , such as ibuprofen, help decrease swelling, pain, and fever  This medicine is available with or without a doctor's order  NSAIDs can cause stomach bleeding or kidney problems in certain people  If you take blood thinner medicine, always ask your healthcare provider if NSAIDs are safe for you  Always read the medicine label and follow directions  Follow up with your doctor as directed:  Write down your questions so you remember to ask them during your visits  © Copyright [employer] 2021 Information is for End User's use only and may not be sold, redistributed or otherwise used for commercial purposes   All illustrations and images included in CareNotes® are the copyrighted property of [employer]  or Crystal Callejas   The above information is an  only  It is not intended as medical advice for individual conditions or treatments  Talk to your doctor, nurse or pharmacist before following any medical regimen to see if it is safe and effective for you

## 2022-01-20 NOTE — PROGRESS NOTES
[facility]        NAME: Macrina Suarez is a 46 y o  female  : 1970    MRN: [id number]  DATE: 2022  TIME: 4:13 PM    Assessment and Plan   Contusion of left foot, initial encounter [S90 32XA]  1  Contusion of left foot, initial encounter  lidocaine (Lidoderm) 5 %    naproxen (NAPROSYN) 500 mg tablet   2  Contusion of left knee, initial encounter  lidocaine (Lidoderm) 5 %    naproxen (NAPROSYN) 500 mg tablet   3  Left foot pain  XR foot 3+ vw left    lidocaine (Lidoderm) 5 %    naproxen (NAPROSYN) 500 mg tablet   4  Acute pain of left knee  XR knee 4+ vw left injury    lidocaine (Lidoderm) 5 %    naproxen (NAPROSYN) 500 mg tablet   5  Fall, initial encounter  XR knee 4+ vw left injury    XR foot 3+ vw left    lidocaine (Lidoderm) 5 %    naproxen (NAPROSYN) 500 mg tablet   6  Acute bilateral low back pain with sciatica, sciatica laterality unspecified  lidocaine (Lidoderm) 5 %    naproxen (NAPROSYN) 500 mg tablet    acute on chronic    7  Chronic pain syndrome  lidocaine (Lidoderm) 5 %    naproxen (NAPROSYN) 500 mg tablet         Patient Instructions       Follow up with PCP in 3-5 days  Proceed to  ER if symptoms worsen  Your xray was preliminarily read by your provider  A radiologist will read the xray and you will be notified if it is abnormal     You are to Rest, Ice, compression (ace wrap, splint) elevate  Take tylenol with the naproxen as prescribed  Use the lidoderm patches as prescribed  You are to see your PCP   Go to the ED if symptoms worsen  You are to see your orthopedist or pain specialist for follow up               Chief Complaint     Chief Complaint   Patient presents with    Fall     0500 this am , down stairs at home         History of Present Illness       This is a 46year old female who is brought to care now with c/o a fall this AM at 530  Female with pt states that pt had "peed the bed and was carrying the topper of the bed down the steps"    She states that she has recently started trazodone and it seems to be causing side effects  Pt states she did not have any LOC and did not hit her head  She states she has a history of back surgery (pain contract on file per EMR 10/22/2020)  Pt is c/o left knee and left medial foot pain  She has not taken anything for pain and has not applied any ice  Pt states she is walking on the lateral side of her foot  She states pain from the big to to the top of the foot  Pt denies she is on any blood thinners or aspirin products  PDMP reviewed   No red flags noted       Review of Systems   Review of Systems   Constitutional: Negative  HENT: Negative  Eyes: Negative  Respiratory: Negative  Cardiovascular: Negative  Gastrointestinal: Negative  Endocrine: Negative  Genitourinary: Negative  Musculoskeletal: Positive for back pain  Chronic back pain  Left knee pain   Left foot pain    Skin: Negative  Allergic/Immunologic: Negative  Neurological: Negative  Hematological: Negative  Psychiatric/Behavioral: Negative            Current Medications       Current Outpatient Medications:     albuterol (Ventolin HFA) 90 mcg/act inhaler, Inhale 2 puffs every 4 (four) hours as needed for wheezing, Disp: 18 g, Rfl: 5    budesonide (Pulmicort Flexhaler) 180 MCG/ACT inhaler, Inhale 2 puffs 2 (two) times a day Rinse mouth after use , Disp: 1 each, Rfl: 1    busPIRone (BUSPAR) 15 mg tablet, Take 1 tablet (15 mg total) by mouth 3 (three) times a day, Disp: 30 tablet, Rfl: 0    escitalopram (LEXAPRO) 20 mg tablet, Take 1 tablet (20 mg total) by mouth daily, Disp: 30 tablet, Rfl: 2    gabapentin (NEURONTIN) 300 mg capsule, 1 PO BID x 1 week, then 1 PO TID, Disp: 90 capsule, Rfl: 1    naproxen (NAPROSYN) 500 mg tablet, Take 1 tablet (500 mg total) by mouth 2 (two) times a day with meals, Disp: 60 tablet, Rfl: 1    traZODone (DESYREL) 100 mg tablet, Take 1 tablet (100 mg total) by mouth daily at bedtime, Disp: 30 tablet, Rfl: 0    lidocaine (Lidoderm) 5 %, Apply 1 patch topically daily Remove & Discard patch within 12 hours or as directed by MD, Disp: 6 patch, Rfl: 0    Current Allergies     Allergies as of 01/20/2022    (No Known Allergies)            The following portions of the patient's history were reviewed and updated as appropriate: allergies, current medications, past family history, past medical history, past social history, past surgical history and problem list      Past Medical History:   Diagnosis Date    Anxiety     Back pain     Depression     Insomnia        Past Surgical History:   Procedure Laterality Date    BACK SURGERY  2006    cage in lumbar L5 area    BACK SURGERY      CHOLECYSTECTOMY     [address] N/A 3/16/2018    Procedure: [address] with mesh;  Surgeon: Serjio Greer DO;  Location: [facility] MAIN [facility];  Service: General    AL SHLDR ARTHROSCOP,SURG,W/ROTAT CUFF REPR Left 11/18/2020    Procedure: SHOULDER ARTHROSCOPY, ROTATOR CUFF REPAIR, SYNEVECTOMY, ACROMIOPLASTY DEBRIDEMENT, INJECTION;  Surgeon: Francie Marie DO;  Location: [address] [place] [address] MAIN OR;  Service: Orthopedics       Family History   Adopted: Yes   Family history unknown: Yes         Medications have been verified  Objective   /86   Pulse 92   Temp 98 2 °F (36 8 °C)   Resp 20   Wt 108 kg (239 lb)   LMP 10/10/2016   SpO2 98%   BMI 38 58 kg/m²   Patient's last menstrual period was 10/10/2016  Physical Exam     Physical Exam  Vitals and nursing note reviewed  Constitutional:       General: She is not in acute distress  Appearance: Normal appearance  She is obese  She is not ill-appearing, toxic-appearing or diaphoretic  HENT:      Head: Normocephalic and atraumatic  Eyes:      Extraocular Movements: Extraocular movements intact  Pupils: Pupils are equal, round, and reactive to light     Cardiovascular:      Rate and Rhythm: Normal rate and regular rhythm  Pulses: Normal pulses  Dorsalis pedis pulses are 2+ on the left side  Heart sounds: Normal heart sounds  Pulmonary:      Effort: Pulmonary effort is normal       Breath sounds: Normal breath sounds  Abdominal:      Palpations: Abdomen is soft  Musculoskeletal:         General: Tenderness and signs of injury present  No swelling  Cervical back: Normal range of motion  Feet:    Feet:      Left foot:      Skin integrity: Skin integrity normal       Toenail Condition: Left toenails are normal    Skin:     General: Skin is warm and dry  Capillary Refill: Capillary refill takes less than 2 seconds  Neurological:      General: No focal deficit present  Mental Status: She is alert and oriented to person, place, and time  Psychiatric:         Mood and Affect: Mood normal          Behavior: Behavior normal          Thought Content: Thought content normal          Judgment: Judgment normal              Preliminary reading of xray knee and foot  No acute fracture seen  Waiting on rad read    Orthopedic injury treatment    Date/Time: 1/20/2022 4:00 PM  Performed by: YAYA Kelly  Authorized by: YAYA Kelly     Patient Location:  Clinic  Other Assisting Provider: Yes (comment) Godwin Man RN )    Verbal consent obtained?: Yes    Written consent obtained?: Yes    Emergent situation    Risks and benefits: Risks, benefits and alternatives were discussed    Consent given by:  Patient  Patient states understanding of procedure being performed: Yes    Patient's understanding of procedure matches consent: Yes    Procedure consent matches procedure scheduled: Yes    Relevant documents present and verified: Yes    Test results available and properly labeled: Yes    Site marked: Yes    Radiology Images displayed and confirmed   If images not available, report reviewed: Yes    Required items: Required blood products, implants, devices and special equipment available    Patient identity confirmed:  Verbally with patient and hospital-assigned identification number  Time out: Immediately prior to the procedure a time out was called    Injury location:  Foot  Location details:  Left foot  Injury type:   Soft tissue  Neurovascular status: Neurovascularly intact    Distal perfusion: normal    Neurological function: normal    Range of motion: reduced    Immobilization: cam boot   Neurovascular status: Neurovascularly intact    Distal perfusion: normal    Neurological function: normal    Range of motion comment:  Restricted due to cam boot   Patient tolerance:  Patient tolerated the procedure well with no immediate complications

## 2022-01-25 ENCOUNTER — OFFICE VISIT (OUTPATIENT)
Dept: PAIN MEDICINE | Facility: CLINIC | Age: 52
End: 2022-01-25
Payer: COMMERCIAL

## 2022-01-25 VITALS
DIASTOLIC BLOOD PRESSURE: 93 MMHG | SYSTOLIC BLOOD PRESSURE: 160 MMHG | HEART RATE: 79 BPM | HEIGHT: 66 IN | WEIGHT: 235 LBS | BODY MASS INDEX: 37.77 KG/M2

## 2022-01-25 DIAGNOSIS — Z98.1 STATUS POST LUMBAR SPINAL FUSION: ICD-10-CM

## 2022-01-25 DIAGNOSIS — G89.4 CHRONIC PAIN SYNDROME: ICD-10-CM

## 2022-01-25 DIAGNOSIS — M51.26 LUMBAR DISC HERNIATION: ICD-10-CM

## 2022-01-25 DIAGNOSIS — M54.16 LUMBAR RADICULOPATHY: ICD-10-CM

## 2022-01-25 PROCEDURE — 99214 OFFICE O/P EST MOD 30 MIN: CPT | Performed by: NURSE PRACTITIONER

## 2022-01-25 RX ORDER — GABAPENTIN 300 MG/1
CAPSULE ORAL
Qty: 100 CAPSULE | Refills: 0 | Status: SHIPPED | OUTPATIENT
Start: 2022-01-25 | End: 2022-03-22

## 2022-01-25 RX ORDER — PREGABALIN 50 MG/1
CAPSULE ORAL
Qty: 90 CAPSULE | Refills: 2 | Status: SHIPPED | OUTPATIENT
Start: 2022-01-25 | End: 2022-03-22 | Stop reason: SDUPTHER

## 2022-01-25 NOTE — PROGRESS NOTES
Assessment:  1  Chronic pain syndrome    2  Lumbar radiculopathy    3  Lumbar disc herniation    4  Status post lumbar spinal fusion        Plan:  While the patient was in the office today, I did have a thorough conversation regarding their chronic pain syndrome, medication management, and treatment plan options  Patient is being seen for follow-up visit  She was last seen here on 12/16/2021  In the past  we were planning to proceed with a trial spinal cord stimulator which was put on hold due to some significant pulmonary issues that she was having at the time  Per records, she had postoperative hypoxic respiratory failure after surgery in November of 2020 which required high-flow oxygen supplementation for a few days  She has been following with Pulmonary on a regular basis  She was diagnosed with functional dysphonia and she was referred to voice and respiratory retraining therapy she was also found to have signs of reflux so she was started on a low acid diet by ENT she has completed her therapy but continues to have hoarseness of voice and dyspnea on exertion  Per review of a recent pulmonary note from October she stated that she could not walk a block before having to stop due to dyspnea on exertion  During today's visit, we had a long discussion regarding different options available  I advised her that she would need to obtain pulmonary and cardiac clearance before we would consider spinal cord stimulator implant as an option  This point, patient is not sure if she will pursue this option or not  In the meantime, we will discontinue gabapentin due to lack of efficacy  I provided her with a weaning schedule  Will plan to trial her on Lyrica after she has weaned off of gabapentin  I sent a prescription to her pharmacy for Lyrica 50 mg titrating to 3 times daily  Patient is being prescribed Lexapro 20 mg daily by her PCP    Today, we discussed possibility of switching from Lexapro to Cymbalta which would treat axial back pain and radicular pain in addition to depression  She will discuss this with her PCP  The patient will follow-up in 2 months for medication prescription refill and reevaluation  The patient was advised to contact the office should their symptoms worsen in the interim  The patient was agreeable and verbalized an understanding  History of Present Illness: The patient is a 46 y o  female who presents for a follow up office visit in regards to Back Pain, Leg Pain, and Knee Pain  The patients current symptoms include complaints of low back pain radiating down both legs  Current pain level is a 9/10  Quality pain is described as sharp, throbbing, shooting, numb, pins and needles  Current pain medications includes:  Gabapentin  She has increased her dose to 1200 milligrams 3 times daily     The patient reports that this regimen is providing 0 % pain relief  The patient is reporting no side effects from this pain medication regimen  I have personally reviewed and/or updated the patient's past medical history, past surgical history, family history, social history, current medications, allergies, and vital signs today  Review of Systems  Review of Systems   Musculoskeletal: Positive for back pain and gait problem  Decreased range of motion     All other systems reviewed and are negative          Past Medical History:   Diagnosis Date    Anxiety     Back pain     Depression     Insomnia        Past Surgical History:   Procedure Laterality Date    BACK SURGERY  2006    cage in lumbar L5 area    BACK SURGERY      CHOLECYSTECTOMY      LUMBAR [address]      TN REPAIR INCISIONAL HERNIA,REDUCIBLE N/A 3/16/2018    Procedure: REPAIR HERNIA VENTRAL with mesh;  Surgeon: Linh Velásquez DO;  Location: [facility];  Service: General    TN SHLDR ARTHROSCOP,SURG,W/ROTAT CUFF REPR Left 11/18/2020    Procedure: SHOULDER ARTHROSCOPY, ROTATOR CUFF REPAIR, SYNEVECTOMY, ACROMIOPLASTY DEBRIDEMENT, INJECTION;  Surgeon: Elaine Terrazas DO;  Location: [address] [place] [address] MAIN OR;  Service: Orthopedics       Family History   Adopted: Yes   Family history unknown: Yes       Social History     Occupational History    Not on file   Tobacco Use    Smoking status: Former Smoker     Packs/day: 0 50     Years: 0 00     Pack years: 0 00     Types: Cigarettes     Quit date: 10/17/2020     Years since quittin 2    Smokeless tobacco: Never Used    Tobacco comment: 1 pack a day for 20 years   Vaping Use    Vaping Use: Never used   Substance and Sexual Activity    Alcohol use: Never     Alcohol/week: 0 0 standard drinks    Drug use: Never    Sexual activity: Not Currently         Current Outpatient Medications:     busPIRone (BUSPAR) 15 mg tablet, Take 1 tablet (15 mg total) by mouth 3 (three) times a day, Disp: 30 tablet, Rfl: 0    escitalopram (LEXAPRO) 20 mg tablet, Take 1 tablet (20 mg total) by mouth daily, Disp: 30 tablet, Rfl: 2    gabapentin (NEURONTIN) 300 mg capsule, 2 PO TID x 1 wk, then 1 PO TID x 1 week, then decrease by 1 pill weekly until finished, Disp: 100 capsule, Rfl: 0    lidocaine (Lidoderm) 5 %, Apply 1 patch topically daily Remove & Discard patch within 12 hours or as directed by MD, Disp: 6 patch, Rfl: 0    naproxen (NAPROSYN) 500 mg tablet, Take 1 tablet (500 mg total) by mouth 2 (two) times a day with meals, Disp: 60 tablet, Rfl: 1    traZODone (DESYREL) 100 mg tablet, Take 1 tablet (100 mg total) by mouth daily at bedtime, Disp: 30 tablet, Rfl: 0    albuterol (Ventolin HFA) 90 mcg/act inhaler, Inhale 2 puffs every 4 (four) hours as needed for wheezing, Disp: 18 g, Rfl: 5    budesonide (Pulmicort Flexhaler) 180 MCG/ACT inhaler, Inhale 2 puffs 2 (two) times a day Rinse mouth after use , Disp: 1 each, Rfl: 1    pregabalin (LYRICA) 50 mg capsule, 1 PO QHS x 3 days, then 1 PO BID x 3 days, then 1 PO TID, Disp: 90 capsule, Rfl: 2    No Known Allergies    Physical Exam:    /93   Pulse 79   Ht 5' 6" (1 676 m)   Wt 107 kg (235 lb)   LMP 10/10/2016   BMI 37 93 kg/m²     Constitutional:normal, well developed, well nourished, alert, in no distress and non-toxic and no overt pain behavior  and overweight  Eyes:anicteric  HEENT:grossly intact  Neck:supple, symmetric, trachea midline and no masses   Pulmonary:even and unlabored  Cardiovascular:No edema or pitting edema present  Skin:Normal without rashes or lesions and well hydrated  Psychiatric:Mood and affect appropriate  Neurologic:Cranial Nerves II-XII grossly intact  Musculoskeletal:Gait is slow and guarded  Imaging  No orders to display       No orders of the defined types were placed in this encounter

## 2022-01-25 NOTE — PATIENT INSTRUCTIONS
Decrease gabapentin as follows:    600 mg 3 times daily for 1 week, then  300 mg 3 times daily for 1 week, then  300 mg twice daily for 1 week, then  300 mg once daily for 1 week, then  Discontinue  Then, start Lyrica  Duloxetine (By mouth)   Duloxetine (doo-LOX-e-teen)  Treats depression, anxiety, diabetic peripheral neuropathy, fibromyalgia, and chronic muscle or bone pain  This medicine is an NRI  Brand Name(s): Cymbalta, Drizalma Sprinkle, Irenka   There may be other brand names for this medicine  When This Medicine Should Not Be Used: This medicine is not right for everyone  Do not use it if you had an allergic reaction to duloxetine  How to Use This Medicine:   Capsule, Delayed Release Capsule  · Take your medicine as directed  Your dose may need to be changed several times to find what works best for you  · Delayed-release capsule: Swallow the capsule whole  Do not crush, chew, break, or open it  Do not open the Cymbalta® delayed-release capsule and sprinkle the contents on food or in liquids  · If you have trouble swallowing the Ford Redden delayed release capsule:   ? You may open the capsule and sprinkle the contents over one tablespoon (15 mL) of applesauce  Swallow the mixture right away and do not save any of the mixture to use later  ? You may open the capsule and pour the contents to an all plastic catheter tip syringe and add 50 mL of water  Do not use other liquids  Gently shake it for 10 seconds, and then use it through a nasogastric tube  Rinse with additional water (about 15 mL) if needed  · This medicine should come with a Medication Guide  Ask your pharmacist for a copy if you do not have one  · Missed dose: Take a dose as soon as you remember  If it is almost time for your next dose, wait until then and take a regular dose  Do not take extra medicine to make up for a missed dose    · Store the medicine in a closed container at room temperature, away from heat, moisture, and direct light  Drugs and Foods to Avoid:   Ask your doctor or pharmacist before using any other medicine, including over-the-counter medicines, vitamins, and herbal products  · Do not take duloxetine if you have used an MAO inhibitor (MAOI) within the past 14 days  Do not start taking an MAO inhibitor within 5 days of stopping duloxetine  Ask your doctor if you are not sure if you take an MAOI, including linezolid or methylene blue injection  · Some medicines can affect how duloxetine works  Tell your doctor if you are using any of the following:  ? Buspirone, cimetidine, ciprofloxacin, enoxacin, fentanyl, fluvoxamine, lithium, Juan Carlos's wort, theophylline, tramadol, tryptophan, warfarin  ? Amphetamines  ? Blood pressure medicine  ? Diuretic (water pill)  ? Medicine for heart rhythm problems (including flecainide, propafenone, quinidine)  ? Medicine to treat migraine headaches (including triptans)  ? NSAID pain or arthritis medicine (including aspirin, celecoxib, diclofenac, ibuprofen, naproxen)  ? Other medicine to treat depression or mood disorders (including amitriptyline, desipramine, fluoxetine, imipramine, nortriptyline, paroxetine)  ? Phenothiazine medicine (including thioridazine)  ? Stomach medicine (including famotidine, antacids containing aluminum or magnesium, PPIs)  · Tell your doctor if you use anything else that makes you sleepy  Some examples are allergy medicine, narcotic pain medicine, and alcohol  · Do not drink alcohol while you are using this medicine  Warnings While Using This Medicine:   · Tell your doctor if you are pregnant or breastfeeding, or if you have kidney disease, liver disease, bleeding problems, diabetes, digestion problems, glaucoma, heart disease, high or low blood pressure, or problems with urination   Tell your doctor if you smoke or you have a history of seizures, mental health problems (including bipolar disorder, belle), or drug or alcohol addiction  · This medicine may cause the following problems:   ? Serious liver problems  ? Serotonin syndrome, when used with certain medicines  ? Increased risk of bleeding problems  ? Serious skin reactions, including erythema multiforme, Green-Master syndrome  ? Low sodium levels in the blood  ? Sexual problems  · This medicine can increase thoughts of suicide  Tell your doctor right away if you start to feel depressed and have thoughts about hurting yourself  · This medicine may cause blurred vision, dizziness, drowsiness, trouble with thinking, or trouble with controlling body movements, which may lead to falls, fractures, or other injuries  Do not drive or do anything that could be dangerous until you know how this medicine affects you  Stand up slowly to avoid falls  · Do not stop using this medicine suddenly  Your doctor will need to slowly decrease your dose before you stop it completely  · Your doctor will check your progress and the effects of this medicine at regular visits  Keep all appointments  · Keep all medicine out of the reach of children  Never share your medicine with anyone    Possible Side Effects While Using This Medicine:   Call your doctor right away if you notice any of these side effects:  · Allergic reaction: Itching or hives, swelling in your face or hands, swelling or tingling in your mouth or throat, chest tightness, trouble breathing  · Anxiety, restlessness, fever, fast heartbeat, sweating, muscle spasms, diarrhea, seeing or hearing things that are not there  · Blistering, peeling, red skin rash  · Confusion, weakness, muscle twitching  · Dark urine or pale stools, nausea, vomiting, loss of appetite, stomach pain, yellow skin or eyes  · Decrease in how much or how often you urinate  · Decrease in sexual ability, desire, drive, or performance  · Eye pain, vision changes, seeing halos around lights  · Feeling more energetic than usual  · Lightheadedness, dizziness, fainting  · Unusual moods or behaviors, worsening depression, thoughts about hurting yourself, trouble sleeping  · Unusual bleeding or bruising  If you notice these less serious side effects, talk with your doctor:   · Decrease in appetite or weight  · Dry mouth, constipation, mild nausea  · Headache  · Unusual drowsiness, sleepiness, or tiredness  If you notice other side effects that you think are caused by this medicine, tell your doctor  Call your doctor for medical advice about side effects  You may report side effects to FDA at 4-369-YKG-5010    © Copyright [employer] 2021 Information is for End User's use only and may not be sold, redistributed or otherwise used for commercial purposes  The above information is an  only  It is not intended as medical advice for individual conditions or treatments  Talk to your doctor, nurse or pharmacist before following any medical regimen to see if it is safe and effective for you  Pregabalin (By mouth)   Pregabalin (pre-GA-ba-yvette)  Treats nerve and muscle pain, including fibromyalgia  Also treats partial-onset seizures  Brand Name(s): Lyrica, Lyrica CR   There may be other brand names for this medicine  When This Medicine Should Not Be Used: This medicine is not right for everyone  Do not use it if you had an allergic reaction to pregabalin  How to Use This Medicine:   Capsule, Liquid, Long Acting Tablet  · Take your medicine as directed  Your dose may need to be changed several times to find what works best for you  · Extended-release tablet: Swallow the extended-release tablet whole  Do not crush, break, or chew it  Take it after an evening meal   · Oral liquid: Measure the oral liquid medicine with a marked measuring spoon, oral syringe, or medicine cup  · This medicine should come with a Medication Guide  Ask your pharmacist for a copy if you do not have one  · Missed dose: Take a dose as soon as you remember   If it is almost time for your next dose, wait until then and take a regular dose  Do not take extra medicine to make up for a missed dose  If you miss a dose of the extended-release tablet after your evening meal, take it before bedtime after a snack  If you miss the dose before bedtime, take it after your morning meal  If you do not take the dose the following morning, then take the next dose at your regular time after your evening meal  Do not take 2 doses at the same time  · Store the medicine in a closed container at room temperature, away from heat, moisture, and direct light  Drugs and Foods to Avoid:   Ask your doctor or pharmacist before using any other medicine, including over-the-counter medicines, vitamins, and herbal products  · Some medicines can affect how pregabalin works  Tell your doctor if you are using any of the following:   ? ACE inhibitor (including benazepril, enalapril, lisinopril, quinapril, ramipril)  ? Oral diabetes medicine (including metformin, pioglitazone, rosiglitazone)  · Do not drink alcohol while you are using this medicine  · Tell your doctor if you use anything else that makes you sleepy  Some examples are allergy medicine, narcotic pain medicine, and alcohol  Tell your doctor if you are also using oxycodone, lorazepam, or zolpidem  Warnings While Using This Medicine:   · Tell your doctor if you are pregnant or breastfeeding, or if you have kidney disease, heart failure, heart rhythm problems, lung or breathing problems, a bleeding disorder, diabetes, sores or skin problems, or a low blood platelet count  Tell your doctor if you have a history of angioedema (severe swelling), alcohol or drug abuse, depression, or other mood problems  · This medicine may cause the following problems:   ? Angioedema (severe swelling), which may be life-threatening  ? Changes in mood or behavior, including suicidal thoughts or behavior  ?  Respiratory depression (serious breathing problem that can be life-threatening), when used with narcotic pain medicines  ? Peripheral edema (swelling of your hands, ankles, feet, or lower legs)  ? Increased risk for cancer and bleeding  ? Serious muscle problems  ? Heart rhythm changes  · This medicine may make you dizzy or drowsy  It may also cause blurry or double vision  Do not drive or do anything else that could be dangerous until you know how this medicine affects you  · Do not stop using this medicine suddenly  Your doctor will need to slowly decrease your dose before you stop it completely  · Your doctor will do lab tests at regular visits to check on the effects of this medicine  Keep all appointments  · Keep all medicine out of the reach of children  Never share your medicine with anyone  Possible Side Effects While Using This Medicine:   Call your doctor right away if you notice any of these side effects:  · Allergic reaction: Itching or hives, swelling in your face or hands, swelling or tingling in your mouth or throat, chest tightness, trouble breathing  · Blistering, peeling, red skin rash  · Blue lips, fingernails, or skin, trouble breathing, chest pain  · Blurry or double vision  · Fever, chills, cough, sore throat, body aches  · Muscle pain, tenderness, or weakness, general feeling of illness  · Rapid weight gain, swelling in your hands, ankles, or feet  · Severe dizziness or drowsiness  · Sudden mood changes, unusual moods or behavior, including extreme happiness or depression, thoughts or attempts of killing oneself  · Swelling in your throat, head, or neck  · Uneven heartbeat  · Unusual bleeding, bruising, or weakness  If you notice these less serious side effects, talk with your doctor:   · Confusion, trouble concentrating  · Constipation  · Dry mouth  If you notice other side effects that you think are caused by this medicine, tell your doctor  Call your doctor for medical advice about side effects   You may report side effects to FDA at 1-800-FDA-1088    © Copyright [employer] 2021 Information is for End User's use only and may not be sold, redistributed or otherwise used for commercial purposes  The above information is an  only  It is not intended as medical advice for individual conditions or treatments  Talk to your doctor, nurse or pharmacist before following any medical regimen to see if it is safe and effective for you

## 2022-02-10 DIAGNOSIS — F32.A ANXIETY AND DEPRESSION: ICD-10-CM

## 2022-02-10 DIAGNOSIS — F41.9 ANXIETY AND DEPRESSION: ICD-10-CM

## 2022-02-10 DIAGNOSIS — G47.00 INSOMNIA, UNSPECIFIED TYPE: ICD-10-CM

## 2022-02-10 RX ORDER — BUSPIRONE HYDROCHLORIDE 15 MG/1
TABLET ORAL
Qty: 30 TABLET | Refills: 5 | OUTPATIENT
Start: 2022-02-10

## 2022-02-10 RX ORDER — TRAZODONE HYDROCHLORIDE 100 MG/1
TABLET ORAL
Qty: 30 TABLET | Refills: 5 | OUTPATIENT
Start: 2022-02-10

## 2022-03-09 ENCOUNTER — TELEPHONE (OUTPATIENT)
Dept: FAMILY MEDICINE CLINIC | Facility: CLINIC | Age: 52
End: 2022-03-09

## 2022-03-09 ENCOUNTER — HOSPITAL ENCOUNTER (EMERGENCY)
Facility: HOSPITAL | Age: 52
Discharge: HOME | End: 2022-03-09
Attending: EMERGENCY MEDICINE | Admitting: EMERGENCY MEDICINE
Payer: COMMERCIAL

## 2022-03-09 ENCOUNTER — APPOINTMENT (EMERGENCY)
Dept: CT IMAGING | Facility: HOSPITAL | Age: 52
End: 2022-03-09
Payer: COMMERCIAL

## 2022-03-09 VITALS
RESPIRATION RATE: 20 BRPM | TEMPERATURE: 97.2 F | HEIGHT: 66 IN | SYSTOLIC BLOOD PRESSURE: 115 MMHG | WEIGHT: 235.89 LBS | HEART RATE: 64 BPM | BODY MASS INDEX: 37.91 KG/M2 | DIASTOLIC BLOOD PRESSURE: 65 MMHG | OXYGEN SATURATION: 94 %

## 2022-03-09 DIAGNOSIS — R91.1 RIGHT UPPER LOBE PULMONARY NODULE: ICD-10-CM

## 2022-03-09 DIAGNOSIS — J40 BRONCHITIS: ICD-10-CM

## 2022-03-09 DIAGNOSIS — N28.1 RENAL CYST, LEFT: ICD-10-CM

## 2022-03-09 DIAGNOSIS — D35.02 BILATERAL ADRENAL ADENOMAS: ICD-10-CM

## 2022-03-09 DIAGNOSIS — R06.00 DYSPNEA: Primary | ICD-10-CM

## 2022-03-09 DIAGNOSIS — K57.90 DIVERTICULOSIS: ICD-10-CM

## 2022-03-09 DIAGNOSIS — N20.0 NEPHROLITHIASIS: ICD-10-CM

## 2022-03-09 DIAGNOSIS — D35.01 BILATERAL ADRENAL ADENOMAS: ICD-10-CM

## 2022-03-09 LAB
2HR DELTA HS TROPONIN: -1 NG/L
ALBUMIN SERPL BCP-MCNC: 3.8 G/DL (ref 3.5–5)
ALP SERPL-CCNC: 93 U/L (ref 46–116)
ALT SERPL W P-5'-P-CCNC: 23 U/L (ref 12–78)
ANION GAP SERPL CALCULATED.3IONS-SCNC: 8 MMOL/L (ref 4–13)
AST SERPL W P-5'-P-CCNC: 7 U/L (ref 5–45)
BASOPHILS # BLD AUTO: 0.03 THOUSANDS/ΜL (ref 0–0.1)
BASOPHILS NFR BLD AUTO: 0 % (ref 0–1)
BILIRUB SERPL-MCNC: 0.35 MG/DL (ref 0.2–1)
BUN SERPL-MCNC: 17 MG/DL (ref 5–25)
CALCIUM SERPL-MCNC: 9.1 MG/DL (ref 8.3–10.1)
CARDIAC TROPONIN I PNL SERPL HS: 4 NG/L
CARDIAC TROPONIN I PNL SERPL HS: 5 NG/L
CHLORIDE SERPL-SCNC: 105 MMOL/L (ref 100–108)
CO2 SERPL-SCNC: 28 MMOL/L (ref 21–32)
CREAT SERPL-MCNC: 0.74 MG/DL (ref 0.6–1.3)
D DIMER PPP FEU-MCNC: 0.52 UG/ML FEU
EOSINOPHIL # BLD AUTO: 0.19 THOUSAND/ΜL (ref 0–0.61)
EOSINOPHIL NFR BLD AUTO: 3 % (ref 0–6)
ERYTHROCYTE [DISTWIDTH] IN BLOOD BY AUTOMATED COUNT: 14.4 % (ref 11.6–15.1)
GFR SERPL CREATININE-BSD FRML MDRD: 92 ML/MIN/1.73SQ M
GLUCOSE SERPL-MCNC: 131 MG/DL (ref 65–140)
HCT VFR BLD AUTO: 49.2 % (ref 34.8–46.1)
HGB BLD-MCNC: 16.4 G/DL (ref 11.5–15.4)
IMM GRANULOCYTES # BLD AUTO: 0.03 THOUSAND/UL (ref 0–0.2)
IMM GRANULOCYTES NFR BLD AUTO: 0 % (ref 0–2)
LIPASE SERPL-CCNC: 59 U/L (ref 73–393)
LYMPHOCYTES # BLD AUTO: 2.09 THOUSANDS/ΜL (ref 0.6–4.47)
LYMPHOCYTES NFR BLD AUTO: 29 % (ref 14–44)
MCH RBC QN AUTO: 29.4 PG (ref 26.8–34.3)
MCHC RBC AUTO-ENTMCNC: 33.3 G/DL (ref 31.4–37.4)
MCV RBC AUTO: 88 FL (ref 82–98)
MONOCYTES # BLD AUTO: 0.6 THOUSAND/ΜL (ref 0.17–1.22)
MONOCYTES NFR BLD AUTO: 8 % (ref 4–12)
NEUTROPHILS # BLD AUTO: 4.39 THOUSANDS/ΜL (ref 1.85–7.62)
NEUTS SEG NFR BLD AUTO: 60 % (ref 43–75)
NRBC BLD AUTO-RTO: 0 /100 WBCS
NT-PROBNP SERPL-MCNC: 228 PG/ML
PLATELET # BLD AUTO: 270 THOUSANDS/UL (ref 149–390)
PMV BLD AUTO: 10.7 FL (ref 8.9–12.7)
POTASSIUM SERPL-SCNC: 3.8 MMOL/L (ref 3.5–5.3)
PROT SERPL-MCNC: 6.9 G/DL (ref 6.4–8.2)
RBC # BLD AUTO: 5.57 MILLION/UL (ref 3.81–5.12)
SODIUM SERPL-SCNC: 141 MMOL/L (ref 136–145)
WBC # BLD AUTO: 7.33 THOUSAND/UL (ref 4.31–10.16)

## 2022-03-09 PROCEDURE — 99285 EMERGENCY DEPT VISIT HI MDM: CPT | Performed by: PHYSICIAN ASSISTANT

## 2022-03-09 PROCEDURE — 36415 COLL VENOUS BLD VENIPUNCTURE: CPT | Performed by: EMERGENCY MEDICINE

## 2022-03-09 PROCEDURE — 85379 FIBRIN DEGRADATION QUANT: CPT | Performed by: PHYSICIAN ASSISTANT

## 2022-03-09 PROCEDURE — 71275 CT ANGIOGRAPHY CHEST: CPT

## 2022-03-09 PROCEDURE — 85025 COMPLETE CBC W/AUTO DIFF WBC: CPT | Performed by: EMERGENCY MEDICINE

## 2022-03-09 PROCEDURE — 96374 THER/PROPH/DIAG INJ IV PUSH: CPT

## 2022-03-09 PROCEDURE — G1004 CDSM NDSC: HCPCS

## 2022-03-09 PROCEDURE — 83690 ASSAY OF LIPASE: CPT | Performed by: EMERGENCY MEDICINE

## 2022-03-09 PROCEDURE — 84484 ASSAY OF TROPONIN QUANT: CPT | Performed by: PHYSICIAN ASSISTANT

## 2022-03-09 PROCEDURE — 93005 ELECTROCARDIOGRAM TRACING: CPT

## 2022-03-09 PROCEDURE — 80053 COMPREHEN METABOLIC PANEL: CPT | Performed by: EMERGENCY MEDICINE

## 2022-03-09 PROCEDURE — 99285 EMERGENCY DEPT VISIT HI MDM: CPT

## 2022-03-09 PROCEDURE — 83880 ASSAY OF NATRIURETIC PEPTIDE: CPT | Performed by: PHYSICIAN ASSISTANT

## 2022-03-09 PROCEDURE — 94640 AIRWAY INHALATION TREATMENT: CPT

## 2022-03-09 PROCEDURE — 96365 THER/PROPH/DIAG IV INF INIT: CPT

## 2022-03-09 RX ORDER — IPRATROPIUM BROMIDE AND ALBUTEROL SULFATE 2.5; .5 MG/3ML; MG/3ML
3 SOLUTION RESPIRATORY (INHALATION) ONCE
Status: COMPLETED | OUTPATIENT
Start: 2022-03-09 | End: 2022-03-09

## 2022-03-09 RX ORDER — PREDNISONE 20 MG/1
40 TABLET ORAL ONCE
Status: COMPLETED | OUTPATIENT
Start: 2022-03-09 | End: 2022-03-09

## 2022-03-09 RX ORDER — DOXYCYCLINE HYCLATE 100 MG/1
100 CAPSULE ORAL 2 TIMES DAILY
Qty: 14 CAPSULE | Refills: 0 | Status: SHIPPED | OUTPATIENT
Start: 2022-03-09 | End: 2022-03-16

## 2022-03-09 RX ORDER — KETOROLAC TROMETHAMINE 30 MG/ML
15 INJECTION, SOLUTION INTRAMUSCULAR; INTRAVENOUS ONCE
Status: COMPLETED | OUTPATIENT
Start: 2022-03-09 | End: 2022-03-09

## 2022-03-09 RX ORDER — ALBUTEROL SULFATE 90 UG/1
1-2 AEROSOL, METERED RESPIRATORY (INHALATION) EVERY 4 HOURS PRN
Qty: 8 G | Refills: 0 | Status: SHIPPED | OUTPATIENT
Start: 2022-03-09 | End: 2022-05-25

## 2022-03-09 RX ORDER — PREDNISONE 10 MG/1
TABLET ORAL
Qty: 17 TABLET | Refills: 0 | Status: SHIPPED | OUTPATIENT
Start: 2022-03-10 | End: 2022-03-18

## 2022-03-09 RX ORDER — ACETAMINOPHEN 325 MG/1
650 TABLET ORAL ONCE
Status: COMPLETED | OUTPATIENT
Start: 2022-03-09 | End: 2022-03-09

## 2022-03-09 RX ORDER — BENZONATATE 100 MG/1
100 CAPSULE ORAL ONCE
Status: COMPLETED | OUTPATIENT
Start: 2022-03-09 | End: 2022-03-09

## 2022-03-09 RX ORDER — BENZONATATE 100 MG/1
100 CAPSULE ORAL 3 TIMES DAILY PRN
Qty: 21 CAPSULE | Refills: 0 | Status: SHIPPED | OUTPATIENT
Start: 2022-03-09 | End: 2022-05-25

## 2022-03-09 RX ORDER — MAGNESIUM SULFATE HEPTAHYDRATE 40 MG/ML
2 INJECTION, SOLUTION INTRAVENOUS ONCE
Status: COMPLETED | OUTPATIENT
Start: 2022-03-09 | End: 2022-03-09

## 2022-03-09 RX ORDER — DOXYCYCLINE HYCLATE 100 MG/1
100 CAPSULE ORAL ONCE
Status: COMPLETED | OUTPATIENT
Start: 2022-03-09 | End: 2022-03-09

## 2022-03-09 RX ADMIN — IOHEXOL 85 ML: 350 INJECTION, SOLUTION INTRAVENOUS at 10:28

## 2022-03-09 RX ADMIN — PREDNISONE 40 MG: 20 TABLET ORAL at 12:03

## 2022-03-09 RX ADMIN — IPRATROPIUM BROMIDE AND ALBUTEROL SULFATE 3 ML: 2.5; .5 SOLUTION RESPIRATORY (INHALATION) at 09:12

## 2022-03-09 RX ADMIN — MAGNESIUM SULFATE HEPTAHYDRATE 2 G: 40 INJECTION, SOLUTION INTRAVENOUS at 11:16

## 2022-03-09 RX ADMIN — KETOROLAC TROMETHAMINE 15 MG: 30 INJECTION, SOLUTION INTRAMUSCULAR at 09:12

## 2022-03-09 RX ADMIN — BENZONATATE 100 MG: 100 CAPSULE ORAL at 09:12

## 2022-03-09 RX ADMIN — DOXYCYCLINE HYCLATE 100 MG: 100 CAPSULE ORAL at 12:03

## 2022-03-09 RX ADMIN — ACETAMINOPHEN 325MG 650 MG: 325 TABLET ORAL at 10:00

## 2022-03-09 NOTE — ED PROVIDER NOTES
History  Chief Complaint   Patient presents with    Shortness of Breath     patient reports she was covid positive two weeks ago and has been sob since  denies any at home breathing treatments  46year old female with PMH restrictive lung disease, anxiety/depression presents ambulatory from home for evaluation of dyspnea  Pt reports she was recently in [place]   She states while she was there she tested positive for covid on 2 home tests on 2/27/22  She reports she had symptoms of headache, body aches, congestion, cough  She states she wasn't able to fly home and had to wait  She states on Saturday she tested negative on a repeat covid test and then came home  She reports she continues with cough and congestion  She also has been feeling more short of breath  She reports chronic shortness of breath but this is worse form her baseline  This is worse with coughing  Cough is dry and non productive  Denies chest pain  Denies dizziness or lightheadedness  She reports she had one episode of vomiting the other day  Denies abdominal pain, diarrhea  Denies leg pain or swelling  No reported alleviating factors  She states she called her PCP and was referred to ED for evaluation  She has received 2 prior covid vaccines  No known sick contacts  Denies personal h/o DVT or PE  She is a former smoker        History provided by:  Patient and medical records   used: No    Shortness of Breath  Relieved by:  Nothing  Worsened by:  Coughing  Associated symptoms: cough, headaches and vomiting (x1 episode the other day)    Associated symptoms: no abdominal pain, no chest pain, no diaphoresis, no fever, no hemoptysis, no PND, no rash, no sore throat, no sputum production, no syncope and no wheezing    Risk factors: obesity    Risk factors: no hx of cancer, no hx of PE/DVT, no prolonged immobilization, no recent surgery and no tobacco use (former)        Prior to Admission Medications Prescriptions Last Dose Informant Patient Reported? Taking? albuterol (Ventolin HFA) 90 mcg/act inhaler   No No   Sig: Inhale 2 puffs every 4 (four) hours as needed for wheezing   budesonide (Pulmicort Flexhaler) 180 MCG/ACT inhaler   No No   Sig: Inhale 2 puffs 2 (two) times a day Rinse mouth after use     busPIRone (BUSPAR) 15 mg tablet   No No   Sig: Take 1 tablet (15 mg total) by mouth 3 (three) times a day   escitalopram (LEXAPRO) 20 mg tablet   No No   Sig: Take 1 tablet (20 mg total) by mouth daily   gabapentin (NEURONTIN) 300 mg capsule   No No   Si PO TID x 1 wk, then 1 PO TID x 1 week, then decrease by 1 pill weekly until finished   lidocaine (Lidoderm) 5 %   No No   Sig: Apply 1 patch topically daily Remove & Discard patch within 12 hours or as directed by MD   naproxen (NAPROSYN) 500 mg tablet   No No   Sig: Take 1 tablet (500 mg total) by mouth 2 (two) times a day with meals   pregabalin (LYRICA) 50 mg capsule   No No   Si PO QHS x 3 days, then 1 PO BID x 3 days, then 1 PO TID   traZODone (DESYREL) 100 mg tablet   No No   Sig: Take 1 tablet (100 mg total) by mouth daily at bedtime      Facility-Administered Medications: None       Past Medical History:   Diagnosis Date    Anxiety     Back pain     Depression     Insomnia        Past Surgical History:   Procedure Laterality Date    BACK SURGERY      cage in lumbar L5 area    BACK SURGERY      CHOLECYSTECTOMY      LUMBAR [address] N/A 3/16/2018    Procedure: REPAIR HERNIA VENTRAL with mesh;  Surgeon: Lela Mares DO;  Location: [facility] MA[facility];  Service: General    MT SHLDR ARTHROSCOP,SURG,W/ROTAT CUFF REPR Left 2020    Procedure: SHOULDER ARTHROSCOPY, ROTATOR CUFF REPAIR, SYNEVECTOMY, ACROMIOPLASTY DEBRIDEMENT, INJECTION;  Surgeon: Gilmer Ochoa DO;  Location: [facility] MAIN OR;  Service: Orthopedics       Family History   Adopted: Yes   Family history unknown: Yes     I have reviewed and agree with the history as documented  E-Cigarette/Vaping    E-Cigarette Use Never User      E-Cigarette/Vaping Substances    Nicotine No     THC No     CBD No     Flavoring No     Other No     Unknown No      Social History     Tobacco Use    Smoking status: Former Smoker     Packs/day: 0 50     Years: 0 00     Pack years: 0 00     Types: Cigarettes     Quit date: 10/17/2020     Years since quittin 3    Smokeless tobacco: Never Used    Tobacco comment: 1 pack a day for 20 years   Vaping Use    Vaping Use: Never used   Substance Use Topics    Alcohol use: Never     Alcohol/week: 0 0 standard drinks    Drug use: Never       Review of Systems   Constitutional: Positive for fatigue  Negative for chills, diaphoresis and fever  HENT: Positive for congestion  Negative for rhinorrhea and sore throat  Eyes: Negative  Negative for visual disturbance  Respiratory: Positive for cough and shortness of breath  Negative for hemoptysis, sputum production and wheezing  Cardiovascular: Negative  Negative for chest pain, palpitations, leg swelling, syncope and PND  Gastrointestinal: Positive for vomiting (x1 episode the other day)  Negative for abdominal pain, constipation, diarrhea and nausea  Genitourinary: Negative  Negative for dysuria, flank pain, frequency and hematuria  Musculoskeletal: Positive for myalgias  Negative for back pain  Skin: Negative  Negative for rash  Neurological: Positive for headaches  Negative for dizziness and light-headedness  Psychiatric/Behavioral: Negative  Negative for confusion  All other systems reviewed and are negative  Physical Exam  Physical Exam  Vitals and nursing note reviewed  Constitutional:       General: She is not in acute distress  Appearance: She is well-developed  She is obese  She is not toxic-appearing  HENT:      Head: Normocephalic and atraumatic        Right Ear: Hearing, tympanic membrane, ear canal and external ear normal       Left Ear: Hearing, tympanic membrane, ear canal and external ear normal       Nose: Nose normal       Mouth/Throat:      Mouth: Mucous membranes are moist       Pharynx: Oropharynx is clear  Uvula midline  Eyes:      General: Lids are normal  No scleral icterus  Conjunctiva/sclera: Conjunctivae normal       Pupils: Pupils are equal, round, and reactive to light  Neck:      Trachea: Trachea and phonation normal  No tracheal deviation  Cardiovascular:      Rate and Rhythm: Normal rate and regular rhythm  Pulses: Normal pulses  Radial pulses are 2+ on the right side and 2+ on the left side  Dorsalis pedis pulses are 2+ on the right side and 2+ on the left side  Posterior tibial pulses are 2+ on the right side and 2+ on the left side  Heart sounds: Normal heart sounds, S1 normal and S2 normal  No murmur heard  Pulmonary:      Effort: Pulmonary effort is normal  No tachypnea or respiratory distress  Breath sounds: Wheezing (faint exp wheezes b/l) present  No rhonchi or rales  Abdominal:      General: Bowel sounds are normal  There is no distension  Palpations: Abdomen is soft  Tenderness: There is no abdominal tenderness  There is no guarding  Musculoskeletal:         General: No tenderness  Cervical back: Neck supple  Right lower leg: No edema  Left lower leg: No edema  Skin:     General: Skin is warm and dry  Capillary Refill: Capillary refill takes less than 2 seconds  Findings: No rash  Neurological:      General: No focal deficit present  Mental Status: She is alert and oriented to person, place, and time  GCS: GCS eye subscore is 4  GCS verbal subscore is 5  GCS motor subscore is 6        Gait: Gait normal    Psychiatric:         Mood and Affect: Mood normal          Speech: Speech normal          Behavior: Behavior normal          Vital Signs  ED Triage Vitals   Temperature Pulse Respirations Blood Pressure SpO2   03/09/22 0834 03/09/22 0834 03/09/22 0834 03/09/22 0845 03/09/22 0834   (!) 97 2 °F (36 2 °C) 92 20 134/71 96 %      Temp Source Heart Rate Source Patient Position - Orthostatic VS BP Location FiO2 (%)   03/09/22 0834 03/09/22 0834 03/09/22 0834 03/09/22 0834 --   Temporal Monitor Sitting Left arm       Pain Score       03/09/22 0834       7           Vitals:    03/09/22 0845 03/09/22 0900 03/09/22 0930 03/09/22 1000   BP: 134/71 118/64 127/58 115/65   Pulse: 73 78 80 64   Patient Position - Orthostatic VS:             Visual Acuity      ED Medications  Medications   ipratropium-albuterol (DUO-NEB) 0 5-2 5 mg/3 mL inhalation solution 3 mL (3 mL Nebulization Given 3/9/22 0912)   benzonatate (TESSALON PERLES) capsule 100 mg (100 mg Oral Given 3/9/22 0912)   ketorolac (TORADOL) injection 15 mg (15 mg Intravenous Given 3/9/22 0912)   acetaminophen (TYLENOL) tablet 650 mg (650 mg Oral Given 3/9/22 1000)   iohexol (OMNIPAQUE) 350 MG/ML injection (SINGLE-DOSE) 85 mL (85 mL Intravenous Given 3/9/22 1028)   magnesium sulfate 2 g/50 mL IVPB (premix) 2 g (0 g Intravenous Stopped 3/9/22 1146)   doxycycline hyclate (VIBRAMYCIN) capsule 100 mg (100 mg Oral Given 3/9/22 1203)   predniSONE tablet 40 mg (40 mg Oral Given 3/9/22 1203)       Diagnostic Studies  Results Reviewed     Procedure Component Value Units Date/Time    HS Troponin I 2hr [[id number]]  (Normal) Collected: 03/09/22 1113    Lab Status: Final result Specimen: Blood from Arm, Right Updated: 03/09/22 1139     hs TnI 2hr 4 ng/L      Delta 2hr hsTnI -1 ng/L     HS Troponin I 4hr [[id number]]     Lab Status: No result Specimen: Blood     NT-BNP PRO [[id number]]  (Abnormal) Collected: 03/09/22 0852    Lab Status: Final result Specimen: Blood Updated: 03/09/22 1011     NT-proBNP 228 pg/mL     HS Troponin 0hr (reflex protocol) [[id number]]  (Normal) Collected: 03/09/22 0908    Lab Status: Final result Specimen: Blood from Arm, Right Updated: 03/09/22 3852 hs TnI 0hr 5 ng/L     D-Dimer [[id number]]  (Abnormal) Collected: 03/09/22 0908    Lab Status: Final result Specimen: Blood from Arm, Right Updated: 03/09/22 2015     D-Dimer, Quant 0 52 ug/ml FEU     Narrative: In the evaluation for possible pulmonary embolism, in the appropriate (Well's Score of 4 or less) patient, the age adjusted d-dimer cutoff for this patient can be calculated as:    Age x 0 01 (in ug/mL) for Age-adjusted D-dimer exclusion threshold for a patient over 50 years      Comprehensive metabolic panel [[id number]] Collected: 03/09/22 0852    Lab Status: Final result Specimen: Blood from Arm, Right Updated: 03/09/22 0914     Sodium 141 mmol/L      Potassium 3 8 mmol/L      Chloride 105 mmol/L      CO2 28 mmol/L      ANION GAP 8 mmol/L      BUN 17 mg/dL      Creatinine 0 74 mg/dL      Glucose 131 mg/dL      Calcium 9 1 mg/dL      AST 7 U/L      ALT 23 U/L      Alkaline Phosphatase 93 U/L      Total Protein 6 9 g/dL      Albumin 3 8 g/dL      Total Bilirubin 0 35 mg/dL      eGFR 92 ml/min/1 73sq m     Narrative:      [place] guidelines for Chronic Kidney Disease (CKD):     Stage 1 with normal or high GFR (GFR > 90 mL/min/1 73 square meters)    Stage 2 Mild CKD (GFR = 60-89 mL/min/1 73 square meters)    Stage 3A Moderate CKD (GFR = 45-59 mL/min/1 73 square meters)    Stage 3B Moderate CKD (GFR = 30-44 mL/min/1 73 square meters)    Stage 4 Severe CKD (GFR = 15-29 mL/min/1 73 square meters)    Stage 5 End Stage CKD (GFR <15 mL/min/1 73 square meters)  Note: GFR calculation is accurate only with a steady state creatinine    Lipase [[id number]]  (Abnormal) Collected: 03/09/22 0852    Lab Status: Final result Specimen: Blood from Arm, Right Updated: 03/09/22 0914     Lipase 59 u/L     CBC and differential [[id number]]  (Abnormal) Collected: 03/09/22 0852    Lab Status: Final result Specimen: Blood from Arm, Right Updated: 03/09/22 0858     WBC 7 33 Thousand/uL      RBC 5 57 Million/uL      Hemoglobin 16 4 g/dL      Hematocrit 49 2 %      MCV 88 fL      MCH 29 4 pg      MCHC 33 3 g/dL      RDW 14 4 %      MPV 10 7 fL      Platelets 998 Thousands/uL      nRBC 0 /100 WBCs      Neutrophils Relative 60 %      Immat GRANS % 0 %      Lymphocytes Relative 29 %      Monocytes Relative 8 %      Eosinophils Relative 3 %      Basophils Relative 0 %      Neutrophils Absolute 4 39 Thousands/µL      Immature Grans Absolute 0 03 Thousand/uL      Lymphocytes Absolute 2 09 Thousands/µL      Monocytes Absolute 0 60 Thousand/µL      Eosinophils Absolute 0 19 Thousand/µL      Basophils Absolute 0 03 Thousands/µL                  CTA ED chest PE Study   Final Result by Keyanna Mcdonald MD (03/09 1124)      No CT evidence of pulmonary embolism  Stable right upper lobe pulmonary nodules  No focal airspace consolidation  Cardiomegaly  Stable bilateral adrenal gland adenomas and bilateral nephrolithiasis  Partially visualized left renal cyst   Colonic diverticulosis  Workstation performed: [id number]                    Procedures  ECG 12 Lead Documentation Only    Date/Time: 3/9/2022 8:56 AM  Performed by: Lylia Runner, PA-C  Authorized by: Lylia Runner, PA-C     Indications / Diagnosis:  Dyspnea  ECG reviewed by me, the ED Provider: yes    Patient location:  ED  Previous ECG:     Previous ECG:  Unavailable    Comparison to cardiac monitor: Yes    Interpretation:     Interpretation: normal    Rate:     ECG rate:  87    ECG rate assessment: normal    Rhythm:     Rhythm: sinus rhythm    Ectopy:     Ectopy: none    QRS:     QRS axis:  Normal    QRS intervals:  Normal  Conduction:     Conduction: normal    ST segments:     ST segments:  Normal  T waves:     T waves: normal    Comments:      , QRS 96, QT/QTc 380/457; no acute ischemic changes               ED Course  ED Course as of 03/09/22 1226   Wed Mar 09, 2022   0859 WBC: 7 33   0859 Hemoglobin(!): 16 4  Similar to nine months ago   0859 Platelet Count: 183   0914 Glucose, Random: 131   0914 Creatinine: 0 74   0914 BUN: 17   0914 Sodium: 141   0914 Potassium: 3 8   0914 Chloride: 105   0914 CO2: 28   0914 Anion Gap: 8   0914 Calcium: 9 1   0914 AST: 7   0914 ALT: 23   0914 Alkaline Phosphatase: 93   0914 Total Protein: 6 9   0915 Albumin: 3 8   0915 TOTAL BILIRUBIN: 0 35   0915 eGFR: 92   0915 Lipase(!): 59  Below reference range  0935 hs TnI 0hr: 5   0936 D-Dimer, Quant(!): 0 52   1013 NT-proBNP(!): 228  Increased from 110 one year ago   1030 Pt updated on results  She reports feeling about the same  Still c/o headache, body aches  1101 CTA performed and pending interpretation  12 CTA ED chest PE Study  IMPRESSION:     No CT evidence of pulmonary embolism      Stable right upper lobe pulmonary nodules      No focal airspace consolidation      Cardiomegaly      Stable bilateral adrenal gland adenomas and bilateral nephrolithiasis  Partially visualized left renal cyst   Colonic diverticulosis  [address] [place] hsTnI: -1   5026 Pt again reassessed and updated on results  Will discharge with symptomatic management of suspected bronchitis  Pt afebrile, non toxic appearing  She is not hypoxic  She had recent covid infection in February 2021, has had negative testing since that time  Suspect bronchitis as etiology of her symptoms  Symptoms do not appear to be related to ACS  Of note, she did have echo 3/4/21 which showed EF >60%, gradle 1 diastolic dysfunction, as well as NM stress test on 4/12/21 which was negative for ischemia  She does not appear volume overloaded  CTA negative for PE  Discussed continued symptomatic/supportive care  Advised rest, fluids, OTC meds as needed for symptoms  Rx doxycycline, prednisone, tessalon and albuterol inhaler  We discussed incidental findings noted on CTA  Strict return precautions outlined      Advised outpatient follow up with PCP or return to ER for change in condition as outlined  Pt verbalized understanding and had no further questions  HEART Risk Score      Most Recent Value   Heart Score Risk Calculator    History 0 Filed at: 03/09/2022 0918   ECG 0 Filed at: 03/09/2022 7676   Age 1 Filed at: 03/09/2022 9127   Risk Factors 1 Filed at: 03/09/2022 0918   Troponin 0 Filed at: 03/09/2022 5368   HEART Score 2 Filed at: 03/09/2022 1180                        SBIRT 20yo+      Most Recent Value   SBIRT (23 yo +)    In order to provide better care to our patients, we are screening all of our patients for alcohol and drug use  Would it be okay to ask you these screening questions? Yes Filed at: 03/09/2022 2160   Initial Alcohol Screen: US AUDIT-C     1  How often do you have a drink containing alcohol? 0 Filed at: 03/09/2022 0837   2  How many drinks containing alcohol do you have on a typical day you are drinking? 0 Filed at: 03/09/2022 0837   3a  Male UNDER 65: How often do you have five or more drinks on one occasion? 0 Filed at: 03/09/2022 0837   3b  FEMALE Any Age, or MALE 65+: How often do you have 4 or more drinks on one occassion? 0 Filed at: 03/09/2022 0837   Audit-C Score 0 Filed at: 03/09/2022 4452   CORWIN: How many times in the past year have you    Used an illegal drug or used a prescription medication for non-medical reasons?  Never Filed at: 03/09/2022 3145          Wells' Criteria for PE      Most Recent Value   Wells' Criteria for PE    Clinical signs and symptoms of DVT 0 Filed at: 03/09/2022 5641   PE is primary diagnosis or equally likely 0 Filed at: 03/09/2022 0919   HR >100 0 Filed at: 03/09/2022 0919   Immobilization at least 3 days or Surgery in the previous 4 weeks 0 Filed at: 03/09/2022 0919   Previous, objectively diagnosed PE or DVT 0 Filed at: 03/09/2022 0919   Hemoptysis 0 Filed at: 03/09/2022 8871   Malignancy with treatment within 6 months or palliative 0 Filed at: 03/09/2022 5804   Wells' Criteria Total 0 Filed at: 03/09/2022 0919                [facility]  Number of Diagnoses or Management Options  Bronchitis: new and requires workup  Dyspnea: new and requires workup     Amount and/or Complexity of Data Reviewed  Clinical lab tests: ordered and reviewed  Tests in the radiology section of CPT®: ordered and reviewed  Decide to obtain previous medical records or to obtain history from someone other than the patient: yes  Obtain history from someone other than the patient: yes  Review and summarize past medical records: yes  Independent visualization of images, tracings, or specimens: yes    Patient Progress  Patient progress: improved      Disposition  Final diagnoses:   Dyspnea   Bronchitis   Right upper lobe pulmonary nodule   Bilateral adrenal adenomas   Nephrolithiasis   Renal cyst, left   Diverticulosis     Time reflects when diagnosis was documented in both MDM as applicable and the Disposition within this note     Time User Action Codes Description Comment    3/9/2022 11:51 AM Em Gregory Add [R06 00] Dyspnea     3/9/2022 11:51 AM Em Gregory Add [J40] Bronchitis     3/9/2022 11:51 AM Em Gregory Add [R91 1] Right upper lobe pulmonary nodule     3/9/2022 11:51 AM Em Gregory Add [D35 01,  D35 02] Bilateral adrenal adenomas     3/9/2022 11:51 AM Em Gregory Add [N20 0] Nephrolithiasis     3/9/2022 11:52 AM Em Gregory Add [N28 1] Renal cyst, left     3/9/2022 11:52 AM Me Gregory Add [K57 90] Diverticulosis       ED Disposition     ED Disposition Condition Date/Time Comment    Discharge Stable Wed Mar 9, 2022 11:51 AM Marycruz Bender discharge to home/self care              Follow-up Information     Follow up With Specialties Details Why Contact Info Additional Information    Shirley Horn PA-C Physician Assistant Schedule an appointment as soon as possible for a visit   [address] [place] [address] [address] [place] [address] Emergency Department Emergency Medicine  As needed [address] [phone]  [facility] Emergency Department, [address], [facility], [address]          Discharge Medication List as of 3/9/2022 11:55 AM      START taking these medications    Details   !! albuterol (PROVENTIL HFA,VENTOLIN HFA) 90 mcg/act inhaler Inhale 1-2 puffs every 4 (four) hours as needed for wheezing or shortness of breath, Starting Wed 3/9/2022, Normal      benzonatate (TESSALON PERLES) 100 mg capsule Take 1 capsule (100 mg total) by mouth 3 (three) times a day as needed for cough, Starting Wed 3/9/2022, Normal      doxycycline hyclate (VIBRAMYCIN) 100 mg capsule Take 1 capsule (100 mg total) by mouth 2 (two) times a day for 7 days, Starting Wed 3/9/2022, Until Wed 3/16/2022, Normal      predniSONE 10 mg tablet Multiple Dosages:Starting Thu 3/10/2022, Until Fri 3/11/2022 at 2359, THEN Starting Sat 3/12/2022, Until Mon 3/14/2022 at 2359, THEN Starting Tue 3/15/2022, Until Thu 3/17/2022 at 2359Take 4 tablets (40 mg total) by mouth daily for 2 days, THEN 2 tab lets (20 mg total) daily for 3 days, THEN 1 tablet (10 mg total) daily for 3 days  , Normal       !! - Potential duplicate medications found  Please discuss with provider        CONTINUE these medications which have NOT CHANGED    Details   !! albuterol (Ventolin HFA) 90 mcg/act inhaler Inhale 2 puffs every 4 (four) hours as needed for wheezing, Starting Thu 12/30/2021, Normal      budesonide (Pulmicort Flexhaler) 180 MCG/ACT inhaler Inhale 2 puffs 2 (two) times a day Rinse mouth after use , Starting Thu 12/30/2021, Normal      busPIRone (BUSPAR) 15 mg tablet Take 1 tablet (15 mg total) by mouth 3 (three) times a day, Starting Tue 1/18/2022, Normal      escitalopram (LEXAPRO) 20 mg tablet Take 1 tablet (20 mg total) by mouth daily, Starting Tue 1/18/2022, Normal      gabapentin (NEURONTIN) 300 mg capsule 2 PO TID x 1 wk, then 1 PO TID x 1 week, then decrease by 1 pill weekly until finished, Normal      lidocaine (Lidoderm) 5 % Apply 1 patch topically daily Remove & Discard patch within 12 hours or as directed by MD, Starting Thu 1/20/2022, Normal      naproxen (NAPROSYN) 500 mg tablet Take 1 tablet (500 mg total) by mouth 2 (two) times a day with meals, Starting Thu 1/20/2022, Normal      pregabalin (LYRICA) 50 mg capsule 1 PO QHS x 3 days, then 1 PO BID x 3 days, then 1 PO TID, Normal      traZODone (DESYREL) 100 mg tablet Take 1 tablet (100 mg total) by mouth daily at bedtime, Starting Tue 1/18/2022, Normal       !! - Potential duplicate medications found  Please discuss with provider  No discharge procedures on file      PDMP Review       Value Time User    PDMP Reviewed  Yes 1/20/2022  3:44 PM Oli Sahni          ED Provider  Electronically Signed by           Laura Dejesus PA-C  03/09/22 4052

## 2022-03-09 NOTE — ED NOTES
Pt medicated breathing treatment initiated   No needs at this time and call bell in reach     Haleigh Arias, ANGE  03/09/22 4926

## 2022-03-09 NOTE — ED NOTES
Pt states a week ago tested positive for covid and then tested again 5 days later negative  Pt states she feels sob of breath though still  Line place and labs sent   Call bell in reach      Sejal Miranda RN  03/09/22 4896

## 2022-03-09 NOTE — DISCHARGE INSTRUCTIONS
Rest, plenty of fluids  Take antibiotic as directed for the full duration  Take steroid as directed with food  Tessalon as needed for cough  Albuterol inhaler every 4-6 hours as needed for wheezing/shortness of breath  Continue to alternate OTC tylenol and ibuprofen as needed for fever/discomfort  Follow up with PCP or return to ER as needed

## 2022-03-09 NOTE — TELEPHONE ENCOUNTER
Pt in office for an appointment; tested covid positive 2/27/22, took home test this am was covid negative  She is still presenting with symptoms of headache, cough, body aches, difficulty breathing  As per Nilton Lance, advised to send pt to ER

## 2022-03-11 ENCOUNTER — TELEPHONE (OUTPATIENT)
Dept: FAMILY MEDICINE CLINIC | Facility: CLINIC | Age: 52
End: 2022-03-11

## 2022-03-11 LAB
ATRIAL RATE: 87 BPM
P AXIS: 66 DEGREES
PR INTERVAL: 158 MS
QRS AXIS: 80 DEGREES
QRSD INTERVAL: 96 MS
QT INTERVAL: 380 MS
QTC INTERVAL: 457 MS
T WAVE AXIS: 63 DEGREES
VENTRICULAR RATE: 87 BPM

## 2022-03-11 PROCEDURE — 93010 ELECTROCARDIOGRAM REPORT: CPT | Performed by: INTERNAL MEDICINE

## 2022-03-11 NOTE — TELEPHONE ENCOUNTER
Would advise trying addition of chloraseptic throat spray  There is nothing else for me to add to what she is already taking  Try hot tea with honey, warm salt water gargles  Symptoms are viral as a result of Covid and can last any length of time, everyone is different

## 2022-03-11 NOTE — TELEPHONE ENCOUNTER
Pt called; was seen in ER on Wednesday, covid positive 2 weeks ago  Having terrible headache and very sore throat  She was given medication in ER but it is not helping  She wanted to know if there is anything else she can do or be prescribed  Please Advise  Thank you

## 2022-03-22 ENCOUNTER — OFFICE VISIT (OUTPATIENT)
Dept: PAIN MEDICINE | Facility: CLINIC | Age: 52
End: 2022-03-22
Payer: COMMERCIAL

## 2022-03-22 VITALS
DIASTOLIC BLOOD PRESSURE: 80 MMHG | BODY MASS INDEX: 36.96 KG/M2 | SYSTOLIC BLOOD PRESSURE: 125 MMHG | WEIGHT: 230 LBS | HEART RATE: 96 BPM | HEIGHT: 66 IN

## 2022-03-22 DIAGNOSIS — M54.16 LUMBAR RADICULOPATHY: ICD-10-CM

## 2022-03-22 DIAGNOSIS — G89.4 CHRONIC PAIN SYNDROME: Primary | ICD-10-CM

## 2022-03-22 DIAGNOSIS — M51.26 LUMBAR DISC HERNIATION: ICD-10-CM

## 2022-03-22 DIAGNOSIS — Z98.1 STATUS POST LUMBAR SPINAL FUSION: ICD-10-CM

## 2022-03-22 PROCEDURE — 99214 OFFICE O/P EST MOD 30 MIN: CPT | Performed by: NURSE PRACTITIONER

## 2022-03-22 RX ORDER — PREGABALIN 75 MG/1
75 CAPSULE ORAL 3 TIMES DAILY
Qty: 90 CAPSULE | Refills: 1 | Status: SHIPPED | OUTPATIENT
Start: 2022-03-22 | End: 2022-04-19 | Stop reason: SDUPTHER

## 2022-03-22 NOTE — PROGRESS NOTES
Assessment:  1  Chronic pain syndrome    2  Status post lumbar spinal fusion    3  Lumbar radiculopathy    4  Lumbar disc herniation        Plan:  While the patient was in the office today, I did have a thorough conversation regarding their chronic pain syndrome, medication management, and treatment plan options  Patient is being seen for follow-up visit  He was last seen here on 01/25/2022 at which time she was started on Lyrica titrating to 50 mg 3 times daily  Unfortunately, she denies any significant change in her pain  She denies effects from this medication this point, we will increase the Lyrica to 75 mg 3 times daily  A new prescription was sent to her pharmacy  Will discussed possibility of switching Lexapro to Cymbalta with her PCP at the next visit  History of Present Illness: The patient is a 46 y o  female who presents for a follow up office visit in regards to Back Pain, Leg Pain, and Knee Pain  The patients current symptoms include complaints of low back pain and bilateral leg pain  Current pain level is an 8/10  Quality pain is described as sharp, shooting, pins and needles  Current pain medications includes:  Lyrica 50 mg 3 times daily   The patient reports that this regimen is providing 0 % pain relief  The patient is reporting no side effects from this pain medication regimen  I have personally reviewed and/or updated the patient's past medical history, past surgical history, family history, social history, current medications, allergies, and vital signs today  Review of Systems  Review of Systems   Constitutional: Negative for chills and fever  HENT: Negative for ear pain and sore throat  Eyes: Negative for pain and visual disturbance  Respiratory: Positive for shortness of breath  Negative for cough  Cardiovascular: Negative for chest pain and palpitations  Gastrointestinal: Negative for abdominal pain and vomiting     Genitourinary: Negative for dysuria and hematuria  Musculoskeletal: Positive for gait problem  Negative for arthralgias and back pain  Pain in extremity- legs      Skin: Negative for color change and rash  Neurological: Negative for seizures and syncope  All other systems reviewed and are negative          Past Medical History:   Diagnosis Date    Anxiety     Back pain     Depression     Insomnia        Past Surgical History:   Procedure Laterality Date    BACK SURGERY  2006    cage in lumbar L5 area    BACK SURGERY      CHOLECYSTECTOMY      [address]      ME REPAIR INCISIONAL HERNIA,REDUCIBLE N/A 3/16/2018    Procedure: REPAIR HERNIA VENTRAL with mesh;  Surgeon: Lyubov Mishra DO;  Location: [facility];  Service: General    ME SHLDR ARTHROSCOP,SURG,W/ROTAT CUFF REPR Left 2020    Procedure: SHOULDER ARTHROSCOPY, ROTATOR CUFF REPAIR, SYNEVECTOMY, ACROMIOPLASTY DEBRIDEMENT, INJECTION;  Surgeon: Daya Lentz DO;  [facility] MAIN OR;  Service: Orthopedics       Family History   Adopted: Yes   Family history unknown: Yes       Social History     Occupational History    Not on file   Tobacco Use    Smoking status: Former Smoker     Packs/day: 0 50     Years: 0 00     Pack years: 0 00     Types: Cigarettes     Quit date: 10/17/2020     Years since quittin 4    Smokeless tobacco: Never Used    Tobacco comment: 1 pack a day for 20 years   Vaping Use    Vaping Use: Never used   Substance and Sexual Activity    Alcohol use: Never     Alcohol/week: 0 0 standard drinks    Drug use: Never    Sexual activity: Not Currently         Current Outpatient Medications:     busPIRone (BUSPAR) 15 mg tablet, Take 1 tablet (15 mg total) by mouth 3 (three) times a day, Disp: 30 tablet, Rfl: 0    escitalopram (LEXAPRO) 20 mg tablet, Take 1 tablet (20 mg total) by mouth daily, Disp: 30 tablet, Rfl: 2    lidocaine (Lidoderm) 5 %, Apply 1 patch topically daily Remove & Discard patch within 12 hours or as directed by MD, Disp: 6 patch, Rfl: 0    naproxen (NAPROSYN) 500 mg tablet, Take 1 tablet (500 mg total) by mouth 2 (two) times a day with meals, Disp: 60 tablet, Rfl: 1    pregabalin (LYRICA) 75 mg capsule, Take 1 capsule (75 mg total) by mouth 3 (three) times a day, Disp: 90 capsule, Rfl: 1    traZODone (DESYREL) 100 mg tablet, Take 1 tablet (100 mg total) by mouth daily at bedtime, Disp: 30 tablet, Rfl: 0    albuterol (PROVENTIL HFA,VENTOLIN HFA) 90 mcg/act inhaler, Inhale 1-2 puffs every 4 (four) hours as needed for wheezing or shortness of breath, Disp: 8 g, Rfl: 0    albuterol (Ventolin HFA) 90 mcg/act inhaler, Inhale 2 puffs every 4 (four) hours as needed for wheezing, Disp: 18 g, Rfl: 5    benzonatate (TESSALON PERLES) 100 mg capsule, Take 1 capsule (100 mg total) by mouth 3 (three) times a day as needed for cough, Disp: 21 capsule, Rfl: 0    budesonide (Pulmicort Flexhaler) 180 MCG/ACT inhaler, Inhale 2 puffs 2 (two) times a day Rinse mouth after use , Disp: 1 each, Rfl: 1    No Known Allergies    Physical Exam:    /80   Pulse 96   Ht 5' 6" (1 676 m)   Wt 104 kg (230 lb)   LMP 10/10/2016   BMI 37 12 kg/m²     Constitutional:normal, well developed, well nourished, alert, in no distress and non-toxic and no overt pain behavior  and overweight  Eyes:anicteric  HEENT:grossly intact  Neck:supple, symmetric, trachea midline and no masses   Pulmonary:even and unlabored  Cardiovascular:No edema or pitting edema present  Skin:Normal without rashes or lesions and well hydrated  Psychiatric:Mood and affect appropriate  Neurologic:Cranial Nerves II-XII grossly intact  Musculoskeletal:normal    Imaging  No orders to display       No orders of the defined types were placed in this encounter

## 2022-03-29 DIAGNOSIS — F41.9 ANXIETY AND DEPRESSION: ICD-10-CM

## 2022-03-29 DIAGNOSIS — F32.A ANXIETY AND DEPRESSION: ICD-10-CM

## 2022-03-29 DIAGNOSIS — R06.00 DYSPNEA ON EXERTION: ICD-10-CM

## 2022-03-29 RX ORDER — LORATADINE 10 MG/1
TABLET ORAL
Qty: 30 TABLET | Refills: 0 | Status: SHIPPED | OUTPATIENT
Start: 2022-03-29 | End: 2022-05-25

## 2022-03-29 RX ORDER — BUSPIRONE HYDROCHLORIDE 15 MG/1
15 TABLET ORAL 3 TIMES DAILY
Qty: 30 TABLET | Refills: 0 | Status: SHIPPED | OUTPATIENT
Start: 2022-03-29 | End: 2022-04-29 | Stop reason: SDUPTHER

## 2022-03-29 RX ORDER — ESCITALOPRAM OXALATE 20 MG/1
20 TABLET ORAL DAILY
Qty: 30 TABLET | Refills: 0 | Status: SHIPPED | OUTPATIENT
Start: 2022-03-29 | End: 2022-04-29 | Stop reason: SDUPTHER

## 2022-03-29 RX ORDER — BUSPIRONE HYDROCHLORIDE 10 MG/1
TABLET ORAL
Qty: 90 TABLET | Refills: 0 | Status: SHIPPED | OUTPATIENT
Start: 2022-03-29 | End: 2022-05-25

## 2022-04-18 ENCOUNTER — OFFICE VISIT (OUTPATIENT)
Dept: URGENT CARE | Facility: MEDICAL CENTER | Age: 52
End: 2022-04-18
Payer: COMMERCIAL

## 2022-04-18 VITALS
HEART RATE: 78 BPM | SYSTOLIC BLOOD PRESSURE: 130 MMHG | BODY MASS INDEX: 36.8 KG/M2 | WEIGHT: 228 LBS | OXYGEN SATURATION: 98 % | DIASTOLIC BLOOD PRESSURE: 82 MMHG | RESPIRATION RATE: 18 BRPM | TEMPERATURE: 98.1 F

## 2022-04-18 DIAGNOSIS — R21 RASH: Primary | ICD-10-CM

## 2022-04-18 PROCEDURE — 99214 OFFICE O/P EST MOD 30 MIN: CPT | Performed by: PHYSICIAN ASSISTANT

## 2022-04-18 RX ORDER — HYDROXYZINE HYDROCHLORIDE 25 MG/1
25 TABLET, FILM COATED ORAL EVERY 6 HOURS PRN
Qty: 30 TABLET | Refills: 0 | Status: SHIPPED | OUTPATIENT
Start: 2022-04-18 | End: 2022-05-25

## 2022-04-18 RX ORDER — PREDNISONE 10 MG/1
TABLET ORAL
Qty: 21 TABLET | Refills: 0 | Status: SHIPPED | OUTPATIENT
Start: 2022-04-18 | End: 2022-05-25

## 2022-04-18 NOTE — PATIENT INSTRUCTIONS
Take prednisone as prescribed (take first thing in the morning with food)  Take hydroxyzine as prescribed (do not drive or operate heavy machinery while taking)  CeraVe Facial moisturizer and a plain gentle dove face soap   Avoid scratching area  Topical benadryl cream or calamine lotion during the day  Cool compresses  Keep area clean and dry  Watch for signs of infection  Follow up with PCP in 3-5 days  Proceed to  ER if symptoms worsen  Acute Rash   WHAT YOU NEED TO KNOW:   A rash is irritated, red, or itchy skin or mucus membranes, such as the lining of your nose or throat  Acute means the rash starts suddenly, worsens quickly, and lasts a short time  Common causes include a disease or infection, a reaction to something you are allergic to, or certain medicines  DISCHARGE INSTRUCTIONS:   Return to the emergency department if:   · You have sudden trouble breathing or chest pain  · You are vomiting, have a headache or muscle aches, and your throat hurts  Call your doctor or dermatologist if:   · You have a fever  · You get open wounds from scratching your skin, or you have a wound that is red, swollen, or painful  · Your rash lasts longer than 3 months  · You have swelling or pain in your joints  · You have questions or concerns about your condition or care  Medicines:  If your rash does not go away on its own, you may need the following medicines:  · Antihistamines  may be given to help decrease itching  · Steroids  may be given to decrease inflammation  · Antibiotics  help fight or prevent a bacterial infection  · Take your medicine as directed  Contact your healthcare provider if you think your medicine is not helping or if you have side effects  Tell him of her if you are allergic to any medicine  Keep a list of the medicines, vitamins, and herbs you take  Include the amounts, and when and why you take them  Bring the list or the pill bottles to follow-up visits   Carry your medicine list with you in case of an emergency  Prevent a rash or care for your skin when you have a rash:  Dry skin can lead to more problems  Do not scratch your skin if it itches  You may cause a skin infection by scratching  The following may prevent dry skin, and help your skin look better:  · Help soothe your rash  Apply thick cream lotions or petroleum jelly  Cool compresses may also soothe your skin  Apply a cool compress or a cool, wet towel, and then cover it with a dry towel  · Use lukewarm water when you bathe  Hot water may damage your skin more  Pat your skin dry  Do not rub your skin with a towel  · Use detergents, soaps, shampoos, and bubble baths  made for sensitive skin  · Wear clothes made of cotton instead of nylon or wool  Cotton is softer, so it will not hurt your skin as much  Follow up with your healthcare providers as directed:  A dermatologist may help find the cause of your rash or help plan or change treatment  A dietitian may help with meal planning if you have a food allergy  Write down your questions so you remember to ask them during your visits  © Copyright [employer] 2022 Information is for End User's use only and may not be sold, redistributed or otherwise used for commercial purposes  All illustrations and images included in CareNotes® are the copyrighted property of A D A M [employer] Inc  or Crystal Santos  The above information is an  only  It is not intended as medical advice for individual conditions or treatments  Talk to your doctor, nurse or pharmacist before following any medical regimen to see if it is safe and effective for you

## 2022-04-18 NOTE — PROGRESS NOTES
[address]        NAME: Maria G uGzman is a 46 y o  female  : 1970    MRN: [id number]  DATE: 2022  TIME: 1:24 PM    Assessment and Plan   Rash [R21]  1  Rash  predniSONE 10 mg tablet    hydrOXYzine HCL (ATARAX) 25 mg tablet           Patient Instructions     Take prednisone as prescribed (take first thing in the morning with food)  Take hydroxyzine as prescribed (do not drive or operate heavy machinery while taking)  CeraVe Facial moisturizer and a plain gentle dove face soap   Avoid scratching area  Topical benadryl cream or calamine lotion during the day  Cool compresses  Keep area clean and dry  Watch for signs of infection  Follow up with PCP in 3-5 days  Proceed to  ER if symptoms worsen  Chief Complaint     Chief Complaint   Patient presents with    Rash     pt complaining of red rash on left cheek, nose and forehead that started 4 days ago  Red dried areas noted  Open area noted on forehead draining bloody draining  States discomfort feels like burning rash  Pt denies any change in perfumes, soap, laudry detergrent or medications         History of Present Illness       Denies taking medications that cause photosensitivity (EX: doxycycline), new medications, new soaps/detergents  She has not been out in the sun, in the woods hiking or gardening  Reports prior hx/o shingles last year  Patient is washing face and body with [place] soap as she has used for many years  Denies benzoyl peroxide or salicylic acid washes/creams  No URI sx  No contacts with similar symptoms  No hx/o skin conditions  Rash  This is a new problem  Episode onset: 4d  Location: L cheek, nose, forehead  The rash is characterized by burning, redness, draining and dryness (sanginous discharge; more painful than her prior episode of shinges)  She was exposed to nothing  Pertinent negatives include no eye pain, fever or shortness of breath  Treatments tried: calamine lotion   Her past medical history is significant for varicella  There is no history of allergies or eczema  Review of Systems   Review of Systems   Constitutional: Negative for chills and fever  HENT: Negative for trouble swallowing  Eyes: Negative for photophobia, pain, discharge, redness, itching and visual disturbance  Respiratory: Negative for shortness of breath, wheezing and stridor  Cardiovascular: Negative for chest pain  Gastrointestinal: Negative for abdominal pain  Musculoskeletal: Negative for arthralgias  Skin: Positive for rash           Current Medications       Current Outpatient Medications:     albuterol (PROVENTIL HFA,VENTOLIN HFA) 90 mcg/act inhaler, Inhale 1-2 puffs every 4 (four) hours as needed for wheezing or shortness of breath, Disp: 8 g, Rfl: 0    albuterol (Ventolin HFA) 90 mcg/act inhaler, Inhale 2 puffs every 4 (four) hours as needed for wheezing, Disp: 18 g, Rfl: 5    Allergy Relief 10 MG tablet, TAKE ONE TABLET BY MOUTH ONCE DAILY, Disp: 30 tablet, Rfl: 0    benzonatate (TESSALON PERLES) 100 mg capsule, Take 1 capsule (100 mg total) by mouth 3 (three) times a day as needed for cough, Disp: 21 capsule, Rfl: 0    budesonide (Pulmicort Flexhaler) 180 MCG/ACT inhaler, Inhale 2 puffs 2 (two) times a day Rinse mouth after use , Disp: 1 each, Rfl: 1    busPIRone (BUSPAR) 10 mg tablet, TAKE ONE TABLET BY MOUTH 3 TIMES A DAY, Disp: 90 tablet, Rfl: 0    busPIRone (BUSPAR) 15 mg tablet, Take 1 tablet (15 mg total) by mouth 3 (three) times a day, Disp: 30 tablet, Rfl: 0    escitalopram (LEXAPRO) 20 mg tablet, Take 1 tablet (20 mg total) by mouth daily, Disp: 30 tablet, Rfl: 0    hydrOXYzine HCL (ATARAX) 25 mg tablet, Take 1 tablet (25 mg total) by mouth every 6 (six) hours as needed for itching, Disp: 30 tablet, Rfl: 0    lidocaine (Lidoderm) 5 %, Apply 1 patch topically daily Remove & Discard patch within 12 hours or as directed by MD, Disp: 6 patch, Rfl: 0    naproxen (NAPROSYN) 500 mg tablet, Take 1 tablet (500 mg total) by mouth 2 (two) times a day with meals (Patient not taking: Reported on 4/18/2022 ), Disp: 60 tablet, Rfl: 1    predniSONE 10 mg tablet, Take 6 pills day one, 5 pills day 2, 4 pills day 3, 3 pills day 4, 2 pills day 5, and 1 pill day 6 , Disp: 21 tablet, Rfl: 0    pregabalin (LYRICA) 75 mg capsule, Take 1 capsule (75 mg total) by mouth 3 (three) times a day, Disp: 90 capsule, Rfl: 1    traZODone (DESYREL) 100 mg tablet, Take 1 tablet (100 mg total) by mouth daily at bedtime, Disp: 30 tablet, Rfl: 0    Current Allergies     Allergies as of 04/18/2022    (No Known Allergies)            The following portions of the patient's history were reviewed and updated as appropriate: allergies, current medications, past family history, past medical history, past social history, past surgical history and problem list      Past Medical History:   Diagnosis Date    Anxiety     Back pain     Depression     Insomnia        Past Surgical History:   Procedure Laterality Date    BACK SURGERY  2006    cage in lumbar L5 area    [address] N/A 3/16/2018    Procedure: [address] with mesh;  Surgeon: Mere Esteban DO;  Location: [facility];  Service: General    [place] SHLDR ARTHROSCOP,SURG,W/ROTAT CUFF REPR Left 11/18/2020    Procedure: SHOULDER ARTHROSCOPY, ROTATOR CUFF REPAIR, SYNEVECTOMY, ACROMIOPLASTY DEBRIDEMENT, INJECTION;  Surgeon: Tina Ferro DO;  Location: [address] [place] [address] MAIN OR;  Service: Orthopedics       Family History   Adopted: Yes   Family history unknown: Yes         Medications have been verified  Objective   /82   Pulse 78   Temp 98 1 °F (36 7 °C) (Temporal)   Resp 18   Wt 103 kg (228 lb)   LMP 10/10/2016   SpO2 98%   BMI 36 80 kg/m²   Patient's last menstrual period was 10/10/2016         Physical Exam     Physical Exam  Constitutional:       General: She is not in acute distress  Appearance: She is well-developed  Cardiovascular:      Rate and Rhythm: Normal rate and regular rhythm  Heart sounds: Normal heart sounds  No murmur heard  No friction rub  No gallop  Pulmonary:      Effort: Pulmonary effort is normal  No respiratory distress  Breath sounds: Normal breath sounds  No wheezing or rales  Chest:      Chest wall: No tenderness  Skin:     General: Skin is warm  Findings: Rash present  Neurological:      Mental Status: She is alert  Psychiatric:         Behavior: Behavior normal          Thought Content:  Thought content normal          Judgment: Judgment normal

## 2022-04-19 ENCOUNTER — OFFICE VISIT (OUTPATIENT)
Dept: PAIN MEDICINE | Facility: CLINIC | Age: 52
End: 2022-04-19
Payer: COMMERCIAL

## 2022-04-19 ENCOUNTER — TELEPHONE (OUTPATIENT)
Dept: FAMILY MEDICINE CLINIC | Facility: CLINIC | Age: 52
End: 2022-04-19

## 2022-04-19 VITALS
DIASTOLIC BLOOD PRESSURE: 66 MMHG | HEART RATE: 99 BPM | SYSTOLIC BLOOD PRESSURE: 104 MMHG | WEIGHT: 229 LBS | BODY MASS INDEX: 36.8 KG/M2 | HEIGHT: 66 IN

## 2022-04-19 DIAGNOSIS — M51.26 LUMBAR DISC HERNIATION: ICD-10-CM

## 2022-04-19 DIAGNOSIS — G89.4 CHRONIC PAIN SYNDROME: ICD-10-CM

## 2022-04-19 DIAGNOSIS — Z98.1 STATUS POST LUMBAR SPINAL FUSION: ICD-10-CM

## 2022-04-19 DIAGNOSIS — M54.16 LUMBAR RADICULOPATHY: ICD-10-CM

## 2022-04-19 PROCEDURE — 99214 OFFICE O/P EST MOD 30 MIN: CPT | Performed by: NURSE PRACTITIONER

## 2022-04-19 RX ORDER — PREGABALIN 100 MG/1
100 CAPSULE ORAL 3 TIMES DAILY
Qty: 90 CAPSULE | Refills: 1 | Status: SHIPPED | OUTPATIENT
Start: 2022-04-19 | End: 2022-05-17 | Stop reason: SDUPTHER

## 2022-04-19 NOTE — PATIENT INSTRUCTIONS
Duloxetine (By mouth)   Duloxetine (doo-LOX-e-teen)  Treats depression, anxiety, diabetic peripheral neuropathy, fibromyalgia, and chronic muscle or bone pain  This medicine is an SSNRI  Brand Name(s): Cymbalta, izalma [place], [place]   There may be other brand names for this medicine  When This Medicine Should Not Be Used: This medicine is not right for everyone  Do not use it if you had an allergic reaction to duloxetine  How to Use This Medicine:   Capsule, Delayed Release Capsule  · Take your medicine as directed  Your dose may need to be changed several times to find what works best for you  · Delayed-release capsule: Swallow the capsule whole  Do not crush, chew, break, or open it  Do not open the Cymbalta® delayed-release capsule and sprinkle the contents on food or in liquids  · If you have trouble swallowing the [place]  delayed release capsule:   ? You may open the capsule and sprinkle the contents over one tablespoon (15 mL) of applesauce  Swallow the mixture right away and do not save any of the mixture to use later  ? You may open the capsule and pour the contents to an all plastic catheter tip syringe and add 50 mL of water  Do not use other liquids  Gently shake it for 10 seconds, and then use it through a nasogastric tube  Rinse with additional water (about 15 mL) if needed  · This medicine should come with a Medication Guide  Ask your pharmacist for a copy if you do not have one  · Missed dose: Take a dose as soon as you remember  If it is almost time for your next dose, wait until then and take a regular dose  Do not take extra medicine to make up for a missed dose  · Store the medicine in a closed container at room temperature, away from heat, moisture, and direct light  Drugs and Foods to Avoid:   Ask your doctor or pharmacist before using any other medicine, including over-the-counter medicines, vitamins, and herbal products    · Do not take duloxetine if you have used an MAO inhibitor (MAOI) within the past 14 days  Do not start taking an MAO inhibitor within 5 days of stopping duloxetine  Ask your doctor if you are not sure if you take an MAOI, including linezolid or methylene blue injection  · Some medicines can affect how duloxetine works  Tell your doctor if you are using any of the following:  ? Buspirone, cimetidine, ciprofloxacin, enoxacin, fentanyl, fluvoxamine, lithium, Juan Carlos's wort, theophylline, tramadol, tryptophan, warfarin  ? Amphetamines  ? Blood pressure medicine  ? Diuretic (water pill)  ? Medicine for heart rhythm problems (including flecainide, propafenone, quinidine)  ? Medicine to treat migraine headaches (including triptans)  ? NSAID pain or arthritis medicine (including aspirin, celecoxib, diclofenac, ibuprofen, naproxen)  ? Other medicine to treat depression or mood disorders (including amitriptyline, desipramine, fluoxetine, imipramine, nortriptyline, paroxetine)  ? Phenothiazine medicine (including thioridazine)  ? Stomach medicine (including famotidine, antacids containing aluminum or magnesium, PPIs)  · Tell your doctor if you use anything else that makes you sleepy  Some examples are allergy medicine, narcotic pain medicine, and alcohol  · Do not drink alcohol while you are using this medicine  Warnings While Using This Medicine:   · Tell your doctor if you are pregnant or breastfeeding, or if you have kidney disease, liver disease, bleeding problems, diabetes, digestion problems, glaucoma, heart disease, high or low blood pressure, or problems with urination  Tell your doctor if you smoke or you have a history of seizures, mental health problems (including bipolar disorder, belle), or drug or alcohol addiction  · This medicine may cause the following problems:   ? Serious liver problems  ? Serotonin syndrome, when used with certain medicines  ? Increased risk of bleeding problems  ?  Serious skin reactions, including erythema multiforme, Green-Master syndrome  ? Low sodium levels in the blood  ? Sexual problems  · This medicine can increase thoughts of suicide  Tell your doctor right away if you start to feel depressed and have thoughts about hurting yourself  · This medicine may cause blurred vision, dizziness, drowsiness, trouble with thinking, or trouble with controlling body movements, which may lead to falls, fractures, or other injuries  Do not drive or do anything that could be dangerous until you know how this medicine affects you  Stand up slowly to avoid falls  · Do not stop using this medicine suddenly  Your doctor will need to slowly decrease your dose before you stop it completely  · Your doctor will check your progress and the effects of this medicine at regular visits  Keep all appointments  · Keep all medicine out of the reach of children  Never share your medicine with anyone    Possible Side Effects While Using This Medicine:   Call your doctor right away if you notice any of these side effects:  · Allergic reaction: Itching or hives, swelling in your face or hands, swelling or tingling in your mouth or throat, chest tightness, trouble breathing  · Anxiety, restlessness, fever, fast heartbeat, sweating, muscle spasms, diarrhea, seeing or hearing things that are not there  · Blistering, peeling, red skin rash  · Confusion, weakness, muscle twitching  · Dark urine or pale stools, nausea, vomiting, loss of appetite, stomach pain, yellow skin or eyes  · Decrease in how much or how often you urinate  · Decrease in sexual ability, desire, drive, or performance  · Eye pain, vision changes, seeing halos around lights  · Feeling more energetic than usual  · Lightheadedness, dizziness, fainting  · Unusual moods or behaviors, worsening depression, thoughts about hurting yourself, trouble sleeping  · Unusual bleeding or bruising  If you notice these less serious side effects, talk with your doctor:   · Decrease in appetite or weight  · Dry mouth, constipation, mild nausea  · Headache  · Unusual drowsiness, sleepiness, or tiredness  If you notice other side effects that you think are caused by this medicine, tell your doctor  Call your doctor for medical advice about side effects  You may report side effects to FDA at 1-501-FDA-4514    © Copyright [employer] 2022 Information is for End User's use only and may not be sold, redistributed or otherwise used for commercial purposes  The above information is an  only  It is not intended as medical advice for individual conditions or treatments  Talk to your doctor, nurse or pharmacist before following any medical regimen to see if it is safe and effective for you

## 2022-04-19 NOTE — TELEPHONE ENCOUNTER
I am not comfortable prescribing the cymbalta along with Lexapro  If pain management is ok with it, then they need to Rx it

## 2022-04-19 NOTE — TELEPHONE ENCOUNTER
Pt calling stating that she was seen at the Pain Management center; they wanted to change her BuSpar to Cymbalta, the pain management dr told her it would help the pain/depression better  The dr didn't want to change it because she was not the prescriber  Pt now calling to see if you will switch it  Please Advise

## 2022-04-19 NOTE — PROGRESS NOTES
Assessment:  1  Chronic pain syndrome    2  Status post lumbar spinal fusion    3  Lumbar radiculopathy    4  Lumbar disc herniation        Plan:  While the patient was in the office today, I did have a thorough conversation regarding their chronic pain syndrome, medication management, and treatment plan options  Patient is being seen for follow-up visit  She was last seen here on 03/22/2022 which time Lyrica was increased to 75 mg 3 times daily  She denies any improvement with this medication so far  Also, denies any side effects from it  At this point, we will increase the Lyrica to 100 mg 3 times daily  New prescription was sent to her pharmacy  I discussed with her the possibility having her PCP switch Lexapro to Cymbalta which would not only address depression, but also her axial back pain as well as radicular complaints  She will discuss this with her PCP  The patient will follow-up in 1 month for medication prescription refill and reevaluation  The patient was advised to contact the office should their symptoms worsen in the interim  The patient was agreeable and verbalized an understanding  History of Present Illness: The patient is a 46 y o  female who presents for a follow up office visit in regards to Back Pain and Leg Pain  The patients current symptoms include complaints of low back and bilateral leg pain  Current pain level is a 9/10  Quality pain is described as sharp, shooting, numb, pins and needles  Current pain medications includes:  Lyrica 75 mg 3 times daily   The patient reports that this regimen is providing 0 % pain relief  The patient is reporting no side effects from this pain medication regimen  I have personally reviewed and/or updated the patient's past medical history, past surgical history, family history, social history, current medications, allergies, and vital signs today           Review of Systems  Review of Systems   Constitutional: Negative for chills and fever  HENT: Negative for ear pain and sore throat  Eyes: Negative for pain and visual disturbance  Respiratory: Negative for cough and shortness of breath  Cardiovascular: Negative for chest pain and palpitations  Gastrointestinal: Negative for abdominal pain and vomiting  Genitourinary: Negative for dysuria and hematuria  Musculoskeletal: Positive for gait problem  Negative for arthralgias and back pain  Pain in extremity- legs     Skin: Negative for color change and rash  Neurological: Negative for seizures and syncope  All other systems reviewed and are negative          Past Medical History:   Diagnosis Date    Anxiety     Back pain     Depression     Insomnia        Past Surgical History:   Procedure Laterality Date    BACK SURGERY  2006    cage in lumbar L5 area    BACK SURGERY      CHOLECYSTECTOMY      [address]      [place] REPAIR INCISIONAL HERNIA,REDUCIBLE N/A 3/16/2018    Procedure: REPAIR HERNIA VENTRAL with mesh;  Surgeon: Avel Watson DO;  Location: [facility];  Service: General    [place] SHLDR ARTHROSCOP,SURG,W/ROTAT CUFF REPR Left 2020    Procedure: SHOULDER ARTHROSCOPY, ROTATOR CUFF REPAIR, SYNEVECTOMY, ACROMIOPLASTY DEBRIDEMENT, INJECTION;  Surgeon: Jose Plasencia DO;  Location: [facility] MAIN OR;  Service: Orthopedics       Family History   Adopted: Yes   Family history unknown: Yes       Social History     Occupational History    Not on file   Tobacco Use    Smoking status: Former Smoker     Packs/day: 0 50     Years: 0 00     Pack years: 0 00     Types: Cigarettes     Quit date: 10/17/2020     Years since quittin 5    Smokeless tobacco: Never Used    Tobacco comment: 1 pack a day for 20 years   Vaping Use    Vaping Use: Never used   Substance and Sexual Activity    Alcohol use: Never     Alcohol/week: 0 0 standard drinks    Drug use: Never    Sexual activity: Not Currently         Current Outpatient Medications:     Allergy Relief 10 MG tablet, TAKE ONE TABLET BY MOUTH ONCE DAILY, Disp: 30 tablet, Rfl: 0    busPIRone (BUSPAR) 10 mg tablet, TAKE ONE TABLET BY MOUTH 3 TIMES A DAY, Disp: 90 tablet, Rfl: 0    busPIRone (BUSPAR) 15 mg tablet, Take 1 tablet (15 mg total) by mouth 3 (three) times a day, Disp: 30 tablet, Rfl: 0    escitalopram (LEXAPRO) 20 mg tablet, Take 1 tablet (20 mg total) by mouth daily, Disp: 30 tablet, Rfl: 0    hydrOXYzine HCL (ATARAX) 25 mg tablet, Take 1 tablet (25 mg total) by mouth every 6 (six) hours as needed for itching, Disp: 30 tablet, Rfl: 0    naproxen (NAPROSYN) 500 mg tablet, Take 1 tablet (500 mg total) by mouth 2 (two) times a day with meals, Disp: 60 tablet, Rfl: 1    predniSONE 10 mg tablet, Take 6 pills day one, 5 pills day 2, 4 pills day 3, 3 pills day 4, 2 pills day 5, and 1 pill day 6 , Disp: 21 tablet, Rfl: 0    pregabalin (LYRICA) 100 mg capsule, Take 1 capsule (100 mg total) by mouth 3 (three) times a day, Disp: 90 capsule, Rfl: 1    traZODone (DESYREL) 100 mg tablet, Take 1 tablet (100 mg total) by mouth daily at bedtime, Disp: 30 tablet, Rfl: 0    albuterol (PROVENTIL HFA,VENTOLIN HFA) 90 mcg/act inhaler, Inhale 1-2 puffs every 4 (four) hours as needed for wheezing or shortness of breath, Disp: 8 g, Rfl: 0    albuterol (Ventolin HFA) 90 mcg/act inhaler, Inhale 2 puffs every 4 (four) hours as needed for wheezing, Disp: 18 g, Rfl: 5    benzonatate (TESSALON PERLES) 100 mg capsule, Take 1 capsule (100 mg total) by mouth 3 (three) times a day as needed for cough, Disp: 21 capsule, Rfl: 0    budesonide (Pulmicort Flexhaler) 180 MCG/ACT inhaler, Inhale 2 puffs 2 (two) times a day Rinse mouth after use , Disp: 1 each, Rfl: 1    lidocaine (Lidoderm) 5 %, Apply 1 patch topically daily Remove & Discard patch within 12 hours or as directed by MD, Disp: 6 patch, Rfl: 0    No Known Allergies    Physical Exam:    /66   Pulse 99   Ht 5' 6" (1 676 m)   Wt 104 kg (229 lb)   LMP 10/10/2016   BMI 36 96 kg/m²     Constitutional:normal, well developed, well nourished, alert, in no distress and non-toxic and no overt pain behavior  Eyes:anicteric  HEENT:grossly intact  Neck:supple, symmetric, trachea midline and no masses   Pulmonary:even and unlabored  Cardiovascular:No edema or pitting edema present  Skin:Normal without rashes or lesions and well hydrated  Psychiatric:Mood and affect appropriate  Neurologic:Cranial Nerves II-XII grossly intact  Musculoskeletal:normal    Imaging  No orders to display       No orders of the defined types were placed in this encounter

## 2022-04-29 DIAGNOSIS — F41.9 ANXIETY AND DEPRESSION: ICD-10-CM

## 2022-04-29 DIAGNOSIS — F32.A ANXIETY AND DEPRESSION: ICD-10-CM

## 2022-04-29 DIAGNOSIS — G47.00 INSOMNIA, UNSPECIFIED TYPE: ICD-10-CM

## 2022-04-29 RX ORDER — BUSPIRONE HYDROCHLORIDE 15 MG/1
15 TABLET ORAL 3 TIMES DAILY
Qty: 30 TABLET | Refills: 0 | Status: SHIPPED | OUTPATIENT
Start: 2022-04-29 | End: 2022-05-25 | Stop reason: SDUPTHER

## 2022-04-29 RX ORDER — TRAZODONE HYDROCHLORIDE 100 MG/1
100 TABLET ORAL
Qty: 30 TABLET | Refills: 0 | Status: SHIPPED | OUTPATIENT
Start: 2022-04-29

## 2022-04-29 RX ORDER — ESCITALOPRAM OXALATE 20 MG/1
20 TABLET ORAL DAILY
Qty: 30 TABLET | Refills: 0 | Status: SHIPPED | OUTPATIENT
Start: 2022-04-29 | End: 2022-05-25

## 2022-05-17 ENCOUNTER — OFFICE VISIT (OUTPATIENT)
Dept: PAIN MEDICINE | Facility: CLINIC | Age: 52
End: 2022-05-17
Payer: COMMERCIAL

## 2022-05-17 VITALS
HEIGHT: 66 IN | WEIGHT: 231.6 LBS | SYSTOLIC BLOOD PRESSURE: 112 MMHG | HEART RATE: 75 BPM | BODY MASS INDEX: 37.22 KG/M2 | TEMPERATURE: 98.1 F | DIASTOLIC BLOOD PRESSURE: 82 MMHG

## 2022-05-17 DIAGNOSIS — Z98.1 STATUS POST LUMBAR SPINAL FUSION: ICD-10-CM

## 2022-05-17 DIAGNOSIS — G89.4 CHRONIC PAIN SYNDROME: Primary | ICD-10-CM

## 2022-05-17 DIAGNOSIS — M54.16 LUMBAR RADICULOPATHY: ICD-10-CM

## 2022-05-17 DIAGNOSIS — M54.50 LUMBAR PAIN WITH RADIATION DOWN LEFT LEG: ICD-10-CM

## 2022-05-17 DIAGNOSIS — M51.26 LUMBAR DISC HERNIATION: ICD-10-CM

## 2022-05-17 DIAGNOSIS — M79.605 LUMBAR PAIN WITH RADIATION DOWN LEFT LEG: ICD-10-CM

## 2022-05-17 PROCEDURE — 99214 OFFICE O/P EST MOD 30 MIN: CPT | Performed by: NURSE PRACTITIONER

## 2022-05-17 RX ORDER — METHOCARBAMOL 500 MG/1
500 TABLET, FILM COATED ORAL 3 TIMES DAILY
Qty: 90 TABLET | Refills: 1 | Status: SHIPPED | OUTPATIENT
Start: 2022-05-17 | End: 2022-06-27 | Stop reason: SDUPTHER

## 2022-05-17 RX ORDER — PREGABALIN 50 MG/1
CAPSULE ORAL
Qty: 90 CAPSULE | Refills: 1 | Status: SHIPPED | OUTPATIENT
Start: 2022-05-17 | End: 2022-05-25

## 2022-05-17 NOTE — PROGRESS NOTES
Assessment:  1  Chronic pain syndrome    2  Lumbar disc herniation    3  Lumbar pain with radiation down left leg    4  Lumbar radiculopathy    5  Status post lumbar spinal fusion        Plan:  While the patient was in the office today, I did have a thorough conversation regarding their chronic pain syndrome, medication management, and treatment plan options  Patient is being seen for follow-up visit  She was last seen here on 04/19/2022 at which time Lyrica was increased to 100 milligrams 3 times daily  Unfortunately, she denies any improvement in her symptoms since starting Lyrica  As such, we will plan to wean her off of Lyrica due to lack of efficacy  I provided her with a weaning schedule  During our last visit I discussed possibility of switching Lexapro to Cymbalta to not only address depression but also address her chronic pain issues  She contacted her PCP, but unfortunately requested to switch BuSpar to Cymbalta instead of Lexapro  While patient was in the office, I sent a staff message to her PCP asking if she thought it was reasonable to switch Lexapro to Cymbalta  If the switch is made, we will take over the Cymbalta and titrated to a therapeutic dose  Since the patient is having myofascial pain and/or spasms, I advised the patient that starting on a muscle relaxer could help decrease some of the myofascial pain and/or spasms  I advised patient that they should not drive or operate heavy machinery while on this medication as it could cause lethargy  I advised the patient to call our office if they experience any side effects  The patient verbalized understanding  Trial Robaxin 500 milligrams 3 times daily if needed for spasms  A prescription was sent to her pharmacy  The patient will follow-up in 1 month for medication prescription refill and reevaluation  The patient was advised to contact the office should their symptoms worsen in the interim   The patient was agreeable and verbalized an understanding  History of Present Illness: The patient is a 46 y o  female who presents for a follow up office visit in regards to Knee Pain, Back Pain, Leg Pain, and Foot Pain  The patients current symptoms include complaints of low back pain that radiates down both legs  Current pain level is an 8/10  Quality pain is described as sharp, shooting, numb, pins and needles  Current pain medications includes:  Lyrica 100 milligrams 3 times daily   The patient reports that this regimen is providing 0 % pain relief  The patient is reporting no side effects from this pain medication regimen  I have personally reviewed and/or updated the patient's past medical history, past surgical history, family history, social history, current medications, allergies, and vital signs today  Review of Systems  Review of Systems   Respiratory: Negative for shortness of breath  Cardiovascular: Negative for chest pain  Gastrointestinal: Negative for constipation, diarrhea, nausea and vomiting  Musculoskeletal: Positive for gait problem  Negative for arthralgias, joint swelling and myalgias  Decreased range of motion  Pain in extremity - legs   Neurological: Negative for dizziness, seizures and weakness  All other systems reviewed and are negative          Past Medical History:   Diagnosis Date    Anxiety     Back pain     Depression     Insomnia        Past Surgical History:   Procedure Laterality Date    BACK SURGERY  2006    cage in lumbar L5 area    BACK SURGERY      CHOLECYSTECTOMY      [address]      [place] REPAIR INCISIONAL HERNIA,REDUCIBLE N/A 3/16/2018    Procedure: REPAIR HERNIA VENTRAL with mesh;  Surgeon: Sridhar Tucker DO;  Location: [facility];  Service: General    [place] SHLDR ARTHROSCOP,SURG,W/ROTAT CUFF REPR Left 11/18/2020    Procedure: SHOULDER ARTHROSCOPY, ROTATOR CUFF REPAIR, SYNEVECTOMY, ACROMIOPLASTY DEBRIDEMENT, INJECTION;  Surgeon: Cory Moore DO; Location: [facility] MAIN OR;  Service: Orthopedics       Family History   Adopted: Yes   Family history unknown: Yes       Social History     Occupational History    Not on file   Tobacco Use    Smoking status: Former Smoker     Packs/day: 0 50     Years: 0 00     Pack years: 0 00     Types: Cigarettes     Quit date: 10/17/2020     Years since quittin 5    Smokeless tobacco: Never Used    Tobacco comment: 1 pack a day for 20 years   Vaping Use    Vaping Use: Never used   Substance and Sexual Activity    Alcohol use: Never     Alcohol/week: 0 0 standard drinks    Drug use: Never    Sexual activity: Not Currently         Current Outpatient Medications:     busPIRone (BUSPAR) 15 mg tablet, Take 1 tablet (15 mg total) by mouth 3 (three) times a day, Disp: 30 tablet, Rfl: 0    escitalopram (LEXAPRO) 20 mg tablet, Take 1 tablet (20 mg total) by mouth daily, Disp: 30 tablet, Rfl: 0    methocarbamol (ROBAXIN) 500 mg tablet, Take 1 tablet (500 mg total) by mouth in the morning and 1 tablet (500 mg total) in the evening and 1 tablet (500 mg total) before bedtime  As needed for pain/spasms  , Disp: 90 tablet, Rfl: 1    pregabalin (LYRICA) 50 mg capsule, 1 PO TID x 1 week, then 1 PO BID x 1 week, then 1 PO QD x 1 week, then  D/C, Disp: 90 capsule, Rfl: 1    traZODone (DESYREL) 100 mg tablet, Take 1 tablet (100 mg total) by mouth daily at bedtime, Disp: 30 tablet, Rfl: 0    albuterol (PROVENTIL HFA,VENTOLIN HFA) 90 mcg/act inhaler, Inhale 1-2 puffs every 4 (four) hours as needed for wheezing or shortness of breath, Disp: 8 g, Rfl: 0    albuterol (Ventolin HFA) 90 mcg/act inhaler, Inhale 2 puffs every 4 (four) hours as needed for wheezing, Disp: 18 g, Rfl: 5    Allergy Relief 10 MG tablet, TAKE ONE TABLET BY MOUTH ONCE DAILY (Patient not taking: Reported on 2022), Disp: 30 tablet, Rfl: 0    benzonatate (TESSALON PERLES) 100 mg capsule, Take 1 capsule (100 mg total) by mouth 3 (three) times a day as needed for cough, Disp: 21 capsule, Rfl: 0    budesonide (Pulmicort Flexhaler) 180 MCG/ACT inhaler, Inhale 2 puffs 2 (two) times a day Rinse mouth after use , Disp: 1 each, Rfl: 1    busPIRone (BUSPAR) 10 mg tablet, TAKE ONE TABLET BY MOUTH 3 TIMES A DAY (Patient not taking: Reported on 5/17/2022), Disp: 90 tablet, Rfl: 0    hydrOXYzine HCL (ATARAX) 25 mg tablet, Take 1 tablet (25 mg total) by mouth every 6 (six) hours as needed for itching (Patient not taking: Reported on 5/17/2022), Disp: 30 tablet, Rfl: 0    lidocaine (Lidoderm) 5 %, Apply 1 patch topically daily Remove & Discard patch within 12 hours or as directed by MD, Disp: 6 patch, Rfl: 0    naproxen (NAPROSYN) 500 mg tablet, Take 1 tablet (500 mg total) by mouth 2 (two) times a day with meals (Patient not taking: Reported on 5/17/2022), Disp: 60 tablet, Rfl: 1    predniSONE 10 mg tablet, Take 6 pills day one, 5 pills day 2, 4 pills day 3, 3 pills day 4, 2 pills day 5, and 1 pill day 6  (Patient not taking: Reported on 5/17/2022), Disp: 21 tablet, Rfl: 0    No Known Allergies    Physical Exam:    /82 (BP Location: Left arm, Patient Position: Sitting, Cuff Size: Large)   Pulse 75   Temp 98 1 °F (36 7 °C)   Ht 5' 6" (1 676 m)   Wt 105 kg (231 lb 9 6 oz)   LMP 10/10/2016   BMI 37 38 kg/m²     Constitutional:normal, well developed, well nourished, alert, in no distress and non-toxic and no overt pain behavior  Eyes:anicteric  HEENT:grossly intact  Neck:supple, symmetric, trachea midline and no masses   Pulmonary:even and unlabored  Cardiovascular:No edema or pitting edema present  Skin:Normal without rashes or lesions and well hydrated  Psychiatric:Mood and affect appropriate  Neurologic:Cranial Nerves II-XII grossly intact  Musculoskeletal:normal    Imaging  LEFT FOOT     INDICATION:   Q58 355: Pain in left foot  W19  XXXA: Unspecified fall, initial encounter      COMPARISON:  None     VIEWS:  XR FOOT 3+ VW LEFT         FINDINGS:     There is no acute fracture or dislocation      No significant degenerative changes      No lytic or blastic osseous lesion      Soft tissues are unremarkable      IMPRESSION:     No acute osseous abnormality  LEFT KNEE     INDICATION:   M25 562: Pain in left knee  W19  XXXA: Unspecified fall, initial encounter      COMPARISON:  None     VIEWS:  XR KNEE 4+ VW LEFT INJURY         FINDINGS:     There is no acute fracture or dislocation      There is a joint effusion      Medial compartment narrowing  Medial and lateral marginal and posterior patella osteophytes      No lytic or blastic osseous lesion      Soft tissues are unremarkable      IMPRESSION:     Osteoarthritis     No acute osseous abnormality

## 2022-05-18 DIAGNOSIS — G89.29 OTHER CHRONIC PAIN: Primary | ICD-10-CM

## 2022-05-18 RX ORDER — DULOXETIN HYDROCHLORIDE 20 MG/1
20 CAPSULE, DELAYED RELEASE ORAL DAILY
Qty: 30 CAPSULE | Refills: 0 | Status: SHIPPED | OUTPATIENT
Start: 2022-05-18 | End: 2022-06-13 | Stop reason: SDUPTHER

## 2022-05-25 ENCOUNTER — OFFICE VISIT (OUTPATIENT)
Dept: FAMILY MEDICINE CLINIC | Facility: CLINIC | Age: 52
End: 2022-05-25
Payer: COMMERCIAL

## 2022-05-25 VITALS
SYSTOLIC BLOOD PRESSURE: 116 MMHG | HEART RATE: 75 BPM | TEMPERATURE: 97.9 F | WEIGHT: 233 LBS | BODY MASS INDEX: 37.45 KG/M2 | DIASTOLIC BLOOD PRESSURE: 80 MMHG | OXYGEN SATURATION: 97 % | HEIGHT: 66 IN

## 2022-05-25 DIAGNOSIS — F41.9 ANXIETY: ICD-10-CM

## 2022-05-25 DIAGNOSIS — F32.A ANXIETY AND DEPRESSION: ICD-10-CM

## 2022-05-25 DIAGNOSIS — F41.9 ANXIETY AND DEPRESSION: ICD-10-CM

## 2022-05-25 PROBLEM — J96.01 ACUTE RESPIRATORY FAILURE WITH HYPOXIA (HCC): Status: RESOLVED | Noted: 2020-11-20 | Resolved: 2022-05-25

## 2022-05-25 PROCEDURE — 99213 OFFICE O/P EST LOW 20 MIN: CPT | Performed by: PHYSICIAN ASSISTANT

## 2022-05-25 RX ORDER — ESCITALOPRAM OXALATE 20 MG/1
TABLET ORAL
Qty: 30 TABLET | Refills: 0
Start: 2022-05-25 | End: 2022-06-27

## 2022-05-25 RX ORDER — PHENOL 1.4 %
40 AEROSOL, SPRAY (ML) MUCOUS MEMBRANE
COMMUNITY

## 2022-05-25 RX ORDER — BUSPIRONE HYDROCHLORIDE 15 MG/1
15 TABLET ORAL 3 TIMES DAILY
Qty: 90 TABLET | Refills: 0 | Status: SHIPPED | OUTPATIENT
Start: 2022-05-25 | End: 2022-06-13 | Stop reason: SDUPTHER

## 2022-05-25 NOTE — PROGRESS NOTES
Assessment/Plan:    Problem List Items Addressed This Visit    None     Visit Diagnoses     Anxiety and depression        Relevant Medications    busPIRone (BUSPAR) 15 mg tablet    escitalopram (LEXAPRO) 20 mg tablet    Anxiety        Relevant Medications    escitalopram (LEXAPRO) 20 mg tablet           Diagnoses and all orders for this visit:    Anxiety and depression  -     busPIRone (BUSPAR) 15 mg tablet; Take 1 tablet (15 mg total) by mouth in the morning and 1 tablet (15 mg total) in the evening and 1 tablet (15 mg total) before bedtime  Anxiety  -     escitalopram (LEXAPRO) 20 mg tablet; Take 1/2 tablet until finished  Other orders  -     Melatonin 10 MG TABS; Take 40 mg by mouth daily at bedtime      Pt to taper off of lexapro, follow up in 1 month and consider increasing Cymbalta at that time  Subjective:      Patient ID: Nadiya Chatman is a 46 y o  female  Curtistine Scarce is here today for follow up, needs to go over medication  Apparently, she started taking Cymbalta without tapering the lexapro first  I instructed her to taper off the lexapro first  She feels jittery, antsy at times, anxious, feels this is worse than her depression  Pain management stopped lyrica and changed to robaxin  Pt would like to try a nerve stimulator in back, however, breathing is not good enough to safely do surgery  She applied for SSDI, was denied, has an appointment for examination with [facility] in July along with another PFT that day  The following portions of the patient's history were reviewed and updated as appropriate:   She has a past medical history of Anxiety, Back pain, Depression, and Insomnia ,  does not have any pertinent problems on file  ,   has a past surgical history that includes Cholecystectomy; Lumbar disc surgery; pr repair incisional hernia,reducible (N/A, 3/16/2018); Back surgery (2006); Back surgery; and pr shldr arthroscop,surg,w/rotat cuff repr (Left, 11/18/2020)  ,  She was adopted   Family history is unknown by patient  ,   reports that she quit smoking about 19 months ago  Her smoking use included cigarettes  She smoked 0 50 packs per day for 0 00 years  She has never used smokeless tobacco  She reports that she does not drink alcohol and does not use drugs  ,  has No Known Allergies     Current Outpatient Medications   Medication Sig Dispense Refill    busPIRone (BUSPAR) 15 mg tablet Take 1 tablet (15 mg total) by mouth in the morning and 1 tablet (15 mg total) in the evening and 1 tablet (15 mg total) before bedtime  90 tablet 0    DULoxetine (CYMBALTA) 20 mg capsule Take 1 capsule (20 mg total) by mouth in the morning  30 capsule 0    escitalopram (LEXAPRO) 20 mg tablet Take 1/2 tablet until finished  30 tablet 0    Melatonin 10 MG TABS Take 40 mg by mouth daily at bedtime      methocarbamol (ROBAXIN) 500 mg tablet Take 1 tablet (500 mg total) by mouth in the morning and 1 tablet (500 mg total) in the evening and 1 tablet (500 mg total) before bedtime  As needed for pain/spasms  90 tablet 1    traZODone (DESYREL) 100 mg tablet Take 1 tablet (100 mg total) by mouth daily at bedtime 30 tablet 0     No current facility-administered medications for this visit  Review of Systems   Constitutional: Negative for activity change, appetite change, chills, diaphoresis, fatigue, fever and unexpected weight change  HENT: Negative for congestion, ear pain, postnasal drip, rhinorrhea, sinus pressure, sinus pain, sneezing, sore throat, tinnitus and voice change  Eyes: Negative for pain, redness and visual disturbance  Respiratory: Positive for chest tightness and shortness of breath  Negative for cough and wheezing  [place]   Cardiovascular: Negative for chest pain, palpitations and leg swelling  Gastrointestinal: Negative for abdominal pain, blood in stool, constipation, diarrhea, nausea and vomiting     Genitourinary: Negative for difficulty urinating, dysuria, frequency, hematuria and urgency  Musculoskeletal: Negative for arthralgias, back pain, gait problem, joint swelling, myalgias, neck pain and neck stiffness  Skin: Negative for color change, pallor, rash and wound  Neurological: Negative for dizziness, tremors, weakness, light-headedness and headaches  Psychiatric/Behavioral: Positive for dysphoric mood  Negative for self-injury, sleep disturbance and suicidal ideas  The patient is nervous/anxious  Objective:  Vitals:    05/25/22 0900   BP: 116/80   Pulse: 75   Temp: 97 9 °F (36 6 °C)   SpO2: 97%   Weight: 106 kg (233 lb)   Height: 5' 6" (1 676 m)     Body mass index is 37 61 kg/m²  Physical Exam  Vitals reviewed  Constitutional:       General: She is not in acute distress  Appearance: She is well-developed  She is not diaphoretic  HENT:      Head: Normocephalic and atraumatic  Right Ear: Hearing, tympanic membrane, ear canal and external ear normal       Left Ear: Hearing, tympanic membrane, ear canal and external ear normal       Mouth/Throat:      Pharynx: Uvula midline  No oropharyngeal exudate  Eyes:      General: No scleral icterus  Right eye: No discharge  Left eye: No discharge  Conjunctiva/sclera: Conjunctivae normal    Neck:      Thyroid: No thyromegaly  Vascular: No carotid bruit  Cardiovascular:      Rate and Rhythm: Normal rate and regular rhythm  Heart sounds: Normal heart sounds  No murmur heard  Pulmonary:      Effort: Pulmonary effort is normal  No respiratory distress  Breath sounds: Normal breath sounds  No wheezing  Abdominal:      General: Bowel sounds are normal  There is no distension  Palpations: Abdomen is soft  There is no mass  Tenderness: There is no abdominal tenderness  There is no guarding or rebound  Musculoskeletal:         General: No tenderness  Normal range of motion  Cervical back: Neck supple  Lymphadenopathy:      Cervical: No cervical adenopathy     Skin: General: Skin is warm and dry  Findings: No erythema or rash  Neurological:      Mental Status: She is alert and oriented to person, place, and time  Psychiatric:         Behavior: Behavior normal          Thought Content:  Thought content normal          Judgment: Judgment normal

## 2022-06-13 DIAGNOSIS — G89.29 OTHER CHRONIC PAIN: ICD-10-CM

## 2022-06-13 DIAGNOSIS — F41.9 ANXIETY AND DEPRESSION: ICD-10-CM

## 2022-06-13 DIAGNOSIS — F32.A ANXIETY AND DEPRESSION: ICD-10-CM

## 2022-06-13 RX ORDER — BUSPIRONE HYDROCHLORIDE 15 MG/1
15 TABLET ORAL 3 TIMES DAILY
Qty: 90 TABLET | Refills: 0 | Status: SHIPPED | OUTPATIENT
Start: 2022-06-13 | End: 2022-07-25

## 2022-06-13 RX ORDER — DULOXETIN HYDROCHLORIDE 20 MG/1
20 CAPSULE, DELAYED RELEASE ORAL DAILY
Qty: 30 CAPSULE | Refills: 0 | Status: SHIPPED | OUTPATIENT
Start: 2022-06-13 | End: 2022-06-27 | Stop reason: SDUPTHER

## 2022-06-27 ENCOUNTER — OFFICE VISIT (OUTPATIENT)
Dept: PAIN MEDICINE | Facility: CLINIC | Age: 52
End: 2022-06-27
Payer: COMMERCIAL

## 2022-06-27 VITALS
DIASTOLIC BLOOD PRESSURE: 83 MMHG | HEART RATE: 96 BPM | WEIGHT: 227 LBS | SYSTOLIC BLOOD PRESSURE: 136 MMHG | BODY MASS INDEX: 36.48 KG/M2 | HEIGHT: 66 IN

## 2022-06-27 DIAGNOSIS — M54.50 LUMBAR PAIN WITH RADIATION DOWN LEFT LEG: ICD-10-CM

## 2022-06-27 DIAGNOSIS — M54.16 LUMBAR RADICULOPATHY: ICD-10-CM

## 2022-06-27 DIAGNOSIS — Z98.1 STATUS POST LUMBAR SPINAL FUSION: ICD-10-CM

## 2022-06-27 DIAGNOSIS — G89.4 CHRONIC PAIN SYNDROME: ICD-10-CM

## 2022-06-27 DIAGNOSIS — G89.29 OTHER CHRONIC PAIN: ICD-10-CM

## 2022-06-27 DIAGNOSIS — M51.26 LUMBAR DISC HERNIATION: ICD-10-CM

## 2022-06-27 DIAGNOSIS — M79.605 LUMBAR PAIN WITH RADIATION DOWN LEFT LEG: ICD-10-CM

## 2022-06-27 PROCEDURE — 99214 OFFICE O/P EST MOD 30 MIN: CPT | Performed by: NURSE PRACTITIONER

## 2022-06-27 RX ORDER — METHOCARBAMOL 500 MG/1
500 TABLET, FILM COATED ORAL 3 TIMES DAILY
Qty: 90 TABLET | Refills: 1 | Status: SHIPPED | OUTPATIENT
Start: 2022-06-27 | End: 2022-07-25 | Stop reason: SDUPTHER

## 2022-06-27 RX ORDER — DULOXETIN HYDROCHLORIDE 20 MG/1
40 CAPSULE, DELAYED RELEASE ORAL
Qty: 60 CAPSULE | Refills: 1 | Status: SHIPPED | OUTPATIENT
Start: 2022-06-27 | End: 2022-07-25 | Stop reason: SDUPTHER

## 2022-06-27 NOTE — PROGRESS NOTES
Assessment:  1  Other chronic pain    2  Chronic pain syndrome    3  Lumbar disc herniation    4  Lumbar pain with radiation down left leg    5  Lumbar radiculopathy    6  Status post lumbar spinal fusion        Plan:  While the patient was in the office today, I did have a thorough conversation regarding their chronic pain syndrome, medication management, and treatment plan options  Patient is being seen for follow-up visit  She was last seen here on 05/17/2022 at which time she was weaned off of Lyrica due to lack of efficacy  Her PCP recently switched Lexapro to Cymbalta 20 mg daily  Also, she was started on Robaxin 500 mg 3 times daily if needed for spasms  Patient denies any additional improvement in her symptoms with the use of Cymbalta and Robaxin  Increase Cymbalta to 40 mg at bedtime  A new prescription was sent to her pharmacy  Increase Robaxin to 750 mg 3 times daily if needed for spasms  Prescription was sent to her pharmacy  The patient will follow-up in 1 month for medication prescription refill and reevaluation  The patient was advised to contact the office should their symptoms worsen in the interim  The patient was agreeable and verbalized an understanding  History of Present Illness: The patient is a 45 y o  female who presents for a follow up office visit in regards to Back Pain, Leg Pain, and Foot Pain  The patients current symptoms include complaints of low back and bilateral leg pain  Current pain level is a 9/10  Quality pain is described as burning, sharp, shooting, numb, pins and needles  Current pain medications includes:  Cymbalta 20 mg daily, Robaxin 500 mg 3 times daily if needed for spasms   The patient reports that this regimen is providing 0 % pain relief  The patient is reporting no side effects from this pain medication regimen      I have personally reviewed and/or updated the patient's past medical history, past surgical history, family history, social history, current medications, allergies, and vital signs today  Review of Systems  Review of Systems   Constitutional: Negative for chills and fever  HENT: Negative for ear pain and sore throat  Eyes: Negative for pain and visual disturbance  Respiratory: Negative for cough and shortness of breath  Cardiovascular: Negative for chest pain and palpitations  Gastrointestinal: Negative for abdominal pain and vomiting  Genitourinary: Negative for dysuria and hematuria  Musculoskeletal: Positive for gait problem  Negative for arthralgias and back pain  Decreased range of motion  Joint stiffness  Pain in extremity- legs     Skin: Negative for color change and rash  Neurological: Negative for seizures and syncope  All other systems reviewed and are negative          Past Medical History:   Diagnosis Date    Anxiety     Back pain     Depression     Insomnia        Past Surgical History:   Procedure Laterality Date    BACK SURGERY  2006    cage in lumbar L5 area    BACK SURGERY      CHOLECYSTECTOMY      [address]      [place] REPAIR INCISIONAL HERNIA,REDUCIBLE N/A 3/16/2018    Procedure: REPAIR HERNIA VENTRAL with mesh;  Surgeon: Chandni Green DO;  Location: [facility];  Service: General    AR SHLDR ARTHROSCOP,SURG,W/ROTAT CUFF REPR Left 2020    Procedure: SHOULDER ARTHROSCOPY, ROTATOR CUFF REPAIR, SYNEVECTOMY, ACROMIOPLASTY DEBRIDEMENT, INJECTION;  Surgeon: Teja Catherine DO;  Location: [facility] MAIN OR;  Service: Orthopedics       Family History   Adopted: Yes   Family history unknown: Yes       Social History     Occupational History    Not on file   Tobacco Use    Smoking status: Former Smoker     Packs/day: 0 50     Years: 0 00     Pack years: 0 00     Types: Cigarettes     Quit date: 10/17/2020     Years since quittin 6    Smokeless tobacco: Never Used    Tobacco comment: 1 pack a day for 20 years   Vaping Use    Vaping Use: Never used   Substance and Sexual Activity    Alcohol use: Never     Alcohol/week: 0 0 standard drinks    Drug use: Never    Sexual activity: Not Currently         Current Outpatient Medications:     busPIRone (BUSPAR) 15 mg tablet, Take 1 tablet (15 mg total) by mouth 3 (three) times a day, Disp: 90 tablet, Rfl: 0    DULoxetine (CYMBALTA) 20 mg capsule, Take 2 capsules (40 mg total) by mouth daily at bedtime, Disp: 60 capsule, Rfl: 1    Melatonin 10 MG TABS, Take 40 mg by mouth daily at bedtime, Disp: , Rfl:     methocarbamol (ROBAXIN) 500 mg tablet, Take 1 tablet (500 mg total) by mouth 3 (three) times a day As needed for pain/spasms, Disp: 90 tablet, Rfl: 1    traZODone (DESYREL) 100 mg tablet, Take 1 tablet (100 mg total) by mouth daily at bedtime, Disp: 30 tablet, Rfl: 0    No Known Allergies    Physical Exam:    /83   Pulse 96   Ht 5' 6" (1 676 m)   Wt 103 kg (227 lb)   LMP 10/10/2016   BMI 36 64 kg/m²     Constitutional:normal, well developed, well nourished, alert, in no distress and non-toxic and no overt pain behavior  Eyes:anicteric  HEENT:grossly intact  Neck:supple, symmetric, trachea midline and no masses   Pulmonary:even and unlabored  Cardiovascular:No edema or pitting edema present  Skin:Normal without rashes or lesions and well hydrated  Psychiatric:Mood and affect appropriate  Neurologic:Cranial Nerves II-XII grossly intact  Musculoskeletal:antalgic    Imaging  No orders to display       No orders of the defined types were placed in this encounter

## 2022-07-05 ENCOUNTER — OFFICE VISIT (OUTPATIENT)
Dept: FAMILY MEDICINE CLINIC | Facility: CLINIC | Age: 52
End: 2022-07-05
Payer: COMMERCIAL

## 2022-07-05 VITALS
BODY MASS INDEX: 36.48 KG/M2 | DIASTOLIC BLOOD PRESSURE: 76 MMHG | HEART RATE: 97 BPM | TEMPERATURE: 97.6 F | OXYGEN SATURATION: 95 % | SYSTOLIC BLOOD PRESSURE: 122 MMHG | HEIGHT: 66 IN | WEIGHT: 227 LBS

## 2022-07-05 DIAGNOSIS — J98.4 RESTRICTIVE LUNG DISEASE: ICD-10-CM

## 2022-07-05 DIAGNOSIS — F41.9 ANXIETY AND DEPRESSION: Primary | ICD-10-CM

## 2022-07-05 DIAGNOSIS — F32.A ANXIETY AND DEPRESSION: Primary | ICD-10-CM

## 2022-07-05 DIAGNOSIS — G90.529 COMPLEX REGIONAL PAIN SYNDROME TYPE 1 OF LOWER EXTREMITY, UNSPECIFIED LATERALITY: ICD-10-CM

## 2022-07-05 PROCEDURE — 99213 OFFICE O/P EST LOW 20 MIN: CPT | Performed by: PHYSICIAN ASSISTANT

## 2022-07-05 NOTE — PROGRESS NOTES
Assessment/Plan:    Problem List Items Addressed This Visit        Respiratory    Restrictive lung disease       Nervous and Auditory    CRPS (complex regional pain syndrome type I)       Other    Anxiety and depression - Primary           Diagnoses and all orders for this visit:    Anxiety and depression    Restrictive lung disease    Complex regional pain syndrome type 1 of lower extremity, unspecified laterality            Subjective:      Patient ID: Michael Mijares is a 45 y o  female  Gely Meuse is here today for 1 month follow up  Feels since discontinuing lexapro and starting Cymbalta, depression is worse, does not feel any benefit to her chronic pain, either  Pain management recently increased Cymbalta to 40 mg daily, however pt has not been able to have it filled at pharmacy until today due to insurance  I've asked her to try that dose for 1 month and RTO 1 month for re-evaluation  Breathing remains at baseline, "not worse " She has noticed that breathing is worse in humid conditions  The following portions of the patient's history were reviewed and updated as appropriate:   She has a past medical history of Anxiety, Back pain, Depression, and Insomnia ,  does not have any pertinent problems on file  ,   has a past surgical history that includes Cholecystectomy; Lumbar disc surgery; pr repair incisional hernia,reducible (N/A, 3/16/2018); Back surgery (2006); Back surgery; and pr shldr arthroscop,surg,w/rotat cuff repr (Left, 11/18/2020)  ,  She was adopted  Family history is unknown by patient  ,   reports that she quit smoking about 20 months ago  Her smoking use included cigarettes  She smoked 0 50 packs per day for 0 00 years  She has never used smokeless tobacco  She reports that she does not drink alcohol and does not use drugs  ,  has No Known Allergies     Current Outpatient Medications   Medication Sig Dispense Refill    busPIRone (BUSPAR) 15 mg tablet Take 1 tablet (15 mg total) by mouth 3 (three) times a day 90 tablet 0    DULoxetine (CYMBALTA) 20 mg capsule Take 2 capsules (40 mg total) by mouth daily at bedtime 60 capsule 1    Melatonin 10 MG TABS Take 40 mg by mouth daily at bedtime      methocarbamol (ROBAXIN) 500 mg tablet Take 1 tablet (500 mg total) by mouth 3 (three) times a day As needed for pain/spasms 90 tablet 1    traZODone (DESYREL) 100 mg tablet Take 1 tablet (100 mg total) by mouth daily at bedtime 30 tablet 0     No current facility-administered medications for this visit  Review of Systems   Constitutional: Negative for activity change, appetite change, chills, diaphoresis, fatigue, fever and unexpected weight change  HENT: Negative for congestion, ear pain, postnasal drip, rhinorrhea, sinus pressure, sinus pain, sneezing, sore throat, tinnitus and voice change  Eyes: Negative for pain, redness and visual disturbance  Respiratory: Positive for shortness of breath (CADET, baseline)  Negative for cough, chest tightness and wheezing  Cardiovascular: Negative for chest pain, palpitations and leg swelling  Gastrointestinal: Negative for abdominal pain, blood in stool, constipation, diarrhea, nausea and vomiting  Genitourinary: Negative for difficulty urinating, dysuria, frequency, hematuria and urgency  Musculoskeletal: Positive for arthralgias and back pain  Negative for gait problem, joint swelling, myalgias, neck pain and neck stiffness  Skin: Negative for color change, pallor, rash and wound  Neurological: Negative for dizziness, tremors, weakness, light-headedness and headaches  Psychiatric/Behavioral: Positive for dysphoric mood  Negative for self-injury, sleep disturbance and suicidal ideas  The patient is nervous/anxious  Objective:  Vitals:    07/05/22 0728   BP: 122/76   Pulse: 97   Temp: 97 6 °F (36 4 °C)   SpO2: 95%   Weight: 103 kg (227 lb)   Height: 5' 6" (1 676 m)     Body mass index is 36 64 kg/m²  Physical Exam  Vitals reviewed  Constitutional:       General: She is not in acute distress  Appearance: She is well-developed  She is obese  She is not diaphoretic  HENT:      Head: Normocephalic and atraumatic  Right Ear: Hearing, tympanic membrane, ear canal and external ear normal       Left Ear: Hearing, tympanic membrane, ear canal and external ear normal       Mouth/Throat:      Pharynx: Uvula midline  No oropharyngeal exudate  Eyes:      General: No scleral icterus  Right eye: No discharge  Left eye: No discharge  Conjunctiva/sclera: Conjunctivae normal    Neck:      Thyroid: No thyromegaly  Vascular: No carotid bruit  Cardiovascular:      Rate and Rhythm: Normal rate and regular rhythm  Heart sounds: Normal heart sounds  No murmur heard  Pulmonary:      Effort: Pulmonary effort is normal  No respiratory distress  Breath sounds: Normal breath sounds  No wheezing  Abdominal:      General: Bowel sounds are normal  There is no distension  Palpations: Abdomen is soft  There is no mass  Tenderness: There is no abdominal tenderness  There is no guarding or rebound  Musculoskeletal:         General: No tenderness  Normal range of motion  Cervical back: Neck supple  Lymphadenopathy:      Cervical: No cervical adenopathy  Skin:     General: Skin is warm and dry  Findings: No erythema or rash  Neurological:      Mental Status: She is alert and oriented to person, place, and time  Psychiatric:         Behavior: Behavior normal          Thought Content:  Thought content normal          Judgment: Judgment normal

## 2022-07-25 ENCOUNTER — OFFICE VISIT (OUTPATIENT)
Dept: PAIN MEDICINE | Facility: CLINIC | Age: 52
End: 2022-07-25
Payer: COMMERCIAL

## 2022-07-25 VITALS
DIASTOLIC BLOOD PRESSURE: 82 MMHG | WEIGHT: 225 LBS | HEART RATE: 89 BPM | BODY MASS INDEX: 36.16 KG/M2 | SYSTOLIC BLOOD PRESSURE: 130 MMHG | HEIGHT: 66 IN

## 2022-07-25 DIAGNOSIS — Z98.1 STATUS POST LUMBAR SPINAL FUSION: ICD-10-CM

## 2022-07-25 DIAGNOSIS — G89.4 CHRONIC PAIN SYNDROME: Primary | ICD-10-CM

## 2022-07-25 DIAGNOSIS — M79.605 LUMBAR PAIN WITH RADIATION DOWN LEFT LEG: ICD-10-CM

## 2022-07-25 DIAGNOSIS — M54.16 LUMBAR RADICULOPATHY: ICD-10-CM

## 2022-07-25 DIAGNOSIS — M54.50 LUMBAR PAIN WITH RADIATION DOWN LEFT LEG: ICD-10-CM

## 2022-07-25 DIAGNOSIS — G89.29 OTHER CHRONIC PAIN: ICD-10-CM

## 2022-07-25 DIAGNOSIS — M51.26 LUMBAR DISC HERNIATION: ICD-10-CM

## 2022-07-25 PROCEDURE — 99214 OFFICE O/P EST MOD 30 MIN: CPT | Performed by: NURSE PRACTITIONER

## 2022-07-25 RX ORDER — DULOXETIN HYDROCHLORIDE 60 MG/1
60 CAPSULE, DELAYED RELEASE ORAL
Qty: 30 CAPSULE | Refills: 2 | Status: SHIPPED | OUTPATIENT
Start: 2022-07-25 | End: 2022-09-27

## 2022-07-25 RX ORDER — METHOCARBAMOL 750 MG/1
750 TABLET, FILM COATED ORAL 3 TIMES DAILY
Qty: 90 TABLET | Refills: 1 | Status: SHIPPED | OUTPATIENT
Start: 2022-07-25 | End: 2022-09-27 | Stop reason: SDUPTHER

## 2022-07-25 NOTE — PROGRESS NOTES
Assessment:  1  Chronic pain syndrome    2  Lumbar disc herniation    3  Lumbar radiculopathy    4  Status post lumbar spinal fusion        Plan:  While the patient was in the office today, I did have a thorough conversation regarding their chronic pain syndrome, medication management, and treatment plan options  Patient is being seen for follow-up visit  She was last seen here on 06/27/2022 at which time Cymbalta was increased to 40 mg at bedtime  Unfortunately, she denies any significant improvement with the increased dosage of Cymbalta  At this point, we will increase Cymbalta to 60 mg at bedtime  A new prescription was sent to her pharmacy  Increase Robaxin to 750 mg 3 times daily if needed for spasms  A new prescription was sent to her pharmacy  The patient will follow-up in 1 month for medication prescription refill and reevaluation  The patient was advised to contact the office should their symptoms worsen in the interim  The patient was agreeable and verbalized an understanding  History of Present Illness: The patient is a 45 y o  female who presents for a follow up office visit in regards to Leg Pain and Knee Pain  The patients current symptoms include complaints of low back and bilateral leg pain  Current pain level is an 8/10  Quality pain is described as sharp, shooting, numb, pins and needles  Current pain medications includes:  Cymbalta 40 mg at bedtime, Robaxin 750 mg 3 times daily if needed for spasms   The patient reports that this regimen is providing 0 % pain relief  The patient is reporting no side effects from this pain medication regimen  I have personally reviewed and/or updated the patient's past medical history, past surgical history, family history, social history, current medications, allergies, and vital signs today  Review of Systems  Review of Systems   Respiratory: Negative for shortness of breath  Cardiovascular: Negative for chest pain  Gastrointestinal: Negative for constipation, diarrhea, nausea and vomiting  Musculoskeletal: Positive for gait problem  Negative for arthralgias, joint swelling and myalgias  Decreased range of motion  Pain in extremity - legs   Neurological: Negative for dizziness, seizures and weakness  All other systems reviewed and are negative          Past Medical History:   Diagnosis Date    Anxiety     Back pain     Depression     Insomnia        Past Surgical History:   Procedure Laterality Date    BACK SURGERY  2006    cage in lumbar L5 area    BACK SURGERY      CHOLECYSTECTOMY      [address]BAR [address]      [place] REPAIR INCISIONAL HERNIA,REDUCIBLE N/A 3/16/2018    Procedure: REPAIR HERNIA VENTRAL with mesh;  Surgeon: Eloy Palencia DO;  Location: [facility];  Service: General    [place] SHLDR ARTHROSCOP,SURG,W/ROTAT CUFF REPR Left 2020    Procedure: SHOULDER ARTHROSCOPY, ROTATOR CUFF REPAIR, SYNEVECTOMY, ACROMIOPLASTY DEBRIDEMENT, INJECTION;  Surgeon: Mario Patiño DO;  Location: [address] [place] [address] MAIN OR;  Service: Orthopedics       Family History   Adopted: Yes   Family history unknown: Yes       Social History     Occupational History    Not on file   Tobacco Use    Smoking status: Former Smoker     Packs/day: 0 50     Years: 0 00     Pack years: 0 00     Types: Cigarettes     Quit date: 10/17/2020     Years since quittin 7    Smokeless tobacco: Never Used    Tobacco comment: 1 pack a day for 20 years   Vaping Use    Vaping Use: Never used   Substance and Sexual Activity    Alcohol use: Never     Alcohol/week: 0 0 standard drinks    Drug use: Never    Sexual activity: Not Currently         Current Outpatient Medications:     busPIRone (BUSPAR) 15 mg tablet, Take 1 tablet (15 mg total) by mouth 3 (three) times a day, Disp: 90 tablet, Rfl: 0    DULoxetine (CYMBALTA) 20 mg capsule, Take 2 capsules (40 mg total) by mouth daily at bedtime, Disp: 60 capsule, Rfl: 1    Melatonin 10 MG TABS, Take 40 mg by mouth daily at bedtime, Disp: , Rfl:     methocarbamol (ROBAXIN) 500 mg tablet, Take 1 tablet (500 mg total) by mouth 3 (three) times a day As needed for pain/spasms, Disp: 90 tablet, Rfl: 1    traZODone (DESYREL) 100 mg tablet, Take 1 tablet (100 mg total) by mouth daily at bedtime, Disp: 30 tablet, Rfl: 0    No Known Allergies    Physical Exam:    Ht 5' 6" (1 676 m)   LMP 10/10/2016   BMI 36 64 kg/m²     Constitutional:normal, well developed, well nourished, alert, in no distress and non-toxic and no overt pain behavior  Eyes:anicteric  HEENT:grossly intact  Neck:supple, symmetric, trachea midline and no masses   Pulmonary:even and unlabored  Cardiovascular:No edema or pitting edema present  Skin:Normal without rashes or lesions and well hydrated  Psychiatric:Mood and affect appropriate  Neurologic:Cranial Nerves II-XII grossly intact  Musculoskeletal:normal    Imaging  LEFT KNEE     INDICATION:   M25 562: Pain in left knee  W19  XXXA: Unspecified fall, initial encounter      COMPARISON:  None     VIEWS:  XR KNEE 4+ VW LEFT INJURY         FINDINGS:     There is no acute fracture or dislocation      There is a joint effusion      Medial compartment narrowing  Medial and lateral marginal and posterior patella osteophytes      No lytic or blastic osseous lesion      Soft tissues are unremarkable      IMPRESSION:     Osteoarthritis     No acute osseous abnormality            LEFT FOOT     INDICATION:   H32 389: Pain in left foot  W19  XXXA: Unspecified fall, initial encounter      COMPARISON:  None     VIEWS:  XR FOOT 3+ VW LEFT         FINDINGS:     There is no acute fracture or dislocation      No significant degenerative changes      No lytic or blastic osseous lesion      Soft tissues are unremarkable      IMPRESSION:     No acute osseous abnormality

## 2022-08-25 ENCOUNTER — OFFICE VISIT (OUTPATIENT)
Dept: FAMILY MEDICINE CLINIC | Facility: CLINIC | Age: 52
End: 2022-08-25
Payer: COMMERCIAL

## 2022-08-25 VITALS
HEART RATE: 81 BPM | DIASTOLIC BLOOD PRESSURE: 88 MMHG | SYSTOLIC BLOOD PRESSURE: 130 MMHG | BODY MASS INDEX: 36.26 KG/M2 | OXYGEN SATURATION: 97 % | WEIGHT: 225.6 LBS | HEIGHT: 66 IN | TEMPERATURE: 97.8 F

## 2022-08-25 DIAGNOSIS — G89.29 OTHER CHRONIC PAIN: ICD-10-CM

## 2022-08-25 DIAGNOSIS — F41.9 ANXIETY AND DEPRESSION: ICD-10-CM

## 2022-08-25 DIAGNOSIS — F32.A ANXIETY AND DEPRESSION: ICD-10-CM

## 2022-08-25 DIAGNOSIS — G47.00 INSOMNIA, UNSPECIFIED TYPE: ICD-10-CM

## 2022-08-25 PROCEDURE — 99214 OFFICE O/P EST MOD 30 MIN: CPT | Performed by: PHYSICIAN ASSISTANT

## 2022-08-25 RX ORDER — TRAZODONE HYDROCHLORIDE 150 MG/1
150 TABLET ORAL
Qty: 30 TABLET | Refills: 0 | Status: SHIPPED | OUTPATIENT
Start: 2022-08-25 | End: 2022-09-12 | Stop reason: SDUPTHER

## 2022-08-25 RX ORDER — BUSPIRONE HYDROCHLORIDE 15 MG/1
15 TABLET ORAL 3 TIMES DAILY
Qty: 90 TABLET | Refills: 0 | Status: SHIPPED | OUTPATIENT
Start: 2022-08-25 | End: 2022-10-10 | Stop reason: SDUPTHER

## 2022-08-25 NOTE — PROGRESS NOTES
Assessment/Plan:    Problem List Items Addressed This Visit        Other    Anxiety and depression    Relevant Medications    traZODone (DESYREL) 150 mg tablet    busPIRone (BUSPAR) 15 mg tablet      Other Visit Diagnoses     Insomnia, unspecified type        Discontinue melatonin, will try trazodone 100 mg HS  Relevant Medications    traZODone (DESYREL) 150 mg tablet    Other chronic pain               Diagnoses and all orders for this visit:    Insomnia, unspecified type  Comments:  Discontinue melatonin, will try trazodone 100 mg HS  Orders:  -     traZODone (DESYREL) 150 mg tablet; Take 1 tablet (150 mg total) by mouth daily at bedtime    Anxiety and depression  -     busPIRone (BUSPAR) 15 mg tablet; Take 1 tablet (15 mg total) by mouth 3 (three) times a day    Other chronic pain            Subjective:      Patient ID: Suzette Celaya is a 45 y o  female  [place] is here today for 1 month follow up  Has been taking Cymbalta 60 mg x 1 month, does not notice an improvement in pain, anxiety, or depression  She is taking trazodone 100 mg and gets 1 hour of sleep, will increase to 150 mg hs  She does state that she has increased back pain  Scheduled to see pain management next week  She does not have any standing appointments with pulmonology, still waiting to hear back from SSDI determination  The following portions of the patient's history were reviewed and updated as appropriate:   She has a past medical history of Anxiety, Back pain, Depression, and Insomnia ,  does not have any pertinent problems on file  ,   has a past surgical history that includes Cholecystectomy; Lumbar disc surgery; pr repair incisional hernia,reducible (N/A, 3/16/2018); Back surgery (2006); Back surgery; and pr shldr arthroscop,surg,w/rotat cuff repr (Left, 11/18/2020)  ,  She was adopted  Family history is unknown by patient  ,   reports that she quit smoking about 22 months ago  Her smoking use included cigarettes   She smoked 0 50 packs per day for 0 00 years  She has never used smokeless tobacco  She reports that she does not drink alcohol and does not use drugs  ,  has No Known Allergies     Current Outpatient Medications   Medication Sig Dispense Refill    busPIRone (BUSPAR) 15 mg tablet Take 1 tablet (15 mg total) by mouth 3 (three) times a day 90 tablet 0    DULoxetine (CYMBALTA) 60 mg delayed release capsule Take 1 capsule (60 mg total) by mouth daily at bedtime 30 capsule 2    Melatonin 10 MG TABS Take 40 mg by mouth daily at bedtime      methocarbamol (ROBAXIN) 750 mg tablet Take 1 tablet (750 mg total) by mouth 3 (three) times a day As needed for pain/spasms 90 tablet 1    traZODone (DESYREL) 150 mg tablet Take 1 tablet (150 mg total) by mouth daily at bedtime 30 tablet 0     No current facility-administered medications for this visit  Review of Systems   Constitutional: Negative for activity change, appetite change, chills, diaphoresis, fatigue, fever and unexpected weight change  HENT: Negative for congestion, ear pain, postnasal drip, rhinorrhea, sinus pressure, sinus pain, sneezing, sore throat, tinnitus and voice change  Eyes: Negative for pain, redness and visual disturbance  Respiratory: Negative for cough, chest tightness, shortness of breath and wheezing  Cardiovascular: Negative for chest pain, palpitations and leg swelling  Gastrointestinal: Negative for abdominal pain, blood in stool, constipation, diarrhea, nausea and vomiting  Genitourinary: Negative for difficulty urinating, dysuria, frequency, hematuria and urgency  Musculoskeletal: Positive for back pain  Negative for arthralgias, gait problem, joint swelling, myalgias, neck pain and neck stiffness  Skin: Negative for color change, pallor, rash and wound  Neurological: Negative for dizziness, tremors, weakness, light-headedness and headaches  Psychiatric/Behavioral: Positive for dysphoric mood and sleep disturbance   Negative for self-injury and suicidal ideas  The patient is nervous/anxious  Objective:  Vitals:    08/25/22 0904 08/25/22 0925   BP: 146/92 130/88   Pulse: 81    Temp: 97 8 °F (36 6 °C)    SpO2: 97%    Weight: 102 kg (225 lb 9 6 oz)    Height: 5' 6" (1 676 m)      Body mass index is 36 41 kg/m²  Physical Exam  Vitals reviewed  Constitutional:       General: She is not in acute distress  Appearance: She is well-developed  She is obese  She is not diaphoretic  HENT:      Head: Normocephalic and atraumatic  Right Ear: Hearing, tympanic membrane, ear canal and external ear normal       Left Ear: Hearing, tympanic membrane, ear canal and external ear normal       Mouth/Throat:      Pharynx: Uvula midline  No oropharyngeal exudate  Eyes:      General: No scleral icterus  Right eye: No discharge  Left eye: No discharge  Conjunctiva/sclera: Conjunctivae normal    Neck:      Thyroid: No thyromegaly  Vascular: No carotid bruit  Cardiovascular:      Rate and Rhythm: Normal rate and regular rhythm  Heart sounds: Normal heart sounds  No murmur heard  Pulmonary:      Effort: Pulmonary effort is normal  No respiratory distress  Breath sounds: Normal breath sounds  No wheezing  Abdominal:      General: Bowel sounds are normal  There is no distension  Palpations: Abdomen is soft  There is no mass  Tenderness: There is no abdominal tenderness  There is no guarding or rebound  Musculoskeletal:         General: No tenderness  Normal range of motion  Cervical back: Neck supple  Lymphadenopathy:      Cervical: No cervical adenopathy  Skin:     General: Skin is warm and dry  Findings: No erythema or rash  Neurological:      Mental Status: She is alert and oriented to person, place, and time  Psychiatric:         Behavior: Behavior normal          Thought Content: Thought content normal          Judgment: Judgment normal            BMI Counseling:  Body mass index is 36 41 kg/m²  The BMI is above normal  Nutrition recommendations include decreasing portion sizes, encouraging healthy choices of fruits and vegetables, decreasing fast food intake, consuming healthier snacks, limiting drinks that contain sugar, moderation in carbohydrate intake, increasing intake of lean protein, reducing intake of saturated and trans fat and reducing intake of cholesterol  Exercise recommendations include moderate physical activity 150 minutes/week  Rationale for BMI follow-up plan is due to patient being overweight or obese

## 2022-08-30 ENCOUNTER — OFFICE VISIT (OUTPATIENT)
Dept: PAIN MEDICINE | Facility: CLINIC | Age: 52
End: 2022-08-30
Payer: COMMERCIAL

## 2022-08-30 VITALS
SYSTOLIC BLOOD PRESSURE: 145 MMHG | BODY MASS INDEX: 36.1 KG/M2 | DIASTOLIC BLOOD PRESSURE: 86 MMHG | HEART RATE: 88 BPM | HEIGHT: 66 IN | WEIGHT: 224.6 LBS

## 2022-08-30 DIAGNOSIS — G89.4 CHRONIC PAIN SYNDROME: Primary | ICD-10-CM

## 2022-08-30 DIAGNOSIS — M54.16 LUMBAR RADICULOPATHY: ICD-10-CM

## 2022-08-30 DIAGNOSIS — M51.26 LUMBAR DISC HERNIATION: ICD-10-CM

## 2022-08-30 DIAGNOSIS — Z98.1 STATUS POST LUMBAR SPINAL FUSION: ICD-10-CM

## 2022-08-30 PROCEDURE — 99214 OFFICE O/P EST MOD 30 MIN: CPT | Performed by: NURSE PRACTITIONER

## 2022-08-30 RX ORDER — DULOXETIN HYDROCHLORIDE 30 MG/1
30 CAPSULE, DELAYED RELEASE ORAL DAILY
Qty: 30 CAPSULE | Refills: 1 | Status: SHIPPED | OUTPATIENT
Start: 2022-08-30 | End: 2022-09-27

## 2022-08-30 RX ORDER — PREDNISONE 10 MG/1
TABLET ORAL
Qty: 15 TABLET | Refills: 0 | Status: SHIPPED | OUTPATIENT
Start: 2022-08-30 | End: 2022-10-24

## 2022-08-30 NOTE — PROGRESS NOTES
Assessment:  1  Lumbar radiculopathy    2  Chronic pain syndrome    3  Lumbar disc herniation    4  Status post lumbar spinal fusion        Plan:  While the patient was in the office today, I did have a thorough conversation regarding their chronic pain syndrome, medication management, and treatment plan options  Patient is being seen for follow-up visit  She was last seen here on 07/25/2022 at which time Cymbalta was increased to 60 mg at bedtime, Robaxin was increased to 750 mg 3 times daily if needed for spasms  Patient tells me that she went on a 13 hour each way road trip about 3 weeks ago and has had increased pain ever since  I discussed with the patient that at this point time since I feel that there is a significant inflammatory component to their pain symptoms, that they would benefit from a titrating dose of oral steroids over the next 7 days  I advised the patient that while on the steroids, they should not take any other oral NSAIDs except for acetaminophen or Tylenol until they have completed the steroid taper  I also advised them that once they have completed the steroid taper, they are to give our office a follow up phone call to let us know how they are doing and if there is any improvement  The patient was agreeable and verbalized an understanding  Increase Cymbalta to 90 mg at bedtime  A prescription for 30 mg capsule was sent to her pharmacy to take in addition to the 60 mg capsule  Continue Robaxin 750 mg 3 times daily if needed for spasms  She did not require refill this medication during today's visit  The patient will follow-up in 1 month for medication prescription refill and reevaluation  The patient was advised to contact the office should their symptoms worsen in the interim  The patient was agreeable and verbalized an understanding  History of Present Illness:   The patient is a 45 y o  female who presents for a follow up office visit in regards to Leg Pain and Back Pain  The patients current symptoms include complaints of low back pain that radiates down the posterior aspect of both legs  Current pain level is a 9/10  Quality pain is described as sharp, throbbing, shooting, numb, pins and needles  Current pain medications includes:  Cymbalta 60 mg at bedtime, Robaxin 750 mg 3 times daily if needed for spasms   The patient reports that this regimen is providing 0 % pain relief  The patient is reporting no side effects from this pain medication regimen  I have personally reviewed and/or updated the patient's past medical history, past surgical history, family history, social history, current medications, allergies, and vital signs today  Review of Systems  Review of Systems   Respiratory: Positive for shortness of breath  Cardiovascular: Negative for chest pain  Gastrointestinal: Negative for constipation, diarrhea, nausea and vomiting  Musculoskeletal: Positive for gait problem  Negative for arthralgias, joint swelling and myalgias  Pain in extremity - legs, back   Neurological: Negative for dizziness, seizures and weakness  All other systems reviewed and are negative          Past Medical History:   Diagnosis Date    Anxiety     Back pain     Depression     Insomnia        Past Surgical History:   Procedure Laterality Date    BACK SURGERY  2006    cage in lumbar L5 area    BACK SURGERY      CHOLECYSTECTOMY      LUMBAR [address]      [place] REPAIR INCISIONAL HERNIA,REDUCIBLE N/A 3/16/2018    Procedure: REPAIR HERNIA VENTRAL with mesh;  Surgeon: Branden Ortez DO;  Location: [facility];  Service: General    [place] SHLDR ARTHROSCOP,SURG,W/ROTAT CUFF REPR Left 11/18/2020    Procedure: SHOULDER ARTHROSCOPY, ROTATOR CUFF REPAIR, SYNEVECTOMY, ACROMIOPLASTY DEBRIDEMENT, INJECTION;  Surgeon: Erica Ochoa DO;  Location: [facility] MAIN OR;  Service: Orthopedics       Family History   Adopted: Yes   Family history unknown: Yes       Social History Occupational History    Not on file   Tobacco Use    Smoking status: Former Smoker     Packs/day: 0 50     Years: 0 00     Pack years: 0 00     Types: Cigarettes     Quit date: 10/17/2020     Years since quittin 8    Smokeless tobacco: Never Used    Tobacco comment: 1 pack a day for 20 years   Vaping Use    Vaping Use: Never used   Substance and Sexual Activity    Alcohol use: Never     Alcohol/week: 0 0 standard drinks    Drug use: Never    Sexual activity: Not Currently         Current Outpatient Medications:     busPIRone (BUSPAR) 15 mg tablet, Take 1 tablet (15 mg total) by mouth 3 (three) times a day, Disp: 90 tablet, Rfl: 0    DULoxetine (CYMBALTA) 30 mg delayed release capsule, Take 1 capsule (30 mg total) by mouth daily, Disp: 30 capsule, Rfl: 1    DULoxetine (CYMBALTA) 60 mg delayed release capsule, Take 1 capsule (60 mg total) by mouth daily at bedtime, Disp: 30 capsule, Rfl: 2    Melatonin 10 MG TABS, Take 40 mg by mouth daily at bedtime, Disp: , Rfl:     methocarbamol (ROBAXIN) 750 mg tablet, Take 1 tablet (750 mg total) by mouth 3 (three) times a day As needed for pain/spasms, Disp: 90 tablet, Rfl: 1    predniSONE 10 mg tablet, 3 tabs once daily x 3 days; 2 tabs once daily x 2 days; 1 tab daily x 2 days, Disp: 15 tablet, Rfl: 0    traZODone (DESYREL) 150 mg tablet, Take 1 tablet (150 mg total) by mouth daily at bedtime, Disp: 30 tablet, Rfl: 0    No Known Allergies    Physical Exam:    /86   Pulse 88   Ht 5' 6" (1 676 m)   Wt 102 kg (224 lb 9 6 oz)   LMP 10/10/2016   BMI 36 25 kg/m²     Constitutional:normal, well developed, well nourished, alert, in no distress and non-toxic and no overt pain behavior    Eyes:anicteric  HEENT:grossly intact  Neck:supple, symmetric, trachea midline and no masses   Pulmonary:even and unlabored  Cardiovascular:No edema or pitting edema present  Skin:Normal without rashes or lesions and well hydrated  Psychiatric:Mood and affect appropriate  Neurologic:Cranial Nerves II-XII grossly intact  Musculoskeletal:normal    Imaging  No orders to display       No orders of the defined types were placed in this encounter

## 2022-09-12 ENCOUNTER — OFFICE VISIT (OUTPATIENT)
Dept: FAMILY MEDICINE CLINIC | Facility: CLINIC | Age: 52
End: 2022-09-12
Payer: COMMERCIAL

## 2022-09-12 VITALS
SYSTOLIC BLOOD PRESSURE: 144 MMHG | TEMPERATURE: 97.9 F | BODY MASS INDEX: 36.55 KG/M2 | OXYGEN SATURATION: 97 % | DIASTOLIC BLOOD PRESSURE: 86 MMHG | WEIGHT: 227.4 LBS | HEIGHT: 66 IN | HEART RATE: 102 BPM

## 2022-09-12 DIAGNOSIS — G47.00 INSOMNIA, UNSPECIFIED TYPE: Primary | ICD-10-CM

## 2022-09-12 DIAGNOSIS — J98.4 RESTRICTIVE LUNG DISEASE: ICD-10-CM

## 2022-09-12 DIAGNOSIS — F41.9 ANXIETY AND DEPRESSION: ICD-10-CM

## 2022-09-12 DIAGNOSIS — F32.A ANXIETY AND DEPRESSION: ICD-10-CM

## 2022-09-12 PROCEDURE — 99213 OFFICE O/P EST LOW 20 MIN: CPT | Performed by: PHYSICIAN ASSISTANT

## 2022-09-12 RX ORDER — TRAZODONE HYDROCHLORIDE 50 MG/1
200 TABLET ORAL
Start: 2022-09-12

## 2022-09-12 NOTE — PROGRESS NOTES
Assessment/Plan:    Problem List Items Addressed This Visit        Respiratory    Restrictive lung disease       Other    Anxiety and depression    Relevant Medications    traZODone (DESYREL) 50 mg tablet      Other Visit Diagnoses     Insomnia, unspecified type    -  Primary    Pt will increase trazodone to 200 mg, she has multiple doses at home that she will use to build the 200 mg total      Relevant Medications    traZODone (DESYREL) 50 mg tablet           Diagnoses and all orders for this visit:    Insomnia, unspecified type  Comments:  Pt will increase trazodone to 200 mg, she has multiple doses at home that she will use to build the 200 mg total    Orders:  -     traZODone (DESYREL) 50 mg tablet; Take 4 tablets (200 mg total) by mouth daily at bedtime    Anxiety and depression  Comments:  Need to discontniue Cymbalta prior to starting anything else  Pt to take 60 mg x 1 week, then 30 mg x 1 week, then will call me  Restrictive lung disease  Comments:  Handicap placquard form completed  Will likely try sertraline after patient tapered from Cymbalta  Subjective:      Patient ID: Johanne Vigil is a 45 y o  female  Obadiah Savers returns today suddenly unable to sleep and feeling anxious/jittery/angry  Was at her usual state of health at last OV with me on 8/25, started between that visit and the visit with pain management on 8/30  Pt states medications did not change  She took 2 days of prednisone taper from pain management and then stopped medication as she did not like it  She is now taking Cymbalta 90 mg at bedtime, she does not notice any improvement in her pain or anxiety/depression since starting this medication, would like to stop the Cymbalta so that she can try another anxiety/depression medication  She states she faithfully takes buspar 15 mg TID   Current dose of Trazodone is 150 mg daily, she takes this and sleeps from 11:, then is awake until 430 and may sleep until 6:00 AM, She feels fatigued during the remainder of the day, she is not taking any daytime naps  The following portions of the patient's history were reviewed and updated as appropriate:   She has a past medical history of Anxiety, Back pain, Depression, and Insomnia ,  does not have any pertinent problems on file  ,   has a past surgical history that includes Cholecystectomy; Lumbar disc surgery; pr repair incisional hernia,reducible (N/A, 3/16/2018); Back surgery (2006); Back surgery; and pr shldr arthroscop,surg,w/rotat cuff repr (Left, 11/18/2020)  ,  She was adopted  Family history is unknown by patient  ,   reports that she quit smoking about 22 months ago  Her smoking use included cigarettes  She smoked 0 50 packs per day for 0 00 years  She has never used smokeless tobacco  She reports that she does not drink alcohol and does not use drugs  ,  has No Known Allergies     Current Outpatient Medications   Medication Sig Dispense Refill    busPIRone (BUSPAR) 15 mg tablet Take 1 tablet (15 mg total) by mouth 3 (three) times a day 90 tablet 0    DULoxetine (CYMBALTA) 30 mg delayed release capsule Take 1 capsule (30 mg total) by mouth daily 30 capsule 1    DULoxetine (CYMBALTA) 60 mg delayed release capsule Take 1 capsule (60 mg total) by mouth daily at bedtime 30 capsule 2    Melatonin 10 MG TABS Take 40 mg by mouth daily at bedtime      methocarbamol (ROBAXIN) 750 mg tablet Take 1 tablet (750 mg total) by mouth 3 (three) times a day As needed for pain/spasms 90 tablet 1    traZODone (DESYREL) 50 mg tablet Take 4 tablets (200 mg total) by mouth daily at bedtime      predniSONE 10 mg tablet 3 tabs once daily x 3 days; 2 tabs once daily x 2 days; 1 tab daily x 2 days (Patient not taking: Reported on 9/12/2022) 15 tablet 0     No current facility-administered medications for this visit  Review of Systems   Constitutional: Positive for fatigue  Negative for chills and fever  HENT: Negative for ear pain and sore throat  Eyes: Negative for pain and visual disturbance  Respiratory: Positive for chest tightness and shortness of breath  Negative for cough  Baseline/chronic   Cardiovascular: Negative for chest pain and palpitations  Gastrointestinal: Negative for abdominal pain and vomiting  Genitourinary: Negative for dysuria and hematuria  Musculoskeletal: Positive for arthralgias and back pain  Skin: Negative for color change and rash  Neurological: Negative for seizures and syncope  Psychiatric/Behavioral: Positive for agitation, dysphoric mood and sleep disturbance  The patient is nervous/anxious  All other systems reviewed and are negative  Objective:  Vitals:    09/12/22 1424   BP: 144/86   Pulse: 102   Temp: 97 9 °F (36 6 °C)   SpO2: 97%   Weight: 103 kg (227 lb 6 4 oz)   Height: 5' 6" (1 676 m)     Body mass index is 36 7 kg/m²  Physical Exam  Vitals reviewed  Constitutional:       General: She is not in acute distress  Appearance: She is well-developed  She is not diaphoretic  HENT:      Head: Normocephalic and atraumatic  Right Ear: Hearing, tympanic membrane, ear canal and external ear normal       Left Ear: Hearing, tympanic membrane, ear canal and external ear normal       Mouth/Throat:      Pharynx: Uvula midline  No oropharyngeal exudate  Eyes:      General: No scleral icterus  Right eye: No discharge  Left eye: No discharge  Conjunctiva/sclera: Conjunctivae normal    Neck:      Thyroid: No thyromegaly  Vascular: No carotid bruit  Cardiovascular:      Rate and Rhythm: Normal rate and regular rhythm  Heart sounds: Normal heart sounds  No murmur heard  Pulmonary:      Effort: Pulmonary effort is normal  No respiratory distress  Breath sounds: Normal breath sounds  No wheezing  Abdominal:      General: Bowel sounds are normal  There is no distension  Palpations: Abdomen is soft  There is no mass  Tenderness:  There is no abdominal tenderness  There is no guarding or rebound  Musculoskeletal:         General: No tenderness  Normal range of motion  Cervical back: Neck supple  Lymphadenopathy:      Cervical: No cervical adenopathy  Skin:     General: Skin is warm and dry  Findings: No erythema or rash  Neurological:      Mental Status: She is alert and oriented to person, place, and time  Psychiatric:         Behavior: Behavior normal          Thought Content:  Thought content normal          Judgment: Judgment normal

## 2022-09-27 ENCOUNTER — TELEPHONE (OUTPATIENT)
Dept: FAMILY MEDICINE CLINIC | Facility: CLINIC | Age: 52
End: 2022-09-27

## 2022-09-27 ENCOUNTER — OFFICE VISIT (OUTPATIENT)
Dept: PAIN MEDICINE | Facility: CLINIC | Age: 52
End: 2022-09-27
Payer: MEDICARE

## 2022-09-27 VITALS
HEIGHT: 66 IN | WEIGHT: 224 LBS | HEART RATE: 112 BPM | DIASTOLIC BLOOD PRESSURE: 86 MMHG | BODY MASS INDEX: 36 KG/M2 | SYSTOLIC BLOOD PRESSURE: 139 MMHG

## 2022-09-27 DIAGNOSIS — M54.16 LUMBAR RADICULOPATHY: ICD-10-CM

## 2022-09-27 DIAGNOSIS — Z98.1 STATUS POST LUMBAR SPINAL FUSION: ICD-10-CM

## 2022-09-27 DIAGNOSIS — M79.605 LUMBAR PAIN WITH RADIATION DOWN LEFT LEG: ICD-10-CM

## 2022-09-27 DIAGNOSIS — G89.4 CHRONIC PAIN SYNDROME: Primary | ICD-10-CM

## 2022-09-27 DIAGNOSIS — M54.50 LUMBAR PAIN WITH RADIATION DOWN LEFT LEG: ICD-10-CM

## 2022-09-27 DIAGNOSIS — M51.26 LUMBAR DISC HERNIATION: ICD-10-CM

## 2022-09-27 DIAGNOSIS — F32.A ANXIETY AND DEPRESSION: Primary | ICD-10-CM

## 2022-09-27 DIAGNOSIS — F41.9 ANXIETY AND DEPRESSION: Primary | ICD-10-CM

## 2022-09-27 PROCEDURE — 99214 OFFICE O/P EST MOD 30 MIN: CPT | Performed by: NURSE PRACTITIONER

## 2022-09-27 RX ORDER — GABAPENTIN 300 MG/1
CAPSULE ORAL
Qty: 90 CAPSULE | Refills: 1 | Status: SHIPPED | OUTPATIENT
Start: 2022-09-27 | End: 2022-10-25

## 2022-09-27 RX ORDER — NAPROXEN 500 MG/1
500 TABLET ORAL 2 TIMES DAILY WITH MEALS
Qty: 60 TABLET | Refills: 2 | Status: SHIPPED | OUTPATIENT
Start: 2022-09-27 | End: 2022-10-25

## 2022-09-27 RX ORDER — METHOCARBAMOL 750 MG/1
750 TABLET, FILM COATED ORAL 3 TIMES DAILY
Qty: 90 TABLET | Refills: 1 | Status: SHIPPED | OUTPATIENT
Start: 2022-09-27

## 2022-09-27 NOTE — TELEPHONE ENCOUNTER
Please let patient know I have been out of the office since Thursday  Will call in sertraline for her to start, would like her to come in for an appointment in 1 month          ----- [facility] at 9/23/2022  6:51 AM EDT -----  Regarding: FW: Medication     ----- Message -----  From: Nicole Stoddard  Sent: 9/22/2022   8:52 PM EDT  To: [facility]  Subject: Medication                                       I just wanted to let you know Im off of the medication  You wanted me to let you know as soon as I was off of it  Please let me know if you will be prescribing something else for me   Thank you

## 2022-09-27 NOTE — PROGRESS NOTES
Assessment:  1  Chronic pain syndrome    2  Lumbar disc herniation    3  Lumbar radiculopathy    4  Status post lumbar spinal fusion    5  Lumbar pain with radiation down left leg        Plan:  While the patient was in the office today, I did have a thorough conversation regarding their chronic pain syndrome, medication management, and treatment plan options  Patient is being seen for follow-up visit  She was last seen here on 08/03/2022 at which time Cymbalta was increased to 90 milligrams  She was also placed on a steroid taper due to increased pain following a long car ride  Unfortunately, she denies any improvement with the increase Cymbalta or after completing the steroid taper  She was recently weaned off of Cymbalta and will be starting Zoloft the recommendation of her PCP  Patient tells me that combination of gabapentin and naproxen was somewhat helpful in the past and she would like to try it again  Will restart gabapentin titrating it to 300 milligrams 3 times daily  Restart naproxen 500 milligrams twice daily with food  Renewed Robaxin 750 milligrams 3 times daily if needed for spasms  Consider caudal epidural steroid injection  I provided her with a brochure to take home and read over  The patient will follow-up in 1 month for medication prescription refill and reevaluation  The patient was advised to contact the office should their symptoms worsen in the interim  The patient was agreeable and verbalized an understanding  History of Present Illness: The patient is a 45 y o  female who presents for a follow up office visit in regards to Back Pain, Leg Pain, and Knee Pain  The patients current symptoms include complaints of low back and bilateral leg pain  Current pain level is an 8/10  Quality pain is described as sharp, throbbing, shooting, pins and needles  Current pain medications includes:  Robaxin 750 milligrams 3 times daily if needed for spasms     The patient reports that this regimen is providing 0 % pain relief  The patient is reporting no side effects from this pain medication regimen  I have personally reviewed and/or updated the patient's past medical history, past surgical history, family history, social history, current medications, allergies, and vital signs today  Review of Systems  Review of Systems   Constitutional: Negative for fatigue and unexpected weight change  HENT: Negative for dental problem, ear pain, hearing loss and sneezing  Eyes: Negative for visual disturbance  Respiratory: Negative for cough and chest tightness  Cardiovascular: Negative for leg swelling  Gastrointestinal: Negative for anal bleeding  Endocrine: Negative for heat intolerance  Genitourinary: Negative for flank pain and genital sores  Musculoskeletal: Positive for gait problem  Pain in extremity: knee, back, legs   Skin: Negative for wound  Allergic/Immunologic: Negative for immunocompromised state  Neurological: Negative for speech difficulty and light-headedness  Hematological: Negative for adenopathy  Psychiatric/Behavioral: Negative for confusion  The patient is not hyperactive  All other systems reviewed and are negative          Past Medical History:   Diagnosis Date    Anxiety     Back pain     Depression     Insomnia        Past Surgical History:   Procedure Laterality Date    BACK SURGERY  2006    cage in lumbar L5 area    BACK SURGERY      CHOLECYSTECTOMY      LUMBAR [address] SURGERY      [place] REPAIR INCISIONAL HERNIA,REDUCIBLE N/A 3/16/2018    Procedure: REPAIR HERNIA VENTRAL with mesh;  Surgeon: Lily Younger DO;  Location: [facility] MA[facility];  Service: General    [place] SHLDR ARTHROSCOP,SURG,W/ROTAT CUFF REPR Left 11/18/2020    Procedure: SHOULDER ARTHROSCOPY, ROTATOR CUFF REPAIR, SYNEVECTOMY, ACROMIOPLASTY DEBRIDEMENT, INJECTION;  Surgeon: Zenaida Lara DO;  Location[facility] MAIN OR;  Service: Orthopedics       Family History Adopted: Yes   Family history unknown: Yes       Social History     Occupational History    Not on file   Tobacco Use    Smoking status: Former Smoker     Packs/day: 0 50     Years: 0 00     Pack years: 0 00     Types: Cigarettes     Quit date: 10/17/2020     Years since quittin 9    Smokeless tobacco: Never Used    Tobacco comment: 1 pack a day for 20 years   Vaping Use    Vaping Use: Never used   Substance and Sexual Activity    Alcohol use: Never     Alcohol/week: 0 0 standard drinks    Drug use: Never    Sexual activity: Not Currently         Current Outpatient Medications:     busPIRone (BUSPAR) 15 mg tablet, Take 1 tablet (15 mg total) by mouth 3 (three) times a day, Disp: 90 tablet, Rfl: 0    gabapentin (NEURONTIN) 300 mg capsule, 1 PO QHS x 1 day, then 1 PO BID x 1 dday, then 1 PO TID, Disp: 90 capsule, Rfl: 1    Melatonin 10 MG TABS, Take 40 mg by mouth daily at bedtime, Disp: , Rfl:     methocarbamol (ROBAXIN) 750 mg tablet, Take 1 tablet (750 mg total) by mouth 3 (three) times a day As needed for pain/spasms, Disp: 90 tablet, Rfl: 1    naproxen (NAPROSYN) 500 mg tablet, Take 1 tablet (500 mg total) by mouth 2 (two) times a day with meals With food, Disp: 60 tablet, Rfl: 2    sertraline (ZOLOFT) 50 mg tablet, Take 1 tablet (50 mg total) by mouth daily, Disp: 30 tablet, Rfl: 0    traZODone (DESYREL) 50 mg tablet, Take 4 tablets (200 mg total) by mouth daily at bedtime, Disp: , Rfl:     predniSONE 10 mg tablet, 3 tabs once daily x 3 days; 2 tabs once daily x 2 days; 1 tab daily x 2 days (Patient not taking: No sig reported), Disp: 15 tablet, Rfl: 0    No Known Allergies    Physical Exam:    /86   Pulse (!) 112   Ht 5' 6" (1 676 m)   Wt 102 kg (224 lb)   LMP 10/10/2016   BMI 36 15 kg/m²     Constitutional:normal, well developed, well nourished, alert, in no distress and non-toxic and no overt pain behavior    Eyes:anicteric  HEENT:grossly intact  Neck:supple, symmetric, trachea midline and no masses   Pulmonary:even and unlabored  Cardiovascular:No edema or pitting edema present  Skin:Normal without rashes or lesions and well hydrated  Psychiatric:Mood and affect appropriate  Neurologic:Cranial Nerves II-XII grossly intact  Musculoskeletal:normal    Imaging  No orders to display       No orders of the defined types were placed in this encounter

## 2022-10-10 DIAGNOSIS — F32.A ANXIETY AND DEPRESSION: ICD-10-CM

## 2022-10-10 DIAGNOSIS — F41.9 ANXIETY AND DEPRESSION: ICD-10-CM

## 2022-10-10 RX ORDER — BUSPIRONE HYDROCHLORIDE 15 MG/1
15 TABLET ORAL 3 TIMES DAILY
Qty: 90 TABLET | Refills: 0 | Status: SHIPPED | OUTPATIENT
Start: 2022-10-10 | End: 2022-10-24 | Stop reason: SDUPTHER

## 2022-10-24 ENCOUNTER — OFFICE VISIT (OUTPATIENT)
Dept: FAMILY MEDICINE CLINIC | Facility: CLINIC | Age: 52
End: 2022-10-24
Payer: MEDICARE

## 2022-10-24 VITALS
TEMPERATURE: 97.4 F | HEART RATE: 68 BPM | HEIGHT: 66 IN | BODY MASS INDEX: 36.93 KG/M2 | OXYGEN SATURATION: 98 % | DIASTOLIC BLOOD PRESSURE: 82 MMHG | WEIGHT: 229.8 LBS | SYSTOLIC BLOOD PRESSURE: 136 MMHG

## 2022-10-24 DIAGNOSIS — F32.A ANXIETY AND DEPRESSION: Primary | ICD-10-CM

## 2022-10-24 DIAGNOSIS — Z23 NEED FOR INFLUENZA VACCINATION: ICD-10-CM

## 2022-10-24 DIAGNOSIS — F41.9 ANXIETY AND DEPRESSION: Primary | ICD-10-CM

## 2022-10-24 PROCEDURE — 99213 OFFICE O/P EST LOW 20 MIN: CPT | Performed by: PHYSICIAN ASSISTANT

## 2022-10-24 PROCEDURE — 90682 RIV4 VACC RECOMBINANT DNA IM: CPT | Performed by: PHYSICIAN ASSISTANT

## 2022-10-24 PROCEDURE — G0008 ADMIN INFLUENZA VIRUS VAC: HCPCS | Performed by: PHYSICIAN ASSISTANT

## 2022-10-24 RX ORDER — BUSPIRONE HYDROCHLORIDE 15 MG/1
15 TABLET ORAL 3 TIMES DAILY
Qty: 270 TABLET | Refills: 0 | Status: SHIPPED | OUTPATIENT
Start: 2022-10-24 | End: 2023-01-22

## 2022-10-24 NOTE — PROGRESS NOTES
Assessment/Plan:    Problem List Items Addressed This Visit        Other    Anxiety and depression - Primary    Relevant Medications    sertraline (ZOLOFT) 50 mg tablet    busPIRone (BUSPAR) 15 mg tablet      Other Visit Diagnoses     Need for influenza vaccination        Relevant Orders    influenza vaccine, quadrivalent, recombinant, PF, 0 5 mL, for patients 18 yr+ (FLUBLOK) (Completed)           Diagnoses and all orders for this visit:    Anxiety and depression  Comments:  Continue sertraline 50 mg daily, RTO 3 months or sooner PRN  Orders:  -     sertraline (ZOLOFT) 50 mg tablet; Take 1 tablet (50 mg total) by mouth daily  -     busPIRone (BUSPAR) 15 mg tablet; Take 1 tablet (15 mg total) by mouth 3 (three) times a day    Need for influenza vaccination  -     influenza vaccine, quadrivalent, recombinant, PF, 0 5 mL, for patients 18 yr+ (FLUBLOK)    Other orders  -     Cancel: Ambulatory referral to social work care management program; Future      No problem-specific Assessment & Plan notes found for this encounter  Subjective:      Patient ID: Allyn Schneider is a 45 y o  female  Kathi Samuel is here today for 1 month follow up since starting sertraline 50 mg  Pt reports feeling 70% improvement since starting sertraline 50 mg daily  Pt did not have trouble weaning from Cymbalta or starting sertraline  She would like to continue current dose of medication  Of note, pt refuses AWV/EKG/Snellen exam today so unable to complete [place] to Medicare visit at this time  The following portions of the patient's history were reviewed and updated as appropriate:   She has a past medical history of Anxiety, Back pain, Depression, and Insomnia ,  does not have any pertinent problems on file  ,   has a past surgical history that includes Cholecystectomy; Lumbar disc surgery; pr repair incisional hernia,reducible (N/A, 3/16/2018); Back surgery (2006);  Back surgery; and pr shldr arthroscop,surg,w/rotat cuff repr (Left, 11/18/2020)  ,  She was adopted  Family history is unknown by patient  ,   reports that she quit smoking about 2 years ago  Her smoking use included cigarettes  She smoked 0 50 packs per day for 0 00 years  She has never used smokeless tobacco  She reports that she does not drink alcohol and does not use drugs  ,  has No Known Allergies     Current Outpatient Medications   Medication Sig Dispense Refill   • busPIRone (BUSPAR) 15 mg tablet Take 1 tablet (15 mg total) by mouth 3 (three) times a day 270 tablet 0   • gabapentin (NEURONTIN) 300 mg capsule 1 PO QHS x 1 day, then 1 PO BID x 1 dday, then 1 PO TID 90 capsule 1   • Melatonin 10 MG TABS Take 40 mg by mouth daily at bedtime     • methocarbamol (ROBAXIN) 750 mg tablet Take 1 tablet (750 mg total) by mouth 3 (three) times a day As needed for pain/spasms 90 tablet 1   • naproxen (NAPROSYN) 500 mg tablet Take 1 tablet (500 mg total) by mouth 2 (two) times a day with meals With food 60 tablet 2   • sertraline (ZOLOFT) 50 mg tablet Take 1 tablet (50 mg total) by mouth daily 90 tablet 0   • traZODone (DESYREL) 50 mg tablet Take 4 tablets (200 mg total) by mouth daily at bedtime       No current facility-administered medications for this visit  Review of Systems   Constitutional: Negative for activity change, appetite change, chills, diaphoresis, fatigue, fever and unexpected weight change  HENT: Negative for congestion, ear pain, postnasal drip, rhinorrhea, sinus pressure, sinus pain, sneezing, sore throat, tinnitus and voice change  Eyes: Negative for pain, redness and visual disturbance  Respiratory: Positive for chest tightness and shortness of breath  Negative for cough and wheezing  Chronic   Cardiovascular: Negative for chest pain, palpitations and leg swelling  Gastrointestinal: Negative for abdominal pain, blood in stool, constipation, diarrhea, nausea and vomiting     Genitourinary: Negative for difficulty urinating, dysuria, frequency, hematuria and urgency  Musculoskeletal: Negative for arthralgias, back pain, gait problem, joint swelling, myalgias, neck pain and neck stiffness  Skin: Negative for color change, pallor, rash and wound  Neurological: Negative for dizziness, tremors, weakness, light-headedness and headaches  Psychiatric/Behavioral: Negative for dysphoric mood, self-injury, sleep disturbance and suicidal ideas  The patient is not nervous/anxious  Objective:  Vitals:    10/24/22 1444   BP: 136/82   Pulse: 68   Temp: (!) 97 4 °F (36 3 °C)   SpO2: 98%   Weight: 104 kg (229 lb 12 8 oz)   Height: 5' 6" (1 676 m)     Body mass index is 37 09 kg/m²  Physical Exam  Vitals reviewed  Constitutional:       General: She is not in acute distress  Appearance: She is well-developed  She is not diaphoretic  HENT:      Head: Normocephalic and atraumatic  Right Ear: Hearing, tympanic membrane, ear canal and external ear normal       Left Ear: Hearing, tympanic membrane, ear canal and external ear normal       Mouth/Throat:      Pharynx: Uvula midline  No oropharyngeal exudate  Eyes:      General: No scleral icterus  Right eye: No discharge  Left eye: No discharge  Conjunctiva/sclera: Conjunctivae normal    Neck:      Thyroid: No thyromegaly  Vascular: No carotid bruit  Cardiovascular:      Rate and Rhythm: Normal rate and regular rhythm  Heart sounds: Normal heart sounds  No murmur heard  Pulmonary:      Effort: Pulmonary effort is normal  No respiratory distress  Breath sounds: Normal breath sounds  No wheezing  Abdominal:      General: Bowel sounds are normal  There is no distension  Palpations: Abdomen is soft  There is no mass  Tenderness: There is no abdominal tenderness  There is no guarding or rebound  Musculoskeletal:         General: No tenderness  Normal range of motion  Cervical back: Neck supple     Lymphadenopathy:      Cervical: No cervical adenopathy  Skin:     General: Skin is warm and dry  Findings: No erythema or rash  Neurological:      Mental Status: She is alert and oriented to person, place, and time  Psychiatric:         Behavior: Behavior normal          Thought Content:  Thought content normal          Judgment: Judgment normal

## 2022-10-24 NOTE — PATIENT INSTRUCTIONS
Medicare Preventive Visit Patient Instructions  Thank you for completing your Welcome to Medicare Visit or Medicare Annual Wellness Visit today  Your next wellness visit will be due in one year (10/25/2023)  The screening/preventive services that you may require over the next 5-10 years are detailed below  Some tests may not apply to you based off risk factors and/or age  Screening tests ordered at today's visit but not completed yet may show as past due  Also, please note that scanned in results may not display below  Preventive Screenings:  Service Recommendations Previous Testing/Comments   Colorectal Cancer Screening  * Colonoscopy    * Fecal Occult Blood Test (FOBT)/Fecal Immunochemical Test (FIT)  * Fecal DNA/Cologuard Test  * Flexible Sigmoidoscopy Age: 39-70 years old   Colonoscopy: every 10 years (may be performed more frequently if at higher risk)  OR  FOBT/FIT: every 1 year  OR  Cologuard: every 3 years  OR  Sigmoidoscopy: every 5 years  Screening may be recommended earlier than age 39 if at higher risk for colorectal cancer  Also, an individualized decision between you and your healthcare provider will decide whether screening between the ages of 74-80 would be appropriate  Colonoscopy: Not on file  FOBT/FIT: Not on file  Cologuard: Not on file  Sigmoidoscopy: Not on file          Breast Cancer Screening Age: 36 years old  Frequency: every 1-2 years  Not required if history of left and right mastectomy Mammogram: 10/05/2013        Cervical Cancer Screening Between the ages of 21-29, pap smear recommended once every 3 years  Between the ages of 33-67, can perform pap smear with HPV co-testing every 5 years     Recommendations may differ for women with a history of total hysterectomy, cervical cancer, or abnormal pap smears in past  Pap Smear: Not on file        Hepatitis C Screening Once for adults born between [facility]  More frequently in patients at high risk for Hepatitis C Hep C Antibody: Not on file        Diabetes Screening 1-2 times per year if you're at risk for diabetes or have pre-diabetes Fasting glucose: 100 mg/dL (6/3/2021)  A1C: No results in last 5 years (No results in last 5 years)  Screening Current   Cholesterol Screening Once every 5 years if you don't have a lipid disorder  May order more often based on risk factors  Lipid panel: Not on file          Other Preventive Screenings Covered by Medicare:  1  Abdominal Aortic Aneurysm (AAA) Screening: covered once if your at risk  You're considered to be at risk if you have a family history of AAA  2  Lung Cancer Screening: covers low dose CT scan once per year if you meet all of the following conditions: (1) Age 50-69; (2) No signs or symptoms of lung cancer; (3) Current smoker or have quit smoking within the last 15 years; (4) You have a tobacco smoking history of at least 20 pack years (packs per day multiplied by number of years you smoked); (5) You get a written order from a healthcare provider  3  Glaucoma Screening: covered annually if you're considered high risk: (1) You have diabetes OR (2) Family history of glaucoma OR (3)  aged 48 and older OR (3)  American aged 72 and older  3  Osteoporosis Screening: covered every 2 years if you meet one of the following conditions: (1) You're estrogen deficient and at risk for osteoporosis based off medical history and other findings; (2) Have a vertebral abnormality; (3) On glucocorticoid therapy for more than 3 months; (4) Have primary hyperparathyroidism; (5) On osteoporosis medications and need to assess response to drug therapy  · Last bone density test (DXA Scan): Not on file  5  HIV Screening: covered annually if you're between the age of 12-76  Also covered annually if you are younger than 13 and older than 72 with risk factors for HIV infection  For pregnant patients, it is covered up to 3 times per pregnancy      Immunizations:  Immunization Recommendations Influenza Vaccine Annual influenza vaccination during flu season is recommended for all persons aged >= 6 months who do not have contraindications   Pneumococcal Vaccine   * Pneumococcal conjugate vaccine = PCV13 (Prevnar 13), PCV15 (Vaxneuvance), PCV20 (Prevnar 20)  * Pneumococcal polysaccharide vaccine = PPSV23 (Pneumovax) Adults 25-60 years old: 1-3 doses may be recommended based on certain risk factors  Adults 72 years old: 1-2 doses may be recommended based off what pneumonia vaccine you previously received   Hepatitis B Vaccine 3 dose series if at intermediate or high risk (ex: diabetes, end stage renal disease, liver disease)   Tetanus (Td) Vaccine - COST NOT COVERED BY MEDICARE PART B Following completion of primary series, a booster dose should be given every 10 years to maintain immunity against tetanus  Td may also be given as tetanus wound prophylaxis  Tdap Vaccine - COST NOT COVERED BY MEDICARE PART B Recommended at least once for all adults  For pregnant patients, recommended with each pregnancy  Shingles Vaccine (Shingrix) - COST NOT COVERED BY MEDICARE PART B  2 shot series recommended in those aged 48 and above     Health Maintenance Due:      Topic Date Due   • Hepatitis C Screening  Never done   • HIV Screening  Never done   • Cervical Cancer Screening  Never done   • Colorectal Cancer Screening  Never done   • Breast Cancer Screening: Mammogram  11/30/2022 (Originally 10/5/2014)     Immunizations Due:      Topic Date Due   • COVID-19 Vaccine (3 - Booster for Moderna series) 09/08/2021   • Influenza Vaccine (1) 09/01/2022     Advance Directives   What are advance directives? Advance directives are legal documents that state your wishes and plans for medical care  These plans are made ahead of time in case you lose your ability to make decisions for yourself  Advance directives can apply to any medical decision, such as the treatments you want, and if you want to donate organs     What are the types of advance directives? There are many types of advance directives, and each state has rules about how to use them  You may choose a combination of any of the following:  · Living will: This is a written record of the treatment you want  You can also choose which treatments you do not want, which to limit, and which to stop at a certain time  This includes surgery, medicine, IV fluid, and tube feedings  · Durable power of  for healthcare [facility]): This is a written record that states who you want to make healthcare choices for you when you are unable to make them for yourself  This person, called a proxy, is usually a family member or a friend  You may choose more than 1 proxy  · Do not resuscitate (DNR) order:  A DNR order is used in case your heart stops beating or you stop breathing  It is a request not to have certain forms of treatment, such as CPR  A DNR order may be included in other types of advance directives  · Medical directive: This covers the care that you want if you are in a coma, near death, or unable to make decisions for yourself  You can list the treatments you want for each condition  Treatment may include pain medicine, surgery, blood transfusions, dialysis, IV or tube feedings, and a ventilator (breathing machine)  · Values history: This document has questions about your views, beliefs, and how you feel and think about life  This information can help others choose the care that you would choose  Why are advance directives important? An advance directive helps you control your care  Although spoken wishes may be used, it is better to have your wishes written down  Spoken wishes can be misunderstood, or not followed  Treatments may be given even if you do not want them  An advance directive may make it easier for your family to make difficult choices about your care     Weight Management   Why it is important to manage your weight:  Being overweight increases your risk of health conditions such as heart disease, high blood pressure, type 2 diabetes, and certain types of cancer  It can also increase your risk for osteoarthritis, sleep apnea, and other respiratory problems  Aim for a slow, steady weight loss  Even a small amount of weight loss can lower your risk of health problems  How to lose weight safely:  A safe and healthy way to lose weight is to eat fewer calories and get regular exercise  You can lose up about 1 pound a week by decreasing the number of calories you eat by 500 calories each day  Healthy meal plan for weight management:  A healthy meal plan includes a variety of foods, contains fewer calories, and helps you stay healthy  A healthy meal plan includes the following:  · Eat whole-grain foods more often  A healthy meal plan should contain fiber  Fiber is the part of grains, fruits, and vegetables that is not broken down by your body  Whole-grain foods are healthy and provide extra fiber in your diet  Some examples of whole-grain foods are whole-wheat breads and pastas, oatmeal, brown rice, and bulgur  · Eat a variety of vegetables every day  Include dark, leafy greens such as spinach, kale, valdez greens, and mustard greens  Eat yellow and orange vegetables such as carrots, sweet potatoes, and winter squash  · Eat a variety of fruits every day  Choose fresh or canned fruit (canned in its own juice or light syrup) instead of juice  Fruit juice has very little or no fiber  · Eat low-fat dairy foods  Drink fat-free (skim) milk or 1% milk  Eat fat-free yogurt and low-fat cottage cheese  Try low-fat cheeses such as mozzarella and other reduced-fat cheeses  · Choose meat and other protein foods that are low in fat  Choose beans or other legumes such as split peas or lentils  Choose fish, skinless poultry (chicken or turkey), or lean cuts of red meat (beef or pork)  Before you cook meat or poultry, cut off any visible fat  · Use less fat and oil    Try baking foods instead of frying them  Add less fat, such as margarine, sour cream, regular salad dressing and mayonnaise to foods  Eat fewer high-fat foods  Some examples of high-fat foods include french fries, doughnuts, ice cream, and cakes  · Eat fewer sweets  Limit foods and drinks that are high in sugar  This includes candy, cookies, regular soda, and sweetened drinks  Exercise:  Exercise at least 30 minutes per day on most days of the week  Some examples of exercise include walking, biking, dancing, and swimming  You can also fit in more physical activity by taking the stairs instead of the elevator or parking farther away from stores  Ask your healthcare provider about the best exercise plan for you  © Copyright [employer] 2018 Information is for End User's use only and may not be sold, redistributed or otherwise used for commercial purposes   All illustrations and images included in CareNotes® are the copyrighted property of A D A M , Inc  or [address] [place] [address]

## 2022-10-25 ENCOUNTER — OFFICE VISIT (OUTPATIENT)
Dept: PAIN MEDICINE | Facility: CLINIC | Age: 52
End: 2022-10-25
Payer: MEDICARE

## 2022-10-25 VITALS
HEIGHT: 66 IN | HEART RATE: 84 BPM | WEIGHT: 231.4 LBS | BODY MASS INDEX: 37.19 KG/M2 | DIASTOLIC BLOOD PRESSURE: 84 MMHG | SYSTOLIC BLOOD PRESSURE: 159 MMHG

## 2022-10-25 DIAGNOSIS — M51.26 LUMBAR DISC HERNIATION: ICD-10-CM

## 2022-10-25 DIAGNOSIS — G89.4 CHRONIC PAIN SYNDROME: Primary | ICD-10-CM

## 2022-10-25 DIAGNOSIS — M54.16 LUMBAR RADICULOPATHY: ICD-10-CM

## 2022-10-25 DIAGNOSIS — Z98.1 STATUS POST LUMBAR SPINAL FUSION: ICD-10-CM

## 2022-10-25 PROCEDURE — 99214 OFFICE O/P EST MOD 30 MIN: CPT | Performed by: NURSE PRACTITIONER

## 2022-10-25 RX ORDER — GABAPENTIN 600 MG/1
600 TABLET ORAL 3 TIMES DAILY
Qty: 90 TABLET | Refills: 1 | Status: SHIPPED | OUTPATIENT
Start: 2022-10-25

## 2022-10-25 NOTE — PROGRESS NOTES
Assessment:  1  Chronic pain syndrome    2  Lumbar disc herniation    3  Lumbar radiculopathy    4  Status post lumbar spinal fusion        Plan:  While the patient was in the office today, I did have a thorough conversation regarding their chronic pain syndrome, medication management, and treatment plan options  Patient is being seen for follow-up visit  She was last seen here on 09/27/2022 which time we restarted gabapentin titrating to 300 milligrams 3 times daily  Naproxen 500 milligrams twice daily was restarted  Unfortunately, she denies any significant improvement in her symptoms  At this point, we will discontinue naproxen  Gabapentin will be increased, titrating to 600 milligrams 3 times daily  A new prescription was sent to her pharmacy  Continue Robaxin 750 milligrams 3 times daily if needed for spasms  She did not require refill this medication during today's visit  Schedule caudal epidural steroid injection  Complete risks and benefits including bleeding, infection, tissue reaction, nerve injury and allergic reaction were discussed  The approach was demonstrated using models and literature was provided  Verbal and written consent was obtained  Follow-up 1 month after the injection  History of Present Illness: The patient is a 45 y o  female who presents for a follow up office visit in regards to Back Pain and Leg Pain  The patient’s current symptoms include complaints of low back and bilateral leg pain  Current pain level is an 8/10  Quality pain is described as sharp, throbbing, shooting, numb, pins and needles  Current pain medications includes:  Gabapentin 300 milligrams 3 times daily, naproxen 500 milligrams twice daily, Robaxin 750 milligrams 3 times daily if needed for spasms    The patient reports that this regimen is providing 0 % pain relief  The patient is reporting no side effects from this pain medication regimen      I have personally reviewed and/or updated the patient's past medical history, past surgical history, family history, social history, current medications, allergies, and vital signs today  Review of Systems  Review of Systems   Constitutional: Negative for unexpected weight change  HENT: Negative for hearing loss  Eyes: Negative for visual disturbance  Respiratory: Negative for shortness of breath  Cardiovascular: Negative for leg swelling  Gastrointestinal: Negative for constipation  Endocrine: Negative for polyuria  Genitourinary: Negative for difficulty urinating  Musculoskeletal: Positive for gait problem  Negative for joint swelling and myalgias  Joint stiffness  Pain in extremity- leg   Skin: Negative for rash  Neurological: Negative for weakness and headaches  Psychiatric/Behavioral: Negative for decreased concentration  All other systems reviewed and are negative          Past Medical History:   Diagnosis Date   • Anxiety    • Back pain    • Depression    • Insomnia        Past Surgical History:   Procedure Laterality Date   • BACK SURGERY      cage in lumbar L5 area   • BACK SURGERY     • CHOLECYSTECTOMY     • LUMBAR [place] N/A 3/16/2018    Procedure: REPAIR HERNIA VENTRAL with mesh;  Surgeon: Supriya Walters DO;  Location: [facility] MAIN OR;  Service: General   • CO SHLDR ARTHROSCOP,SURG,W/ROTAT CUFF REPR Left 2020    Procedure: SHOULDER ARTHROSCOPY, ROTATOR CUFF REPAIR, SYNEVECTOMY, ACROMIOPLASTY DEBRIDEMENT, INJECTION;  Surgeon: Bessie Banda DO;  Location: [address] [place] [address] MAIN OR;  Service: Orthopedics       Family History   Adopted: Yes   Family history unknown: Yes       Social History     Occupational History   • Not on file   Tobacco Use   • Smoking status: Former Smoker     Packs/day: 0 50     Years: 0 00     Pack years: 0 00     Types: Cigarettes     Quit date: 10/17/2020     Years since quittin 0   • Smokeless tobacco: Never Used   • Tobacco comment: 1 pack a day for 20 years   Vaping Use   • Vaping Use: Never used   Substance and Sexual Activity   • Alcohol use: Never     Alcohol/week: 0 0 standard drinks   • Drug use: Never   • Sexual activity: Not Currently         Current Outpatient Medications:   •  busPIRone (BUSPAR) 15 mg tablet, Take 1 tablet (15 mg total) by mouth 3 (three) times a day, Disp: 270 tablet, Rfl: 0  •  gabapentin (Neurontin) 600 MG tablet, Take 1 tablet (600 mg total) by mouth 3 (three) times a day, Disp: 90 tablet, Rfl: 1  •  Melatonin 10 MG TABS, Take 40 mg by mouth daily at bedtime, Disp: , Rfl:   •  methocarbamol (ROBAXIN) 750 mg tablet, Take 1 tablet (750 mg total) by mouth 3 (three) times a day As needed for pain/spasms, Disp: 90 tablet, Rfl: 1  •  sertraline (ZOLOFT) 50 mg tablet, Take 1 tablet (50 mg total) by mouth daily, Disp: 90 tablet, Rfl: 0  •  traZODone (DESYREL) 50 mg tablet, Take 4 tablets (200 mg total) by mouth daily at bedtime, Disp: , Rfl:     No Known Allergies    Physical Exam:    /84   Pulse 84   Ht 5' 6" (1 676 m)   Wt 105 kg (231 lb 6 4 oz)   LMP 10/10/2016   BMI 37 35 kg/m²     Constitutional:normal, well developed, well nourished, alert, in no distress and non-toxic and no overt pain behavior  Eyes:anicteric  HEENT:grossly intact  Neck:supple, symmetric, trachea midline and no masses   Pulmonary:even and unlabored  Cardiovascular:No edema or pitting edema present  Skin:Normal without rashes or lesions and well hydrated  Psychiatric:Mood and affect appropriate  Neurologic:Cranial Nerves II-XII grossly intact  Musculoskeletal:Gait is slow and guarded  Imaging  No orders to display       No orders of the defined types were placed in this encounter

## 2022-10-25 NOTE — H&P (VIEW-ONLY)
Assessment:  1  Chronic pain syndrome    2  Lumbar disc herniation    3  Lumbar radiculopathy    4  Status post lumbar spinal fusion        Plan:  While the patient was in the office today, I did have a thorough conversation regarding their chronic pain syndrome, medication management, and treatment plan options  Patient is being seen for follow-up visit  She was last seen here on 09/27/2022 which time we restarted gabapentin titrating to 300 milligrams 3 times daily  Naproxen 500 milligrams twice daily was restarted  Unfortunately, she denies any significant improvement in her symptoms  At this point, we will discontinue naproxen  Gabapentin will be increased, titrating to 600 milligrams 3 times daily  A new prescription was sent to her pharmacy  Continue Robaxin 750 milligrams 3 times daily if needed for spasms  She did not require refill this medication during today's visit  Schedule caudal epidural steroid injection  Complete risks and benefits including bleeding, infection, tissue reaction, nerve injury and allergic reaction were discussed  The approach was demonstrated using models and literature was provided  Verbal and written consent was obtained  Follow-up 1 month after the injection  History of Present Illness: The patient is a 45 y o  female who presents for a follow up office visit in regards to Back Pain and Leg Pain  The patient’s current symptoms include complaints of low back and bilateral leg pain  Current pain level is an 8/10  Quality pain is described as sharp, throbbing, shooting, numb, pins and needles  Current pain medications includes:  Gabapentin 300 milligrams 3 times daily, naproxen 500 milligrams twice daily, Robaxin 750 milligrams 3 times daily if needed for spasms    The patient reports that this regimen is providing 0 % pain relief  The patient is reporting no side effects from this pain medication regimen      I have personally reviewed and/or updated the patient's past medical history, past surgical history, family history, social history, current medications, allergies, and vital signs today  Review of Systems  Review of Systems   Constitutional: Negative for unexpected weight change  HENT: Negative for hearing loss  Eyes: Negative for visual disturbance  Respiratory: Negative for shortness of breath  Cardiovascular: Negative for leg swelling  Gastrointestinal: Negative for constipation  Endocrine: Negative for polyuria  Genitourinary: Negative for difficulty urinating  Musculoskeletal: Positive for gait problem  Negative for joint swelling and myalgias  Joint stiffness  Pain in extremity- leg   Skin: Negative for rash  Neurological: Negative for weakness and headaches  Psychiatric/Behavioral: Negative for decreased concentration  All other systems reviewed and are negative          Past Medical History:   Diagnosis Date   • Anxiety    • Back pain    • Depression    • Insomnia        Past Surgical History:   Procedure Laterality Date   • BACK SURGERY      cage in lumbar L5 area   • BACK SURGERY     • CHOLECYSTECTOMY     • LUMBAR [place] N/A 3/16/2018    Procedure: REPAIR HERNIA VENTRAL with mesh;  Surgeon: Claire Fischer DO;  Location: [facility];  Service: General   • CO SHLDR ARTHROSCOP,SURG,W/ROTAT CUFF REPR Left 2020    Procedure: SHOULDER ARTHROSCOPY, ROTATOR CUFF REPAIR, SYNEVECTOMY, ACROMIOPLASTY DEBRIDEMENT, INJECTION;  Surgeon: Branden Ruelas DO;  Location: [address] [place] [address] MAIN OR;  Service: Orthopedics       Family History   Adopted: Yes   Family history unknown: Yes       Social History     Occupational History   • Not on file   Tobacco Use   • Smoking status: Former Smoker     Packs/day: 0 50     Years: 0 00     Pack years: 0 00     Types: Cigarettes     Quit date: 10/17/2020     Years since quittin 0   • Smokeless tobacco: Never Used   • Tobacco comment: 1 pack a day for 20 years   Vaping Use   • Vaping Use: Never used   Substance and Sexual Activity   • Alcohol use: Never     Alcohol/week: 0 0 standard drinks   • Drug use: Never   • Sexual activity: Not Currently         Current Outpatient Medications:   •  busPIRone (BUSPAR) 15 mg tablet, Take 1 tablet (15 mg total) by mouth 3 (three) times a day, Disp: 270 tablet, Rfl: 0  •  gabapentin (Neurontin) 600 MG tablet, Take 1 tablet (600 mg total) by mouth 3 (three) times a day, Disp: 90 tablet, Rfl: 1  •  Melatonin 10 MG TABS, Take 40 mg by mouth daily at bedtime, Disp: , Rfl:   •  methocarbamol (ROBAXIN) 750 mg tablet, Take 1 tablet (750 mg total) by mouth 3 (three) times a day As needed for pain/spasms, Disp: 90 tablet, Rfl: 1  •  sertraline (ZOLOFT) 50 mg tablet, Take 1 tablet (50 mg total) by mouth daily, Disp: 90 tablet, Rfl: 0  •  traZODone (DESYREL) 50 mg tablet, Take 4 tablets (200 mg total) by mouth daily at bedtime, Disp: , Rfl:     No Known Allergies    Physical Exam:    /84   Pulse 84   Ht 5' 6" (1 676 m)   Wt 105 kg (231 lb 6 4 oz)   LMP 10/10/2016   BMI 37 35 kg/m²     Constitutional:normal, well developed, well nourished, alert, in no distress and non-toxic and no overt pain behavior  Eyes:anicteric  HEENT:grossly intact  Neck:supple, symmetric, trachea midline and no masses   Pulmonary:even and unlabored  Cardiovascular:No edema or pitting edema present  Skin:Normal without rashes or lesions and well hydrated  Psychiatric:Mood and affect appropriate  Neurologic:Cranial Nerves II-XII grossly intact  Musculoskeletal:Gait is slow and guarded  Imaging  No orders to display       No orders of the defined types were placed in this encounter

## 2022-10-31 ENCOUNTER — TELEPHONE (OUTPATIENT)
Dept: PAIN MEDICINE | Facility: CLINIC | Age: 52
End: 2022-10-31

## 2022-10-31 NOTE — TELEPHONE ENCOUNTER
Caller: Rebecca Cross    Doctor: Ranjit Syed    Reason for call: Patient called asking for pain medication- pain level 10/10 from lower back into left leg, patient cant stand up unsupported for 3 days without falling    [facility]s use [facility] on file     Call back#: [phone]

## 2022-10-31 NOTE — TELEPHONE ENCOUNTER
S/w pt, states she has been experiencing significant pain despite taking Gabapentin, Robaxin and Naproxen as prescribed at most recent OV last week  Pt scheduled for caudal inj on 11/15, asked if any further recommendations in the meantime  Please advise, thank you

## 2022-10-31 NOTE — TELEPHONE ENCOUNTER
Please advise patient that gabapentin was just increased last week it may take 2-3 weeks after medication increased to see the full benefit  She can apply ice or heat to low back as needed

## 2022-11-15 ENCOUNTER — APPOINTMENT (OUTPATIENT)
Dept: RADIOLOGY | Facility: HOSPITAL | Age: 52
End: 2022-11-15

## 2022-11-15 ENCOUNTER — HOSPITAL ENCOUNTER (OUTPATIENT)
Facility: HOSPITAL | Age: 52
Setting detail: OUTPATIENT SURGERY
Discharge: HOME | End: 2022-11-15
Attending: ANESTHESIOLOGY | Admitting: ANESTHESIOLOGY

## 2022-11-15 VITALS
BODY MASS INDEX: 37.12 KG/M2 | DIASTOLIC BLOOD PRESSURE: 85 MMHG | WEIGHT: 231 LBS | HEIGHT: 66 IN | OXYGEN SATURATION: 97 % | TEMPERATURE: 98.4 F | HEART RATE: 83 BPM | SYSTOLIC BLOOD PRESSURE: 136 MMHG | RESPIRATION RATE: 18 BRPM

## 2022-11-15 DIAGNOSIS — Z98.1 STATUS POST LUMBAR SPINAL FUSION: ICD-10-CM

## 2022-11-15 DIAGNOSIS — M47.816 LUMBAR SPONDYLOSIS: ICD-10-CM

## 2022-11-15 DIAGNOSIS — M54.16 LUMBAR RADICULOPATHY: ICD-10-CM

## 2022-11-15 RX ORDER — LIDOCAINE HYDROCHLORIDE 10 MG/ML
INJECTION, SOLUTION EPIDURAL; INFILTRATION; INTRACAUDAL; PERINEURAL AS NEEDED
Status: DISCONTINUED | OUTPATIENT
Start: 2022-11-15 | End: 2022-11-15 | Stop reason: HOSPADM

## 2022-11-15 RX ORDER — METHYLPREDNISOLONE ACETATE 80 MG/ML
INJECTION, SUSPENSION INTRA-ARTICULAR; INTRALESIONAL; INTRAMUSCULAR; SOFT TISSUE AS NEEDED
Status: DISCONTINUED | OUTPATIENT
Start: 2022-11-15 | End: 2022-11-15 | Stop reason: HOSPADM

## 2022-11-15 NOTE — INTERVAL H&P NOTE
H&P reviewed  After examining the patient I find no changes in the patients condition since the H&P had been written      Vitals:    11/15/22 1356   BP: 139/92   Pulse: 80   Resp: 18   Temp: 98 4 °F (36 9 °C)   SpO2: 96%

## 2022-11-15 NOTE — DISCHARGE INSTRUCTIONS
Epidural Steroid Injection   WHAT YOU NEED TO KNOW:   An epidural steroid injection (ADRY) is a procedure to inject steroid medicine into the epidural space  The epidural space is between your spinal cord and vertebrae  Steroids reduce inflammation and fluid buildup in your spine that may be causing pain  You may be given pain medicine along with the steroids  ACTIVITY  Do not drive or operate machinery today  No strenuous activity today - bending, lifting, etc   You may resume normal activites starting tomorrow - start slowly and as tolerated  You may shower today, but no tub baths or hot tubs  You may have numbness for several hours from the local anesthetic  Please use caution and common sense, especially with weight-bearing activities  CARE OF THE INJECTION SITE  If you have soreness or pain, apply ice to the area today (20 minutes on/20 minutes off)  Starting tomorrow, you may use warm, moist heat or ice if needed  You may have an increase or change in your discomfort for 36-48 hours after your treatment  Apply ice and continue with any pain medication you have been prescribed  Notify the [facility] if you have any of the following: redness, drainage, swelling, headache, stiff neck or fever above 100°F     SPECIAL INSTRUCTIONS  Our office will contact you in approximately 7 days for a progress report  MEDICATIONS  Continue to take all routine medications  Our office may have instructed you to hold some medications  As no general anesthesia was used in today's procedure, you should not experience any side effects related to anesthesia  If you are diabetic, the steroids used in today's injection may temporarily increase your blood sugar levels after the first few days after your injection  Please keep a close eye on your sugars and alert the doctor who manages your diabetes if your sugars are significantly high from your baseline or you are symptomatic       If you have a problem specifically related to your procedure, please call our office at [phone]  Problems not related to your procedure should be directed to your primary care physician

## 2022-11-17 NOTE — OP NOTE
OPERATIVE REPORT  PATIENT NAME: Chayo Joyce    :  1970  MRN: [id number]  Pt Location: [facility] OR ROOM 01    SURGERY DATE: 11/15/2022    Surgeon(s) and Role:     * Nirmala Adams MD - Primary    Preop Diagnosis:  Chronic pain syndrome [G89 4]  Lumbar disc herniation [M51 26]  Lumbar radiculopathy [M54 16]  Status post lumbar spinal fusion [Z98 1]    Post-Op Diagnosis Codes:     * Chronic pain syndrome [G89 4]     * Lumbar disc herniation [M51 26]     * Lumbar radiculopathy [M54 16]     * Status post lumbar spinal fusion [Z98 1]    Procedure(s) (LRB):  BLOCK / INJECTION CAUDAL (N/A)    Specimen(s):  * No specimens in log *    Estimated Blood Loss:   Minimal    Drains:  * No LDAs found *    Anesthesia Type:   Local    Operative Indications:  Chronic pain syndrome [G89 4]  Lumbar disc herniation [M51 26]  Lumbar radiculopathy [M54 16]  Status post lumbar spinal fusion [Z98 1]      Operative Findings:  same    Complications:   None    Procedure and Technique:  Fluoroscopically guided caudal epidural steroid injection under fluoroscopy      After discussing the risks, benefits, and alternatives to the procedure, the patient expressed understanding and wished to proceed  The patient was brought to the fluoroscopy suite and placed in the prone position  A procedural pause was conducted to verify:  correct patient identity, procedure to be performed and as applicable, correct side and site, correct patient position, and availability of implants, special equipment and special requirements  After identifying the anatomical landmarks, the sacral hiatus, and the caudal epidural space fluoroscopically in PA and lateral views, the skin was sterilely prepped and draped in the usual fashion using Chloraprep skin prep  The skin and subcutaneous tissue were anesthetized with 0 5 % lidocaine  A 3 5 22 gauge spinal needle was advanced through the sacral hiatus into the epidural space    Proper needle positioning was confirmed using multiple fluoroscopic views  After negative aspiration of hem or CSF, 1 mL of Omnipaque 300 contrast was injected epidural spread without evidence of intravascular or intrathecal spread and clearly outlined the epidural space  A 10 mL ml solution consisting of 80 mg Depo-Medrol was injected slowly and incrementally into the epidural space  Following the injection the needle was withdrawn slightly and flushed with 0 5 % lidocaine as it was fully extracted  The patient tolerated the procedure well and there were no apparent complications  After appropriate observation, the patient was dismissed from the clinic in good condition under their own power               I was present for the entire procedure    Patient Disposition:  hemodynamically stable        SIGNATURE: Gabe Cee MD  DATE: November 17, 2022  TIME: 1:05 PM

## 2022-11-18 ENCOUNTER — TELEPHONE (OUTPATIENT)
Dept: PAIN MEDICINE | Facility: CLINIC | Age: 52
End: 2022-11-18

## 2022-11-18 NOTE — TELEPHONE ENCOUNTER
--DORA--    S/W pt   Pt s/p caudal on 11/15/22  Advised her the steroids kick in on days 3-5 and takes up to 2 weeks to see the full effect  Advised the pain can get worse before it gets better  Pt stated she can take tylenol  Advised her can take up to 3,000 mg in 24 hrs  Advised Gabapentin script was sent on 10/25 with 1 refill  She verbalized understanding

## 2022-11-18 NOTE — TELEPHONE ENCOUNTER
Caller: Alverto     Doctor: SHIMA    Reason for call: Pt had shots on Tuesday for her back  She is having pain  Pain level is a 8/10  She said that she is almost out of the gabapentin  Is there anything else she can have?     Call back#: [phone]

## 2022-12-19 ENCOUNTER — TELEPHONE (OUTPATIENT)
Dept: FAMILY MEDICINE CLINIC | Facility: CLINIC | Age: 52
End: 2022-12-19

## 2022-12-19 ENCOUNTER — TELEPHONE (OUTPATIENT)
Dept: PAIN MEDICINE | Facility: MEDICAL CENTER | Age: 52
End: 2022-12-19

## 2022-12-19 NOTE — TELEPHONE ENCOUNTER
There are too many interactions with Paxlovid and the medications she is currently taking, so cannot do that  What are her symptoms?

## 2022-12-19 NOTE — TELEPHONE ENCOUNTER
Caller: patient    Doctor: Nahid Chávez    Reason for call: rescheduled, positive for Covid     Call back#:

## 2022-12-19 NOTE — TELEPHONE ENCOUNTER
Tested positive for covid, wanted to know if you can call something in for her? She started with symptoms Friday, was negative that day, retested today and was positive

## 2022-12-19 NOTE — TELEPHONE ENCOUNTER
Recommend alternating tylenol and ibuprofen, can try hot tea with honey and warm salt water gargles

## 2023-01-05 ENCOUNTER — OFFICE VISIT (OUTPATIENT)
Dept: PAIN MEDICINE | Facility: CLINIC | Age: 53
End: 2023-01-05

## 2023-01-05 VITALS
SYSTOLIC BLOOD PRESSURE: 153 MMHG | DIASTOLIC BLOOD PRESSURE: 104 MMHG | WEIGHT: 239.4 LBS | HEIGHT: 66 IN | BODY MASS INDEX: 38.47 KG/M2 | HEART RATE: 72 BPM

## 2023-01-05 DIAGNOSIS — G89.4 CHRONIC PAIN SYNDROME: Primary | ICD-10-CM

## 2023-01-05 DIAGNOSIS — M54.50 LUMBAR PAIN WITH RADIATION DOWN LEFT LEG: ICD-10-CM

## 2023-01-05 DIAGNOSIS — M79.605 LUMBAR PAIN WITH RADIATION DOWN LEFT LEG: ICD-10-CM

## 2023-01-05 DIAGNOSIS — M54.16 LUMBAR RADICULOPATHY: ICD-10-CM

## 2023-01-05 DIAGNOSIS — Z98.1 STATUS POST LUMBAR SPINAL FUSION: ICD-10-CM

## 2023-01-05 DIAGNOSIS — M51.26 LUMBAR DISC HERNIATION: ICD-10-CM

## 2023-01-05 RX ORDER — METHOCARBAMOL 750 MG/1
750 TABLET, FILM COATED ORAL 3 TIMES DAILY
Qty: 90 TABLET | Refills: 1 | Status: SHIPPED | OUTPATIENT
Start: 2023-01-05

## 2023-01-05 RX ORDER — GABAPENTIN 600 MG/1
TABLET ORAL
Qty: 150 TABLET | Refills: 1 | Status: SHIPPED | OUTPATIENT
Start: 2023-01-05

## 2023-01-05 NOTE — PROGRESS NOTES
Assessment:  1  Chronic pain syndrome    2  Lumbar disc herniation    3  Lumbar radiculopathy    4  Status post lumbar spinal fusion    5  Lumbar pain with radiation down left leg        Plan:  While the patient was in the office today, I did have a thorough conversation regarding their chronic pain syndrome, medication management, and treatment plan options  Patient is being seen for follow-up visit  She was treated with a caudal epidural steroid injection on 11/15/2022  Unfortunately, she denies any improvement from the injection  Patient ran out of medications about a week ago  She tells me that she was taking Robaxin 750 mg 3 times daily  Gabapentin was prescribed 600 mg 3 times daily  She tells me that she did not get any relief by taking gabapentin 3 times daily, but when she took gabapentin 1800 mg at bedtime it seemed to help reduce her pain  As such, we will plan to restart the gabapentin titrating it to 600 mg twice daily and continue with 1800 mg at bedtime  She can use Robaxin 750 mg 3 times daily in between for spasms if needed  Prescriptions were sent to her pharmacy  The patient will follow-up in 6 weeks for medication prescription refill and reevaluation  The patient was advised to contact the office should their symptoms worsen in the interim  The patient was agreeable and verbalized an understanding  History of Present Illness: The patient is a 45 y o  female who presents for a follow up office visit in regards to Leg Pain  The patient’s current symptoms include complaints of low back and bilateral leg pain  Current pain level is an 8/10  Quality pain is described as sharp, throbbing, shooting, numb, pins-and-needles  Current pain medications includes: Currently ran out of Robaxin and gabapentin  Was taking Robaxin 750 mg 3 times daily if needed for spasms as well as gabapentin 1800 mg at bedtime       I have personally reviewed and/or updated the patient's past medical history, past surgical history, family history, social history, current medications, allergies, and vital signs today  Review of Systems  Review of Systems   Constitutional: Negative for unexpected weight change  HENT: Negative for hearing loss  Eyes: Negative for visual disturbance  Respiratory: Negative for shortness of breath  Cardiovascular: Negative for leg swelling  Gastrointestinal: Negative for constipation  Endocrine: Negative for polyuria  Genitourinary: Negative for difficulty urinating  Musculoskeletal: Positive for gait problem  Negative for joint swelling and myalgias  Decreased range of motion  Pain in extremity-legs and left arm   Skin: Negative for rash  Neurological: Negative for weakness and headaches  Psychiatric/Behavioral: Negative for decreased concentration  All other systems reviewed and are negative          Past Medical History:   Diagnosis Date   • Anxiety    • Back pain    • Depression    • Insomnia        Past Surgical History:   Procedure Laterality Date   • BACK SURGERY  2006    cage in lumbar L5 area   • BACK SURGERY     • CAUDAL BLOCK N/A 11/15/2022    Procedure: BLOCK / INJECTION CAUDAL;  Surgeon: Guillermo Ayala MD;  Location: [facility];  Service: Pain Management    • CHOLECYSTECTOMY     • LUMBAR [address] [place] SURGERY     • SD REPAIR FIRST ABDOMINAL WALL HERNIA N/A 3/16/2018    Procedure: REPAIR HERNIA VENTRAL with mesh;  Surgeon: Jeannette Goncalves DO;  Location: [facility];  Service: General   • SD SURGICAL ARTHROSCOPY SHOULDER W/ROTATOR CUFF RPR Left 11/18/2020    Procedure: SHOULDER ARTHROSCOPY, ROTATOR CUFF REPAIR, SYNEVECTOMY, ACROMIOPLASTY DEBRIDEMENT, INJECTION;  Surgeon: Charisma Almanza DO;  Location: [address] [place] [address] MAIN OR;  Service: Orthopedics       Family History   Adopted: Yes   Family history unknown: Yes       Social History     Occupational History   • Not on file   Tobacco Use   • Smoking status: Former     Packs/day: 0 50     Years: 0 00     Pack years: 0 00     Types: Cigarettes     Quit date: 10/17/2020     Years since quittin 2   • Smokeless tobacco: Never   • Tobacco comments:     1 pack a day for 20 years   Vaping Use   • Vaping Use: Never used   Substance and Sexual Activity   • Alcohol use: Never     Alcohol/week: 0 0 standard drinks   • Drug use: Never   • Sexual activity: Not Currently         Current Outpatient Medications:   •  busPIRone (BUSPAR) 15 mg tablet, Take 1 tablet (15 mg total) by mouth 3 (three) times a day, Disp: 270 tablet, Rfl: 0  •  gabapentin (Neurontin) 600 MG tablet, 1 PO BID and 3 PO QHS, Disp: 150 tablet, Rfl: 1  •  Melatonin 10 MG TABS, Take 40 mg by mouth daily at bedtime, Disp: , Rfl:   •  methocarbamol (ROBAXIN) 750 mg tablet, Take 1 tablet (750 mg total) by mouth 3 (three) times a day As needed for pain/spasms, Disp: 90 tablet, Rfl: 1  •  sertraline (ZOLOFT) 50 mg tablet, Take 1 tablet (50 mg total) by mouth daily, Disp: 90 tablet, Rfl: 0  •  traZODone (DESYREL) 50 mg tablet, Take 4 tablets (200 mg total) by mouth daily at bedtime, Disp: , Rfl:     No Known Allergies    Physical Exam:    BP (!) 153/104   Pulse 72   Ht 5' 6" (1 676 m)   Wt 109 kg (239 lb 6 4 oz)   LMP 10/10/2016   BMI 38 64 kg/m²     Constitutional:normal, well developed, well nourished, alert, in no distress and non-toxic and no overt pain behavior  Eyes:anicteric  HEENT:grossly intact  Neck:supple, symmetric, trachea midline and no masses   Pulmonary:even and unlabored  Cardiovascular:No edema or pitting edema present  Skin:Normal without rashes or lesions and well hydrated  Psychiatric:Mood and affect appropriate  Neurologic:Cranial Nerves II-XII grossly intact  Musculoskeletal:normal    Imaging  No orders to display       No orders of the defined types were placed in this encounter

## 2023-01-17 ENCOUNTER — RA CDI HCC (OUTPATIENT)
Dept: OTHER | Facility: HOSPITAL | Age: 53
End: 2023-01-17

## 2023-01-17 NOTE — PROGRESS NOTES
[address]  coding opportunities       Chart reviewed, no opportunity found: CHART REVIEWED, NO OPPORTUNITY FOUND        Patients Insurance     Medicare Insurance: Medicare

## 2023-01-24 ENCOUNTER — OFFICE VISIT (OUTPATIENT)
Dept: FAMILY MEDICINE CLINIC | Facility: CLINIC | Age: 53
End: 2023-01-24

## 2023-01-24 VITALS
TEMPERATURE: 97.3 F | SYSTOLIC BLOOD PRESSURE: 130 MMHG | HEART RATE: 80 BPM | OXYGEN SATURATION: 97 % | WEIGHT: 240.8 LBS | DIASTOLIC BLOOD PRESSURE: 82 MMHG | BODY MASS INDEX: 38.7 KG/M2 | HEIGHT: 66 IN

## 2023-01-24 DIAGNOSIS — Z12.12 SCREENING FOR COLORECTAL CANCER: ICD-10-CM

## 2023-01-24 DIAGNOSIS — F32.A ANXIETY AND DEPRESSION: Primary | ICD-10-CM

## 2023-01-24 DIAGNOSIS — Z12.11 SCREENING FOR COLORECTAL CANCER: ICD-10-CM

## 2023-01-24 DIAGNOSIS — G47.00 INSOMNIA, UNSPECIFIED TYPE: ICD-10-CM

## 2023-01-24 DIAGNOSIS — Z12.31 ENCOUNTER FOR SCREENING MAMMOGRAM FOR BREAST CANCER: ICD-10-CM

## 2023-01-24 DIAGNOSIS — F41.9 ANXIETY AND DEPRESSION: Primary | ICD-10-CM

## 2023-01-24 RX ORDER — TRAZODONE HYDROCHLORIDE 100 MG/1
200 TABLET ORAL
Qty: 180 TABLET | Refills: 0 | Status: SHIPPED | OUTPATIENT
Start: 2023-01-24

## 2023-01-24 RX ORDER — BUSPIRONE HYDROCHLORIDE 15 MG/1
15 TABLET ORAL 3 TIMES DAILY
Qty: 270 TABLET | Refills: 0 | Status: SHIPPED | OUTPATIENT
Start: 2023-01-24 | End: 2023-04-24

## 2023-01-24 RX ORDER — SERTRALINE HYDROCHLORIDE 100 MG/1
100 TABLET, FILM COATED ORAL DAILY
Qty: 90 TABLET | Refills: 0 | Status: SHIPPED | OUTPATIENT
Start: 2023-01-24

## 2023-01-24 NOTE — PROGRESS NOTES
Assessment/Plan:    Problem List Items Addressed This Visit        Other    Anxiety and depression - Primary    Relevant Medications    sertraline (ZOLOFT) 100 mg tablet    busPIRone (BUSPAR) 15 mg tablet    traZODone (DESYREL) 100 mg tablet   Other Visit Diagnoses     Insomnia, unspecified type        Continue trazodone 200 mg hs  Relevant Medications    traZODone (DESYREL) 100 mg tablet    Screening for colorectal cancer        Relevant Orders    Cologuard    Encounter for screening mammogram for breast cancer        Relevant Orders    Mammo screening bilateral w 3d & cad           Diagnoses and all orders for this visit:    Anxiety and depression  Comments:  Increase sertraline to 100 mg daily  Orders:  -     sertraline (ZOLOFT) 100 mg tablet; Take 1 tablet (100 mg total) by mouth daily  -     busPIRone (BUSPAR) 15 mg tablet; Take 1 tablet (15 mg total) by mouth 3 (three) times a day    Insomnia, unspecified type  Comments:  Continue trazodone 200 mg hs  Orders:  -     traZODone (DESYREL) 100 mg tablet; Take 2 tablets (200 mg total) by mouth daily at bedtime    Screening for colorectal cancer  -     Cologuard    Encounter for screening mammogram for breast cancer  -     Mammo screening bilateral w 3d & cad; Future      No problem-specific Assessment & Plan notes found for this encounter  Subjective:      Patient ID: Vanessa Resendiz is a 45 y o  female  Refugia Fleet is here today for routine follow up  She has been having some breakthrough anxiety, would like to try increasing sertraline to 100 mg daily  The following portions of the patient's history were reviewed and updated as appropriate:   She has a past medical history of Anxiety, Back pain, Depression, and Insomnia ,  does not have any pertinent problems on file  ,   has a past surgical history that includes Cholecystectomy; Lumbar disc surgery; pr repair first abdominal wall hernia (N/A, 3/16/2018); Back surgery (2006);  Back surgery; pr surgical arthroscopy shoulder w/rotator cuff rpr (Left, 11/18/2020); and Caudal block (N/A, 11/15/2022)  ,  She was adopted  Family history is unknown by patient  ,   reports that she quit smoking about 2 years ago  Her smoking use included cigarettes  She smoked an average of 0 50 packs per day  She has never used smokeless tobacco  She reports that she does not drink alcohol and does not use drugs  ,  has No Known Allergies     Current Outpatient Medications   Medication Sig Dispense Refill   • busPIRone (BUSPAR) 15 mg tablet Take 1 tablet (15 mg total) by mouth 3 (three) times a day 270 tablet 0   • gabapentin (Neurontin) 600 MG tablet 1 PO BID and 3 PO  tablet 1   • Melatonin 10 MG TABS Take 40 mg by mouth daily at bedtime     • methocarbamol (ROBAXIN) 750 mg tablet Take 1 tablet (750 mg total) by mouth 3 (three) times a day As needed for pain/spasms 90 tablet 1   • sertraline (ZOLOFT) 100 mg tablet Take 1 tablet (100 mg total) by mouth daily 90 tablet 0   • traZODone (DESYREL) 100 mg tablet Take 2 tablets (200 mg total) by mouth daily at bedtime 180 tablet 0     No current facility-administered medications for this visit  Review of Systems   Constitutional: Negative for activity change, appetite change, chills, diaphoresis, fatigue, fever and unexpected weight change  HENT: Negative for congestion, ear pain, postnasal drip, rhinorrhea, sinus pressure, sinus pain, sneezing, sore throat, tinnitus and voice change  Eyes: Negative for pain, redness and visual disturbance  Respiratory: Negative for cough, chest tightness, shortness of breath and wheezing  Cardiovascular: Negative for chest pain, palpitations and leg swelling  Gastrointestinal: Negative for abdominal pain, blood in stool, constipation, diarrhea, nausea and vomiting  Genitourinary: Negative for difficulty urinating, dysuria, frequency, hematuria and urgency  Musculoskeletal: Positive for back pain   Negative for arthralgias, gait problem, joint swelling, myalgias, neck pain and neck stiffness  Skin: Negative for color change, pallor, rash and wound  Neurological: Negative for dizziness, tremors, weakness, light-headedness and headaches  Psychiatric/Behavioral: Negative for dysphoric mood, self-injury, sleep disturbance and suicidal ideas  The patient is nervous/anxious  Objective:  Vitals:    01/24/23 1400   BP: 130/82   Pulse: 80   Temp: (!) 97 3 °F (36 3 °C)   SpO2: 97%   Weight: 109 kg (240 lb 12 8 oz)   Height: 5' 6" (1 676 m)     Body mass index is 38 87 kg/m²  Physical Exam  Vitals reviewed  Constitutional:       General: She is not in acute distress  Appearance: She is well-developed  She is not diaphoretic  HENT:      Head: Normocephalic and atraumatic  Right Ear: Hearing, tympanic membrane, ear canal and external ear normal       Left Ear: Hearing, tympanic membrane, ear canal and external ear normal       Mouth/Throat:      Pharynx: Uvula midline  No oropharyngeal exudate  Eyes:      General: No scleral icterus  Right eye: No discharge  Left eye: No discharge  Conjunctiva/sclera: Conjunctivae normal    Neck:      Thyroid: No thyromegaly  Vascular: No carotid bruit  Cardiovascular:      Rate and Rhythm: Normal rate and regular rhythm  Heart sounds: Normal heart sounds  No murmur heard  Pulmonary:      Effort: Pulmonary effort is normal  No respiratory distress  Breath sounds: Normal breath sounds  No wheezing  Abdominal:      General: Bowel sounds are normal  There is no distension  Palpations: Abdomen is soft  There is no mass  Tenderness: There is no abdominal tenderness  There is no guarding or rebound  Musculoskeletal:         General: No tenderness  Normal range of motion  Cervical back: Neck supple  Lymphadenopathy:      Cervical: No cervical adenopathy  Skin:     General: Skin is warm and dry        Findings: No erythema or rash    Neurological:      Mental Status: She is alert and oriented to person, place, and time  Psychiatric:         Mood and Affect: Mood is anxious  Behavior: Behavior normal          Thought Content:  Thought content normal          Judgment: Judgment normal

## 2023-01-24 NOTE — PATIENT INSTRUCTIONS
Medicare Preventive Visit Patient Instructions  Thank you for completing your Welcome to Medicare Visit or Medicare Annual Wellness Visit today  Your next wellness visit will be due in one year (1/25/2024)  The screening/preventive services that you may require over the next 5-10 years are detailed below  Some tests may not apply to you based off risk factors and/or age  Screening tests ordered at today's visit but not completed yet may show as past due  Also, please note that scanned in results may not display below  Preventive Screenings:  Service Recommendations Previous Testing/Comments   Colorectal Cancer Screening  * Colonoscopy    * Fecal Occult Blood Test (FOBT)/Fecal Immunochemical Test (FIT)  * Fecal DNA/Cologuard Test  * Flexible Sigmoidoscopy Age: 39-70 years old   Colonoscopy: every 10 years (may be performed more frequently if at higher risk)  OR  FOBT/FIT: every 1 year  OR  Cologuard: every 3 years  OR  Sigmoidoscopy: every 5 years  Screening may be recommended earlier than age 39 if at higher risk for colorectal cancer  Also, an individualized decision between you and your healthcare provider will decide whether screening between the ages of 74-80 would be appropriate  Colonoscopy: Not on file  FOBT/FIT: Not on file  Cologuard: Not on file  Sigmoidoscopy: Not on file          Breast Cancer Screening Age: 36 years old  Frequency: every 1-2 years  Not required if history of left and right mastectomy Mammogram: 10/05/2013        Cervical Cancer Screening Between the ages of 21-29, pap smear recommended once every 3 years  Between the ages of 33-67, can perform pap smear with HPV co-testing every 5 years     Recommendations may differ for women with a history of total hysterectomy, cervical cancer, or abnormal pap smears in past  Pap Smear: Not on file        Hepatitis C Screening Once for adults born between [facility]  More frequently in patients at high risk for Hepatitis C Hep C Antibody: Not on file        Diabetes Screening 1-2 times per year if you're at risk for diabetes or have pre-diabetes Fasting glucose: 100 mg/dL (6/3/2021)  A1C: No results in last 5 years (No results in last 5 years)  Screening Current   Cholesterol Screening Once every 5 years if you don't have a lipid disorder  May order more often based on risk factors  Lipid panel: Not on file          Other Preventive Screenings Covered by Medicare:  1  Abdominal Aortic Aneurysm (AAA) Screening: covered once if your at risk  You're considered to be at risk if you have a family history of AAA  2  Lung Cancer Screening: covers low dose CT scan once per year if you meet all of the following conditions: (1) Age 50-69; (2) No signs or symptoms of lung cancer; (3) Current smoker or have quit smoking within the last 15 years; (4) You have a tobacco smoking history of at least 20 pack years (packs per day multiplied by number of years you smoked); (5) You get a written order from a healthcare provider  3  Glaucoma Screening: covered annually if you're considered high risk: (1) You have diabetes OR (2) Family history of glaucoma OR (3)  aged 48 and older OR (3)  American aged 72 and older  3  Osteoporosis Screening: covered every 2 years if you meet one of the following conditions: (1) You're estrogen deficient and at risk for osteoporosis based off medical history and other findings; (2) Have a vertebral abnormality; (3) On glucocorticoid therapy for more than 3 months; (4) Have primary hyperparathyroidism; (5) On osteoporosis medications and need to assess response to drug therapy  · Last bone density test (DXA Scan): Not on file  5  HIV Screening: covered annually if you're between the age of 12-76  Also covered annually if you are younger than 13 and older than 72 with risk factors for HIV infection  For pregnant patients, it is covered up to 3 times per pregnancy      Immunizations:  Immunization Recommendations Influenza Vaccine Annual influenza vaccination during flu season is recommended for all persons aged >= 6 months who do not have contraindications   Pneumococcal Vaccine   * Pneumococcal conjugate vaccine = PCV13 (Prevnar 13), PCV15 (Vaxneuvance), PCV20 (Prevnar 20)  * Pneumococcal polysaccharide vaccine = PPSV23 (Pneumovax) Adults 25-60 years old: 1-3 doses may be recommended based on certain risk factors  Adults 72 years old: 1-2 doses may be recommended based off what pneumonia vaccine you previously received   Hepatitis B Vaccine 3 dose series if at intermediate or high risk (ex: diabetes, end stage renal disease, liver disease)   Tetanus (Td) Vaccine - COST NOT COVERED BY MEDICARE PART B Following completion of primary series, a booster dose should be given every 10 years to maintain immunity against tetanus  Td may also be given as tetanus wound prophylaxis  Tdap Vaccine - COST NOT COVERED BY MEDICARE PART B Recommended at least once for all adults  For pregnant patients, recommended with each pregnancy  Shingles Vaccine (Shingrix) - COST NOT COVERED BY MEDICARE PART B  2 shot series recommended in those aged 48 and above     Health Maintenance Due:      Topic Date Due   • Hepatitis C Screening  Never done   • HIV Screening  Never done   • Cervical Cancer Screening  Never done   • Colorectal Cancer Screening  02/24/2023 (Originally 6/17/2013)   • Breast Cancer Screening: Mammogram  02/24/2023 (Originally 10/5/2014)     Immunizations Due:      Topic Date Due   • COVID-19 Vaccine (3 - Booster for Moderna series) 06/03/2021     Advance Directives   What are advance directives? Advance directives are legal documents that state your wishes and plans for medical care  These plans are made ahead of time in case you lose your ability to make decisions for yourself  Advance directives can apply to any medical decision, such as the treatments you want, and if you want to donate organs     What are the types of advance directives? There are many types of advance directives, and each state has rules about how to use them  You may choose a combination of any of the following:  · Living will: This is a written record of the treatment you want  You can also choose which treatments you do not want, which to limit, and which to stop at a certain time  This includes surgery, medicine, IV fluid, and tube feedings  · Durable power of  for healthcare [facility]): This is a written record that states who you want to make healthcare choices for you when you are unable to make them for yourself  This person, called a proxy, is usually a family member or a friend  You may choose more than 1 proxy  · Do not resuscitate (DNR) order:  A DNR order is used in case your heart stops beating or you stop breathing  It is a request not to have certain forms of treatment, such as CPR  A DNR order may be included in other types of advance directives  · Medical directive: This covers the care that you want if you are in a coma, near death, or unable to make decisions for yourself  You can list the treatments you want for each condition  Treatment may include pain medicine, surgery, blood transfusions, dialysis, IV or tube feedings, and a ventilator (breathing machine)  · Values history: This document has questions about your views, beliefs, and how you feel and think about life  This information can help others choose the care that you would choose  Why are advance directives important? An advance directive helps you control your care  Although spoken wishes may be used, it is better to have your wishes written down  Spoken wishes can be misunderstood, or not followed  Treatments may be given even if you do not want them  An advance directive may make it easier for your family to make difficult choices about your care     Weight Management   Why it is important to manage your weight:  Being overweight increases your risk of health conditions such as heart disease, high blood pressure, type 2 diabetes, and certain types of cancer  It can also increase your risk for osteoarthritis, sleep apnea, and other respiratory problems  Aim for a slow, steady weight loss  Even a small amount of weight loss can lower your risk of health problems  How to lose weight safely:  A safe and healthy way to lose weight is to eat fewer calories and get regular exercise  You can lose up about 1 pound a week by decreasing the number of calories you eat by 500 calories each day  Healthy meal plan for weight management:  A healthy meal plan includes a variety of foods, contains fewer calories, and helps you stay healthy  A healthy meal plan includes the following:  · Eat whole-grain foods more often  A healthy meal plan should contain fiber  Fiber is the part of grains, fruits, and vegetables that is not broken down by your body  Whole-grain foods are healthy and provide extra fiber in your diet  Some examples of whole-grain foods are whole-wheat breads and pastas, oatmeal, brown rice, and bulgur  · Eat a variety of vegetables every day  Include dark, leafy greens such as spinach, kale, valdez greens, and mustard greens  Eat yellow and orange vegetables such as carrots, sweet potatoes, and winter squash  · Eat a variety of fruits every day  Choose fresh or canned fruit (canned in its own juice or light syrup) instead of juice  Fruit juice has very little or no fiber  · Eat low-fat dairy foods  Drink fat-free (skim) milk or 1% milk  Eat fat-free yogurt and low-fat cottage cheese  Try low-fat cheeses such as mozzarella and other reduced-fat cheeses  · Choose meat and other protein foods that are low in fat  Choose beans or other legumes such as split peas or lentils  Choose fish, skinless poultry (chicken or turkey), or lean cuts of red meat (beef or pork)  Before you cook meat or poultry, cut off any visible fat  · Use less fat and oil    Try baking foods instead of frying them  Add less fat, such as margarine, sour cream, regular salad dressing and mayonnaise to foods  Eat fewer high-fat foods  Some examples of high-fat foods include french fries, doughnuts, ice cream, and cakes  · Eat fewer sweets  Limit foods and drinks that are high in sugar  This includes candy, cookies, regular soda, and sweetened drinks  Exercise:  Exercise at least 30 minutes per day on most days of the week  Some examples of exercise include walking, biking, dancing, and swimming  You can also fit in more physical activity by taking the stairs instead of the elevator or parking farther away from stores  Ask your healthcare provider about the best exercise plan for you  © Copyright [employer] 2018 Information is for End User's use only and may not be sold, redistributed or otherwise used for commercial purposes   All illustrations and images included in CareNotes® are the copyrighted property of A D A M , Inc  or [address] [place] [address]

## 2023-01-26 ENCOUNTER — HOSPITAL ENCOUNTER (OUTPATIENT)
Dept: MAMMOGRAPHY | Facility: HOSPITAL | Age: 53
Discharge: HOME | End: 2023-01-26

## 2023-01-26 VITALS — HEIGHT: 66 IN | BODY MASS INDEX: 38.57 KG/M2 | WEIGHT: 240 LBS

## 2023-01-26 DIAGNOSIS — Z12.31 ENCOUNTER FOR SCREENING MAMMOGRAM FOR BREAST CANCER: ICD-10-CM

## 2023-02-03 LAB — COLOGUARD RESULT REPORTABLE: NEGATIVE

## 2023-02-06 ENCOUNTER — HOSPITAL ENCOUNTER (EMERGENCY)
Facility: HOSPITAL | Age: 53
Discharge: HOME | End: 2023-02-06
Attending: EMERGENCY MEDICINE

## 2023-02-06 ENCOUNTER — APPOINTMENT (EMERGENCY)
Dept: CT IMAGING | Facility: HOSPITAL | Age: 53
End: 2023-02-06

## 2023-02-06 ENCOUNTER — TELEPHONE (OUTPATIENT)
Dept: FAMILY MEDICINE CLINIC | Facility: CLINIC | Age: 53
End: 2023-02-06

## 2023-02-06 ENCOUNTER — OFFICE VISIT (OUTPATIENT)
Dept: URGENT CARE | Facility: MEDICAL CENTER | Age: 53
End: 2023-02-06

## 2023-02-06 VITALS
DIASTOLIC BLOOD PRESSURE: 75 MMHG | OXYGEN SATURATION: 94 % | SYSTOLIC BLOOD PRESSURE: 129 MMHG | HEART RATE: 77 BPM | RESPIRATION RATE: 18 BRPM | TEMPERATURE: 97.1 F

## 2023-02-06 VITALS
BODY MASS INDEX: 38.57 KG/M2 | WEIGHT: 240 LBS | OXYGEN SATURATION: 97 % | RESPIRATION RATE: 20 BRPM | SYSTOLIC BLOOD PRESSURE: 148 MMHG | DIASTOLIC BLOOD PRESSURE: 88 MMHG | HEART RATE: 74 BPM | TEMPERATURE: 98.9 F | HEIGHT: 66 IN

## 2023-02-06 DIAGNOSIS — K57.92 ACUTE DIVERTICULITIS: Primary | ICD-10-CM

## 2023-02-06 DIAGNOSIS — R19.7 DIARRHEA, UNSPECIFIED TYPE: ICD-10-CM

## 2023-02-06 DIAGNOSIS — R10.30 LOWER ABDOMINAL PAIN: Primary | ICD-10-CM

## 2023-02-06 LAB
ALBUMIN SERPL BCP-MCNC: 4.2 G/DL (ref 3.5–5)
ALP SERPL-CCNC: 76 U/L (ref 34–104)
ALT SERPL W P-5'-P-CCNC: 13 U/L (ref 7–52)
ANION GAP SERPL CALCULATED.3IONS-SCNC: 11 MMOL/L (ref 4–13)
AST SERPL W P-5'-P-CCNC: 12 U/L (ref 13–39)
BASOPHILS # BLD AUTO: 0.03 THOUSANDS/ÂΜL (ref 0–0.1)
BASOPHILS NFR BLD AUTO: 1 % (ref 0–1)
BILIRUB SERPL-MCNC: 0.32 MG/DL (ref 0.2–1)
BUN SERPL-MCNC: 17 MG/DL (ref 5–25)
CALCIUM SERPL-MCNC: 9.3 MG/DL (ref 8.4–10.2)
CHLORIDE SERPL-SCNC: 103 MMOL/L (ref 96–108)
CO2 SERPL-SCNC: 25 MMOL/L (ref 21–32)
CREAT SERPL-MCNC: 0.66 MG/DL (ref 0.6–1.3)
EOSINOPHIL # BLD AUTO: 0.17 THOUSAND/ÂΜL (ref 0–0.61)
EOSINOPHIL NFR BLD AUTO: 3 % (ref 0–6)
ERYTHROCYTE [DISTWIDTH] IN BLOOD BY AUTOMATED COUNT: 13.4 % (ref 11.6–15.1)
GFR SERPL CREATININE-BSD FRML MDRD: 100 ML/MIN/1.73SQ M
GLUCOSE SERPL-MCNC: 82 MG/DL (ref 65–140)
HCT VFR BLD AUTO: 45.9 % (ref 34.8–46.1)
HGB BLD-MCNC: 14.8 G/DL (ref 11.5–15.4)
IMM GRANULOCYTES # BLD AUTO: 0.03 THOUSAND/UL (ref 0–0.2)
IMM GRANULOCYTES NFR BLD AUTO: 1 % (ref 0–2)
LACTATE SERPL-SCNC: 0.7 MMOL/L (ref 0.5–2)
LIPASE SERPL-CCNC: 8 U/L (ref 11–82)
LYMPHOCYTES # BLD AUTO: 1.99 THOUSANDS/ÂΜL (ref 0.6–4.47)
LYMPHOCYTES NFR BLD AUTO: 31 % (ref 14–44)
MCH RBC QN AUTO: 29.5 PG (ref 26.8–34.3)
MCHC RBC AUTO-ENTMCNC: 32.2 G/DL (ref 31.4–37.4)
MCV RBC AUTO: 92 FL (ref 82–98)
MONOCYTES # BLD AUTO: 0.46 THOUSAND/ÂΜL (ref 0.17–1.22)
MONOCYTES NFR BLD AUTO: 7 % (ref 4–12)
NEUTROPHILS # BLD AUTO: 3.75 THOUSANDS/ÂΜL (ref 1.85–7.62)
NEUTS SEG NFR BLD AUTO: 57 % (ref 43–75)
NRBC BLD AUTO-RTO: 0 /100 WBCS
PLATELET # BLD AUTO: 309 THOUSANDS/UL (ref 149–390)
PMV BLD AUTO: 10.2 FL (ref 8.9–12.7)
POTASSIUM SERPL-SCNC: 4.1 MMOL/L (ref 3.5–5.3)
PROT SERPL-MCNC: 6.9 G/DL (ref 6.4–8.4)
RBC # BLD AUTO: 5.01 MILLION/UL (ref 3.81–5.12)
SODIUM SERPL-SCNC: 139 MMOL/L (ref 135–147)
WBC # BLD AUTO: 6.43 THOUSAND/UL (ref 4.31–10.16)

## 2023-02-06 RX ORDER — CEFTRIAXONE 2 G/50ML
2000 INJECTION, SOLUTION INTRAVENOUS ONCE
Status: COMPLETED | OUTPATIENT
Start: 2023-02-06 | End: 2023-02-06

## 2023-02-06 RX ORDER — HYDROMORPHONE HCL/PF 1 MG/ML
0.5 SYRINGE (ML) INJECTION ONCE
Status: COMPLETED | OUTPATIENT
Start: 2023-02-06 | End: 2023-02-06

## 2023-02-06 RX ORDER — AMOXICILLIN AND CLAVULANATE POTASSIUM 875; 125 MG/1; MG/1
1 TABLET, FILM COATED ORAL EVERY 12 HOURS
Qty: 14 TABLET | Refills: 0 | Status: SHIPPED | OUTPATIENT
Start: 2023-02-06 | End: 2023-02-13

## 2023-02-06 RX ORDER — OXYCODONE HYDROCHLORIDE 5 MG/1
5 TABLET ORAL EVERY 6 HOURS PRN
Qty: 12 TABLET | Refills: 0 | Status: SHIPPED | OUTPATIENT
Start: 2023-02-06 | End: 2023-02-10

## 2023-02-06 RX ORDER — SIMETHICONE 80 MG
80 TABLET,CHEWABLE ORAL ONCE
Status: COMPLETED | OUTPATIENT
Start: 2023-02-06 | End: 2023-02-06

## 2023-02-06 RX ORDER — METRONIDAZOLE 500 MG/1
500 TABLET ORAL ONCE
Status: COMPLETED | OUTPATIENT
Start: 2023-02-06 | End: 2023-02-06

## 2023-02-06 RX ADMIN — SIMETHICONE 80 MG: 80 TABLET, CHEWABLE ORAL at 20:19

## 2023-02-06 RX ADMIN — CEFTRIAXONE 2000 MG: 2 INJECTION, SOLUTION INTRAVENOUS at 21:59

## 2023-02-06 RX ADMIN — HYDROMORPHONE HYDROCHLORIDE 0.5 MG: 1 INJECTION, SOLUTION INTRAMUSCULAR; INTRAVENOUS; SUBCUTANEOUS at 21:59

## 2023-02-06 RX ADMIN — SODIUM CHLORIDE 500 ML: 0.9 INJECTION, SOLUTION INTRAVENOUS at 20:18

## 2023-02-06 RX ADMIN — HYDROMORPHONE HYDROCHLORIDE 0.5 MG: 1 INJECTION, SOLUTION INTRAMUSCULAR; INTRAVENOUS; SUBCUTANEOUS at 20:18

## 2023-02-06 RX ADMIN — IOHEXOL 100 ML: 350 INJECTION, SOLUTION INTRAVENOUS at 21:29

## 2023-02-06 RX ADMIN — METRONIDAZOLE 500 MG: 500 TABLET ORAL at 21:58

## 2023-02-06 NOTE — PROGRESS NOTES
[address] [facility]        NAME: Lauryn Murray is a 45 y o  female  : 1970    MRN: [id number]  DATE: 2023  TIME: 6:05 PM    Assessment and Plan   Lower abdominal pain [R10 30]  1  Lower abdominal pain  Transfer to other facility      2  Diarrhea, unspecified type  Transfer to other facility        ED for further evaluation  Patient Instructions       Follow up with PCP in 3-5 days  Proceed to  ER if symptoms worsen  Chief Complaint     Chief Complaint   Patient presents with   • Abdominal Pain   • Back Pain   • Diarrhea         History of Present Illness       Patient is a 46 y/o/f [facility] with lower abdominal pain, low back pain and diarrhea  Patient reports diarrhea began 1 week ago  Pt reports chills  No nausea or vomiting  Pt reports abdominal pain is worse in right lower quadrant  Left flank pain w/o hematuria or urinary sxs  Hx of stones in the past     Abdominal Pain  This is a new problem  The current episode started yesterday  The onset quality is sudden  The problem occurs constantly  The pain is located in the LLQ and RLQ  The quality of the pain is aching  The abdominal pain radiates to the left flank  Associated symptoms include diarrhea  Pertinent negatives include no arthralgias, dysuria, fever, frequency, hematuria, melena, myalgias, nausea or vomiting  Back Pain  Associated symptoms include abdominal pain  Pertinent negatives include no chest pain, dysuria or fever  Diarrhea   Associated symptoms include abdominal pain  Pertinent negatives include no arthralgias, chills, coughing, fever, myalgias or vomiting  Review of Systems   Review of Systems   Constitutional: Negative for chills and fever  HENT: Negative for ear pain and sore throat  Eyes: Negative for pain and visual disturbance  Respiratory: Negative for cough and shortness of breath  Cardiovascular: Negative for chest pain and palpitations     Gastrointestinal: Positive for abdominal pain and diarrhea  Negative for melena, nausea and vomiting  Genitourinary: Positive for flank pain  Negative for dysuria, frequency and hematuria  Musculoskeletal: Positive for back pain  Negative for arthralgias and myalgias  Skin: Negative for color change and rash  Neurological: Negative for seizures and syncope  All other systems reviewed and are negative          Current Medications       Current Outpatient Medications:   •  busPIRone (BUSPAR) 15 mg tablet, Take 1 tablet (15 mg total) by mouth 3 (three) times a day, Disp: 270 tablet, Rfl: 0  •  gabapentin (Neurontin) 600 MG tablet, 1 PO BID and 3 PO QHS, Disp: 150 tablet, Rfl: 1  •  Melatonin 10 MG TABS, Take 40 mg by mouth daily at bedtime, Disp: , Rfl:   •  methocarbamol (ROBAXIN) 750 mg tablet, Take 1 tablet (750 mg total) by mouth 3 (three) times a day As needed for pain/spasms, Disp: 90 tablet, Rfl: 1  •  sertraline (ZOLOFT) 100 mg tablet, Take 1 tablet (100 mg total) by mouth daily, Disp: 90 tablet, Rfl: 0  •  traZODone (DESYREL) 100 mg tablet, Take 2 tablets (200 mg total) by mouth daily at bedtime, Disp: 180 tablet, Rfl: 0    Current Allergies     Allergies as of 02/06/2023   • (No Known Allergies)            The following portions of the patient's history were reviewed and updated as appropriate: allergies, current medications, past family history, past medical history, past social history, past surgical history and problem list      Past Medical History:   Diagnosis Date   • Anxiety    • Back pain    • Depression    • Insomnia        Past Surgical History:   Procedure Laterality Date   • BACK SURGERY  2006    cage in lumbar L5 area   • BACK SURGERY     • CAUDAL BLOCK N/A 11/15/2022    Procedure: BLOCK / INJECTION CAUDAL;  Surgeon: Siria Irvin MD;  Location: [facility];  Service: Pain Management    • CHOLECYSTECTOMY     • LUMBAR DISC SURGERY     • [facility] REPAIR FIRST ABDOMINAL WALL HERNIA N/A 3/16/2018 Procedure: REPAIR HERNIA VENTRAL with mesh;  Surgeon: Saint Beat, DO;  Location: [facility];  Service: General   • CA SURGICAL ARTHROSCOPY SHOULDER W/ROTATOR CUFF RPR Left 11/18/2020    Procedure: SHOULDER ARTHROSCOPY, ROTATOR CUFF REPAIR, SYNEVECTOMY, ACROMIOPLASTY DEBRIDEMENT, INJECTION;  Surgeon: Jenn Trinh DO;  Location: [facility] MA[facility] OR;  Service: Orthopedics       Family History   Adopted: Yes   Family history unknown: Yes         Medications have been verified  Objective   /88   Pulse 74   Temp 98 9 °F (37 2 °C)   Resp 20   Ht 5' 6" (1 676 m)   Wt 109 kg (240 lb)   LMP 10/10/2016   SpO2 97%   BMI 38 74 kg/m²   Patient's last menstrual period was 10/10/2016  Physical Exam     Physical Exam  Constitutional:       Appearance: Normal appearance  HENT:      Head: Normocephalic and atraumatic  Nose: Nose normal       Mouth/Throat:      Mouth: Mucous membranes are dry  Eyes:      Extraocular Movements: Extraocular movements intact  Conjunctiva/sclera: Conjunctivae normal       Pupils: Pupils are equal, round, and reactive to light  Cardiovascular:      Rate and Rhythm: Normal rate  Pulmonary:      Effort: Pulmonary effort is normal    Abdominal:      Tenderness: There is abdominal tenderness in the right lower quadrant and left lower quadrant  There is left CVA tenderness  Positive signs include Esquivel's sign  Musculoskeletal:         General: Normal range of motion  Cervical back: Normal range of motion and neck supple  Skin:     General: Skin is warm and dry  Capillary Refill: Capillary refill takes less than 2 seconds  Neurological:      General: No focal deficit present  Mental Status: She is alert and oriented to person, place, and time     Psychiatric:         Mood and Affect: Mood normal          Behavior: Behavior normal

## 2023-02-06 NOTE — TELEPHONE ENCOUNTER
Called & schedule OV for tomorrow - Then transferred to clinical desk  Having a lot of abdominal area  Every time she eats she gets the diarrhea really bad  Went to  which was so crowded there were no available seats, so she went back home  Asking what you would recommend? Should she go back to urgent care or is there anything else she can do besides waiting for her appt tomorrow 
Hard to advise without seeing her  Would recommend trying a different urgent care location 
Pt aware she is actually on her way back to urgent care 
initiation of breastfeeding/breast milk feeding

## 2023-02-07 NOTE — DISCHARGE INSTRUCTIONS
Hydrate well with water and electrolyte-containing fluids  Start taking Augmentin and complete entire course of the antibiotic to treat your current diverticulitis  Take Tylenol and/or Motrin for abdominal pain  Make sure you are taking Motrin with a meal or snack to help avoid irritating your stomach lining  Take oxycodone for severe breakthrough pain only  Oxycodone can cause constipation  If you take oxycodone but have no bowel movements, you may want to to start an over-the-counter stool softener  Follow-up with your primary care physician for reevaluation of symptoms  Follow-up with gastroenterology in 4 to 6 weeks for reevaluation

## 2023-02-07 NOTE — ED NOTES
Patient transported to [address] [place] [address], [address] [place] [address]  02/06/23 1214

## 2023-02-07 NOTE — ED PROVIDER NOTES
EMERGENCY DEPARTMENT ENCOUNTER NOTE    This note has been generated using a voice recognition software  There may be typographic, grammatic, or word substitution errors that have escaped editorial review  Emergency Department Note- Lauryn Murray 45 y o  female MRN: [id number]    Unit/Bed#: RM23 Encounter: [id number]  ? CHIEF COMPLAINT  Chief Complaint   Patient presents with   • Abdominal Pain     Patient states she started with diarrhea last week  Patient c/o lower abdominal pain starting yesterday  Patient denies N/V        HPI  Lauryn Murray is a 45 y o  female presenting with significant lower abdominal pain  Throughout the entirety of last week, patient had diarrhea with some mucus but no blood  Since yesterday, she developed significant lower abdominal pain involving suprapubic as well as bilateral lower quadrants  No vomiting  No fevers  No prior abdominal surgeries  Patient has a history of kidney stones, however, her symptoms do not feel like a prior kidney stone  Pain is quite severe      REVIEW OF SYSTEMS    Constitutional: No fevers  Cardiac: no chest pain  Respiratory: no shortness of breath, no cough  GI: As above  Endocrine: no diabetes  Neuro: no new focal weakness or numbness    Ten systems reviewed and negative unless otherwise noted in HPI and above    PAST MEDICAL HISTORY  Past Medical History:   Diagnosis Date   • Anxiety    • Back pain    • Depression    • Insomnia        SURGICAL HISTORY  Past Surgical History:   Procedure Laterality Date   • BACK SURGERY  2006    cage in lumbar L5 area   • BACK SURGERY     • CAUDAL BLOCK N/A 11/15/2022    Procedure: BLOCK / INJECTION CAUDAL;  Surgeon: Shaun Morton MD;  Location: [facility];  Service: Pain Management    • CHOLECYSTECTOMY     • LUMBAR [address] SURGERY     • [place] REPAIR FIRST ABDOMINAL WALL HERNIA N/A 3/16/2018    Procedure: REPAIR HERNIA VENTRAL with mesh;  Surgeon: Kavon Rdz DO;  Location: [facility];  Service: General • [place] SURGICAL ARTHROSCOPY SHOULDER W/ROTATOR CUFF RPR Left 2020    Procedure: SHOULDER ARTHROSCOPY, ROTATOR CUFF REPAIR, SYNEVECTOMY, ACROMIOPLASTY DEBRIDEMENT, INJECTION;  Surgeon: Kacie Menjivar DO;  Location: [address] [place] [address] MAIN OR;  Service: Orthopedics       FAMILY HISTORY  Family History   Adopted: Yes   Family history unknown: Yes        CURRENT MEDICATIONS  No current facility-administered medications on file prior to encounter       Current Outpatient Medications on File Prior to Encounter   Medication Sig   • busPIRone (BUSPAR) 15 mg tablet Take 1 tablet (15 mg total) by mouth 3 (three) times a day   • gabapentin (Neurontin) 600 MG tablet 1 PO BID and 3 PO QHS   • Melatonin 10 MG TABS Take 40 mg by mouth daily at bedtime   • methocarbamol (ROBAXIN) 750 mg tablet Take 1 tablet (750 mg total) by mouth 3 (three) times a day As needed for pain/spasms   • sertraline (ZOLOFT) 100 mg tablet Take 1 tablet (100 mg total) by mouth daily   • traZODone (DESYREL) 100 mg tablet Take 2 tablets (200 mg total) by mouth daily at bedtime       ALLERGIES  No Known Allergies    SOCIAL HISTORY  Social History     Socioeconomic History   • Marital status: /Civil Union     Spouse name: None   • Number of children: None   • Years of education: None   • Highest education level: None   Occupational History   • None   Tobacco Use   • Smoking status: Former     Packs/day: 0 50     Years: 0 00     Pack years: 0 00     Types: Cigarettes     Quit date: 10/17/2020     Years since quittin 3   • Smokeless tobacco: Never   • Tobacco comments:     1 pack a day for 20 years   Vaping Use   • Vaping Use: Never used   Substance and Sexual Activity   • Alcohol use: Never     Alcohol/week: 0 0 standard drinks   • Drug use: Never   • Sexual activity: Not Currently   Other Topics Concern   • None   Social History Narrative   • None     Social Determinants of Health     Financial Resource Strain: High Risk   • Difficulty of Paying Living Expenses: Hard   Food Insecurity: Not on file   Transportation Needs: No Transportation Needs   • Lack of Transportation (Medical): No   • Lack of Transportation (Non-Medical): No   Physical Activity: Not on file   Stress: Not on file   Social Connections: Not on file   Intimate Partner Violence: Not on file   Housing Stability: Not on file       PHYSICAL EXAM    BP (!) 193/100 (BP Location: Right arm)   Pulse 76   Temp (!) 97 1 °F (36 2 °C)   Resp 18   LMP 10/10/2016   SpO2 97%   Vital signs and nursing notes reviewed    CONSTITUTIONAL: female appearing stated age resting in bed, in distress due to abdominal pain  Nontoxic-appearing  HEENT: atraumatic, normocephalic  Sclera anicteric, conjunctiva are not injected  Moist oral mucosa  CARDIOVASCULAR/CHEST: RRR, no M/R/G  2+ radial pulses  PULMONARY: Breathing comfortably on RA  Breath sounds are equal and clear to auscultation  ABDOMEN: non-distended  BS present, normoactive  Tender to palpation in suprapubic and left lower quadrants without rebound or guarding  MSK: moves all extremities, no lower extremity edema  NEURO: Awake, alert, and oriented x 3  Face symmetric  Moves all extremities spontaneously  No focal neurologic deficits  SKIN: Warm, appears well-perfused  MENTAL STATUS: Normal affect      ?   LABS AND TESTS    Results Reviewed     Procedure Component Value Units Date/Time    Comprehensive metabolic panel [[id number]]  (Abnormal) Collected: 02/06/23 2019    Lab Status: Final result Specimen: Blood from Arm, Left Updated: 02/06/23 2101     Sodium 139 mmol/L      Potassium 4 1 mmol/L      Chloride 103 mmol/L      CO2 25 mmol/L      ANION GAP 11 mmol/L      BUN 17 mg/dL      Creatinine 0 66 mg/dL      Glucose 82 mg/dL      Calcium 9 3 mg/dL      AST 12 U/L      ALT 13 U/L      Alkaline Phosphatase 76 U/L      Total Protein 6 9 g/dL      Albumin 4 2 g/dL      Total Bilirubin 0 32 mg/dL      eGFR 100 ml/min/1 73sq m     Narrative:      National Kidney Disease Foundation guidelines for Chronic Kidney Disease (CKD):   •  Stage 1 with normal or high GFR (GFR > 90 mL/min/1 73 square meters)  •  Stage 2 Mild CKD (GFR = 60-89 mL/min/1 73 square meters)  •  Stage 3A Moderate CKD (GFR = 45-59 mL/min/1 73 square meters)  •  Stage 3B Moderate CKD (GFR = 30-44 mL/min/1 73 square meters)  •  Stage 4 Severe CKD (GFR = 15-29 mL/min/1 73 square meters)  •  Stage 5 End Stage CKD (GFR <15 mL/min/1 73 square meters)  Note: GFR calculation is accurate only with a steady state creatinine    Lipase [[id number]]  (Abnormal) Collected: 02/06/23 2019    Lab Status: Final result Specimen: Blood from Arm, Left Updated: 02/06/23 2101     Lipase 8 u/L     Lactic acid [[id number]]  (Normal) Collected: 02/06/23 2019    Lab Status: Final result Specimen: Blood from Arm, Left Updated: 02/06/23 2057     LACTIC ACID 0 7 mmol/L     Narrative:      Result may be elevated if tourniquet was used during collection  CBC and differential [[id number]] Collected: 02/06/23 2019    Lab Status: Final result Specimen: Blood from Arm, Left Updated: 02/06/23 2041     WBC 6 43 Thousand/uL      RBC 5 01 Million/uL      Hemoglobin 14 8 g/dL      Hematocrit 45 9 %      MCV 92 fL      MCH 29 5 pg      MCHC 32 2 g/dL      RDW 13 4 %      MPV 10 2 fL      Platelets 473 Thousands/uL      nRBC 0 /100 WBCs      Neutrophils Relative 57 %      Immat GRANS % 1 %      Lymphocytes Relative 31 %      Monocytes Relative 7 %      Eosinophils Relative 3 %      Basophils Relative 1 %      Neutrophils Absolute 3 75 Thousands/µL      Immature Grans Absolute 0 03 Thousand/uL      Lymphocytes Absolute 1 99 Thousands/µL      Monocytes Absolute 0 46 Thousand/µL      Eosinophils Absolute 0 17 Thousand/µL      Basophils Absolute 0 03 Thousands/µL           CT abdomen pelvis with contrast   Final Result by Abi Le MD (02/06 2231)      1  Focal inflammatory change at the sigmoid colon compatible with diverticulitis    No evidence of drainable abscess or macro perforation  2   Bilateral nephrolithiasis with 4 mm calculus at the right ureterovesicular junction  No hydronephrosis  Workstation performed: [id number]             ED COURSE & MEDICAL DECISION MAKING  Procedures             Medications   sodium chloride 0 9 % bolus 500 mL (0 mL Intravenous Stopped 2/6/23 2152)   HYDROmorphone (DILAUDID) injection 0 5 mg (0 5 mg Intravenous Given 2/6/23 2018)   simethicone (MYLICON) chewable tablet 80 mg (80 mg Oral Given 2/6/23 2019)   iohexol (OMNIPAQUE) 350 MG/ML injection (SINGLE-DOSE) 100 mL (100 mL Intravenous Given 2/6/23 2129)   HYDROmorphone (DILAUDID) injection 0 5 mg (0 5 mg Intravenous Given 2/6/23 2159)   cefTRIAXone (ROCEPHIN) IVPB (premix in dextrose) 2,000 mg 50 mL (0 mg Intravenous Stopped 2/6/23 2256)   metroNIDAZOLE (FLAGYL) tablet 500 mg (500 mg Oral Given 2/6/23 2158)     45 y o  female presenting with abdominal pain  Vital signs reviewed  DDx includes colitis, diverticulitis, obstruction, appendicitis, urinary tract infection, nephrolithiasis, with other etiologies such as pancreatitis, cholecystitis, etc  considered  Dilaudid administered for pain, 500 mL normal saline bolus started for hydration  Simethicone administered for bloating  Labs reveal unremarkable CBC, normal lactate normal CMP, and lipase not consistent with acute pancreatitis  CT of the abdomen and pelvis pursued  To my review, there are findings concerning for sigmoid diverticulitis  Ceftriaxone and metronidazole IV started while awaiting formal read  Additional dose of analgesic administered for pain  CT reveals uncomplicated acute diverticulitis  Although patient is experiencing pain, she would prefer to continue management of her diverticulitis at home  I will start the patient on a course of Augmentin and provide the patient with a prescription for a short course of opioid analgesic for severe breakthrough pain      Patient discharged to home with recommendations for symptom control, return precautions, and plan for follow up  Patient discharged to home with recommendations for symptom control, return precautions, and plan for follow up with gastroenterology for consideration of colonoscopy after patient recovers from current acute diverticulitis  MDM    CLINICAL IMPRESSION  Final diagnoses:   Acute diverticulitis       DISPOSITION  Time reflects when diagnosis was documented in both MDM as applicable and the Disposition within this note     Time User Action Codes Description Comment    2/6/2023 10:37 PM Tyreevirgie Knight Add [K57 92] Acute diverticulitis       ED Disposition     ED Disposition   Discharge    Condition   Stable    Date/Time   Mon Feb 6, 2023 10:37 PM    [facility] discharge to home/self care                 Follow-up Information     Follow up With Specialties Details Why Contact Info Additional Information    Shari Bingham PA-C Physician Assistant In 1 week Emergency Room Follow-up, As needed [address] [place] [address] [facility] Schedule an appointment as soon as possible for a visit  Follow up with gastroenterology in 4-6 weeks for consideration of colonoscopy, after you recover after your diverticulitis [address] [address]  Brig[address] Saini [address] [facility] [address], Adam Schultz, [address] [facility]          [facility]  Discharge Medication List as of 2/6/2023 10:57 PM      START taking these medications    Details   amoxicillin-clavulanate (AUGMENTIN) 875-125 mg per tablet Take 1 tablet by mouth every 12 (twelve) hours for 7 days, Starting Mon 2/6/2023, Until Mon 2/13/2023, Normal      oxyCODONE (Roxicodone) 5 immediate release tablet Take 1 tablet (5 mg total) by mouth every 6 (six) hours as needed for severe pain for up to 4 days Max Daily Amount: 20 mg, Starting Mon 2/6/2023, Until Fri 2/10/2023 at 2359, Normal         CONTINUE these medications which have NOT CHANGED    Details   busPIRone (BUSPAR) 15 mg tablet Take 1 tablet (15 mg total) by mouth 3 (three) times a day, Starting Tue 1/24/2023, Until Mon 4/24/2023, Normal      gabapentin (Neurontin) 600 MG tablet 1 PO BID and 3 PO QHS, Normal      Melatonin 10 MG TABS Take 40 mg by mouth daily at bedtime, Historical Med      methocarbamol (ROBAXIN) 750 mg tablet Take 1 tablet (750 mg total) by mouth 3 (three) times a day As needed for pain/spasms, Starting Thu 1/5/2023, Normal      sertraline (ZOLOFT) 100 mg tablet Take 1 tablet (100 mg total) by mouth daily, Starting Tue 1/24/2023, Normal      traZODone (DESYREL) 100 mg tablet Take 2 tablets (200 mg total) by mouth daily at bedtime, Starting Tue 1/24/2023, Normal                 Blake Oneil MD  02/08/23 1701

## 2023-02-16 ENCOUNTER — OFFICE VISIT (OUTPATIENT)
Dept: PAIN MEDICINE | Facility: CLINIC | Age: 53
End: 2023-02-16

## 2023-02-16 VITALS
HEART RATE: 69 BPM | SYSTOLIC BLOOD PRESSURE: 150 MMHG | WEIGHT: 240 LBS | DIASTOLIC BLOOD PRESSURE: 89 MMHG | HEIGHT: 66 IN | BODY MASS INDEX: 38.57 KG/M2

## 2023-02-16 DIAGNOSIS — Z79.891 LONG-TERM CURRENT USE OF OPIATE ANALGESIC: ICD-10-CM

## 2023-02-16 DIAGNOSIS — G89.4 CHRONIC PAIN SYNDROME: Primary | ICD-10-CM

## 2023-02-16 DIAGNOSIS — F11.20 UNCOMPLICATED OPIOID DEPENDENCE (HCC): ICD-10-CM

## 2023-02-16 DIAGNOSIS — M54.16 LUMBAR RADICULOPATHY: ICD-10-CM

## 2023-02-16 DIAGNOSIS — M51.26 LUMBAR DISC HERNIATION: ICD-10-CM

## 2023-02-16 DIAGNOSIS — Z98.1 STATUS POST LUMBAR SPINAL FUSION: ICD-10-CM

## 2023-02-16 NOTE — PROGRESS NOTES
Assessment:  1  Chronic pain syndrome    2  Lumbar disc herniation    3  Lumbar radiculopathy    4  Status post lumbar spinal fusion    5  Long-term current use of opiate analgesic    6  Uncomplicated opioid dependence ([address] )        Plan:  While the patient was in the office today, I did have a thorough conversation regarding their chronic pain syndrome, medication management, and treatment plan options  Patient is being seen for a follow-up visit  Overall, she continues with low back and left leg pain  She is reporting about 30% reduction in her pain with the use of gabapentin 600 mg twice daily, 1800 mg at night and Robaxin-750 milligrams 3 times daily if needed for spasms  Patient has undergone injections in the past which were not helpful  We were planning at some point to proceed with neuro stimulation  Unfortunately she is not a great surgical candidate due to respiratory issues and previous problems with respiratory arrest postop  Patient was recently treated in the emergency department for diverticulitis and given oxycodone 5 mg to use at home as needed  She states that the medication was somewhat helpful  She denies side effects from it  Will obtain a baseline urine drug screen today with the anticipation of prescribing oxycodone 5 mg twice daily if needed for pain  Follow-up in 2 weeks to review results of urine drug screen  A urine drug screen was collected at today's office visit as part of our medication management protocol  The point of care testing results were appropriate for what was being prescribed  The specimen will be sent for confirmatory testing  The drug screen is medically necessary because the patient is either dependent on opioid medication or is being considered for opioid medication therapy and the results could impact ongoing or future treatment   The drug screen is to evaluate for the presences or absence of prescribed, non-prescribed, and/or illicit drugs/substances  History of Present Illness: The patient is a 45 y o  female who presents for a follow up office visit in regards to Back Pain and Leg Pain  The patient’s current symptoms include plaints of low back and left leg pain  Current pain level is a 6/10  Quality pain is described as sharp, throbbing, shooting, numb, pins-and-needles  Current pain medications includes: Gabapentin 600 mg twice daily, 1800 mg at bedtime, Robaxin-750 milligrams 3 times daily if needed for spasms  The patient reports that this regimen is providing 30% pain relief  The patient is reporting no side effects from this pain medication regimen  I have personally reviewed and/or updated the patient's past medical history, past surgical history, family history, social history, current medications, allergies, and vital signs today  Review of Systems  Review of Systems   Constitutional: Negative for fatigue and unexpected weight change  HENT: Negative for dental problem, ear pain, hearing loss and sneezing  Eyes: Negative for visual disturbance  Respiratory: Negative for cough and chest tightness  Cardiovascular: Negative for leg swelling  Gastrointestinal: Positive for diarrhea  Negative for anal bleeding  Endocrine: Negative for heat intolerance  Genitourinary: Negative for flank pain and genital sores  Musculoskeletal: Positive for gait problem  Decreased range of motion  Pain in extremity     Skin: Negative for wound  Allergic/Immunologic: Negative for immunocompromised state  Neurological: Negative for speech difficulty and light-headedness  Hematological: Negative for adenopathy  Psychiatric/Behavioral: Negative for confusion  The patient is not hyperactive  All other systems reviewed and are negative          Past Medical History:   Diagnosis Date   • Anxiety    • Back pain    • Depression    • Insomnia        Past Surgical History:   Procedure Laterality Date   • BACK SURGERY  2006    cage in lumbar L5 area   • BACK SURGERY     • CAUDAL BLOCK N/A 11/15/2022    Procedure: BLOCK / INJECTION CAUDAL;  Surgeon: Juancho Moses MD;  [facility];  Service: Pain Management    • CHOLECYSTECTOMY     • LUMBAR [address] SURGERY     • [place] REPAIR FIRST ABDOMINAL WALL HERNIA N/A 3/16/2018    Procedure: REPAIR HERNIA VENTRAL with mesh;  Surgeon: Pamela Ayoub DO;  Location[facility];  Service: General   • [place] SURGICAL ARTHROSCOPY SHOULDER W/ROTATOR CUFF RPR Left 2020    Procedure: SHOULDER ARTHROSCOPY, ROTATOR CUFF REPAIR, SYNEVECTOMY, ACROMIOPLASTY DEBRIDEMENT, INJECTION;  Surgeon: Lorenzo Leyden, DO;  Location: [address] [place] [address] MAIN OR;  Service: Orthopedics       Family History   Adopted: Yes   Family history unknown: Yes       Social History     Occupational History   • Not on file   Tobacco Use   • Smoking status: Former     Packs/day: 0 50     Years: 0 00     Pack years: 0 00     Types: Cigarettes     Quit date: 10/17/2020     Years since quittin 3   • Smokeless tobacco: Never   • Tobacco comments:     1 pack a day for 20 years   Vaping Use   • Vaping Use: Never used   Substance and Sexual Activity   • Alcohol use: Never     Alcohol/week: 0 0 standard drinks   • Drug use: Never   • Sexual activity: Not Currently         Current Outpatient Medications:   •  busPIRone (BUSPAR) 15 mg tablet, Take 1 tablet (15 mg total) by mouth 3 (three) times a day, Disp: 270 tablet, Rfl: 0  •  gabapentin (Neurontin) 600 MG tablet, 1 PO BID and 3 PO QHS, Disp: 150 tablet, Rfl: 1  •  Melatonin 10 MG TABS, Take 40 mg by mouth daily at bedtime, Disp: , Rfl:   •  methocarbamol (ROBAXIN) 750 mg tablet, Take 1 tablet (750 mg total) by mouth 3 (three) times a day As needed for pain/spasms, Disp: 90 tablet, Rfl: 1  •  sertraline (ZOLOFT) 100 mg tablet, Take 1 tablet (100 mg total) by mouth daily, Disp: 90 tablet, Rfl: 0  •  traZODone (DESYREL) 100 mg tablet, Take 2 tablets (200 mg total) by mouth daily at bedtime, Disp: 180 tablet, Rfl: 0    No Known Allergies    Physical Exam:    /89   Pulse 69   Ht 5' 6" (1 676 m)   Wt 109 kg (240 lb)   LMP 10/10/2016   BMI 38 74 kg/m²     Constitutional:normal, well developed, well nourished, alert, in no distress and non-toxic and no overt pain behavior  Eyes:anicteric  HEENT:grossly intact  Neck:supple, symmetric, trachea midline and no masses   Pulmonary:even and unlabored  Cardiovascular:No edema or pitting edema present  Skin:Normal without rashes or lesions and well hydrated  Psychiatric:Mood and affect appropriate  Neurologic:Cranial Nerves II-XII grossly intact  Musculoskeletal:antalgic    Imaging  No orders to display       No orders of the defined types were placed in this encounter

## 2023-02-18 LAB
6MAM UR QL CFM: NEGATIVE NG/ML
7AMINOCLONAZEPAM UR QL CFM: NEGATIVE NG/ML
A-OH ALPRAZ UR QL CFM: NEGATIVE NG/ML
ACCEPTABLE CREAT UR QL: NORMAL MG/DL
ACCEPTIBLE SP GR UR QL: NORMAL
AMITRIP UR QL CFM: NEGATIVE NG/ML
AMPHET UR QL CFM: NEGATIVE NG/ML
AMPHET UR QL CFM: NEGATIVE NG/ML
BUPRENORPHINE UR QL CFM: NEGATIVE NG/ML
BZE UR QL CFM: NEGATIVE NG/ML
DESIPRAMINE UR QL CFM: NEGATIVE NG/ML
EDDP UR QL CFM: NEGATIVE NG/ML
ETHYL GLUCURONIDE UR QL CFM: NEGATIVE NG/ML
ETHYL SULFATE UR QL SCN: NEGATIVE NG/ML
FENTANYL UR QL CFM: NEGATIVE NG/ML
FLUOXETINE UR QL CFM: NEGATIVE NG/ML
GLIADIN IGG SER IA-ACNC: NEGATIVE NG/ML
GLUCOSE 30M P 50 G LAC PO SERPL-MCNC: NEGATIVE NG/ML
HYDROCODONE UR QL CFM: NEGATIVE NG/ML
HYDROMORPHONE UR QL CFM: NEGATIVE NG/ML
LORAZEPAM UR QL CFM: NEGATIVE NG/ML
MDMA UR QL CFM: NEGATIVE NG/ML
ME-PHENIDATE UR QL CFM: NEGATIVE NG/ML
MEPERIDINE UR QL CFM: NEGATIVE NG/ML
METHADONE UR QL CFM: NEGATIVE NG/ML
METHAMPHET UR QL CFM: NEGATIVE NG/ML
MORPHINE UR QL CFM: NEGATIVE NG/ML
NITRITE UR QL: NORMAL UG/ML
NORBUPRENORPHINE UR QL CFM: NEGATIVE NG/ML
NORDIAZEPAM UR QL CFM: NEGATIVE NG/ML
NORFENTANYL UR QL CFM: NEGATIVE NG/ML
NORFLUOXETINE UR QL CFM: NEGATIVE NG/ML
NORHYDROCODONE UR QL CFM: NEGATIVE NG/ML
NORMEPERIDINE UR QL CFM: NEGATIVE NG/ML
NOROXYCODONE UR QL CFM: NEGATIVE NG/ML
NORTRIP UR QL CFM: NEGATIVE NG/ML
OLANZAPINE QUANTIFICATION: NEGATIVE NG/ML
OPC-3373 QUANTIFICATION: NEGATIVE
OXAZEPAM UR QL CFM: NEGATIVE NG/ML
OXYCODONE UR QL CFM: NEGATIVE NG/ML
OXYMORPHONE UR QL CFM: NEGATIVE NG/ML
OXYMORPHONE UR QL CFM: NEGATIVE NG/ML
PARA-FLUOROFENTANYL QUANTIFICATION: NORMAL NG/ML
PROLACTIN SERPL-MCNC: NEGATIVE NG/ML
RESULT ALL_PRESCRIBED MEDS AND SPECIAL INSTRUCTIONS: NORMAL
SECOBARBITAL UR QL CFM: NEGATIVE NG/ML
SL AMB 4-ANPP QUANTIFICATION: NORMAL NG/ML
SL AMB 7-OH-MITRAGYNINE (KRATOM ALKALOID) QUANTIFICATION: NEGATIVE NG/ML
SL AMB ACETYL FENTANYL QUANTIFICATION: NORMAL NG/ML
SL AMB ACETYL NORFENTANYL QUANTIFICATION: NORMAL NG/ML
SL AMB ACRYL FENTANYL QUANTIFICATION: NORMAL NG/ML
SL AMB CARFENTANIL QUANTIFICATION: NORMAL NG/ML
SL AMB CITALOPRAM/ESCITALOPRAM QUANTIFICATION: NEGATIVE NG/ML
SL AMB CITALOPRAM/ESCITALOPRAM QUANTIFICATION: NEGATIVE NG/ML
SL AMB CLOZAPINE QUANTIFICATION: NEGATIVE NG/ML
SL AMB CTHC (MARIJUANA METABOLITE) QUANTIFICATION: NEGATIVE NG/ML
SL AMB DEXTRORPHAN (DEXTROMETHORPHAN METABOLITE) QUANT: NEGATIVE NG/ML
SL AMB HALOPERIDOL  QUANTIFICATION: NEGATIVE NG/ML
SL AMB HALOPERIDOL METABOLITE QUANTIFICATION: NEGATIVE NG/ML
SL AMB HYDROXYBUPROPION QUANTIFICATION: NEGATIVE NG/ML
SL AMB HYDROXYRISPERIDONE QUANTIFICATION: NEGATIVE NG/ML
SL AMB N-DESMETHYL-TRAMADOL QUANTIFICATION: NEGATIVE NG/ML
SL AMB N-DESMETHYLCLOZAPINE QUANTIFICATION: NEGATIVE NG/ML
SL AMB NORQUETIAPINE QUANTIFICATION: NEGATIVE NG/ML
SL AMB PHENTERMINE QUANTIFICATION: NEGATIVE NG/ML
SL AMB PREGABALIN QUANTIFICATION: NEGATIVE
SL AMB QUETIAPINE QUANTIFICATION: NEGATIVE NG/ML
SL AMB RISPERIDONE QUANTIFICATION: NEGATIVE NG/ML
SL AMB RITALINIC ACID QUANTIFICATION: NEGATIVE NG/ML
SPECIMEN PH ACCEPTABLE UR: NORMAL
TAPENTADOL UR QL CFM: NEGATIVE NG/ML
TEMAZEPAM UR QL CFM: NEGATIVE NG/ML
TEMAZEPAM UR QL CFM: NEGATIVE NG/ML
TRAMADOL UR QL CFM: NEGATIVE NG/ML
URATE/CREAT 24H UR: NEGATIVE NG/ML

## 2023-03-02 ENCOUNTER — OFFICE VISIT (OUTPATIENT)
Dept: PAIN MEDICINE | Facility: CLINIC | Age: 53
End: 2023-03-02

## 2023-03-02 VITALS
HEIGHT: 66 IN | BODY MASS INDEX: 38.73 KG/M2 | SYSTOLIC BLOOD PRESSURE: 127 MMHG | HEART RATE: 83 BPM | WEIGHT: 241 LBS | DIASTOLIC BLOOD PRESSURE: 84 MMHG

## 2023-03-02 DIAGNOSIS — M54.16 LUMBAR RADICULOPATHY: ICD-10-CM

## 2023-03-02 DIAGNOSIS — Z98.1 STATUS POST LUMBAR SPINAL FUSION: ICD-10-CM

## 2023-03-02 DIAGNOSIS — Z79.891 LONG-TERM CURRENT USE OF OPIATE ANALGESIC: ICD-10-CM

## 2023-03-02 DIAGNOSIS — M51.26 LUMBAR DISC HERNIATION: ICD-10-CM

## 2023-03-02 DIAGNOSIS — M79.605 LUMBAR PAIN WITH RADIATION DOWN LEFT LEG: ICD-10-CM

## 2023-03-02 DIAGNOSIS — G89.4 CHRONIC PAIN SYNDROME: Primary | ICD-10-CM

## 2023-03-02 DIAGNOSIS — F11.20 UNCOMPLICATED OPIOID DEPENDENCE (HCC): ICD-10-CM

## 2023-03-02 DIAGNOSIS — M54.50 LUMBAR PAIN WITH RADIATION DOWN LEFT LEG: ICD-10-CM

## 2023-03-02 RX ORDER — GABAPENTIN 600 MG/1
TABLET ORAL
Qty: 150 TABLET | Refills: 1 | Status: SHIPPED | OUTPATIENT
Start: 2023-03-02

## 2023-03-02 RX ORDER — NALOXONE HYDROCHLORIDE 4 MG/.1ML
SPRAY NASAL
Qty: 1 EACH | Refills: 1 | Status: SHIPPED | OUTPATIENT
Start: 2023-03-02

## 2023-03-02 RX ORDER — OXYCODONE HYDROCHLORIDE 5 MG/1
5 TABLET ORAL 2 TIMES DAILY PRN
Qty: 30 TABLET | Refills: 0 | Status: SHIPPED | OUTPATIENT
Start: 2023-03-02

## 2023-03-02 RX ORDER — METHOCARBAMOL 750 MG/1
750 TABLET, FILM COATED ORAL 3 TIMES DAILY
Qty: 90 TABLET | Refills: 1 | Status: SHIPPED | OUTPATIENT
Start: 2023-03-02

## 2023-03-02 NOTE — PATIENT INSTRUCTIONS

## 2023-03-02 NOTE — PROGRESS NOTES
Assessment:  1  Chronic pain syndrome    2  Status post lumbar spinal fusion    3  Lumbar pain with radiation down left leg    4  Lumbar disc herniation    5  Long-term current use of opiate analgesic    6  Lumbar radiculopathy    7  Uncomplicated opioid dependence ([address] )        Plan:  While the patient was in the office today, I did have a thorough conversation regarding their chronic pain syndrome, medication management, and treatment plan options  Patient is being seen for a follow-up visit  She was last seen here on 2/16/2023 at which time a baseline urine drug screen was obtained in anticipation of prescribing oxycodone  Urine screen results were normal     Start oxycodone 5 mg twice daily if needed for pain  A 2-week prescription was sent to her pharmacy  While the patient was in the office today an opioid contract was thoroughly reviewed and signed by the patient  The patient was given adequate time to ask questions in regards to the contract and a signed copy was sent home for his/her records  There are risks associated with opioid medications, including dependence, addiction and tolerance  The patient understands and agrees to use these medications only as prescribed  Potential side effects of the medications include, but are not limited to, constipation, drowsiness, addiction, impaired judgment and risk of fatal overdose if not taken as prescribed  The patient was warned against driving while taking sedation medications  Sharing medications is a felony  At this point in time, the patient is showing no signs of addiction, abuse, diversion or suicidal ideation  [place] Prescription Drug Monitoring Program report was reviewed and was appropriate     Follow-up in 2 weeks    History of Present Illness: The patient is a 45 y o  female who presents for a follow up office visit in regards to Follow-up, Back Pain, and Leg Pain     The patient’s current symptoms include complaints of low back and left leg pain  Current pain level is an 8/10  Quality pain is described as sharp, shooting, numb, pins-and-needles  Current pain medications includes: Robaxin 750 mg 3 times daily if needed for spasms, gabapentin 600 mg 3 times daily  The patient reports that this regimen is providing 10% pain relief  The patient is reporting no side effects from this pain medication regimen  I have personally reviewed and/or updated the patient's past medical history, past surgical history, family history, social history, current medications, allergies, and vital signs today  Review of Systems  Review of Systems   Constitutional: Negative  HENT: Negative  Eyes: Negative  Cardiovascular: Negative  Gastrointestinal: Negative  Endocrine: Negative  Genitourinary: Negative  Musculoskeletal: Positive for gait problem  Decrease ROM  Pain in Extremity   Skin: Negative  Allergic/Immunologic: Negative  Hematological: Negative  Psychiatric/Behavioral: Negative              Past Medical History:   Diagnosis Date   • Anxiety    • Back pain    • Depression    • Insomnia        Past Surgical History:   Procedure Laterality Date   • BACK SURGERY  2006    cage in lumbar L5 area   • BACK SURGERY     • CAUDAL BLOCK N/A 11/15/2022    Procedure: BLOCK / INJECTION CAUDAL;  Surgeon: Jared Maciel MD;  Location: [facility];  Service: Pain Management    • CHOLECYSTECTOMY     • LUMBAR [address] SURGERY     • ND REPAIR FIRST ABDOMINAL WALL HERNIA N/A 3/16/2018    Procedure: REPAIR HERNIA VENTRAL with mesh;  Surgeon: Nella Lau DO;  Location: [facility];  Service: General   • ND SURGICAL ARTHROSCOPY SHOULDER W/ROTATOR CUFF RPR Left 11/18/2020    Procedure: SHOULDER ARTHROSCOPY, ROTATOR CUFF REPAIR, SYNEVECTOMY, ACROMIOPLASTY DEBRIDEMENT, INJECTION;  Surgeon: Cuco Geller DO;  Location: [address] [place] [address] MAIN OR;  Service: Orthopedics       Family History   Adopted: Yes   Family history unknown: Yes       Social History     Occupational History   • Not on file   Tobacco Use   • Smoking status: Former     Packs/day: 0 00     Years: 10 00     Pack years: 0 00     Types: Cigarettes     Quit date: 10/17/2020     Years since quittin 3   • Smokeless tobacco: Never   • Tobacco comments:     Recently quit   Vaping Use   • Vaping Use: Never used   Substance and Sexual Activity   • Alcohol use: No   • Drug use: No   • Sexual activity: Not Currently     Partners: Female         Current Outpatient Medications:   •  busPIRone (BUSPAR) 15 mg tablet, Take 1 tablet (15 mg total) by mouth 3 (three) times a day, Disp: 270 tablet, Rfl: 0  •  gabapentin (Neurontin) 600 MG tablet, 1 PO BID and 3 PO QHS, Disp: 150 tablet, Rfl: 1  •  Melatonin 10 MG TABS, Take 40 mg by mouth daily at bedtime, Disp: , Rfl:   •  methocarbamol (ROBAXIN) 750 mg tablet, Take 1 tablet (750 mg total) by mouth 3 (three) times a day As needed for pain/spasms, Disp: 90 tablet, Rfl: 1  •  naloxone (NARCAN) 4 mg/0 1 mL nasal spray, Administer 1 spray into a nostril  If no response after 2-3 minutes, give another dose in the other nostril using a new spray , Disp: 1 each, Rfl: 1  •  oxyCODONE (Roxicodone) 5 immediate release tablet, Take 1 tablet (5 mg total) by mouth 2 (two) times a day as needed for moderate pain Max Daily Amount: 10 mg, Disp: 30 tablet, Rfl: 0  •  sertraline (ZOLOFT) 100 mg tablet, Take 1 tablet (100 mg total) by mouth daily, Disp: 90 tablet, Rfl: 0  •  traZODone (DESYREL) 100 mg tablet, Take 2 tablets (200 mg total) by mouth daily at bedtime, Disp: 180 tablet, Rfl: 0    No Known Allergies    Physical Exam:    /84 (BP Location: Left arm, Patient Position: Sitting, Cuff Size: Adult)   Pulse 83   Ht 5' 6" (1 676 m)   Wt 109 kg (241 lb)   LMP 10/10/2016   BMI 38 90 kg/m²     Constitutional:normal, well developed, well nourished, alert, in no distress and non-toxic and no overt pain behavior    Eyes:anicteric  HEENT:grossly intact  Neck:supple, symmetric, trachea midline and no masses   Pulmonary:even and unlabored  Cardiovascular:No edema or pitting edema present  Skin:Normal without rashes or lesions and well hydrated  Psychiatric:Mood and affect appropriate  Neurologic:Cranial Nerves II-XII grossly intact  Musculoskeletal:Gait is slow and guarded  Imaging  No orders to display       No orders of the defined types were placed in this encounter

## 2023-03-16 ENCOUNTER — OFFICE VISIT (OUTPATIENT)
Dept: PAIN MEDICINE | Facility: CLINIC | Age: 53
End: 2023-03-16

## 2023-03-16 VITALS
DIASTOLIC BLOOD PRESSURE: 84 MMHG | HEART RATE: 73 BPM | WEIGHT: 241.6 LBS | HEIGHT: 66 IN | BODY MASS INDEX: 38.83 KG/M2 | SYSTOLIC BLOOD PRESSURE: 131 MMHG

## 2023-03-16 DIAGNOSIS — F11.20 UNCOMPLICATED OPIOID DEPENDENCE (HCC): ICD-10-CM

## 2023-03-16 DIAGNOSIS — M79.605 LUMBAR PAIN WITH RADIATION DOWN LEFT LEG: ICD-10-CM

## 2023-03-16 DIAGNOSIS — G89.4 CHRONIC PAIN SYNDROME: Primary | ICD-10-CM

## 2023-03-16 DIAGNOSIS — Z98.1 STATUS POST LUMBAR SPINAL FUSION: ICD-10-CM

## 2023-03-16 DIAGNOSIS — M51.26 LUMBAR DISC HERNIATION: ICD-10-CM

## 2023-03-16 DIAGNOSIS — Z79.891 LONG-TERM CURRENT USE OF OPIATE ANALGESIC: ICD-10-CM

## 2023-03-16 DIAGNOSIS — M54.50 LUMBAR PAIN WITH RADIATION DOWN LEFT LEG: ICD-10-CM

## 2023-03-16 RX ORDER — OXYCODONE HYDROCHLORIDE 5 MG/1
5 TABLET ORAL 2 TIMES DAILY PRN
Qty: 60 TABLET | Refills: 0 | Status: SHIPPED | OUTPATIENT
Start: 2023-03-16

## 2023-03-16 RX ORDER — PANTOPRAZOLE SODIUM 40 MG/1
TABLET, DELAYED RELEASE ORAL
COMMUNITY
Start: 2023-03-03

## 2023-03-16 NOTE — PATIENT INSTRUCTIONS

## 2023-03-16 NOTE — PROGRESS NOTES
Assessment:  1  Chronic pain syndrome    2  Status post lumbar spinal fusion    3  Lumbar pain with radiation down left leg    4  Lumbar disc herniation    5  Long-term current use of opiate analgesic    6  Uncomplicated opioid dependence ([address] )        Plan:  While the patient was in the office today, I did have a thorough conversation regarding their chronic pain syndrome, medication management, and treatment plan options  Patient is being seen for a follow-up visit  She was last seen here on 3/2/2023 at which time she was started on oxycodone 5 mg twice daily if needed for pain  Patient reports that her pain is at least 10% improved  He states that she is sleeping a lot better and sleep is less interrupted than it was prior to starting the oxycodone  Patient tells me that she has been trying to do more lately and as a result is experiencing a little more pain  She previously participated in physical therapy which was not very helpful  We will trial patient with aqua therapy  I provided her with a referral and a list of facilities  Renewed oxycodone 5 mg twice daily if needed for pain  The patient's opioid scripts were sent to their pharmacy electronically and was given a 2 month supply of prescriptions with a Do Not Fill date(s) of 3/17/2023, 4/14/2023  Continue gabapentin 1 pill twice daily and 3 pills at bedtime  She did not require refill of this medication during today's visit  Continue Robaxin-750 milligrams 3 times daily if needed for spasms  She did not require refill of this medication during today's visit  There are risks associated with opioid medications, including dependence, addiction and tolerance  The patient understands and agrees to use these medications only as prescribed  Potential side effects of the medications include, but are not limited to, constipation, drowsiness, addiction, impaired judgment and risk of fatal overdose if not taken as prescribed   The patient was warned against driving while taking sedation medications  Sharing medications is a felony  At this point in time, the patient is showing no signs of addiction, abuse, diversion or suicidal ideation  [place] Prescription Drug Monitoring Program report was reviewed and was appropriate     The patient will follow-up in 2 months for medication prescription refill and reevaluation  The patient was advised to contact the office should their symptoms worsen in the interim  The patient was agreeable and verbalized an understanding  History of Present Illness: The patient is a 45 y o  female who presents for a follow up office visit in regards to Follow-up, Back Pain, and Leg Pain  The patient’s current symptoms include complaints of low back pain that radiates down the left leg  Current pain levels a 7/10  Quality pain is described as burning, sharp, shooting, numb, pins-and-needles  Current pain medications includes: Oxycodone 5 mg twice daily if needed for pain, gabapentin 600 mg twice daily and 1800 mg at bedtime, methocarbamol 750 mg 3 times daily if needed for spasms  The patient reports that this regimen is providing 10-15% pain relief  The patient is reporting no side effects from this pain medication regimen  I have personally reviewed and/or updated the patient's past medical history, past surgical history, family history, social history, current medications, allergies, and vital signs today  Review of Systems  Review of Systems   Constitutional: Negative  HENT: Negative  Eyes: Negative  Respiratory: Negative  Cardiovascular: Negative  Gastrointestinal: Negative  Endocrine: Negative  Genitourinary: Negative  Musculoskeletal: Positive for gait problem  Pain in extremity   Skin: Negative  Allergic/Immunologic: Negative  Hematological: Negative  Psychiatric/Behavioral: Negative              Past Medical History:   Diagnosis Date   • Anxiety    • Back pain    • Depression    • Insomnia        Past Surgical History:   Procedure Laterality Date   • BACK SURGERY      cage in lumbar L5 area   • BACK SURGERY     • CAUDAL BLOCK N/A 11/15/2022    Procedure: BLOCK / INJECTION CAUDAL;  Surgeon: Meliza Vega MD;  [facility];  Service: Pain Management    • CHOLECYSTECTOMY     • LUMBAR [address] SURGERY     • [place] REPAIR FIRST ABDOMINAL WALL HERNIA N/A 3/16/2018    Procedure: REPAIR HERNIA VENTRAL with mesh;  Surgeon: Sammy English DO;  Location: [facility];  Service: General   • [place] SURGICAL ARTHROSCOPY SHOULDER W/ROTATOR CUFF RPR Left 2020    Procedure: SHOULDER ARTHROSCOPY, ROTATOR CUFF REPAIR, SYNEVECTOMY, ACROMIOPLASTY DEBRIDEMENT, INJECTION;  Surgeon: Sarah Bobby DO;  Location: [address] [place] [address] MAIN OR;  Service: Orthopedics       Family History   Adopted: Yes   Family history unknown: Yes       Social History     Occupational History   • Not on file   Tobacco Use   • Smoking status: Former     Packs/day: 0 00     Years: 10 00     Pack years: 0 00     Types: Cigarettes     Quit date: 10/17/2020     Years since quittin 4   • Smokeless tobacco: Never   • Tobacco comments:     Recently quit   Vaping Use   • Vaping Use: Never used   Substance and Sexual Activity   • Alcohol use: No   • Drug use: No   • Sexual activity: Not Currently     Partners: Female         Current Outpatient Medications:   •  busPIRone (BUSPAR) 15 mg tablet, Take 1 tablet (15 mg total) by mouth 3 (three) times a day, Disp: 270 tablet, Rfl: 0  •  gabapentin (Neurontin) 600 MG tablet, 1 PO BID and 3 PO QHS, Disp: 150 tablet, Rfl: 1  •  Melatonin 10 MG TABS, Take 40 mg by mouth daily at bedtime, Disp: , Rfl:   •  methocarbamol (ROBAXIN) 750 mg tablet, Take 1 tablet (750 mg total) by mouth 3 (three) times a day As needed for pain/spasms, Disp: 90 tablet, Rfl: 1  •  naloxone (NARCAN) 4 mg/0 1 mL nasal spray, Administer 1 spray into a nostril   If no response after 2-3 minutes, give another dose in the other nostril using a new spray , Disp: 1 each, Rfl: 1  •  oxyCODONE (Roxicodone) 5 immediate release tablet, Take 1 tablet (5 mg total) by mouth 2 (two) times a day as needed for moderate pain Do not fill until 4/14/2023 Max Daily Amount: 10 mg, Disp: 60 tablet, Rfl: 0  •  oxyCODONE (Roxicodone) 5 immediate release tablet, Take 1 tablet (5 mg total) by mouth 2 (two) times a day as needed for moderate pain Do not fill until 3/17/2023 Max Daily Amount: 10 mg, Disp: 60 tablet, Rfl: 0  •  pantoprazole (PROTONIX) 40 mg tablet, , Disp: , Rfl:   •  sertraline (ZOLOFT) 100 mg tablet, Take 1 tablet (100 mg total) by mouth daily, Disp: 90 tablet, Rfl: 0  •  traZODone (DESYREL) 100 mg tablet, Take 2 tablets (200 mg total) by mouth daily at bedtime, Disp: 180 tablet, Rfl: 0    No Known Allergies    Physical Exam:    /84 (BP Location: Left arm, Patient Position: Sitting, Cuff Size: Adult)   Pulse 73   Ht 5' 6" (1 676 m)   Wt 110 kg (241 lb 9 6 oz)   LMP 10/10/2016   BMI 39 00 kg/m²     Constitutional:normal, well developed, well nourished, alert, in no distress and non-toxic and no overt pain behavior    Eyes:anicteric  HEENT:grossly intact  Neck:supple, symmetric, trachea midline and no masses   Pulmonary:even and unlabored  Cardiovascular:No edema or pitting edema present  Skin:Normal without rashes or lesions and well hydrated  Psychiatric:Mood and affect appropriate  Neurologic:Cranial Nerves II-XII grossly intact  Musculoskeletal:normal    Imaging  No orders to display       Orders Placed This Encounter   Procedures   • Ambulatory Referral to Physical Therapy

## 2023-04-26 ENCOUNTER — OFFICE VISIT (OUTPATIENT)
Dept: FAMILY MEDICINE CLINIC | Facility: CLINIC | Age: 53
End: 2023-04-26

## 2023-04-26 VITALS
WEIGHT: 240.6 LBS | HEIGHT: 66 IN | OXYGEN SATURATION: 98 % | SYSTOLIC BLOOD PRESSURE: 134 MMHG | TEMPERATURE: 99 F | BODY MASS INDEX: 38.67 KG/M2 | HEART RATE: 90 BPM | DIASTOLIC BLOOD PRESSURE: 70 MMHG

## 2023-04-26 DIAGNOSIS — J98.4 RESTRICTIVE LUNG DISEASE: ICD-10-CM

## 2023-04-26 DIAGNOSIS — F41.9 ANXIETY AND DEPRESSION: Primary | ICD-10-CM

## 2023-04-26 DIAGNOSIS — F32.A ANXIETY AND DEPRESSION: Primary | ICD-10-CM

## 2023-04-26 RX ORDER — ALBUTEROL SULFATE 90 UG/1
2 AEROSOL, METERED RESPIRATORY (INHALATION) EVERY 6 HOURS PRN
Qty: 6.7 G | Refills: 5 | Status: SHIPPED | OUTPATIENT
Start: 2023-04-26

## 2023-04-26 RX ORDER — FLUTICASONE PROPIONATE AND SALMETEROL 250; 50 UG/1; UG/1
1 POWDER RESPIRATORY (INHALATION) 2 TIMES DAILY
Qty: 60 BLISTER | Refills: 5 | Status: SHIPPED | OUTPATIENT
Start: 2023-04-26 | End: 2023-10-23

## 2023-04-26 NOTE — PROGRESS NOTES
Name: Gail Ag      : 1970      MRN: [id number]  Encounter Provider: Al Fitzpatrick PA-C  Encounter Date: 2023   Encounter department: [address] [place] [address] Road     1  Anxiety and depression  Assessment & Plan:  Pt currently taking sertraline 100 mg, will add 50 mg tablet to take in addition  RTO 3 months  Orders:  -     sertraline (ZOLOFT) 50 mg tablet; Take 1 tablet (50 mg total) by mouth daily    2  Restrictive lung disease  Assessment & Plan:  Trial [place]  I've instructed her to rinse mouth after use  Albuterol inhaler refilled  Orders:  -     Fluticasone-Salmeterol (Advair) 250-50 mcg/dose inhaler; Inhale 1 puff 2 (two) times a day Rinse mouth after use  -     albuterol (Proventil HFA) 90 mcg/act inhaler; Inhale 2 puffs every 6 (six) hours as needed for wheezing           Subjective      Bisi Bourne returns today for 3 month OV  Continues to have anxiety/depression though does have good days  She also continues to have 2-3 episodes a month where she gets suddenly out of breath and Sp02 drops into mid 80s  Sometimes these are brought on by physical exertion, but states other times brought on by anxiety attacks  She states she has to take slow, deep breaths in order to resolve hypoxia and that it takes few minutes to resolve  She's been to pulmonology at both  and [facility] without good diagnosis or treatment of current symptoms  Review of Systems   Constitutional: Negative for activity change, appetite change, chills, diaphoresis, fatigue, fever and unexpected weight change  HENT: Negative for congestion, ear pain, postnasal drip, rhinorrhea, sinus pressure, sinus pain, sneezing, sore throat, tinnitus and voice change  Eyes: Negative for pain, redness and visual disturbance  Respiratory: Positive for chest tightness and shortness of breath  Negative for cough and wheezing  Cardiovascular: Negative for chest pain, palpitations and leg swelling  Gastrointestinal: Negative for abdominal pain, blood in stool, constipation, diarrhea, nausea and vomiting  Genitourinary: Negative for difficulty urinating, dysuria, frequency, hematuria and urgency  Musculoskeletal: Negative for arthralgias, back pain, gait problem, joint swelling, myalgias, neck pain and neck stiffness  Skin: Negative for color change, pallor, rash and wound  Neurological: Negative for dizziness, tremors, weakness, light-headedness and headaches  Psychiatric/Behavioral: Positive for dysphoric mood  Negative for self-injury, sleep disturbance and suicidal ideas  The patient is nervous/anxious  Current Outpatient Medications on File Prior to Visit   Medication Sig   • busPIRone (BUSPAR) 15 mg tablet Take 1 tablet (15 mg total) by mouth 3 (three) times a day   • gabapentin (Neurontin) 600 MG tablet 1 PO BID and 3 PO QHS   • Melatonin 10 MG TABS Take 60 mg by mouth daily at bedtime   • methocarbamol (ROBAXIN) 750 mg tablet Take 1 tablet (750 mg total) by mouth 3 (three) times a day As needed for pain/spasms   • naloxone (NARCAN) 4 mg/0 1 mL nasal spray Administer 1 spray into a nostril  If no response after 2-3 minutes, give another dose in the other nostril using a new spray     • oxyCODONE (Roxicodone) 5 immediate release tablet Take 1 tablet (5 mg total) by mouth 2 (two) times a day as needed for moderate pain Do not fill until 4/14/2023 Max Daily Amount: 10 mg   • oxyCODONE (Roxicodone) 5 immediate release tablet Take 1 tablet (5 mg total) by mouth 2 (two) times a day as needed for moderate pain Do not fill until 3/17/2023 Max Daily Amount: 10 mg   • pantoprazole (PROTONIX) 40 mg tablet    • sertraline (ZOLOFT) 100 mg tablet Take 1 tablet (100 mg total) by mouth daily   • [DISCONTINUED] traZODone (DESYREL) 100 mg tablet Take 2 tablets (200 mg total) by mouth daily at bedtime (Patient not taking: Reported on 4/26/2023)       Objective     /70 (BP Location: Left arm, Patient "Position: Sitting, Cuff Size: Adult)   Pulse 90   Temp 99 °F (37 2 °C) (Temporal)   Ht 5' 6\" (1 676 m)   Wt 109 kg (240 lb 9 6 oz)   LMP 10/10/2016   SpO2 98%   BMI 38 83 kg/m²     Physical Exam  Vitals reviewed  Constitutional:       General: She is not in acute distress  Appearance: She is well-developed  She is obese  She is not diaphoretic  HENT:      Head: Normocephalic and atraumatic  Right Ear: Hearing, tympanic membrane, ear canal and external ear normal       Left Ear: Hearing, tympanic membrane, ear canal and external ear normal       Mouth/Throat:      Pharynx: Uvula midline  No oropharyngeal exudate  Eyes:      General: No scleral icterus  Right eye: No discharge  Left eye: No discharge  Conjunctiva/sclera: Conjunctivae normal    Neck:      Thyroid: No thyromegaly  Vascular: No carotid bruit  Cardiovascular:      Rate and Rhythm: Normal rate and regular rhythm  Heart sounds: Normal heart sounds  No murmur heard  Pulmonary:      Effort: Pulmonary effort is normal  No respiratory distress  Breath sounds: Normal breath sounds  No wheezing  Abdominal:      General: Bowel sounds are normal  There is no distension  Palpations: Abdomen is soft  There is no mass  Tenderness: There is no abdominal tenderness  There is no guarding or rebound  Musculoskeletal:         General: No tenderness  Normal range of motion  Cervical back: Neck supple  Lymphadenopathy:      Cervical: No cervical adenopathy  Skin:     General: Skin is warm and dry  Findings: No erythema or rash  Neurological:      Mental Status: She is alert and oriented to person, place, and time  Psychiatric:         Mood and Affect: Mood is anxious  Behavior: Behavior normal          Thought Content:  Thought content normal          Judgment: Judgment normal        Amanda Alvarez PA-C  "

## 2023-05-16 ENCOUNTER — OFFICE VISIT (OUTPATIENT)
Dept: PAIN MEDICINE | Facility: CLINIC | Age: 53
End: 2023-05-16

## 2023-05-16 VITALS
RESPIRATION RATE: 16 BRPM | HEIGHT: 66 IN | BODY MASS INDEX: 38.57 KG/M2 | DIASTOLIC BLOOD PRESSURE: 82 MMHG | SYSTOLIC BLOOD PRESSURE: 127 MMHG | WEIGHT: 240 LBS | HEART RATE: 86 BPM

## 2023-05-16 DIAGNOSIS — M51.26 LUMBAR DISC HERNIATION: ICD-10-CM

## 2023-05-16 DIAGNOSIS — M54.50 LUMBAR PAIN WITH RADIATION DOWN LEFT LEG: ICD-10-CM

## 2023-05-16 DIAGNOSIS — Z79.891 LONG-TERM CURRENT USE OF OPIATE ANALGESIC: ICD-10-CM

## 2023-05-16 DIAGNOSIS — M79.605 LUMBAR PAIN WITH RADIATION DOWN LEFT LEG: ICD-10-CM

## 2023-05-16 DIAGNOSIS — Z98.1 STATUS POST LUMBAR SPINAL FUSION: ICD-10-CM

## 2023-05-16 DIAGNOSIS — F11.20 UNCOMPLICATED OPIOID DEPENDENCE (HCC): ICD-10-CM

## 2023-05-16 DIAGNOSIS — M54.16 LUMBAR RADICULOPATHY: ICD-10-CM

## 2023-05-16 DIAGNOSIS — G89.4 CHRONIC PAIN SYNDROME: Primary | ICD-10-CM

## 2023-05-16 RX ORDER — GABAPENTIN 600 MG/1
TABLET ORAL
Qty: 150 TABLET | Refills: 1 | Status: SHIPPED | OUTPATIENT
Start: 2023-05-16

## 2023-05-16 RX ORDER — METHOCARBAMOL 750 MG/1
750 TABLET, FILM COATED ORAL 3 TIMES DAILY
Qty: 90 TABLET | Refills: 1 | Status: SHIPPED | OUTPATIENT
Start: 2023-05-16

## 2023-05-16 RX ORDER — OXYCODONE HYDROCHLORIDE 5 MG/1
5 TABLET ORAL 2 TIMES DAILY PRN
Qty: 60 TABLET | Refills: 0 | Status: SHIPPED | OUTPATIENT
Start: 2023-05-16

## 2023-05-16 NOTE — PATIENT INSTRUCTIONS

## 2023-05-16 NOTE — PROGRESS NOTES
Assessment:  1  Chronic pain syndrome    2  Lumbar disc herniation    3  Lumbar pain with radiation down left leg    4  Lumbar radiculopathy    5  Status post lumbar spinal fusion    6  Long-term current use of opiate analgesic    7  Uncomplicated opioid dependence ([address] )        Plan:  While the patient was in the office today, I did have a thorough conversation regarding their chronic pain syndrome, medication management, and treatment plan options  Patient is being seen for a follow-up visit  Overall, patient reports that her pain is reasonably controlled with her current medication regimen  Medications reduce her pain by about 20%, but patient states that pain would be unbearable without medications  He denies side effects from any of the medications that we prescribed  Continue oxycodone 5 mg twice daily if needed for pain  The patient's opioid scripts were sent to their pharmacy electronically and was given a 2 month supply of prescriptions with a Do Not Fill date(s) of 5/16/2023, 6/13/2023  Renewed gabapentin 600 mg twice daily and 1800 mg at bedtime  Renewed Robaxin-750 milligrams 3 times daily if needed for spasms  There are risks associated with opioid medications, including dependence, addiction and tolerance  The patient understands and agrees to use these medications only as prescribed  Potential side effects of the medications include, but are not limited to, constipation, drowsiness, addiction, impaired judgment and risk of fatal overdose if not taken as prescribed  The patient was warned against driving while taking sedation medications  Sharing medications is a felony  At this point in time, the patient is showing no signs of addiction, abuse, diversion or suicidal ideation  A urine drug screen was collected at today's office visit as part of our medication management protocol  The point of care testing results were appropriate for what was being prescribed   The specimen will be sent for confirmatory testing  The drug screen is medically necessary because the patient is either dependent on opioid medication or is being considered for opioid medication therapy and the results could impact ongoing or future treatment  The drug screen is to evaluate for the presences or absence of prescribed, non-prescribed, and/or illicit drugs/substances  [place] Prescription Drug Monitoring Program report was reviewed and was appropriate     The patient will follow-up in 2 months for medication prescription refill and reevaluation  The patient was advised to contact the office should their symptoms worsen in the interim  The patient was agreeable and verbalized an understanding  History of Present Illness: The patient is a 45 y o  female who presents for a follow up office visit in regards to Back Pain  The patient’s current symptoms include complaints of low back and left leg pain  Current pain level is a 7/10  Quality pain is described as sharp, throbbing, shooting, numb, pins-and-needles  Current pain medications includes: Oxycodone 5 mg twice daily if needed for pain, gabapentin 600 mg twice daily and 1800 mg at bedtime, Robaxin-750 milligrams 3 times daily if needed for spasms  The patient reports that this regimen is providing 20% pain relief  The patient is reporting no side effects from this pain medication regimen  I have personally reviewed and/or updated the patient's past medical history, past surgical history, family history, social history, current medications, allergies, and vital signs today  Review of Systems  Review of Systems   Musculoskeletal: Positive for back pain and gait problem  Decreased ROM  Pain left leg   All other systems reviewed and are negative            Past Medical History:   Diagnosis Date   • Anxiety    • Back pain    • Depression    • Insomnia        Past Surgical History:   Procedure Laterality Date   • BACK SURGERY  2006    cage in lumbar L5 area   • BACK SURGERY     • CAUDAL BLOCK N/A 11/15/2022    Procedure: BLOCK / INJECTION CAUDAL;  Surgeon: Chris Fairchild MD;  Location: [facility];  Service: Pain Management    • CHOLECYSTECTOMY     • LUMBAR [address] SURGERY     • [place] REPAIR FIRST ABDOMINAL WALL HERNIA N/A 3/16/2018    Procedure: REPAIR HERNIA VENTRAL with mesh;  Surgeon: Tomás Bryant DO;  Location: [facility];  Service: General   • [place] SURGICAL ARTHROSCOPY SHOULDER W/ROTATOR CUFF RPR Left 2020    Procedure: SHOULDER ARTHROSCOPY, ROTATOR CUFF REPAIR, SYNEVECTOMY, ACROMIOPLASTY DEBRIDEMENT, INJECTION;  Surgeon: Whitney Judd DO;  Location: [address] [place] [address] MAIN OR;  Service: Orthopedics       Family History   Adopted: Yes   Family history unknown: Yes       Social History     Occupational History   • Not on file   Tobacco Use   • Smoking status: Former     Packs/day: 0 00     Years: 10 00     Pack years: 0 00     Types: Cigarettes     Quit date: 10/17/2020     Years since quittin 5   • Smokeless tobacco: Never   • Tobacco comments:     Recently quit   Vaping Use   • Vaping Use: Never used   Substance and Sexual Activity   • Alcohol use: No   • Drug use: No   • Sexual activity: Not Currently     Partners: Female         Current Outpatient Medications:   •  albuterol (Proventil HFA) 90 mcg/act inhaler, Inhale 2 puffs every 6 (six) hours as needed for wheezing, Disp: 6 7 g, Rfl: 5  •  busPIRone (BUSPAR) 15 mg tablet, Take 1 tablet (15 mg total) by mouth 3 (three) times a day, Disp: 270 tablet, Rfl: 0  •  Fluticasone-Salmeterol (Advair) 250-50 mcg/dose inhaler, Inhale 1 puff 2 (two) times a day Rinse mouth after use , Disp: 60 blister, Rfl: 5  •  gabapentin (Neurontin) 600 MG tablet, 1 PO BID and 3 PO QHS, Disp: 150 tablet, Rfl: 1  •  Melatonin 10 MG TABS, Take 60 mg by mouth daily at bedtime, Disp: , Rfl:   •  methocarbamol (ROBAXIN) 750 mg tablet, Take 1 tablet (750 mg total) by mouth 3 (three) times a day As needed for pain/spasms, Disp: 90 "tablet, Rfl: 1  •  naloxone (NARCAN) 4 mg/0 1 mL nasal spray, Administer 1 spray into a nostril  If no response after 2-3 minutes, give another dose in the other nostril using a new spray , Disp: 1 each, Rfl: 1  •  oxyCODONE (Roxicodone) 5 immediate release tablet, Take 1 tablet (5 mg total) by mouth 2 (two) times a day as needed for moderate pain Do not fill until 6/13/2023 Max Daily Amount: 10 mg, Disp: 60 tablet, Rfl: 0  •  oxyCODONE (Roxicodone) 5 immediate release tablet, Take 1 tablet (5 mg total) by mouth 2 (two) times a day as needed for moderate pain Max Daily Amount: 10 mg, Disp: 60 tablet, Rfl: 0  •  pantoprazole (PROTONIX) 40 mg tablet, , Disp: , Rfl:   •  sertraline (ZOLOFT) 100 mg tablet, Take 1 tablet (100 mg total) by mouth daily, Disp: 90 tablet, Rfl: 0  •  sertraline (ZOLOFT) 50 mg tablet, Take 1 tablet (50 mg total) by mouth daily, Disp: 90 tablet, Rfl: 0    No Known Allergies    Physical Exam:    /82 (BP Location: Right arm, Patient Position: Sitting, Cuff Size: Large)   Pulse 86   Resp 16   Ht 5' 6\" (1 676 m)   Wt 109 kg (240 lb)   LMP 10/10/2016   BMI 38 74 kg/m²     Constitutional:normal, well developed, well nourished, alert, in no distress and non-toxic and no overt pain behavior  Eyes:anicteric  HEENT:grossly intact  Neck:supple, symmetric, trachea midline and no masses   Pulmonary:even and unlabored  Cardiovascular:No edema or pitting edema present  Skin:Normal without rashes or lesions and well hydrated  Psychiatric:Mood and affect appropriate  Neurologic:Cranial Nerves II-XII grossly intact  Musculoskeletal:normal    Imaging    Narrative & Impression   MRI THORACIC SPINE WITHOUT CONTRAST     INDICATION: M54 16: Radiculopathy, lumbar region  M51 26: Other intervertebral disc displacement, lumbar region  G90 529: Complex regional pain syndrome i of unspecified lower limb  G89 4: Chronic pain syndrome  Z98 1: Arthrodesis status     Presurgical eval for spinal cord stimulator " placement     COMPARISON:  None      TECHNIQUE:  Sagittal T1, sagittal T2, sagittal inversion recovery, axial T2,  axial 2D MERGE      IMAGE QUALITY: Diagnostic      FINDINGS:     ALIGNMENT: Normal alignment of the thoracic spine  No compression fracture  No subluxation  No scoliosis      MARROW SIGNAL:  Normal marrow signal is identified within the visualized bony structures  No discrete marrow lesion      THORACIC CORD: Normal signal within the thoracic cord  No compressive cord disease  Conus terminates at the L1 level      PARAVERTEBRAL SOFT TISSUES:  Normal      THORACIC DEGENERATIVE CHANGE: No compressive disc herniation, canal stenosis or foraminal narrowing  No compressive degenerative changes  Noncompressive disc protrusions present centrally at T5-T6, to the left at T7-T8, to the right at T8-T9  Facet   arthrosis to the right at T9-T10      IMPRESSION:     Multilevel noncompressive degenerative changes as described  Narrative & Impression   MRI LUMBAR SPINE WITHOUT CONTRAST     INDICATION: M54 31: Sciatica, right side  M54 32: Sciatica, left side  Low back pain      COMPARISON:  1/22/2019     TECHNIQUE:  Sagittal T1, sagittal T2, sagittal inversion recovery, axial T1 and axial T2, coronal T2     IMAGE QUALITY:  Diagnostic     FINDINGS:     VERTEBRAL BODIES:  There are 5 lumbar type vertebral bodies  Trace grade 1 anterolisthesis of L5 on S1 is stable as are bilateral pedicle screws at L5 5  Scattered multilevel spondylotic changes noted  No bone marrow edema or focally suspicious marrow   lesions      SACRUM:  Scattered degenerative endplate changes  No focally suspicious marrow lesions  No bone marrow edema or compression abnormality      DISTAL CORD AND CONUS:  Normal size and signal within the distal cord and conus    The conus medullaris terminates at this superior endplate of L1      PARASPINAL SOFT TISSUES:  Small bilateral T2 hyperintense lesions noted in both kidneys, too small to characterize  Postsurgical changes noted in the lumbosacral region in the subcutaneous soft tissues      LOWER THORACIC DISC SPACES:  T10-T11: There is no focal disk herniation, central canal or neural foraminal narrowing      T11-T12: There is mild loss of disc height and signal  There is no focal disk herniation, central canal or neural foraminal narrowing  This level is similar to the prior study      T12-L1: There is no focal disk herniation, central canal or neural foraminal narrowing  Bilateral facet hypertrophy noted      LUMBAR DISC SPACES:     L1-L2:  Normal      L2-L3:  Normal      L3-L4:  There is a diffuse disk bulge  No significant central canal or neural foraminal narrowing  Bilateral facet hypertrophy noted  This level is similar to the prior study      L4-L5:  Postlaminectomy changes noted  No evidence of recurrent or residual disc herniation  Central canal surgically capacious  Very mild residual right neural foraminal narrowing is stable  Left neural foramen patent      L5-S1:  There is uncovering the intervertebral discs  There is bilateral facet hypertrophy  Moderate bilateral neural foraminal narrowing on the basis of the anterolisthesis is stable  Central canal patent  This level is similar to the prior study      IMPRESSION:     Persistent grade 1 anterolisthesis of L5 on S1 with associated bilateral neural foraminal narrowing  Postoperative changes at L4-5 are stable               No orders to display       No orders of the defined types were placed in this encounter

## 2023-05-18 LAB
6MAM UR QL CFM: NEGATIVE NG/ML
7AMINOCLONAZEPAM UR QL CFM: NEGATIVE NG/ML
A-OH ALPRAZ UR QL CFM: NEGATIVE NG/ML
ACCEPTABLE CREAT UR QL: NORMAL MG/DL
ACCEPTIBLE SP GR UR QL: NORMAL
AMITRIP UR QL CFM: NEGATIVE NG/ML
AMPHET UR QL CFM: NEGATIVE NG/ML
AMPHET UR QL CFM: NEGATIVE NG/ML
BUPRENORPHINE UR QL CFM: NEGATIVE NG/ML
BZE UR QL CFM: NEGATIVE NG/ML
DESIPRAMINE UR QL CFM: NEGATIVE NG/ML
EDDP UR QL CFM: NEGATIVE NG/ML
ETHYL GLUCURONIDE UR QL CFM: NEGATIVE NG/ML
ETHYL SULFATE UR QL SCN: NEGATIVE NG/ML
FENTANYL UR QL CFM: NEGATIVE NG/ML
FLUOXETINE UR QL CFM: NEGATIVE NG/ML
GLIADIN IGG SER IA-ACNC: NEGATIVE NG/ML
GLUCOSE 30M P 50 G LAC PO SERPL-MCNC: NEGATIVE NG/ML
HYDROCODONE UR QL CFM: NEGATIVE NG/ML
HYDROMORPHONE UR QL CFM: NEGATIVE NG/ML
LORAZEPAM UR QL CFM: NEGATIVE NG/ML
MDMA UR QL CFM: NEGATIVE NG/ML
ME-PHENIDATE UR QL CFM: NEGATIVE NG/ML
MEPERIDINE UR QL CFM: NEGATIVE NG/ML
METHADONE UR QL CFM: NEGATIVE NG/ML
METHAMPHET UR QL CFM: NEGATIVE NG/ML
MORPHINE UR QL CFM: NEGATIVE NG/ML
NITRITE UR QL: NORMAL UG/ML
NORBUPRENORPHINE UR QL CFM: NEGATIVE NG/ML
NORDIAZEPAM UR QL CFM: NEGATIVE NG/ML
NORFENTANYL UR QL CFM: NEGATIVE NG/ML
NORFLUOXETINE UR QL CFM: NEGATIVE NG/ML
NORHYDROCODONE UR QL CFM: NEGATIVE NG/ML
NORMEPERIDINE UR QL CFM: NEGATIVE NG/ML
NOROXYCODONE UR QL CFM: NORMAL NG/ML
NORTRIP UR QL CFM: NEGATIVE NG/ML
OLANZAPINE QUANTIFICATION: NEGATIVE NG/ML
OPC-3373 QUANTIFICATION: NEGATIVE
OXAZEPAM UR QL CFM: NEGATIVE NG/ML
OXYCODONE UR QL CFM: NORMAL NG/ML
OXYMORPHONE UR QL CFM: NORMAL NG/ML
OXYMORPHONE UR QL CFM: NORMAL NG/ML
PARA-FLUOROFENTANYL QUANTIFICATION: NORMAL NG/ML
PROLACTIN SERPL-MCNC: NEGATIVE NG/ML
RESULT ALL_PRESCRIBED MEDS AND SPECIAL INSTRUCTIONS: NORMAL
SECOBARBITAL UR QL CFM: NEGATIVE NG/ML
SL AMB 4-ANPP QUANTIFICATION: NORMAL NG/ML
SL AMB 7-OH-MITRAGYNINE (KRATOM ALKALOID) QUANTIFICATION: NEGATIVE NG/ML
SL AMB ACETYL FENTANYL QUANTIFICATION: NORMAL NG/ML
SL AMB ACETYL NORFENTANYL QUANTIFICATION: NORMAL NG/ML
SL AMB ACRYL FENTANYL QUANTIFICATION: NORMAL NG/ML
SL AMB CARFENTANIL QUANTIFICATION: NORMAL NG/ML
SL AMB CITALOPRAM/ESCITALOPRAM QUANTIFICATION: NEGATIVE NG/ML
SL AMB CITALOPRAM/ESCITALOPRAM QUANTIFICATION: NEGATIVE NG/ML
SL AMB CLOZAPINE QUANTIFICATION: NEGATIVE NG/ML
SL AMB CTHC (MARIJUANA METABOLITE) QUANTIFICATION: NEGATIVE NG/ML
SL AMB DEXTRORPHAN (DEXTROMETHORPHAN METABOLITE) QUANT: NEGATIVE NG/ML
SL AMB HALOPERIDOL  QUANTIFICATION: NEGATIVE NG/ML
SL AMB HALOPERIDOL METABOLITE QUANTIFICATION: NEGATIVE NG/ML
SL AMB HYDROXYBUPROPION QUANTIFICATION: NEGATIVE NG/ML
SL AMB HYDROXYRISPERIDONE QUANTIFICATION: NEGATIVE NG/ML
SL AMB N-DESMETHYL-TRAMADOL QUANTIFICATION: NEGATIVE NG/ML
SL AMB N-DESMETHYLCLOZAPINE QUANTIFICATION: NEGATIVE NG/ML
SL AMB NORQUETIAPINE QUANTIFICATION: NEGATIVE NG/ML
SL AMB PHENTERMINE QUANTIFICATION: NEGATIVE NG/ML
SL AMB PREGABALIN QUANTIFICATION: NEGATIVE
SL AMB QUETIAPINE QUANTIFICATION: NEGATIVE NG/ML
SL AMB RISPERIDONE QUANTIFICATION: NEGATIVE NG/ML
SL AMB RITALINIC ACID QUANTIFICATION: NEGATIVE NG/ML
SPECIMEN PH ACCEPTABLE UR: NORMAL
TAPENTADOL UR QL CFM: NEGATIVE NG/ML
TEMAZEPAM UR QL CFM: NEGATIVE NG/ML
TEMAZEPAM UR QL CFM: NEGATIVE NG/ML
TRAMADOL UR QL CFM: NEGATIVE NG/ML
URATE/CREAT 24H UR: NEGATIVE NG/ML

## 2023-05-31 ENCOUNTER — TELEPHONE (OUTPATIENT)
Dept: FAMILY MEDICINE CLINIC | Facility: CLINIC | Age: 53
End: 2023-05-31

## 2023-05-31 NOTE — TELEPHONE ENCOUNTER
She has diarrhea for 2 days, she has been taking imodium  She thinks she has the stomach bug, not feeling well    Can you call something in?

## 2023-05-31 NOTE — TELEPHONE ENCOUNTER
Unfortunately, there is nothing to give her for this  If she has a GI virus, needs to have diarrhea in order to rid herself of the virus  She should try to stay well hydrated with plenty of water

## 2023-06-29 ENCOUNTER — OFFICE VISIT (OUTPATIENT)
Dept: URGENT CARE | Facility: MEDICAL CENTER | Age: 53
End: 2023-06-29
Payer: COMMERCIAL

## 2023-06-29 ENCOUNTER — APPOINTMENT (OUTPATIENT)
Dept: RADIOLOGY | Facility: MEDICAL CENTER | Age: 53
End: 2023-06-29
Payer: COMMERCIAL

## 2023-06-29 VITALS
TEMPERATURE: 97.9 F | OXYGEN SATURATION: 96 % | HEART RATE: 90 BPM | DIASTOLIC BLOOD PRESSURE: 64 MMHG | RESPIRATION RATE: 24 BRPM | SYSTOLIC BLOOD PRESSURE: 98 MMHG

## 2023-06-29 DIAGNOSIS — J02.9 SORE THROAT: ICD-10-CM

## 2023-06-29 DIAGNOSIS — U07.1 COVID-19: Primary | ICD-10-CM

## 2023-06-29 DIAGNOSIS — U07.1 COVID-19: ICD-10-CM

## 2023-06-29 LAB — S PYO AG THROAT QL: NEGATIVE

## 2023-06-29 PROCEDURE — 99212 OFFICE O/P EST SF 10 MIN: CPT | Performed by: PHYSICIAN ASSISTANT

## 2023-06-29 PROCEDURE — 71046 X-RAY EXAM CHEST 2 VIEWS: CPT

## 2023-06-29 PROCEDURE — 87070 CULTURE OTHR SPECIMN AEROBIC: CPT | Performed by: PHYSICIAN ASSISTANT

## 2023-06-29 PROCEDURE — 87880 STREP A ASSAY W/OPTIC: CPT | Performed by: PHYSICIAN ASSISTANT

## 2023-06-29 RX ORDER — NIRMATRELVIR AND RITONAVIR 300-100 MG
3 KIT ORAL 2 TIMES DAILY
Qty: 30 TABLET | Refills: 0 | Status: SHIPPED | OUTPATIENT
Start: 2023-06-29 | End: 2023-07-04

## 2023-06-29 NOTE — PROGRESS NOTES
[facility]        NAME: Carl Castaneda is a 52 y o  female  : 1970    MRN: [id number]  DATE: 2023  TIME: 9:28 AM    Assessment and Plan   COVID-19 [U07 1]  1  COVID-19  XR chest pa & lateral    nirmatrelvir & ritonavir (Paxlovid, 300/100,) tablet therapy pack      2  Sore throat  POCT rapid strepA    Throat culture            Patient Instructions       Follow up with PCP in 3-5 days  Proceed to  ER if symptoms worsen  Chief Complaint     Chief Complaint   Patient presents with   • Cold Like Symptoms     Sore throat and shortness of breath and fever  COVID + on Monday  Using Tylenol  History of Present Illness       Patient started with a sore throat and cough days ago  Tested herself for COVID which was positive on   Being fevers as high as 101 degrees continued sore throat cough and SOB  Review of Systems   Review of Systems   Constitutional: Positive for fever  Negative for chills  HENT: Positive for sore throat  Negative for congestion  Respiratory: Positive for cough and shortness of breath  Gastrointestinal: Negative for nausea and vomiting           Current Medications       Current Outpatient Medications:   •  albuterol (Proventil HFA) 90 mcg/act inhaler, Inhale 2 puffs every 6 (six) hours as needed for wheezing, Disp: 6 7 g, Rfl: 5  •  busPIRone (BUSPAR) 15 mg tablet, Take 1 tablet (15 mg total) by mouth 3 (three) times a day, Disp: 270 tablet, Rfl: 0  •  Fluticasone-Salmeterol (Advair) 250-50 mcg/dose inhaler, Inhale 1 puff 2 (two) times a day Rinse mouth after use , Disp: 60 blister, Rfl: 5  •  gabapentin (Neurontin) 600 MG tablet, 1 PO BID and 3 PO QHS, Disp: 150 tablet, Rfl: 1  •  Melatonin 10 MG TABS, Take 60 mg by mouth daily at bedtime, Disp: , Rfl:   •  methocarbamol (ROBAXIN) 750 mg tablet, Take 1 tablet (750 mg total) by mouth 3 (three) times a day As needed for pain/spasms, Disp: 90 tablet, Rfl: 1  •  naloxone (NARCAN) 4 mg/0 1 mL nasal spray, Administer 1 spray into a nostril   If no response after 2-3 minutes, give another dose in the other nostril using a new spray , Disp: 1 each, Rfl: 1  •  nirmatrelvir & ritonavir (Paxlovid, 300/100,) tablet therapy pack, Take 3 tablets by mouth 2 (two) times a day for 5 days Take 2 nirmatrelvir tablets + 1 ritonavir tablet together per dose, Disp: 30 tablet, Rfl: 0  •  oxyCODONE (Roxicodone) 5 immediate release tablet, Take 1 tablet (5 mg total) by mouth 2 (two) times a day as needed for moderate pain Do not fill until 6/13/2023 Max Daily Amount: 10 mg, Disp: 60 tablet, Rfl: 0  •  oxyCODONE (Roxicodone) 5 immediate release tablet, Take 1 tablet (5 mg total) by mouth 2 (two) times a day as needed for moderate pain Max Daily Amount: 10 mg, Disp: 60 tablet, Rfl: 0  •  pantoprazole (PROTONIX) 40 mg tablet, , Disp: , Rfl:   •  sertraline (ZOLOFT) 100 mg tablet, Take 1 tablet (100 mg total) by mouth daily, Disp: 90 tablet, Rfl: 0  •  sertraline (ZOLOFT) 50 mg tablet, Take 1 tablet (50 mg total) by mouth daily, Disp: 90 tablet, Rfl: 0    Current Allergies     Allergies as of 06/29/2023   • (No Known Allergies)            The following portions of the patient's history were reviewed and updated as appropriate: allergies, current medications, past family history, past medical history, past social history, past surgical history and problem list      Past Medical History:   Diagnosis Date   • Anxiety    • Back pain    • Depression    • Insomnia        Past Surgical History:   Procedure Laterality Date   • BACK SURGERY  2006    cage in lumbar L5 area   • BACK SURGERY     • CAUDAL BLOCK N/A 11/15/2022    Procedure: BLOCK / INJECTION CAUDAL;  Surgeon: Mavis Perla MD;  Location: [facility];  Service: Pain Management    • CHOLECYSTECTOMY     • LUMBAR DISC SURGERY     • [place] REPAIR FIRST ABDOMINAL WALL HERNIA N/A 3/16/2018    Procedure: REPAIR HERNIA VENTRAL with mesh;  Surgeon: Edgardo Chavis DO;  Location: [facility];  Service: General   • [place] SURGICAL ARTHROSCOPY SHOULDER W/ROTATOR CUFF RPR Left 11/18/2020    Procedure: SHOULDER ARTHROSCOPY, ROTATOR CUFF REPAIR, SYNEVECTOMY, ACROMIOPLASTY DEBRIDEMENT, INJECTION;  Surgeon: Jani Roberts DO;  Location: [address] [place], [place] [address] MAIN OR;  Service: Orthopedics       Family History   Adopted: Yes   Family history unknown: Yes         Medications have been verified  Objective   BP 98/64   Pulse 90   Temp 97 9 °F (36 6 °C)   Resp (!) 24   LMP 10/10/2016   SpO2 96%   Patient's last menstrual period was 10/10/2016  Physical Exam     Physical Exam  Vitals and nursing note reviewed  Constitutional:       Appearance: Normal appearance  HENT:      Head: Normocephalic and atraumatic  Ears:      Comments: TM's without erythema, bilateral diminished light reflexes     Mouth/Throat:      Mouth: Mucous membranes are moist       Pharynx: Oropharynx is clear  Eyes:      Conjunctiva/sclera: Conjunctivae normal    Cardiovascular:      Rate and Rhythm: Normal rate and regular rhythm  Heart sounds: Normal heart sounds  Pulmonary:      Effort: Pulmonary effort is normal       Breath sounds: Normal breath sounds  Skin:     General: Skin is warm  Neurological:      Mental Status: She is alert

## 2023-06-29 NOTE — PATIENT INSTRUCTIONS
Quarantine through 06/30/2023  Start Paxlovid  Vitamin D 3 2000 iu   Daily  Drink plenty of fluids  Rest  Tylenol or Motrin as needed for fever or pain  Follow up with PCP  If symptoms worsen have yourself rechecked or go to the ER for further evaluation

## 2023-07-01 LAB — BACTERIA THROAT CULT: NORMAL

## 2023-07-11 ENCOUNTER — OFFICE VISIT (OUTPATIENT)
Dept: PAIN MEDICINE | Facility: CLINIC | Age: 53
End: 2023-07-11
Payer: COMMERCIAL

## 2023-07-11 VITALS
RESPIRATION RATE: 16 BRPM | HEIGHT: 66 IN | WEIGHT: 241.2 LBS | BODY MASS INDEX: 38.76 KG/M2 | SYSTOLIC BLOOD PRESSURE: 136 MMHG | DIASTOLIC BLOOD PRESSURE: 87 MMHG | HEART RATE: 89 BPM

## 2023-07-11 DIAGNOSIS — M54.16 LUMBAR RADICULOPATHY: ICD-10-CM

## 2023-07-11 DIAGNOSIS — M79.605 LUMBAR PAIN WITH RADIATION DOWN LEFT LEG: ICD-10-CM

## 2023-07-11 DIAGNOSIS — F11.20 UNCOMPLICATED OPIOID DEPENDENCE (HCC): ICD-10-CM

## 2023-07-11 DIAGNOSIS — M51.26 LUMBAR DISC HERNIATION: ICD-10-CM

## 2023-07-11 DIAGNOSIS — M54.50 LUMBAR PAIN WITH RADIATION DOWN LEFT LEG: ICD-10-CM

## 2023-07-11 DIAGNOSIS — Z79.891 LONG-TERM CURRENT USE OF OPIATE ANALGESIC: ICD-10-CM

## 2023-07-11 DIAGNOSIS — G89.4 CHRONIC PAIN SYNDROME: Primary | ICD-10-CM

## 2023-07-11 DIAGNOSIS — Z98.1 STATUS POST LUMBAR SPINAL FUSION: ICD-10-CM

## 2023-07-11 PROCEDURE — 99214 OFFICE O/P EST MOD 30 MIN: CPT | Performed by: NURSE PRACTITIONER

## 2023-07-11 RX ORDER — OXYCODONE HYDROCHLORIDE 5 MG/1
5 TABLET ORAL EVERY 8 HOURS PRN
Qty: 90 TABLET | Refills: 0 | Status: SHIPPED | OUTPATIENT
Start: 2023-07-11

## 2023-07-11 RX ORDER — GABAPENTIN 600 MG/1
TABLET ORAL
Qty: 150 TABLET | Refills: 1 | Status: SHIPPED | OUTPATIENT
Start: 2023-07-11

## 2023-07-11 RX ORDER — METHOCARBAMOL 750 MG/1
750 TABLET, FILM COATED ORAL 3 TIMES DAILY
Qty: 90 TABLET | Refills: 1 | Status: SHIPPED | OUTPATIENT
Start: 2023-07-11

## 2023-07-11 NOTE — PATIENT INSTRUCTIONS
Opioid Safety   WHAT YOU NEED TO KNOW:   An opioid medicine is used to treat pain. Examples are oxycodone, morphine, fentanyl, or codeine. Pain control and management may help you rest, heal, and return to your daily activities. You and your family will receive information about how to manage your pain at home. The instructions will include what to do if you have side effects as your pain is managed. You will get information on how to handle opioid medicine safely. You will also get suggestions on how to control pain without opioids. It is important to follow all instructions so your pain is managed effectively. DISCHARGE INSTRUCTIONS:   Call your local emergency number (911 in the ), or have someone else call if:   You have a seizure. You cannot be woken. You have trouble staying awake and your breathing is slow or shallow. Your speech is slurred, or you are confused. You are dizzy or stumble when you walk. Call your doctor, or have someone close to you call if:   You are extremely drowsy, or you have trouble staying awake or speaking. You have pale or clammy skin. You have blue fingernails or lips. Your heartbeat is slower than normal.    You cannot stop vomiting. You have questions or concerns about your condition or care. Use opioids safely:   Take prescribed opioids exactly as directed. Opioids come with directions based on the kind and how it is given. Talk to your healthcare provider or a pharmacist if you have any questions. Do not take more than the recommended amount. Too much can cause a life-threatening overdose. Do not continue to take it after your pain stops. You may develop tolerance. This means you keep needing higher doses to get the same effect. You may also develop opioid use disorder. This means you are not able to control your opioid use. Do not give opioids to others or take opioids that belong to someone else.   The kind or amount one person takes may not be right for another. The person you share them with may also be taking medicines that do not mix with opioids. He or she may drink alcohol or use other drugs that can cause life-threatening problems when mixed with opioids. Do not mix opioids with other medicines or alcohol. The combination can cause an overdose, or cause you to stop breathing. Alcohol, sleeping pills, and medicines such as antihistamines can make you sleepy. A combination with opioids can lead to a coma. Do not drive or operate heavy machinery after you use an opioid. You may feel drowsy or have trouble concentrating. You can injure yourself or others if you drive or use heavy machinery when you are not alert. Your provider or pharmacist can tell you how long to wait after a dose before you do these activities. Talk to your healthcare provider if you have any side effects. Side effects include nausea, sleepiness, itching, and trouble thinking clearly. Your provider may need to make changes to the kind or amount of opioid you are taking. He or she can also help you find ways to prevent or relieve side effects. Manage constipation:  Constipation is the most common side effect of opioid medicine. Constipation is when you have hard, dry bowel movements, or you go longer than usual between bowel movements. Tell your healthcare provider about all changes in your bowel movements while you are taking opioids. He or she may recommend laxative medicine to help you have a bowel movement. He or she may also change the kind of opioid you are taking, or change when you take it. The following are more ways you can prevent or relieve constipation:  Drink liquids as directed. You may need to drink extra liquids to help soften and move your bowels. Ask how much liquid to drink each day and which liquids are best for you. Eat high-fiber foods. This may help decrease constipation by adding bulk to your bowel movements.  High-fiber foods include fruits, vegetables, whole-grain breads and cereals, and beans. Your healthcare provider or dietitian can help you create a high-fiber meal plan. Your provider may also recommend a fiber supplement if you cannot get enough fiber from food. Exercise regularly. Regular physical activity can help stimulate your intestines. Walking is a good exercise to prevent or relieve constipation. Ask which exercises are best for you. Schedule a time each day to have a bowel movement. This may help train your body to have regular bowel movements. Bend forward while you are on the toilet to help move the bowel movement out. Sit on the toilet for at least 10 minutes, even if you do not have a bowel movement. Store opioids safely:   Store opioids where others cannot easily get them. Keep them in a locked cabinet or secure area. Do not  keep them in a purse or other bag you carry with you. A person may be looking for something else and find the opioids. Make sure opioids are stored out of the reach of children. A child can easily overdose on opioids. Opioids may look like candy to a small child. The best way to dispose of opioids: The laws vary by country and area. In the [facility], the best way is to return the opioids through a take-back program. This program is offered by the [employer] The following are options for using the program:  Take the opioids to a [employer] collection site. The site is often a law enforcement center. Call your local law enforcement center for scheduled take-back days in your area. You will be given information on where to go if the collection site is in a different location. Take the opioids to an approved pharmacy or hospital.  A pharmacy or hospital may be set up as a collection site. You will need to ask if it is a [employer] collection site if you were not directed there.  A pharmacy or doctor's office may not be able to take back opioids unless it is a [employer] site.    Use a mail-back system. This means you are given containers to put the opioids into. You will then mail them in the containers. Use a take-back drop box. This is a place to leave the opioids at any time. People and animals will not be able to get into the box. Your local law enforcement agency can tell you where to find a drop box in your area. Other safe ways to dispose of opioids: The medicine may come with disposal instructions. The instructions may vary depending on the brand of medicine you are using. Instructions may come in a Medication Guide, but not every medicine has one. You may instead get instructions from your pharmacy or doctor. Follow instructions carefully. The following are general guidelines to follow:  Find out if you can flush the opioid. Some opioids can be flushed down the toilet or poured into the sink. You will need to contact authorities in your area to see if this is an option for you. The FDA also offers a list of medicines that are safe to flush down the toilet. You can check the list if you cannot get the information for your local area. Ask your waste management company about rules for putting opioids in the trash. The company will be able to give you specific directions. Scratch out personal information on the original medicine label so it cannot be read. Then put it in the trash. Do not label the trash or put any information on it about the opioids. It should look like regular household trash so no one is tempted to look for the opioids. Keep the trash out of the reach of children and animals. Always make sure trash is secure. Talk to officials if you live in a facility. If you live in a nursing home or assisted living center, talk to an official. The person will know the rules for your area. Other ways to manage pain:   Ask your healthcare provider about non-opioid medicines to control pain.   Some medicines may even work better than opioids, depending on the cause of your pain. Nonprescription medicines include NSAIDs (such as ibuprofen) and acetaminophen. Prescription medicines include muscle relaxers, antidepressants, and steroids. Pain may be managed without any medicines. Some ways to relieve pain include massage, aromatherapy, or meditation. Physical or occupational therapy may also help. For more information:   Drug Enforcement Administration  [address] , [address]  Phone: 7- 749 - [phone]  Web Address: [employer]. [employer]/drug_disposal/    [address] and Drug Administration  [address] , [address]  Phone: 6- 154 - [phone]  Web Address: http://[employer]/  Follow up with your doctor or pain specialist as directed: You may need to have your dose adjusted. Your doctor or pain specialist can also help you find ways to manage pain without opioids. Write down your questions so you remember to ask them during your visits. © Copyright [employer] 2022 Information is for End User's use only and may not be sold, redistributed or otherwise used for commercial purposes. The above information is an  only. It is not intended as medical advice for individual conditions or treatments. Talk to your doctor, nurse or pharmacist before following any medical regimen to see if it is safe and effective for you.

## 2023-07-11 NOTE — PROGRESS NOTES
Assessment:  1. Chronic pain syndrome    2. Status post lumbar spinal fusion    3. Lumbar disc herniation    4. Lumbar radiculopathy    5. Lumbar pain with radiation down left leg    6. Long-term current use of opiate analgesic    7. Uncomplicated opioid dependence ([address])        Plan:  While the patient was in the office today, I did have a thorough conversation regarding their chronic pain syndrome, medication management, and treatment plan options. Patient is being seen for a follow-up visit. Patient is reporting some relief with the use of oxycodone 5 milligrams twice daily. Unfortunately, she still suffers from pretty significant pain. She has undergone multiple injections in the past without relief. We were considering neuromodulation at one point, but decided against it due to an episode of respiratory distress after previous surgery. At this point, we will increase the oxycodone 5 mg from twice a day to 3 times daily if needed for pain. The patient's opioid scripts were sent to their pharmacy electronically and was given a 2 month supply of prescriptions with a Do Not Fill date(s) of 7/11/2023, 8/8/2023. Continue gabapentin 600 mg twice daily and 1800 mg at bedtime. A prescription was sent to her pharmacy with refills. Continue Robaxin 750 mg 3 times daily if needed for spasms. A prescription was sent to her pharmacy with refills. There are risks associated with opioid medications, including dependence, addiction and tolerance. The patient understands and agrees to use these medications only as prescribed. Potential side effects of the medications include, but are not limited to, constipation, drowsiness, addiction, impaired judgment and risk of fatal overdose if not taken as prescribed. The patient was warned against driving while taking sedation medications. Sharing medications is a felony.  At this point in time, the patient is showing no signs of addiction, abuse, diversion or suicidal ideation. [place] Prescription Drug Monitoring Program report was reviewed and was appropriate     The patient will follow-up in 2 months for medication prescription refill and reevaluation. The patient was advised to contact the office should their symptoms worsen in the interim. The patient was agreeable and verbalized an understanding. History of Present Illness: The patient is a 52 y.o. female who presents for a follow up office visit in regards to Back Pain. The patient’s current symptoms include complaints of low back and bilateral leg pain. Current pain level is an 8/10. Quality pain is described as sharp, throbbing, shooting, numb, pins-and-needles. Current pain medications includes: Oxycodone 5 mg twice daily if needed for pain, gabapentin 600 mg twice daily and 1800 mg at bedtime, Robaxin-750 milligrams 3 times daily if needed for spasms. The patient reports that this regimen is providing 10% pain relief. The patient is reporting no side effects from this pain medication regimen. I have personally reviewed and/or updated the patient's past medical history, past surgical history, family history, social history, current medications, allergies, and vital signs today. Review of Systems  Review of Systems   Musculoskeletal: Positive for back pain and gait problem. Decreased ROM  Pain left leg   All other systems reviewed and are negative.           Past Medical History:   Diagnosis Date   • Anxiety    • Back pain    • Depression    • Insomnia        Past Surgical History:   Procedure Laterality Date   • BACK SURGERY  2006    cage in lumbar L5 area   • BACK SURGERY     • CAUDAL BLOCK N/A 11/15/2022    Procedure: BLOCK / INJECTION CAUDAL;  Surgeon: Shilpi Chance MD;  Location: [facility];  Service: Pain Management    • CHOLECYSTECTOMY     • LUMBAR DISC SURGERY     • RI REPAIR FIRST ABDOMINAL WALL HERNIA N/A 3/16/2018    Procedure: REPAIR HERNIA VENTRAL with mesh; Surgeon: Melissa Obando DO;  Location: [facility];  Service: General   • IL SURGICAL ARTHROSCOPY SHOULDER W/ROTATOR CUFF RPR Left 2020    Procedure: SHOULDER ARTHROSCOPY, ROTATOR CUFF REPAIR, SYNEVECTOMY, ACROMIOPLASTY DEBRIDEMENT, INJECTION;  Surgeon: Miranda De La Cruz DO;  Location: [address] [place] [address] MAIN OR;  Service: Orthopedics       Family History   Adopted: Yes   Family history unknown: Yes       Social History     Occupational History   • Not on file   Tobacco Use   • Smoking status: Former     Packs/day: 0.00     Years: 10.00     Total pack years: 0.00     Types: Cigarettes     Quit date: 10/17/2020     Years since quittin.7   • Smokeless tobacco: Never   • Tobacco comments:     Recently quit   Vaping Use   • Vaping Use: Never used   Substance and Sexual Activity   • Alcohol use: No   • Drug use: No   • Sexual activity: Not Currently     Partners: Female         Current Outpatient Medications:   •  albuterol (Proventil HFA) 90 mcg/act inhaler, Inhale 2 puffs every 6 (six) hours as needed for wheezing, Disp: 6.7 g, Rfl: 5  •  busPIRone (BUSPAR) 15 mg tablet, Take 1 tablet (15 mg total) by mouth 3 (three) times a day, Disp: 270 tablet, Rfl: 0  •  Fluticasone-Salmeterol (Advair) 250-50 mcg/dose inhaler, Inhale 1 puff 2 (two) times a day Rinse mouth after use., Disp: 60 blister, Rfl: 5  •  gabapentin (Neurontin) 600 MG tablet, 1 PO BID and 3 PO QHS, Disp: 150 tablet, Rfl: 1  •  Melatonin 10 MG TABS, Take 60 mg by mouth daily at bedtime, Disp: , Rfl:   •  methocarbamol (ROBAXIN) 750 mg tablet, Take 1 tablet (750 mg total) by mouth 3 (three) times a day As needed for pain/spasms, Disp: 90 tablet, Rfl: 1  •  naloxone (NARCAN) 4 mg/0.1 mL nasal spray, Administer 1 spray into a nostril.  If no response after 2-3 minutes, give another dose in the other nostril using a new spray., Disp: 1 each, Rfl: 1  •  oxyCODONE (Roxicodone) 5 immediate release tablet, Take 1 tablet (5 mg total) by mouth every 8 (eight) hours as needed for moderate pain Do not fill until 8/8/2023 Max Daily Amount: 15 mg, Disp: 90 tablet, Rfl: 0  •  oxyCODONE (Roxicodone) 5 immediate release tablet, Take 1 tablet (5 mg total) by mouth every 8 (eight) hours as needed for moderate pain Max Daily Amount: 15 mg, Disp: 90 tablet, Rfl: 0  •  pantoprazole (PROTONIX) 40 mg tablet, , Disp: , Rfl:   •  sertraline (ZOLOFT) 100 mg tablet, Take 1 tablet (100 mg total) by mouth daily, Disp: 90 tablet, Rfl: 0  •  sertraline (ZOLOFT) 50 mg tablet, Take 1 tablet (50 mg total) by mouth daily, Disp: 90 tablet, Rfl: 0    No Known Allergies    Physical Exam:    /87 (BP Location: Right arm, Patient Position: Sitting, Cuff Size: Large)   Pulse 89   Resp 16   Ht 5' 6" (1.676 m)   Wt 109 kg (241 lb 3.2 oz)   LMP 10/10/2016   BMI 38.93 kg/m²     Constitutional:normal, well developed, well nourished, alert, in no distress and non-toxic and no overt pain behavior. and overweight  Eyes:anicteric  HEENT:grossly intact  Neck:supple, symmetric, trachea midline and no masses   Pulmonary:even and unlabored  Cardiovascular:No edema or pitting edema present  Skin:Normal without rashes or lesions and well hydrated  Psychiatric:Mood and affect appropriate  Neurologic:Cranial Nerves II-XII grossly intact  Musculoskeletal:Gait is slow and guarded. Imaging    Study Result    Narrative & Impression   MRI LUMBAR SPINE WITHOUT CONTRAST     INDICATION: M54.31: Sciatica, right side  M54.32: Sciatica, left side. Low back pain.     COMPARISON:  1/22/2019     TECHNIQUE:  Sagittal T1, sagittal T2, sagittal inversion recovery, axial T1 and axial T2, coronal T2.    IMAGE QUALITY:  Diagnostic     FINDINGS:     VERTEBRAL BODIES:  There are 5 lumbar type vertebral bodies. Trace grade 1 anterolisthesis of L5 on S1 is stable as are bilateral pedicle screws at L5 5. Scattered multilevel spondylotic changes noted.   No bone marrow edema or focally suspicious marrow   lesions.     SACRUM:  Scattered degenerative endplate changes. No focally suspicious marrow lesions. No bone marrow edema or compression abnormality.     DISTAL CORD AND CONUS:  Normal size and signal within the distal cord and conus. The conus medullaris terminates at this superior endplate of L1.     PARASPINAL SOFT TISSUES:  Small bilateral T2 hyperintense lesions noted in both kidneys, too small to characterize. Postsurgical changes noted in the lumbosacral region in the subcutaneous soft tissues.     LOWER THORACIC DISC SPACES:  T10-T11: There is no focal disk herniation, central canal or neural foraminal narrowing.     T11-T12: There is mild loss of disc height and signal. There is no focal disk herniation, central canal or neural foraminal narrowing. This level is similar to the prior study.     T12-L1: There is no focal disk herniation, central canal or neural foraminal narrowing. Bilateral facet hypertrophy noted.     LUMBAR DISC SPACES:     L1-L2:  Normal.     L2-L3:  Normal.     L3-L4:  There is a diffuse disk bulge. No significant central canal or neural foraminal narrowing. Bilateral facet hypertrophy noted. This level is similar to the prior study.     L4-L5:  Postlaminectomy changes noted. No evidence of recurrent or residual disc herniation. Central canal surgically capacious. Very mild residual right neural foraminal narrowing is stable. Left neural foramen patent.     L5-S1:  There is uncovering the intervertebral discs. There is bilateral facet hypertrophy. Moderate bilateral neural foraminal narrowing on the basis of the anterolisthesis is stable. Central canal patent. This level is similar to the prior study.     IMPRESSION:     Persistent grade 1 anterolisthesis of L5 on S1 with associated bilateral neural foraminal narrowing.   Postoperative changes at L4-5 are stable.        Workstation performed: [id number]                No orders to display       No orders of the defined types were placed in this encounter.

## 2023-07-21 DIAGNOSIS — F32.A ANXIETY AND DEPRESSION: ICD-10-CM

## 2023-07-21 DIAGNOSIS — F41.9 ANXIETY AND DEPRESSION: ICD-10-CM

## 2023-07-21 RX ORDER — SERTRALINE HYDROCHLORIDE 100 MG/1
100 TABLET, FILM COATED ORAL DAILY
Qty: 90 TABLET | Refills: 0 | Status: SHIPPED | OUTPATIENT
Start: 2023-07-21

## 2023-07-26 DIAGNOSIS — Z13.89 SCREENING FOR MULTIPLE CONDITIONS: Primary | ICD-10-CM

## 2023-07-27 ENCOUNTER — APPOINTMENT (OUTPATIENT)
Dept: LAB | Facility: MEDICAL CENTER | Age: 53
End: 2023-07-27
Payer: COMMERCIAL

## 2023-07-27 DIAGNOSIS — Z13.89 SCREENING FOR MULTIPLE CONDITIONS: ICD-10-CM

## 2023-07-27 LAB
25(OH)D3 SERPL-MCNC: 41.5 NG/ML (ref 30–100)
ALBUMIN SERPL BCP-MCNC: 4 G/DL (ref 3.5–5)
ALP SERPL-CCNC: 104 U/L (ref 46–116)
ALT SERPL W P-5'-P-CCNC: 21 U/L (ref 12–78)
ANION GAP SERPL CALCULATED.3IONS-SCNC: 8 MMOL/L
AST SERPL W P-5'-P-CCNC: 10 U/L (ref 5–45)
BILIRUB SERPL-MCNC: 0.54 MG/DL (ref 0.2–1)
BUN SERPL-MCNC: 19 MG/DL (ref 5–25)
CALCIUM SERPL-MCNC: 9.5 MG/DL (ref 8.3–10.1)
CHLORIDE SERPL-SCNC: 109 MMOL/L (ref 96–108)
CHOLEST SERPL-MCNC: 140 MG/DL
CO2 SERPL-SCNC: 25 MMOL/L (ref 21–32)
CREAT SERPL-MCNC: 0.72 MG/DL (ref 0.6–1.3)
ERYTHROCYTE [DISTWIDTH] IN BLOOD BY AUTOMATED COUNT: 15.2 % (ref 11.6–15.1)
GFR SERPL CREATININE-BSD FRML MDRD: 94 ML/MIN/1.73SQ M
GLUCOSE P FAST SERPL-MCNC: 88 MG/DL (ref 65–99)
HCT VFR BLD AUTO: 49.8 % (ref 34.8–46.1)
HDLC SERPL-MCNC: 49 MG/DL
HGB BLD-MCNC: 15.7 G/DL (ref 11.5–15.4)
LDLC SERPL CALC-MCNC: 70 MG/DL (ref 0–100)
MCH RBC QN AUTO: 28.9 PG (ref 26.8–34.3)
MCHC RBC AUTO-ENTMCNC: 31.5 G/DL (ref 31.4–37.4)
MCV RBC AUTO: 92 FL (ref 82–98)
PLATELET # BLD AUTO: 266 THOUSANDS/UL (ref 149–390)
PMV BLD AUTO: 10.7 FL (ref 8.9–12.7)
POTASSIUM SERPL-SCNC: 4 MMOL/L (ref 3.5–5.3)
PROT SERPL-MCNC: 7.4 G/DL (ref 6.4–8.4)
RBC # BLD AUTO: 5.43 MILLION/UL (ref 3.81–5.12)
SODIUM SERPL-SCNC: 142 MMOL/L (ref 135–147)
TRIGL SERPL-MCNC: 103 MG/DL
TSH SERPL DL<=0.05 MIU/L-ACNC: 1.06 UIU/ML (ref 0.45–4.5)
WBC # BLD AUTO: 6.4 THOUSAND/UL (ref 4.31–10.16)

## 2023-07-27 PROCEDURE — 80053 COMPREHEN METABOLIC PANEL: CPT

## 2023-07-27 PROCEDURE — 36415 COLL VENOUS BLD VENIPUNCTURE: CPT

## 2023-07-27 PROCEDURE — 84443 ASSAY THYROID STIM HORMONE: CPT

## 2023-07-27 PROCEDURE — 82306 VITAMIN D 25 HYDROXY: CPT

## 2023-07-27 PROCEDURE — 80061 LIPID PANEL: CPT

## 2023-07-27 PROCEDURE — 85027 COMPLETE CBC AUTOMATED: CPT

## 2023-07-31 ENCOUNTER — OFFICE VISIT (OUTPATIENT)
Dept: FAMILY MEDICINE CLINIC | Facility: CLINIC | Age: 53
End: 2023-07-31
Payer: COMMERCIAL

## 2023-07-31 VITALS
OXYGEN SATURATION: 96 % | BODY MASS INDEX: 38.9 KG/M2 | WEIGHT: 241 LBS | SYSTOLIC BLOOD PRESSURE: 128 MMHG | TEMPERATURE: 97.5 F | DIASTOLIC BLOOD PRESSURE: 72 MMHG | HEART RATE: 94 BPM

## 2023-07-31 DIAGNOSIS — J98.4 RESTRICTIVE LUNG DISEASE: ICD-10-CM

## 2023-07-31 DIAGNOSIS — E55.9 VITAMIN D DEFICIENCY: Primary | ICD-10-CM

## 2023-07-31 DIAGNOSIS — F41.9 ANXIETY AND DEPRESSION: ICD-10-CM

## 2023-07-31 DIAGNOSIS — F32.A ANXIETY AND DEPRESSION: ICD-10-CM

## 2023-07-31 DIAGNOSIS — R53.83 OTHER FATIGUE: ICD-10-CM

## 2023-07-31 PROCEDURE — 99214 OFFICE O/P EST MOD 30 MIN: CPT | Performed by: PHYSICIAN ASSISTANT

## 2023-07-31 RX ORDER — ERGOCALCIFEROL 1.25 MG/1
50000 CAPSULE ORAL WEEKLY
Qty: 12 CAPSULE | Refills: 0 | Status: SHIPPED | OUTPATIENT
Start: 2023-07-31

## 2023-07-31 NOTE — ASSESSMENT & PLAN NOTE
Continue to use albuterol PRN. Consider referral to larger hospital system such as [address] [place] [address] [address]

## 2023-07-31 NOTE — ASSESSMENT & PLAN NOTE
Vit D is low-normal, wondering if this is cause of her fatigue. Restart Vit D supplement, repeat Vit D level in 3 months.

## 2023-07-31 NOTE — PROGRESS NOTES
Name: Alex Casillas      : 1970      MRN: [id number]  Encounter Provider: Annia Hathaway PA-C  Encounter Date: 2023   Encounter department: [address]     1. Vitamin D deficiency  Assessment & Plan:  Vit D is low-normal, wondering if this is cause of her fatigue. Restart Vit D supplement, repeat Vit D level in 3 months. Orders:  -     ergocalciferol (VITAMIN D2) 50,000 units; Take 1 capsule (50,000 Units total) by mouth once a week    2. Other fatigue  Assessment & Plan:  Pt wit Covid end of 2023, fatigue possibly related to post-Covid. 3. Restrictive lung disease  Assessment & Plan:  Continue to use albuterol PRN. Consider referral to larger hospital system such as [facility]. 4. Anxiety and depression  Assessment & Plan:  Relatively stable, will continue sertraline 150 mg daily, RTO 3 months for recheck. [place]      Taras Nieves is here today for routine follow up. Unfortunately, Wixela inhaler did not give any benefit to her breathing after 1 month of use. Pt stopped using it. She reports that she only has 2-3 "bad breathing" days per month. It is consistent with humidity or with wildfire smoke. However, if without those conditions, it is more random as sometimes an activity will trigger it, but other times will not. I did suggest considering seeing a larger hospital system such as [facility], however she declines at this time. She does note that increasing sertraline at last OV does seem to have made improvement in her anxiety/depression, however she still has anxiety and picks at her fingers and arms without awareness. Labs reviewed from earlier this month. Review of Systems   Constitutional: Positive for fatigue. Negative for activity change, appetite change, chills, diaphoresis, fever and unexpected weight change.    HENT: Negative for congestion, ear pain, postnasal drip, rhinorrhea, sinus pressure, sinus pain, sneezing, sore throat, tinnitus and voice change. Eyes: Negative for pain, redness and visual disturbance. Respiratory: Positive for chest tightness and shortness of breath. Negative for cough and wheezing. At times   Cardiovascular: Negative for chest pain, palpitations and leg swelling. Gastrointestinal: Negative for abdominal pain, blood in stool, constipation, diarrhea, nausea and vomiting. Genitourinary: Negative for difficulty urinating, dysuria, frequency, hematuria and urgency. Musculoskeletal: Negative for arthralgias, back pain, gait problem, joint swelling, myalgias, neck pain and neck stiffness. Skin: Negative for color change, pallor, rash and wound. Neurological: Negative for dizziness, tremors, weakness, light-headedness and headaches. Psychiatric/Behavioral: Negative for dysphoric mood, self-injury, sleep disturbance and suicidal ideas. The patient is nervous/anxious. Current Outpatient Medications on File Prior to Visit   Medication Sig   • albuterol (Proventil HFA) 90 mcg/act inhaler Inhale 2 puffs every 6 (six) hours as needed for wheezing   • busPIRone (BUSPAR) 15 mg tablet Take 1 tablet (15 mg total) by mouth 3 (three) times a day   • gabapentin (Neurontin) 600 MG tablet 1 PO BID and 3 PO QHS   • Melatonin 10 MG TABS Take 60 mg by mouth daily at bedtime   • methocarbamol (ROBAXIN) 750 mg tablet Take 1 tablet (750 mg total) by mouth 3 (three) times a day As needed for pain/spasms   • naloxone (NARCAN) 4 mg/0.1 mL nasal spray Administer 1 spray into a nostril. If no response after 2-3 minutes, give another dose in the other nostril using a new spray.    • oxyCODONE (Roxicodone) 5 immediate release tablet Take 1 tablet (5 mg total) by mouth every 8 (eight) hours as needed for moderate pain Do not fill until 8/8/2023 Max Daily Amount: 15 mg   • oxyCODONE (Roxicodone) 5 immediate release tablet Take 1 tablet (5 mg total) by mouth every 8 (eight) hours as needed for moderate pain Max Daily Amount: 15 mg   • pantoprazole (PROTONIX) 40 mg tablet    • sertraline (ZOLOFT) 100 mg tablet Take 1 tablet (100 mg total) by mouth daily   • sertraline (ZOLOFT) 50 mg tablet Take 1 tablet (50 mg total) by mouth daily   • [DISCONTINUED] Fluticasone-Salmeterol (Advair) 250-50 mcg/dose inhaler Inhale 1 puff 2 (two) times a day Rinse mouth after use. (Patient not taking: Reported on 7/31/2023)       Objective     /72   Pulse 94   Temp 97.5 °F (36.4 °C)   Wt 109 kg (241 lb)   LMP 10/10/2016   SpO2 96%   BMI 38.90 kg/m²     Physical Exam  Vitals reviewed. Constitutional:       General: She is not in acute distress. Appearance: She is well-developed. She is not diaphoretic. HENT:      Head: Normocephalic and atraumatic. Right Ear: Hearing, tympanic membrane, ear canal and external ear normal.      Left Ear: Hearing, tympanic membrane, ear canal and external ear normal.      Mouth/Throat:      Pharynx: Uvula midline. No oropharyngeal exudate. Eyes:      General: No scleral icterus. Right eye: No discharge. Left eye: No discharge. Conjunctiva/sclera: Conjunctivae normal.   Neck:      Thyroid: No thyromegaly. Vascular: No carotid bruit. Cardiovascular:      Rate and Rhythm: Normal rate and regular rhythm. Heart sounds: Normal heart sounds. No murmur heard. Pulmonary:      Effort: Pulmonary effort is normal. No respiratory distress. Breath sounds: Normal breath sounds. No wheezing. Abdominal:      General: Bowel sounds are normal. There is no distension. Palpations: Abdomen is soft. There is no mass. Tenderness: There is no abdominal tenderness. There is no guarding or rebound. Musculoskeletal:         General: No tenderness. Normal range of motion. Cervical back: Neck supple. Lymphadenopathy:      Cervical: No cervical adenopathy. Skin:     General: Skin is warm and dry. Findings: No erythema or rash.    Neurological:      Mental Status: She is alert and oriented to person, place, and time. Psychiatric:         Behavior: Behavior normal.         Thought Content:  Thought content normal.         Judgment: Judgment normal.       Lester Braden PA-C

## 2023-09-12 ENCOUNTER — OFFICE VISIT (OUTPATIENT)
Dept: FAMILY MEDICINE CLINIC | Facility: CLINIC | Age: 53
End: 2023-09-12
Payer: COMMERCIAL

## 2023-09-12 ENCOUNTER — OFFICE VISIT (OUTPATIENT)
Dept: PAIN MEDICINE | Facility: CLINIC | Age: 53
End: 2023-09-12
Payer: COMMERCIAL

## 2023-09-12 VITALS
SYSTOLIC BLOOD PRESSURE: 152 MMHG | HEIGHT: 66 IN | TEMPERATURE: 97.5 F | BODY MASS INDEX: 38.63 KG/M2 | OXYGEN SATURATION: 95 % | WEIGHT: 240.4 LBS | DIASTOLIC BLOOD PRESSURE: 98 MMHG | HEART RATE: 83 BPM

## 2023-09-12 VITALS
BODY MASS INDEX: 38.54 KG/M2 | DIASTOLIC BLOOD PRESSURE: 95 MMHG | RESPIRATION RATE: 16 BRPM | HEIGHT: 66 IN | HEART RATE: 74 BPM | SYSTOLIC BLOOD PRESSURE: 140 MMHG | WEIGHT: 239.8 LBS

## 2023-09-12 DIAGNOSIS — F32.A ANXIETY AND DEPRESSION: ICD-10-CM

## 2023-09-12 DIAGNOSIS — Z00.00 MEDICARE ANNUAL WELLNESS VISIT, INITIAL: Primary | ICD-10-CM

## 2023-09-12 DIAGNOSIS — M54.16 LUMBAR RADICULOPATHY: ICD-10-CM

## 2023-09-12 DIAGNOSIS — F41.9 ANXIETY AND DEPRESSION: ICD-10-CM

## 2023-09-12 DIAGNOSIS — F11.20 UNCOMPLICATED OPIOID DEPENDENCE (HCC): ICD-10-CM

## 2023-09-12 DIAGNOSIS — M54.50 LUMBAR PAIN WITH RADIATION DOWN LEFT LEG: ICD-10-CM

## 2023-09-12 DIAGNOSIS — M25.562 LEFT MEDIAL KNEE PAIN: ICD-10-CM

## 2023-09-12 DIAGNOSIS — G89.4 CHRONIC PAIN SYNDROME: ICD-10-CM

## 2023-09-12 DIAGNOSIS — Z79.891 LONG-TERM CURRENT USE OF OPIATE ANALGESIC: ICD-10-CM

## 2023-09-12 DIAGNOSIS — Z98.1 STATUS POST LUMBAR SPINAL FUSION: ICD-10-CM

## 2023-09-12 DIAGNOSIS — M51.26 LUMBAR DISC HERNIATION: ICD-10-CM

## 2023-09-12 DIAGNOSIS — M79.605 LUMBAR PAIN WITH RADIATION DOWN LEFT LEG: ICD-10-CM

## 2023-09-12 PROCEDURE — 99214 OFFICE O/P EST MOD 30 MIN: CPT | Performed by: NURSE PRACTITIONER

## 2023-09-12 PROCEDURE — G0438 PPPS, INITIAL VISIT: HCPCS | Performed by: PHYSICIAN ASSISTANT

## 2023-09-12 PROCEDURE — 99214 OFFICE O/P EST MOD 30 MIN: CPT | Performed by: PHYSICIAN ASSISTANT

## 2023-09-12 RX ORDER — OXYCODONE HYDROCHLORIDE 5 MG/1
5 TABLET ORAL EVERY 8 HOURS PRN
Qty: 90 TABLET | Refills: 0 | Status: SHIPPED | OUTPATIENT
Start: 2023-09-12

## 2023-09-12 RX ORDER — METHOCARBAMOL 750 MG/1
750 TABLET, FILM COATED ORAL 3 TIMES DAILY
Qty: 90 TABLET | Refills: 1 | Status: SHIPPED | OUTPATIENT
Start: 2023-09-12

## 2023-09-12 RX ORDER — GABAPENTIN 600 MG/1
TABLET ORAL
Qty: 150 TABLET | Refills: 1 | Status: SHIPPED | OUTPATIENT
Start: 2023-09-12

## 2023-09-12 NOTE — PROGRESS NOTES
Assessment and Plan:     Problem List Items Addressed This Visit        Other    Anxiety and depression     Continue same medications at this time. Pt to consider stopping therapy as it is likely increasing her anxiety at this time. Left medial knee pain     Pt to rest, if still bothersome in 1-2 weeks, pt to call and would order MRI L knee to evaluate for soft tissue damage. Other Visit Diagnoses     Medicare annual wellness visit, initial    -  Primary           Preventive health issues were discussed with patient, and age appropriate screening tests were ordered as noted in patient's After Visit Summary. Personalized health advice and appropriate referrals for health education or preventive services given if needed, as noted in patient's After Visit Summary. History of Present Illness:     Patient presents for a Medicare Wellness Visit    Shasha Beard is here today due to increased anxiety/insomnia. States upon the insistence of her partner, she started seeing an online therapist. Shasha Beard does not like counseling, she states she does not like talking, she states therapist is discussing things that happened to Shasha Beard 50 years ago and she has long since moved past. She notes that she is short at times, angry, sweaty. She is having nightmares. She also notes that she had a plant/twist injury with L LE 1 week ago, now feels pulling sensation/pain along L medial knee. Feels that the knee is weak or "gives out."      Patient Care Team:  Apolonia Orta PA-C as PCP - General (Physician Assistant)  Karissa Barrios, DO as PCP - PCP-[employer]-Medicaid (RTE)  Karissa Barrios, DO as PCP - [address] [place] [address] (RTE)  YAYA Garcia (Nurse Practitioner)  Marcela Barajas MD (Pain Medicine)     Review of Systems:     Review of Systems   Constitutional: Negative for activity change, appetite change, chills, diaphoresis, fatigue, fever and unexpected weight change.    HENT: Negative for congestion, ear pain, postnasal drip, rhinorrhea, sinus pressure, sinus pain, sneezing, sore throat, tinnitus and voice change. Eyes: Negative for pain, redness and visual disturbance. Respiratory: Negative for cough, chest tightness, shortness of breath and wheezing. Cardiovascular: Negative for chest pain, palpitations and leg swelling. Gastrointestinal: Negative for abdominal pain, blood in stool, constipation, diarrhea, nausea and vomiting. Genitourinary: Negative for difficulty urinating, dysuria, frequency, hematuria and urgency. Musculoskeletal: Positive for arthralgias and gait problem. Negative for back pain, joint swelling, myalgias, neck pain and neck stiffness. Skin: Negative for color change, pallor, rash and wound. Neurological: Negative for dizziness, tremors, weakness, light-headedness and headaches. Psychiatric/Behavioral: Positive for agitation, dysphoric mood and sleep disturbance. Negative for self-injury and suicidal ideas. The patient is nervous/anxious.          Problem List:     Patient Active Problem List   Diagnosis   • Supraumbilical hernia   • Lumbar pain with radiation down left leg   • Lumbar disc herniation   • Status post lumbar spinal fusion   • CRPS (complex regional pain syndrome type I)   • Traumatic incomplete tear of left rotator cuff   • Chronic pain syndrome   • Lumbar radiculopathy   • Dyspnea on exertion   • S/P shoulder surgery   • Restrictive lung disease   • Anxiety and depression   • Long-term current use of opiate analgesic   • Uncomplicated opioid dependence ([address])   • Vitamin D deficiency   • Other fatigue   • Left medial knee pain      Past Medical and Surgical History:     Past Medical History:   Diagnosis Date   • Anxiety    • Back pain    • Depression    • Insomnia      Past Surgical History:   Procedure Laterality Date   • BACK SURGERY  2006    cage in lumbar L5 area   • BACK SURGERY     • CAUDAL BLOCK N/A 11/15/2022    Procedure: BLOCK / INJECTION CAUDAL;  Surgeon: Kiet Nolasco MD;  Location: [facility] MAIN OR;  Service: Pain Management    • CHOLECYSTECTOMY     • LUMBAR [address] [facility] SURGERY     • ME REPAIR FIRST ABDOMINAL WALL HERNIA N/A 3/16/2018    Procedure: REPAIR HERNIA VENTRAL with mesh;  Surgeon: Roro Aquino DO;  Location: [facility] MA[facility];  Service: General   • ME SURGICAL ARTHROSCOPY SHOULDER W/ROTATOR CUFF RPR Left 2020    Procedure: SHOULDER ARTHROSCOPY, ROTATOR CUFF REPAIR, SYNEVECTOMY, ACROMIOPLASTY DEBRIDEMENT, INJECTION;  Surgeon: Nunzio Saint, DO;  Location: [address] [place] [address] MAIN OR;  Service: Orthopedics      Family History:     Family History   Adopted: Yes   Family history unknown: Yes      Social History:     Social History     Socioeconomic History   • Marital status: /Civil Union     Spouse name: None   • Number of children: None   • Years of education: None   • Highest education level: None   Occupational History   • None   Tobacco Use   • Smoking status: Former     Packs/day: 0.00     Years: 10.00     Total pack years: 0.00     Types: Cigarettes     Quit date: 10/17/2020     Years since quittin.9   • Smokeless tobacco: Never   • Tobacco comments:     Recently quit   Vaping Use   • Vaping Use: Never used   Substance and Sexual Activity   • Alcohol use: No   • Drug use: No   • Sexual activity: Not Currently     Partners: Female   Other Topics Concern   • None   Social History Narrative   • None     Social Determinants of Health     Financial Resource Strain: High Risk (2023)    Overall Financial Resource Strain (CARDIA)    • Difficulty of Paying Living Expenses: Hard   Food Insecurity: Not on file   Transportation Needs: No Transportation Needs (2023)    PRAPARE - Transportation    • Lack of Transportation (Medical): No    • Lack of Transportation (Non-Medical):  No   Physical Activity: Not on file   Stress: Not on file   Social Connections: Not on file   Intimate Partner Violence: Not on file   Housing Stability: Not on file      Medications and Allergies:     Current Outpatient Medications   Medication Sig Dispense Refill   • albuterol (Proventil HFA) 90 mcg/act inhaler Inhale 2 puffs every 6 (six) hours as needed for wheezing 6.7 g 5   • ergocalciferol (VITAMIN D2) 50,000 units Take 1 capsule (50,000 Units total) by mouth once a week 12 capsule 0   • gabapentin (Neurontin) 600 MG tablet 1 PO BID and 3 PO  tablet 1   • Melatonin 10 MG TABS Take 60 mg by mouth daily at bedtime     • methocarbamol (ROBAXIN) 750 mg tablet Take 1 tablet (750 mg total) by mouth 3 (three) times a day As needed for pain/spasms 90 tablet 1   • naloxone (NARCAN) 4 mg/0.1 mL nasal spray Administer 1 spray into a nostril. If no response after 2-3 minutes, give another dose in the other nostril using a new spray. 1 each 1   • oxyCODONE (Roxicodone) 5 immediate release tablet Take 1 tablet (5 mg total) by mouth every 8 (eight) hours as needed for moderate pain Do not fill until 8/8/2023 Max Daily Amount: 15 mg 90 tablet 0   • oxyCODONE (Roxicodone) 5 immediate release tablet Take 1 tablet (5 mg total) by mouth every 8 (eight) hours as needed for moderate pain Max Daily Amount: 15 mg 90 tablet 0   • pantoprazole (PROTONIX) 40 mg tablet      • sertraline (ZOLOFT) 100 mg tablet Take 1 tablet (100 mg total) by mouth daily 90 tablet 0   • sertraline (ZOLOFT) 50 mg tablet Take 1 tablet (50 mg total) by mouth daily 90 tablet 0   • busPIRone (BUSPAR) 15 mg tablet Take 1 tablet (15 mg total) by mouth 3 (three) times a day 270 tablet 0     No current facility-administered medications for this visit.      No Known Allergies   Immunizations:     Immunization History   Administered Date(s) Administered   • COVID-19 MODERNA VACC 0.5 ML IM 03/11/2021, 04/08/2021   • Influenza, Quadrivalent (nasal) 11/18/2016   • Influenza, recombinant, quadrivalent,injectable, preservative free 10/02/2020, 10/14/2021, 10/24/2022   • Influenza, seasonal, injectable 10/15/2015   • Tdap 09/09/2013      Health Maintenance:         Topic Date Due   • Hepatitis C Screening  Never done   • HIV Screening  Never done   • Cervical Cancer Screening  Never done   • Breast Cancer Screening: Mammogram  01/26/2024   • Colorectal Cancer Screening  01/27/2026         Topic Date Due   • COVID-19 Vaccine (3 - Moderna series) 06/03/2021   • Influenza Vaccine (1) 09/01/2023      Medicare Screening Tests and Risk Assessments:     Nette Dunaway is here for her Initial Wellness visit. Health Risk Assessment:   Patient rates overall health as fair. Patient feels that their physical health rating is same. Patient is satisfied with their life. Eyesight was rated as same. Hearing was rated as same. Patient feels that their emotional and mental health rating is same. Patients states they are never, rarely angry. Patient states they are often unusually tired/fatigued. Pain experienced in the last 7 days has been a lot. Patient's pain rating has been 7/10. Patient states that she has experienced no weight loss or gain in last 6 months. Depression Screening:   PHQ-9 Score: 0      Fall Risk Screening: In the past year, patient has experienced: history of falling in past year    Injured during fall?: No    Feels unsteady when standing or walking?: Yes    Worried about falling?: Yes      Urinary Incontinence Screening:   Patient has not leaked urine accidently in the last six months. Home Safety:  Patient has trouble with stairs inside or outside of their home. Patient has working smoke alarms and has working carbon monoxide detector. Home safety hazards include: none. Nutrition:   Current diet is Regular. Medications:   Patient is currently taking over-the-counter supplements. OTC medications include: see medication list. Patient is able to manage medications.      Activities of Daily Living (ADLs)/Instrumental Activities of Daily Living (IADLs):   Walk and transfer into and out of bed and chair?: Yes  Dress and groom yourself?: Yes Bathe or shower yourself?: Yes    Feed yourself? Yes  Do your laundry/housekeeping?: Yes  Manage your money, pay your bills and track your expenses?: Yes  Make your own meals?: Yes    Do your own shopping?: Yes    Previous Hospitalizations:   Any hospitalizations or ED visits within the last 12 months?: No      Advance Care Planning:   Living will: Yes    Durable POA for healthcare: Yes    Advanced directive: Yes    Five wishes given: Yes      Cognitive Screening:   Provider or family/friend/caregiver concerned regarding cognition?: No    PREVENTIVE SCREENINGS      Cardiovascular Screening:    General: Screening Current      Diabetes Screening:     General: Screening Current      Colorectal Cancer Screening:     General: Screening Current      Breast Cancer Screening:     General: Screening Current      Cervical Cancer Screening:    General: Patient Declines      Osteoporosis Screening:    General: Patient Declines      Abdominal Aortic Aneurysm (AAA) Screening:        General: Screening Not Indicated      Lung Cancer Screening:     General: Screening Not Indicated      Hepatitis C Screening:    General: Screening Not Indicated    Screening, Brief Intervention, and Referral to Treatment (SBIRT)    Screening  Typical number of drinks in a day: 0  Typical number of drinks in a week: 0  Interpretation: Low risk drinking behavior. Single Item Drug Screening:  How often have you used an illegal drug (including marijuana) or a prescription medication for non-medical reasons in the past year? never    Single Item Drug Screen Score: 0  Interpretation: Negative screen for possible drug use disorder    Review of Current Opioid Use    Opioid Risk Tool (ORT) Interpretation: Complete Opioid Risk Tool (ORT)    No results found.      Physical Exam:     /98   Pulse 83   Temp 97.5 °F (36.4 °C)   Ht 5' 6" (1.676 m)   Wt 109 kg (240 lb 6.4 oz)   LMP 10/10/2016   SpO2 95%   BMI 38.80 kg/m²     Physical Exam  Vitals and nursing note reviewed. Constitutional:       General: She is not in acute distress. Appearance: She is well-developed. She is obese. HENT:      Head: Normocephalic and atraumatic. Eyes:      Conjunctiva/sclera: Conjunctivae normal.   Cardiovascular:      Rate and Rhythm: Normal rate and regular rhythm. Heart sounds: No murmur heard. Pulmonary:      Effort: Pulmonary effort is normal. No respiratory distress. Breath sounds: Normal breath sounds. Abdominal:      Palpations: Abdomen is soft. Tenderness: There is no abdominal tenderness. Musculoskeletal:         General: No swelling. Cervical back: Neck supple. Left knee: Ecchymosis present. No swelling or deformity. Normal range of motion. Tenderness present. Legs:    Skin:     General: Skin is warm and dry. Capillary Refill: Capillary refill takes less than 2 seconds. Neurological:      Mental Status: She is alert.    Psychiatric:         Mood and Affect: Mood normal.          Nicole Rey PA-C

## 2023-09-12 NOTE — ASSESSMENT & PLAN NOTE
Pt to rest, if still bothersome in 1-2 weeks, pt to call and would order MRI L knee to evaluate for soft tissue damage.

## 2023-09-12 NOTE — PATIENT INSTRUCTIONS
Medicare Preventive Visit Patient Instructions  Thank you for completing your Welcome to Medicare Visit or Medicare Annual Wellness Visit today. Your next wellness visit will be due in one year (9/12/2024). The screening/preventive services that you may require over the next 5-10 years are detailed below. Some tests may not apply to you based off risk factors and/or age. Screening tests ordered at today's visit but not completed yet may show as past due. Also, please note that scanned in results may not display below. Preventive Screenings:  Service Recommendations Previous Testing/Comments   Colorectal Cancer Screening  * Colonoscopy    * Fecal Occult Blood Test (FOBT)/Fecal Immunochemical Test (FIT)  * Fecal DNA/Cologuard Test  * Flexible Sigmoidoscopy Age: 43-73 years old   Colonoscopy: every 10 years (may be performed more frequently if at higher risk)  OR  FOBT/FIT: every 1 year  OR  Cologuard: every 3 years  OR  Sigmoidoscopy: every 5 years  Screening may be recommended earlier than age 39 if at higher risk for colorectal cancer. Also, an individualized decision between you and your healthcare provider will decide whether screening between the ages of 77-80 would be appropriate. Colonoscopy: 01/27/2023  FOBT/FIT: Not on file  Cologuard: 01/27/2023  Sigmoidoscopy: Not on file    Screening Current     Breast Cancer Screening Age: 36 years old  Frequency: every 1-2 years  Not required if history of left and right mastectomy Mammogram: 01/26/2023    Screening Current   Cervical Cancer Screening Between the ages of 21-29, pap smear recommended once every 3 years. Between the ages of 32-69, can perform pap smear with HPV co-testing every 5 years.    Recommendations may differ for women with a history of total hysterectomy, cervical cancer, or abnormal pap smears in past. Pap Smear: Not on file        Hepatitis C Screening Once for adults born between 1945 and 1965  More frequently in patients at high risk for Hepatitis C Hep C Antibody: Not on file        Diabetes Screening 1-2 times per year if you're at risk for diabetes or have pre-diabetes Fasting glucose: 88 mg/dL (7/27/2023)  A1C: No results in last 5 years (No results in last 5 years)  Screening Current   Cholesterol Screening Once every 5 years if you don't have a lipid disorder. May order more often based on risk factors. Lipid panel: 07/27/2023    Screening Current     Other Preventive Screenings Covered by Medicare:  1. Abdominal Aortic Aneurysm (AAA) Screening: covered once if your at risk. You're considered to be at risk if you have a family history of AAA. 2. Lung Cancer Screening: covers low dose CT scan once per year if you meet all of the following conditions: (1) Age 48-67; (2) No signs or symptoms of lung cancer; (3) Current smoker or have quit smoking within the last 15 years; (4) You have a tobacco smoking history of at least 20 pack years (packs per day multiplied by number of years you smoked); (5) You get a written order from a healthcare provider. 3. Glaucoma Screening: covered annually if you're considered high risk: (1) You have diabetes OR (2) Family history of glaucoma OR (3)  aged 48 and older OR (3)  American aged 72 and older  3. Osteoporosis Screening: covered every 2 years if you meet one of the following conditions: (1) You're estrogen deficient and at risk for osteoporosis based off medical history and other findings; (2) Have a vertebral abnormality; (3) On glucocorticoid therapy for more than 3 months; (4) Have primary hyperparathyroidism; (5) On osteoporosis medications and need to assess response to drug therapy. · Last bone density test (DXA Scan): Not on file. 5. HIV Screening: covered annually if you're between the age of 14-79. Also covered annually if you are younger than 13 and older than 72 with risk factors for HIV infection.  For pregnant patients, it is covered up to 3 times per pregnancy. Immunizations:  Immunization Recommendations   Influenza Vaccine Annual influenza vaccination during flu season is recommended for all persons aged >= 6 months who do not have contraindications   Pneumococcal Vaccine   * Pneumococcal conjugate vaccine = PCV13 (Prevnar 13), PCV15 (Vaxneuvance), PCV20 (Prevnar 20)  * Pneumococcal polysaccharide vaccine = PPSV23 (Pneumovax) Adults 20-63 years old: 1-3 doses may be recommended based on certain risk factors  Adults 72 years old: 1-2 doses may be recommended based off what pneumonia vaccine you previously received   Hepatitis B Vaccine 3 dose series if at intermediate or high risk (ex: diabetes, end stage renal disease, liver disease)   Tetanus (Td) Vaccine - COST NOT COVERED BY MEDICARE PART B Following completion of primary series, a booster dose should be given every 10 years to maintain immunity against tetanus. Td may also be given as tetanus wound prophylaxis. Tdap Vaccine - COST NOT COVERED BY MEDICARE PART B Recommended at least once for all adults. For pregnant patients, recommended with each pregnancy. Shingles Vaccine (Shingrix) - COST NOT COVERED BY MEDICARE PART B  2 shot series recommended in those aged 48 and above     Health Maintenance Due:      Topic Date Due   • Hepatitis C Screening  Never done   • HIV Screening  Never done   • Cervical Cancer Screening  Never done   • Breast Cancer Screening: Mammogram  01/26/2024   • Colorectal Cancer Screening  01/27/2026     Immunizations Due:      Topic Date Due   • COVID-19 Vaccine (3 - Moderna series) 06/03/2021   • Influenza Vaccine (1) 09/01/2023     Advance Directives   What are advance directives? Advance directives are legal documents that state your wishes and plans for medical care. These plans are made ahead of time in case you lose your ability to make decisions for yourself.  Advance directives can apply to any medical decision, such as the treatments you want, and if you want to donate organs. What are the types of advance directives? There are many types of advance directives, and each state has rules about how to use them. You may choose a combination of any of the following:  · Living will: This is a written record of the treatment you want. You can also choose which treatments you do not want, which to limit, and which to stop at a certain time. This includes surgery, medicine, IV fluid, and tube feedings. · Durable power of  for healthcare [facility]): This is a written record that states who you want to make healthcare choices for you when you are unable to make them for yourself. This person, called a proxy, is usually a family member or a friend. You may choose more than 1 proxy. · Do not resuscitate (DNR) order:  A DNR order is used in case your heart stops beating or you stop breathing. It is a request not to have certain forms of treatment, such as CPR. A DNR order may be included in other types of advance directives. · Medical directive: This covers the care that you want if you are in a coma, near death, or unable to make decisions for yourself. You can list the treatments you want for each condition. Treatment may include pain medicine, surgery, blood transfusions, dialysis, IV or tube feedings, and a ventilator (breathing machine). · Values history: This document has questions about your views, beliefs, and how you feel and think about life. This information can help others choose the care that you would choose. Why are advance directives important? An advance directive helps you control your care. Although spoken wishes may be used, it is better to have your wishes written down. Spoken wishes can be misunderstood, or not followed. Treatments may be given even if you do not want them. An advance directive may make it easier for your family to make difficult choices about your care.    Weight Management   Why it is important to manage your weight:  Being overweight increases your risk of health conditions such as heart disease, high blood pressure, type 2 diabetes, and certain types of cancer. It can also increase your risk for osteoarthritis, sleep apnea, and other respiratory problems. Aim for a slow, steady weight loss. Even a small amount of weight loss can lower your risk of health problems. How to lose weight safely:  A safe and healthy way to lose weight is to eat fewer calories and get regular exercise. You can lose up about 1 pound a week by decreasing the number of calories you eat by 500 calories each day. Healthy meal plan for weight management:  A healthy meal plan includes a variety of foods, contains fewer calories, and helps you stay healthy. A healthy meal plan includes the following:  · Eat whole-grain foods more often. A healthy meal plan should contain fiber. Fiber is the part of grains, fruits, and vegetables that is not broken down by your body. Whole-grain foods are healthy and provide extra fiber in your diet. Some examples of whole-grain foods are whole-wheat breads and pastas, oatmeal, brown rice, and bulgur. · Eat a variety of vegetables every day. Include dark, leafy greens such as spinach, kale, valdez greens, and mustard greens. Eat yellow and orange vegetables such as carrots, sweet potatoes, and winter squash. · Eat a variety of fruits every day. Choose fresh or canned fruit (canned in its own juice or light syrup) instead of juice. Fruit juice has very little or no fiber. · Eat low-fat dairy foods. Drink fat-free (skim) milk or 1% milk. Eat fat-free yogurt and low-fat cottage cheese. Try low-fat cheeses such as mozzarella and other reduced-fat cheeses. · Choose meat and other protein foods that are low in fat. Choose beans or other legumes such as split peas or lentils. Choose fish, skinless poultry (chicken or turkey), or lean cuts of red meat (beef or pork). Before you cook meat or poultry, cut off any visible fat. · Use less fat and oil. Try baking foods instead of frying them. Add less fat, such as margarine, sour cream, regular salad dressing and mayonnaise to foods. Eat fewer high-fat foods. Some examples of high-fat foods include french fries, doughnuts, ice cream, and cakes. · Eat fewer sweets. Limit foods and drinks that are high in sugar. This includes candy, cookies, regular soda, and sweetened drinks. Exercise:  Exercise at least 30 minutes per day on most days of the week. Some examples of exercise include walking, biking, dancing, and swimming. You can also fit in more physical activity by taking the stairs instead of the elevator or parking farther away from stores. Ask your healthcare provider about the best exercise plan for you. Narcotic (Opioid) Safety    Use narcotics safely:  · Take prescribed narcotics exactly as directed  · Do not give narcotics to others or take narcotics that belong to someone else  · Do not mix narcotics without medicines or alcohol  · Do not drive or operate heavy machinery after you take the narcotic  · Monitor for side effects and notify your healthcare provider if you experienced side effects such as nausea, sleepiness, itching, or trouble thinking clearly. Manage constipation:    Constipation is the most common side effect of narcotic medicine. Constipation is when you have hard, dry bowel movements, or you go longer than usual between bowel movements. Tell your healthcare provider about all changes in your bowel movements while you are taking narcotics. He or she may recommend laxative medicine to help you have a bowel movement. He or she may also change the kind of narcotic you are taking, or change when you take it. The following are more ways you can prevent or relieve constipation:    · Drink liquids as directed. You may need to drink extra liquids to help soften and move your bowels. Ask how much liquid to drink each day and which liquids are best for you.   · Eat high-fiber foods. This may help decrease constipation by adding bulk to your bowel movements. High-fiber foods include fruits, vegetables, whole-grain breads and cereals, and beans. Your healthcare provider or dietitian can help you create a high-fiber meal plan. Your provider may also recommend a fiber supplement if you cannot get enough fiber from food. · Exercise regularly. Regular physical activity can help stimulate your intestines. Walking is a good exercise to prevent or relieve constipation. Ask which exercises are best for you. · Schedule a time each day to have a bowel movement. This may help train your body to have regular bowel movements. Bend forward while you are on the toilet to help move the bowel movement out. Sit on the toilet for at least 10 minutes, even if you do not have a bowel movement. Store narcotics safely:   · Store narcotics where others cannot easily get them. Keep them in a locked cabinet or secure area. Do not  keep them in a purse or other bag you carry with you. A person may be looking for something else and find the narcotics. · Make sure narcotics are stored out of the reach of children. A child can easily overdose on narcotics. Narcotics may look like candy to a small child. The best way to dispose of narcotics: The laws vary by country and area. In the [facility], the best way is to return the narcotics through a take-back program. This program is offered by the [employer]). The following are options for using the program:  · Take the narcotics to a [employer] collection site. The site is often a law enforcement center. Call your local law enforcement center for scheduled take-back days in your area. You will be given information on where to go if the collection site is in a different location. · Take the narcotics to an approved pharmacy or hospital.  A pharmacy or hospital may be set up as a collection site.  You will need to ask if it is a [employer] collection site if you were not directed there. A pharmacy or doctor's office may not be able to take back narcotics unless it is a [employer] site. · Use a mail-back system. This means you are given containers to put the narcotics into. You will then mail them in the containers. · Use a take-back drop box. This is a place to leave the narcotics at any time. People and animals will not be able to get into the box. Your local law enforcement agency can tell you where to find a drop box in your area. Other ways to manage pain:   · Ask your healthcare provider about non-narcotic medicines to control pain. Nonprescription medicines include NSAIDs (such as ibuprofen) and acetaminophen. Prescription medicines include muscle relaxers, antidepressants, and steroids. · Pain may be managed without any medicines. Some ways to relieve pain include massage, aromatherapy, or meditation. Physical or occupational therapy may also help. For more information:   · Drug Enforcement Administration  [address] [facility] , [address]  Phone: 3- 807 - 614-2992  Web Address: [facility].. [place].gov/drug_disposal/    · [address] [facility] [address] and Drug Administration  [address] [facility] , [address]  Phone: 0- 000 - 008-1823  Web Address: http://[employer]/     © Copyright [employer] 2018 Information is for End User's use only and may not be sold, redistributed or otherwise used for commercial purposes.  All illustrations and images included in CareNotes® are the copyrighted property of [employer] or  [employer]

## 2023-09-12 NOTE — PROGRESS NOTES
Assessment:  1. Chronic pain syndrome    2. Status post lumbar spinal fusion    3. Lumbar pain with radiation down left leg    4. Long-term current use of opiate analgesic    5. Uncomplicated opioid dependence ([address])    6. Lumbar disc herniation    7. Lumbar radiculopathy        Plan:  While the patient was in the office today, I did have a thorough conversation regarding their chronic pain syndrome, medication management, and treatment plan options. She is being seen for a follow-up visit. She was last seen here on 7/11/2023 at which time oxycodone was increased to 3 times daily. She continues with gabapentin and Robaxin as well. Medications reduce her pain anywhere from 10 to 30% depending on the day. Renewed oxycodone 5 mg 3 times daily if needed for pain. The patient's opioid scripts were sent to their pharmacy electronically and was given a 2 month supply of prescriptions with a Do Not Fill date(s) of 9/12/2023, 10/10/2023. Continue gabapentin 600/600/1800. A prescription was sent to her pharmacy with a refill. Continue Robaxin-750 milligrams 3 times daily if needed for spasms. A prescription was sent to her pharmacy with a refill. There are risks associated with opioid medications, including dependence, addiction and tolerance. The patient understands and agrees to use these medications only as prescribed. Potential side effects of the medications include, but are not limited to, constipation, drowsiness, addiction, impaired judgment and risk of fatal overdose if not taken as prescribed. The patient was warned against driving while taking sedation medications. Sharing medications is a felony. At this point in time, the patient is showing no signs of addiction, abuse, diversion or suicidal ideation. A urine drug screen was collected at today's office visit as part of our medication management protocol. The point of care testing results were appropriate for what was being prescribed.  The specimen will be sent for confirmatory testing. The drug screen is medically necessary because the patient is either dependent on opioid medication or is being considered for opioid medication therapy and the results could impact ongoing or future treatment. The drug screen is to evaluate for the presences or absence of prescribed, non-prescribed, and/or illicit drugs/substances. [place] Prescription Drug Monitoring Program report was reviewed and was appropriate     The patient will follow-up in 2 months for medication prescription refill and reevaluation. The patient was advised to contact the office should their symptoms worsen in the interim. The patient was agreeable and verbalized an understanding. History of Present Illness: The patient is a 52 y.o. female who presents for a follow up office visit in regards to Back Pain and Leg Pain (left). The patient’s current symptoms include plaints of low back and left leg pain. Current pain level is a 7/10. Quality pain is described as burning, sharp, throbbing, shooting, pins-and-needles. Current pain medications includes: Oxycodone 5 mg every 8 hours as needed for pain, gabapentin 600/600/1800, Robaxin-750 milligrams 3 times daily if needed for pain. The patient reports that this regimen is providing 10-30% pain relief. The patient is reporting no side effects from this pain medication regimen. I have personally reviewed and/or updated the patient's past medical history, past surgical history, family history, social history, current medications, allergies, and vital signs today. Review of Systems  Review of Systems   Musculoskeletal: Positive for back pain and gait problem. Decreased ROM  Pain (legs and back)   All other systems reviewed and are negative.           Past Medical History:   Diagnosis Date   • Anxiety    • Back pain    • Depression    • Insomnia        Past Surgical History:   Procedure Laterality Date   • BACK SURGERY  2006    cage in lumbar L5 area   • BACK SURGERY     • CAUDAL BLOCK N/A 11/15/2022    Procedure: BLOCK / INJECTION CAUDAL;  Surgeon: Aby Colvin MD;  Location: [facility];  Service: Pain Management    • CHOLECYSTECTOMY     • LUMBAR [address] [facility] SURGERY     • [place] REPAIR FIRST ABDOMINAL WALL HERNIA N/A 3/16/2018    Procedure: REPAIR HERNIA VENTRAL with mesh;  Surgeon: Jin Arnett DO;  Location: [facility];  Service: General   • [place] SURGICAL ARTHROSCOPY SHOULDER W/ROTATOR CUFF RPR Left 2020    Procedure: SHOULDER ARTHROSCOPY, ROTATOR CUFF REPAIR, SYNEVECTOMY, ACROMIOPLASTY DEBRIDEMENT, INJECTION;  Surgeon: Magen Atkins DO;  Location: [address] [place] [address] MAIN OR;  Service: Orthopedics       Family History   Adopted: Yes   Family history unknown: Yes       Social History     Occupational History   • Not on file   Tobacco Use   • Smoking status: Former     Packs/day: 0.00     Years: 10.00     Total pack years: 0.00     Types: Cigarettes     Quit date: 10/17/2020     Years since quittin.9   • Smokeless tobacco: Never   • Tobacco comments:     Recently quit   Vaping Use   • Vaping Use: Never used   Substance and Sexual Activity   • Alcohol use: No   • Drug use: No   • Sexual activity: Not Currently     Partners: Female         Current Outpatient Medications:   •  albuterol (Proventil HFA) 90 mcg/act inhaler, Inhale 2 puffs every 6 (six) hours as needed for wheezing, Disp: 6.7 g, Rfl: 5  •  busPIRone (BUSPAR) 15 mg tablet, Take 1 tablet (15 mg total) by mouth 3 (three) times a day, Disp: 270 tablet, Rfl: 0  •  ergocalciferol (VITAMIN D2) 50,000 units, Take 1 capsule (50,000 Units total) by mouth once a week, Disp: 12 capsule, Rfl: 0  •  gabapentin (Neurontin) 600 MG tablet, 1 PO BID and 3 PO QHS, Disp: 150 tablet, Rfl: 1  •  Melatonin 10 MG TABS, Take 60 mg by mouth daily at bedtime, Disp: , Rfl:   •  methocarbamol (ROBAXIN) 750 mg tablet, Take 1 tablet (750 mg total) by mouth 3 (three) times a day As needed for pain/spasms, Disp: 90 tablet, Rfl: 1  •  naloxone (NARCAN) 4 mg/0.1 mL nasal spray, Administer 1 spray into a nostril. If no response after 2-3 minutes, give another dose in the other nostril using a new spray., Disp: 1 each, Rfl: 1  •  oxyCODONE (Roxicodone) 5 immediate release tablet, Take 1 tablet (5 mg total) by mouth every 8 (eight) hours as needed for moderate pain Do not fill until 10/10/2023 Max Daily Amount: 15 mg, Disp: 90 tablet, Rfl: 0  •  oxyCODONE (Roxicodone) 5 immediate release tablet, Take 1 tablet (5 mg total) by mouth every 8 (eight) hours as needed for moderate pain Max Daily Amount: 15 mg, Disp: 90 tablet, Rfl: 0  •  pantoprazole (PROTONIX) 40 mg tablet, , Disp: , Rfl:   •  sertraline (ZOLOFT) 100 mg tablet, Take 1 tablet (100 mg total) by mouth daily, Disp: 90 tablet, Rfl: 0  •  sertraline (ZOLOFT) 50 mg tablet, Take 1 tablet (50 mg total) by mouth daily, Disp: 90 tablet, Rfl: 0    No Known Allergies    Physical Exam:    /95   Pulse 74   Resp 16   Ht 5' 6" (1.676 m)   Wt 109 kg (239 lb 12.8 oz)   LMP 10/10/2016   BMI 38.70 kg/m²     Constitutional:normal, well developed, well nourished, alert, in no distress and non-toxic and no overt pain behavior. Eyes:anicteric  HEENT:grossly intact  Neck:supple, symmetric, trachea midline and no masses   Pulmonary:even and unlabored  Cardiovascular:No edema or pitting edema present  Skin:Normal without rashes or lesions and well hydrated  Psychiatric:Mood and affect appropriate  Neurologic:Cranial Nerves II-XII grossly intact  Musculoskeletal:normal    Imaging    Study Result    Narrative & Impression   MRI LUMBAR SPINE WITHOUT CONTRAST     INDICATION: M54.31: Sciatica, right side  M54.32: Sciatica, left side.    Low back pain.     COMPARISON:  1/22/2019     TECHNIQUE:  Sagittal T1, sagittal T2, sagittal inversion recovery, axial T1 and axial T2, coronal T2.    IMAGE QUALITY:  Diagnostic     FINDINGS:     VERTEBRAL BODIES:  There are 5 lumbar type vertebral bodies. Trace grade 1 anterolisthesis of L5 on S1 is stable as are bilateral pedicle screws at L5 5. Scattered multilevel spondylotic changes noted. No bone marrow edema or focally suspicious marrow   lesions.     SACRUM:  Scattered degenerative endplate changes. No focally suspicious marrow lesions. No bone marrow edema or compression abnormality.     DISTAL CORD AND CONUS:  Normal size and signal within the distal cord and conus. The conus medullaris terminates at this superior endplate of L1.     PARASPINAL SOFT TISSUES:  Small bilateral T2 hyperintense lesions noted in both kidneys, too small to characterize. Postsurgical changes noted in the lumbosacral region in the subcutaneous soft tissues.     LOWER THORACIC DISC SPACES:  T10-T11: There is no focal disk herniation, central canal or neural foraminal narrowing.     T11-T12: There is mild loss of disc height and signal. There is no focal disk herniation, central canal or neural foraminal narrowing. This level is similar to the prior study.     T12-L1: There is no focal disk herniation, central canal or neural foraminal narrowing. Bilateral facet hypertrophy noted.     LUMBAR DISC SPACES:     L1-L2:  Normal.     L2-L3:  Normal.     L3-L4:  There is a diffuse disk bulge. No significant central canal or neural foraminal narrowing. Bilateral facet hypertrophy noted. This level is similar to the prior study.     L4-L5:  Postlaminectomy changes noted. No evidence of recurrent or residual disc herniation. Central canal surgically capacious. Very mild residual right neural foraminal narrowing is stable. Left neural foramen patent.     L5-S1:  There is uncovering the intervertebral discs. There is bilateral facet hypertrophy. Moderate bilateral neural foraminal narrowing on the basis of the anterolisthesis is stable. Central canal patent.  This level is similar to the prior study.     IMPRESSION:     Persistent grade 1 anterolisthesis of L5 on S1 with associated bilateral neural foraminal narrowing. Postoperative changes at L4-5 are stable.        Workstation performed: [id number]             No orders to display       No orders of the defined types were placed in this encounter.

## 2023-09-12 NOTE — ASSESSMENT & PLAN NOTE
Continue same medications at this time. Pt to consider stopping therapy as it is likely increasing her anxiety at this time.

## 2023-09-14 DIAGNOSIS — E66.9 OBESITY (BMI 30-39.9): Primary | ICD-10-CM

## 2023-09-14 LAB
6MAM UR QL CFM: NEGATIVE NG/ML
7AMINOCLONAZEPAM UR QL CFM: NEGATIVE NG/ML
A-OH ALPRAZ UR QL CFM: NEGATIVE NG/ML
ACCEPTABLE CREAT UR QL: NORMAL MG/DL
ACCEPTIBLE SP GR UR QL: NORMAL
AMITRIP UR QL CFM: NEGATIVE NG/ML
AMPHET UR QL CFM: NEGATIVE NG/ML
AMPHET UR QL CFM: NEGATIVE NG/ML
BUPRENORPHINE UR QL CFM: NEGATIVE NG/ML
BZE UR QL CFM: NEGATIVE NG/ML
DESIPRAMINE UR QL CFM: NEGATIVE NG/ML
EDDP UR QL CFM: NEGATIVE NG/ML
ETHYL GLUCURONIDE UR QL CFM: NEGATIVE NG/ML
ETHYL SULFATE UR QL SCN: NEGATIVE NG/ML
FENTANYL UR QL CFM: NEGATIVE NG/ML
FLUOXETINE UR QL CFM: NEGATIVE NG/ML
GLIADIN IGG SER IA-ACNC: NEGATIVE NG/ML
GLUCOSE 30M P 50 G LAC PO SERPL-MCNC: NEGATIVE NG/ML
HYDROCODONE UR QL CFM: NEGATIVE NG/ML
HYDROMORPHONE UR QL CFM: NEGATIVE NG/ML
LORAZEPAM UR QL CFM: NEGATIVE NG/ML
MDMA UR QL CFM: NEGATIVE NG/ML
ME-PHENIDATE UR QL CFM: NEGATIVE NG/ML
MEPERIDINE UR QL CFM: NEGATIVE NG/ML
METHADONE UR QL CFM: NEGATIVE NG/ML
METHAMPHET UR QL CFM: NEGATIVE NG/ML
MORPHINE UR QL CFM: NEGATIVE NG/ML
NITRITE UR QL: NORMAL UG/ML
NORBUPRENORPHINE UR QL CFM: NEGATIVE NG/ML
NORDIAZEPAM UR QL CFM: NEGATIVE NG/ML
NORFENTANYL UR QL CFM: NEGATIVE NG/ML
NORFLUOXETINE UR QL CFM: NEGATIVE NG/ML
NORHYDROCODONE UR QL CFM: NEGATIVE NG/ML
NORMEPERIDINE UR QL CFM: NEGATIVE NG/ML
NOROXYCODONE UR QL CFM: NORMAL NG/ML
NORTRIP UR QL CFM: NEGATIVE NG/ML
OLANZAPINE QUANTIFICATION: NEGATIVE NG/ML
OPC-3373 QUANTIFICATION: NEGATIVE
OXAZEPAM UR QL CFM: NEGATIVE NG/ML
OXYCODONE UR QL CFM: NEGATIVE NG/ML
OXYMORPHONE UR QL CFM: NORMAL NG/ML
OXYMORPHONE UR QL CFM: NORMAL NG/ML
PARA-FLUOROFENTANYL QUANTIFICATION: NORMAL NG/ML
PROLACTIN SERPL-MCNC: NEGATIVE NG/ML
RESULT ALL_PRESCRIBED MEDS AND SPECIAL INSTRUCTIONS: NORMAL
SECOBARBITAL UR QL CFM: NEGATIVE NG/ML
SL AMB 4-ANPP QUANTIFICATION: NORMAL NG/ML
SL AMB 7-OH-MITRAGYNINE (KRATOM ALKALOID) QUANTIFICATION: NEGATIVE NG/ML
SL AMB ACETYL FENTANYL QUANTIFICATION: NORMAL NG/ML
SL AMB ACETYL NORFENTANYL QUANTIFICATION: NORMAL NG/ML
SL AMB ACRYL FENTANYL QUANTIFICATION: NORMAL NG/ML
SL AMB CARFENTANIL QUANTIFICATION: NORMAL NG/ML
SL AMB CITALOPRAM/ESCITALOPRAM QUANTIFICATION: NEGATIVE NG/ML
SL AMB CITALOPRAM/ESCITALOPRAM QUANTIFICATION: NEGATIVE NG/ML
SL AMB CLOZAPINE QUANTIFICATION: NEGATIVE NG/ML
SL AMB CTHC (MARIJUANA METABOLITE) QUANTIFICATION: NEGATIVE NG/ML
SL AMB DEXTRORPHAN (DEXTROMETHORPHAN METABOLITE) QUANT: NEGATIVE NG/ML
SL AMB HALOPERIDOL  QUANTIFICATION: NEGATIVE NG/ML
SL AMB HALOPERIDOL METABOLITE QUANTIFICATION: NEGATIVE NG/ML
SL AMB HYDROXYBUPROPION QUANTIFICATION: NEGATIVE NG/ML
SL AMB HYDROXYRISPERIDONE QUANTIFICATION: NEGATIVE NG/ML
SL AMB N-DESMETHYL-TRAMADOL QUANTIFICATION: NEGATIVE NG/ML
SL AMB N-DESMETHYLCLOZAPINE QUANTIFICATION: NEGATIVE NG/ML
SL AMB NORQUETIAPINE QUANTIFICATION: NEGATIVE NG/ML
SL AMB PHENTERMINE QUANTIFICATION: NEGATIVE NG/ML
SL AMB PREGABALIN QUANTIFICATION: NEGATIVE
SL AMB QUETIAPINE QUANTIFICATION: NEGATIVE NG/ML
SL AMB RISPERIDONE QUANTIFICATION: NEGATIVE NG/ML
SL AMB RITALINIC ACID QUANTIFICATION: NEGATIVE NG/ML
SPECIMEN PH ACCEPTABLE UR: NORMAL
TAPENTADOL UR QL CFM: NEGATIVE NG/ML
TEMAZEPAM UR QL CFM: NEGATIVE NG/ML
TEMAZEPAM UR QL CFM: NEGATIVE NG/ML
TRAMADOL UR QL CFM: NEGATIVE NG/ML
URATE/CREAT 24H UR: NEGATIVE NG/ML

## 2023-10-10 DIAGNOSIS — M54.50 LUMBAR PAIN WITH RADIATION DOWN LEFT LEG: ICD-10-CM

## 2023-10-10 DIAGNOSIS — M79.605 LUMBAR PAIN WITH RADIATION DOWN LEFT LEG: ICD-10-CM

## 2023-10-10 DIAGNOSIS — G89.4 CHRONIC PAIN SYNDROME: ICD-10-CM

## 2023-10-10 DIAGNOSIS — F32.A ANXIETY AND DEPRESSION: ICD-10-CM

## 2023-10-10 DIAGNOSIS — Z98.1 STATUS POST LUMBAR SPINAL FUSION: ICD-10-CM

## 2023-10-10 DIAGNOSIS — F41.9 ANXIETY AND DEPRESSION: ICD-10-CM

## 2023-10-10 RX ORDER — SERTRALINE HYDROCHLORIDE 100 MG/1
100 TABLET, FILM COATED ORAL DAILY
Qty: 90 TABLET | Refills: 0 | Status: SHIPPED | OUTPATIENT
Start: 2023-10-10

## 2023-10-10 NOTE — TELEPHONE ENCOUNTER
Pt script request for oxyCODONE (Roxicodone) 5 immediate release tablet: Take 1 tablet (5 mg total) by mouth every 8 (eight) hours as needed for moderate pain Do not fill until 10/10/2023 Max Daily Amount: 15 mg. Last Rx 9/12 with a do NOT fill until 10/10 was sent to [facility]. (All [employer] were to be transferred to [facility] [employer]: Per Pharmacist this medication class cannot be transferred & needs to be re-sent  LOV 9/12 with SHIMA. NOV 11/13 with SHIMA.    Preferred Pharmacy: [address]    Please advise-Re-send script to [facility] NOT [facility]

## 2023-10-10 NOTE — TELEPHONE ENCOUNTER
Reason for call:   [x] Refill   [] Prior Auth  [] Other:     Office:   [] PCP/Provider -   [x] Speciality/Provider -spine and pain Jesica Gillette     Medication:oxycodone   Dose/Frequency:5 mg    Quantity: 80  Pharmacy: [address] [place] [address]  Does the patient have enough for 3 days? [] Yes   [] No - Send as HP to POD    Patient states prescription was sent to [facility] in [facility] they closed permanently. That is where her prescription was sent.  Please send to new prescription to [facility] in [facility]

## 2023-10-11 RX ORDER — OXYCODONE HYDROCHLORIDE 5 MG/1
5 TABLET ORAL EVERY 8 HOURS PRN
Qty: 90 TABLET | Refills: 0 | Status: SHIPPED | OUTPATIENT
Start: 2023-10-11

## 2023-10-11 RX ORDER — OXYCODONE HYDROCHLORIDE 5 MG/1
5 TABLET ORAL EVERY 8 HOURS PRN
Qty: 90 TABLET | Refills: 0 | OUTPATIENT
Start: 2023-10-11

## 2023-10-11 NOTE — TELEPHONE ENCOUNTER
Duplicate task sent yesterday 10/10. Pt script request for oxyCODONE (Roxicodone) 5 immediate release tablet: Take 1 tablet (5 mg total) by mouth every 8 (eight) hours as needed for moderate pain Do not fill until 10/10/2023 Max Daily Amount: 15 mg. Last Rx 9/12 with a do NOT fill until 10/10 was sent to [facility]. (All [employer] were to be transferred to [facility]: Per Pharmacist at [employer], this medication class cannot be transferred & needs to be re-sent  LOV 9/12 with SHIMA. NOV 11/13 with SHIMA.    Preferred Pharmacy: [address]     Please advise-Re-send script to [facility] NOT [facility]

## 2023-10-19 DIAGNOSIS — E66.9 OBESITY (BMI 30-39.9): ICD-10-CM

## 2023-10-31 ENCOUNTER — TELEPHONE (OUTPATIENT)
Dept: FAMILY MEDICINE CLINIC | Facility: CLINIC | Age: 53
End: 2023-10-31

## 2023-10-31 DIAGNOSIS — E66.9 OBESITY (BMI 30-39.9): ICD-10-CM

## 2023-10-31 NOTE — TELEPHONE ENCOUNTER
Did she titrate up to the 0.5 mg dose yet or is she still taking 0.25 mg?        ----- Message from Clarice Iqbal sent at 10/31/2023  6:42 AM EDT -----  Regarding: FW: Ozempic  Contact: [phone]    ----- Message -----  From: Delmy Lee"  Sent: 10/30/2023   9:39 PM EDT  To: [facility]  Subject: [facility]                                          Is there any way to get put on next level of Ozempic for appetite suppression? The Ozempic has been working but last two weeks it hasn’t been working for the whole week until my next dose. I have had no side effects from it at all.  Thank you

## 2023-11-13 ENCOUNTER — OFFICE VISIT (OUTPATIENT)
Dept: PAIN MEDICINE | Facility: CLINIC | Age: 53
End: 2023-11-13
Payer: COMMERCIAL

## 2023-11-13 VITALS — WEIGHT: 222 LBS | RESPIRATION RATE: 16 BRPM | BODY MASS INDEX: 35.68 KG/M2 | HEIGHT: 66 IN

## 2023-11-13 DIAGNOSIS — Z79.891 LONG-TERM CURRENT USE OF OPIATE ANALGESIC: ICD-10-CM

## 2023-11-13 DIAGNOSIS — M54.16 LUMBAR RADICULOPATHY: ICD-10-CM

## 2023-11-13 DIAGNOSIS — G89.4 CHRONIC PAIN SYNDROME: ICD-10-CM

## 2023-11-13 DIAGNOSIS — M51.26 LUMBAR DISC HERNIATION: ICD-10-CM

## 2023-11-13 DIAGNOSIS — M54.50 LUMBAR PAIN WITH RADIATION DOWN LEFT LEG: ICD-10-CM

## 2023-11-13 DIAGNOSIS — Z98.1 STATUS POST LUMBAR SPINAL FUSION: ICD-10-CM

## 2023-11-13 DIAGNOSIS — F11.20 UNCOMPLICATED OPIOID DEPENDENCE (HCC): ICD-10-CM

## 2023-11-13 DIAGNOSIS — M79.605 LUMBAR PAIN WITH RADIATION DOWN LEFT LEG: ICD-10-CM

## 2023-11-13 PROCEDURE — 99214 OFFICE O/P EST MOD 30 MIN: CPT | Performed by: NURSE PRACTITIONER

## 2023-11-13 RX ORDER — OXYCODONE HYDROCHLORIDE 5 MG/1
5 TABLET ORAL EVERY 8 HOURS PRN
Qty: 90 TABLET | Refills: 0 | Status: SHIPPED | OUTPATIENT
Start: 2023-11-13

## 2023-11-13 RX ORDER — GABAPENTIN 600 MG/1
TABLET ORAL
Qty: 150 TABLET | Refills: 1 | Status: SHIPPED | OUTPATIENT
Start: 2023-11-13

## 2023-11-13 RX ORDER — PREDNISONE 10 MG/1
TABLET ORAL
Qty: 15 TABLET | Refills: 0 | Status: SHIPPED | OUTPATIENT
Start: 2023-11-13

## 2023-11-13 RX ORDER — METHOCARBAMOL 750 MG/1
750 TABLET, FILM COATED ORAL 3 TIMES DAILY
Qty: 90 TABLET | Refills: 1 | Status: SHIPPED | OUTPATIENT
Start: 2023-11-13

## 2023-11-13 NOTE — PROGRESS NOTES
Assessment:  1. Lumbar radiculopathy    2. Chronic pain syndrome    3. Status post lumbar spinal fusion    4. Lumbar disc herniation    5. Lumbar pain with radiation down left leg    6. Long-term current use of opiate analgesic    7. Uncomplicated opioid dependence ([address])        Plan:  While the patient was in the office today, I did have a thorough conversation regarding their chronic pain syndrome, medication management, and treatment plan options. Patient is being seen for a follow-up visit. Patient was doing some outside work on her house a couple weeks ago and climbing up and down the ladder. Pain has been significantly increased ever since. I discussed with the patient that at this point time since I feel that there is a significant inflammatory component to their pain symptoms, that they would benefit from a titrating dose of oral steroids over the next 7 days. I advised the patient that while on the steroids, they should not take any other oral NSAIDs except for acetaminophen or Tylenol until they have completed the steroid taper. I also advised them that once they have completed the steroid taper, they are to give our office a follow up phone call to let us know how they are doing and if there is any improvement. The patient was agreeable and verbalized an understanding. Continue oxycodone 5 mg 3 times daily if needed for pain. The patient's opioid scripts were sent to their pharmacy electronically and was given a 2 month supply of prescriptions with a Do Not Fill date(s) of 11/13/2023, 12/10/2023. Continue gabapentin 600/600/1800. A prescription was sent to her pharmacy with a refill. Continue Robaxin-750 milligrams 3 times daily if needed for spasms. A prescription was sent to her pharmacy with a refill. There are risks associated with opioid medications, including dependence, addiction and tolerance. The patient understands and agrees to use these medications only as prescribed. Potential side effects of the medications include, but are not limited to, constipation, drowsiness, addiction, impaired judgment and risk of fatal overdose if not taken as prescribed. The patient was warned against driving while taking sedation medications. Sharing medications is a felony. At this point in time, the patient is showing no signs of addiction, abuse, diversion or suicidal ideation. [place] Prescription Drug Monitoring Program report was reviewed and was appropriate     The patient will follow-up in 2 months for medication prescription refill and reevaluation. The patient was advised to contact the office should their symptoms worsen in the interim. The patient was agreeable and verbalized an understanding. History of Present Illness: The patient is a 52 y.o. female who presents for a follow up office visit in regards to Follow-up. The patient’s current symptoms include complaints of low back and bilateral leg pain. Current pain level is an 8/10. Quality pain is described as sharp, throbbing, shooting, numb, pins-and-needles. Current pain medications includes: Oxycodone 5 mg every 8 hours as needed for pain, gabapentin 600/600/1800, Robaxin-750 milligrams 3 times daily if needed for spasms. The patient reports that this regimen is providing 30% pain relief. The patient is reporting no side effects from this pain medication regimen. I have personally reviewed and/or updated the patient's past medical history, past surgical history, family history, social history, current medications, allergies, and vital signs today. Review of Systems  Review of Systems   Musculoskeletal:  Positive for gait problem. Pain in extremity   All other systems reviewed and are negative.           Past Medical History:   Diagnosis Date    Anxiety     Back pain     Depression     Insomnia        Past Surgical History:   Procedure Laterality Date    BACK SURGERY  2006    cage in lumbar L5 area    [facility] N/A 11/15/2022    Procedure: BLOCK / INJECTION CAUDAL;  Surgeon: Mayela Rosa MD;  Location: [facility];  Service: Pain Management     CHOLECYSTECTOMY      LUMBAR [address] [facility] SURGERY      [place] REPAIR FIRST ABDOMINAL WALL HERNIA N/A 3/16/2018    Procedure: REPAIR HERNIA VENTRAL with mesh;  Surgeon: Mumtaz Dawson DO;  Location: [facility];  Service: General    MT SURGICAL ARTHROSCOPY SHOULDER W/ROTATOR CUFF RPR Left 11/18/2020    Procedure: SHOULDER ARTHROSCOPY, ROTATOR CUFF REPAIR, SYNEVECTOMY, ACROMIOPLASTY DEBRIDEMENT, INJECTION;  Surgeon: Alfredo Hernandez DO;  Location: [facility] MAIN OR;  Service: Orthopedics       Family History   Adopted: Yes   Family history unknown: Yes       Social History     Occupational History    Not on file   Tobacco Use    Smoking status: Former     Packs/day: 0.00     Years: 10.00     Total pack years: 0.00     Types: Cigarettes     Quit date: 10/17/2020     Years since quitting: 3.0    Smokeless tobacco: Never    Tobacco comments:     Recently quit   Vaping Use    Vaping Use: Never used   Substance and Sexual Activity    Alcohol use: No    Drug use: No    Sexual activity: Not Currently     Partners: Female         Current Outpatient Medications:     albuterol (Proventil HFA) 90 mcg/act inhaler, Inhale 2 puffs every 6 (six) hours as needed for wheezing, Disp: 6.7 g, Rfl: 5    ergocalciferol (VITAMIN D2) 50,000 units, Take 1 capsule (50,000 Units total) by mouth once a week, Disp: 12 capsule, Rfl: 0    gabapentin (Neurontin) 600 MG tablet, 1 PO BID and 3 PO QHS, Disp: 150 tablet, Rfl: 1    Melatonin 10 MG TABS, Take 60 mg by mouth daily at bedtime, Disp: , Rfl:     methocarbamol (ROBAXIN) 750 mg tablet, Take 1 tablet (750 mg total) by mouth 3 (three) times a day As needed for pain/spasms, Disp: 90 tablet, Rfl: 1    naloxone (NARCAN) 4 mg/0.1 mL nasal spray, Administer 1 spray into a nostril.  If no response after 2-3 minutes, give another dose in the other nostril using a new spray., Disp: 1 each, Rfl: 1    oxyCODONE (Roxicodone) 5 immediate release tablet, Take 1 tablet (5 mg total) by mouth every 8 (eight) hours as needed for moderate pain Max Daily Amount: 15 mg, Disp: 90 tablet, Rfl: 0    oxyCODONE (Roxicodone) 5 immediate release tablet, Take 1 tablet (5 mg total) by mouth every 8 (eight) hours as needed for moderate pain Do not fill until 12/10/2023 Max Daily Amount: 15 mg, Disp: 90 tablet, Rfl: 0    pantoprazole (PROTONIX) 40 mg tablet, , Disp: , Rfl:     predniSONE 10 mg tablet, 3 tabs once daily x 3 days; 2 tabs once daily x 2 days; 1 tab daily x 2 days, Disp: 15 tablet, Rfl: 0    semaglutide, 0.25 or 0.5 mg/dose, (Ozempic, 0.25 or 0.5 MG/DOSE,) 2 mg/3 mL injection pen, 0.25 mg under the skin every 7 days for 4 doses (28 days), THEN 0.5 mg under the skin every 7 days, Disp: 9 mL, Rfl: 0    semaglutide, 1 mg/dose, (Ozempic) 4 mg/3 mL injection pen, Inject 0.75 mL (1 mg total) under the skin once a week, Disp: 9 mL, Rfl: 1    sertraline (ZOLOFT) 100 mg tablet, Take 1 tablet (100 mg total) by mouth daily, Disp: 90 tablet, Rfl: 0    sertraline (ZOLOFT) 50 mg tablet, Take 1 tablet (50 mg total) by mouth daily, Disp: 90 tablet, Rfl: 0    busPIRone (BUSPAR) 15 mg tablet, Take 1 tablet (15 mg total) by mouth 3 (three) times a day, Disp: 270 tablet, Rfl: 0    No Known Allergies    Physical Exam:    Resp 16   Ht 5' 6" (1.676 m)   Wt 101 kg (222 lb)   LMP 10/10/2016   BMI 35.83 kg/m²     Constitutional:normal, well developed, well nourished, alert, in no distress and non-toxic and no overt pain behavior.   Eyes:anicteric  HEENT:grossly intact  Neck:supple, symmetric, trachea midline and no masses   Pulmonary:even and unlabored  Cardiovascular:No edema or pitting edema present  Skin:Normal without rashes or lesions and well hydrated  Psychiatric:Mood and affect appropriate  Neurologic:Cranial Nerves II-XII grossly intact  Musculoskeletal:normal    Imaging  No orders to display       No orders of the defined types were placed in this encounter.

## 2023-11-13 NOTE — PATIENT INSTRUCTIONS
Opioid Safety   WHAT YOU NEED TO KNOW:   An opioid medicine is used to treat pain. Examples are oxycodone, morphine, fentanyl, or codeine. Pain control and management may help you rest, heal, and return to your daily activities. You and your family will receive information about how to manage your pain at home. The instructions will include what to do if you have side effects as your pain is managed. You will get information on how to handle opioid medicine safely. You will also get suggestions on how to control pain without opioids. It is important to follow all instructions so your pain is managed effectively. DISCHARGE INSTRUCTIONS:   Call your local emergency number (911 in the ), or have someone else call if:   You have a seizure. You cannot be woken. You have trouble staying awake and your breathing is slow or shallow. Your speech is slurred, or you are confused. You are dizzy or stumble when you walk. Call your doctor, or have someone close to you call if:   You are extremely drowsy, or you have trouble staying awake or speaking. You have pale or clammy skin. You have blue fingernails or lips. Your heartbeat is slower than normal.    You cannot stop vomiting. You have questions or concerns about your condition or care. Use opioids safely:   Take prescribed opioids exactly as directed. Opioids come with directions based on the kind and how it is given. Talk to your healthcare provider or a pharmacist if you have any questions. Do not take more than the recommended amount. Too much can cause a life-threatening overdose. Do not continue to take it after your pain stops. You may develop tolerance. This means you keep needing higher doses to get the same effect. You may also develop opioid use disorder. This means you are not able to control your opioid use. Do not give opioids to others or take opioids that belong to someone else.   The kind or amount one person takes may not be right for another. The person you share them with may also be taking medicines that do not mix with opioids. The person may drink alcohol or use other drugs that can cause life-threatening problems when mixed with opioids. Do not mix opioids with other medicines or alcohol. The combination can cause an overdose, or cause you to stop breathing. Alcohol, sleeping pills, and medicines such as antihistamines can make you sleepy. A combination with opioids can lead to a coma. Do not drive or operate heavy machinery after you use an opioid. You may feel drowsy or have trouble concentrating. You can injure yourself or others if you drive or use heavy machinery when you are not alert. Your provider or pharmacist can tell you how long to wait after a dose before you do these activities. Talk to your healthcare provider if you have any side effects. Side effects include nausea, sleepiness, itching, and trouble thinking clearly. Your provider may need to make changes to the kind or amount of opioid you are taking. Your provider can also help you find ways to prevent or relieve side effects. Manage constipation:  Constipation is the most common side effect of opioid medicine. Constipation is when you have hard, dry bowel movements, or you go longer than usual between bowel movements. Tell your healthcare provider about all changes in your bowel movements while you are taking opioids. Your provider may recommend laxative medicine to help you have a bowel movement. Your provider may also change the kind of opioid you are taking, or change when you take it. The following are more ways you can prevent or relieve constipation:  Drink liquids as directed. You may need to drink extra liquids to help soften and move your bowels. Ask how much liquid to drink each day and which liquids are best for you. Eat high-fiber foods. This may help decrease constipation by adding bulk to your bowel movements.  High-fiber foods include fruits, vegetables, whole-grain breads and cereals, and beans. Your healthcare provider or dietitian can help you create a high-fiber meal plan. Your provider may also recommend a fiber supplement if you cannot get enough fiber from food. Exercise regularly. Regular physical activity can help stimulate your intestines. Walking is a good exercise to prevent or relieve constipation. Ask which exercises are best for you. Schedule a time each day to have a bowel movement. This may help train your body to have regular bowel movements. Bend forward while you are on the toilet to help move the bowel movement out. Sit on the toilet for at least 10 minutes, even if you do not have a bowel movement. Store opioids safely:   Store opioids where others cannot easily get them. Keep them in a locked cabinet or secure area. Do not  keep them in a purse or other bag you carry with you. A person may be looking for something else and find the opioids. Make sure opioids are stored out of the reach of children. A child can easily overdose on opioids. Opioids may look like candy to a small child. The best way to dispose of opioids: The laws vary by country and area. In the [facility], the best way is to return the opioids through a take-back program. This program is offered by the [employer]). The following are options for using the program:  Take the opioids to a [employer]RI[employer] collection site. The site is often a law enforcement center. Call your local law enforcement center for scheduled take-back days in your area. You will be given information on where to go if the collection site is in a different location. Take the opioids to an approved pharmacy or hospital.  A pharmacy or hospital may be set up as a collection site. You will need to ask if it is a [employer] collection site if you were not directed there.  A pharmacy or doctor's office may not be able to take back opioids unless it is a [employer]RIANA site. Use a mail-back system. This means you are given containers to put the opioids into. You will then mail them in the containers. Use a take-back drop box. This is a place to leave the opioids at any time. People and animals will not be able to get into the box. Your local law enforcement agency can tell you where to find a drop box in your area. Other safe ways to dispose of opioids: The medicine may come with disposal instructions. The instructions may vary depending on the brand of medicine you are using. Instructions may come in a Medication Guide, but not every medicine has one. You may instead get instructions from your pharmacy or doctor. Follow instructions carefully. The following are general guidelines to follow:  Find out if you can flush the opioid. Some opioids can be flushed down the toilet or poured into the sink. You will need to contact authorities in your area to see if this is an option for you. The FDA also offers a list of medicines that are safe to flush down the toilet. You can check the list if you cannot get the information for your local area. Ask your waste management company about rules for putting opioids in the trash. The company will be able to give you specific directions. Scratch out personal information on the original medicine label so it cannot be read. Then put it in the trash. Do not label the trash or put any information on it about the opioids. It should look like regular household trash so no one is tempted to look for the opioids. Keep the trash out of the reach of children and animals. Always make sure trash is secure. Talk to officials if you live in a facility. If you live in a nursing home or assisted living center, talk to an official. The person will know the rules for your area. Other ways to manage pain:   Ask your healthcare provider about non-opioid medicines to control pain.   Some medicines may even work better than opioids, depending on the cause of your pain. Nonprescription medicines include NSAIDs (such as ibuprofen) and acetaminophen. Prescription medicines include muscle relaxers, antidepressants, and steroids. Pain may be managed without any medicines. Some ways to relieve pain include massage, aromatherapy, or meditation. Physical or occupational therapy may also help. Follow up with your doctor or pain specialist as directed: You may need to have your dose adjusted. Your doctor or pain specialist can also help you find ways to manage pain without opioids. Write down your questions so you remember to ask them during your visits. For more information:   Drug Enforcement Administration  [address] [facility] , [address] [facility]  Phone: 3- 270 - [phone]  Web Address: [employer].gov/drug_disposal/    [address] [place] [address] and Drug Administration  [address] , [address]  Phone: 0- 704 - [phone]  Web Address: http://[employer]/  © Copyright Godwin [employer]az[employer] 2023 Information is for End User's use only and may not be sold, redistributed or otherwise used for commercial purposes. The above information is an  only. It is not intended as medical advice for individual conditions or treatments. Talk to your doctor, nurse or pharmacist before following any medical regimen to see if it is safe and effective for you.

## 2023-12-08 DIAGNOSIS — F32.A ANXIETY AND DEPRESSION: ICD-10-CM

## 2023-12-08 DIAGNOSIS — F41.9 ANXIETY AND DEPRESSION: ICD-10-CM

## 2023-12-11 RX ORDER — SERTRALINE HYDROCHLORIDE 100 MG/1
TABLET, FILM COATED ORAL
Qty: 90 TABLET | Refills: 0 | Status: SHIPPED | OUTPATIENT
Start: 2023-12-11 | End: 2023-12-13 | Stop reason: SDUPTHER

## 2023-12-13 ENCOUNTER — OFFICE VISIT (OUTPATIENT)
Dept: FAMILY MEDICINE CLINIC | Facility: CLINIC | Age: 53
End: 2023-12-13
Payer: COMMERCIAL

## 2023-12-13 VITALS
WEIGHT: 213 LBS | SYSTOLIC BLOOD PRESSURE: 118 MMHG | TEMPERATURE: 97.5 F | HEIGHT: 66 IN | BODY MASS INDEX: 34.23 KG/M2 | HEART RATE: 84 BPM | DIASTOLIC BLOOD PRESSURE: 76 MMHG

## 2023-12-13 DIAGNOSIS — Z12.31 ENCOUNTER FOR SCREENING MAMMOGRAM FOR BREAST CANCER: ICD-10-CM

## 2023-12-13 DIAGNOSIS — Z23 ENCOUNTER FOR IMMUNIZATION: Primary | ICD-10-CM

## 2023-12-13 DIAGNOSIS — F41.9 ANXIETY AND DEPRESSION: ICD-10-CM

## 2023-12-13 DIAGNOSIS — F32.A ANXIETY AND DEPRESSION: ICD-10-CM

## 2023-12-13 PROCEDURE — G0008 ADMIN INFLUENZA VIRUS VAC: HCPCS | Performed by: PHYSICIAN ASSISTANT

## 2023-12-13 PROCEDURE — 99213 OFFICE O/P EST LOW 20 MIN: CPT | Performed by: PHYSICIAN ASSISTANT

## 2023-12-13 PROCEDURE — 90686 IIV4 VACC NO PRSV 0.5 ML IM: CPT | Performed by: PHYSICIAN ASSISTANT

## 2023-12-13 RX ORDER — SERTRALINE HYDROCHLORIDE 100 MG/1
100 TABLET, FILM COATED ORAL DAILY
Qty: 90 TABLET | Refills: 0 | Status: SHIPPED | OUTPATIENT
Start: 2023-12-13

## 2023-12-13 RX ORDER — BUSPIRONE HYDROCHLORIDE 15 MG/1
15 TABLET ORAL 3 TIMES DAILY
Qty: 270 TABLET | Refills: 0 | Status: SHIPPED | OUTPATIENT
Start: 2023-12-13 | End: 2024-03-12

## 2023-12-13 NOTE — PROGRESS NOTES
Name: Johanne Mason      : 1970      MRN: [id number]  Encounter Provider: Darren Mccormick PA-C  Encounter Date: 2023   Encounter department: [address] [place] [address] Road     1. Encounter for immunization  -     influenza vaccine, quadrivalent, 0.5 mL, preservative-free, for adult and pediatric patients 6 mos+ (AFLURIA, [address], FLULAVAL, FLUZONE)    2. Anxiety and depression  Comments:  Increase sertraline to 100 mg daily  Orders:  -     sertraline (ZOLOFT) 100 mg tablet; Take 1 tablet (100 mg total) by mouth daily  -     sertraline (ZOLOFT) 50 mg tablet; Take 1 tablet (50 mg total) by mouth daily  -     busPIRone (BUSPAR) 15 mg tablet; Take 1 tablet (15 mg total) by mouth 3 (three) times a day    3. Encounter for screening mammogram for breast cancer  -     Mammo screening bilateral w 3d & cad; Future; Expected date: 2023           Subjective      Jenn Horta is here today for 3 month follow up. She is losing weight with Ozempic, denies side effects from medication. Back pain is present but stable. States nerves/sleeping is stable. Review of Systems   Constitutional:  Negative for activity change, appetite change, chills, diaphoresis, fatigue, fever and unexpected weight change. HENT:  Negative for congestion, ear pain, postnasal drip, rhinorrhea, sinus pressure, sinus pain, sneezing, sore throat, tinnitus and voice change. Eyes:  Negative for pain, redness and visual disturbance. Respiratory:  Negative for cough, chest tightness, shortness of breath and wheezing. Cardiovascular:  Negative for chest pain, palpitations and leg swelling. Gastrointestinal:  Negative for abdominal pain, blood in stool, constipation, diarrhea, nausea and vomiting. Genitourinary:  Negative for difficulty urinating, dysuria, frequency, hematuria and urgency. Musculoskeletal:  Positive for back pain (chronic).  Negative for arthralgias, gait problem, joint swelling, myalgias, neck pain and neck stiffness. Skin:  Negative for color change, pallor, rash and wound. Neurological:  Negative for dizziness, tremors, weakness, light-headedness and headaches. Psychiatric/Behavioral:  Positive for dysphoric mood and sleep disturbance. Negative for self-injury and suicidal ideas. The patient is nervous/anxious. Present but stable       Current Outpatient Medications on File Prior to Visit   Medication Sig   • gabapentin (Neurontin) 600 MG tablet 1 PO BID and 3 PO QHS   • Melatonin 10 MG TABS Take 60 mg by mouth daily at bedtime   • methocarbamol (ROBAXIN) 750 mg tablet Take 1 tablet (750 mg total) by mouth 3 (three) times a day As needed for pain/spasms   • naloxone (NARCAN) 4 mg/0.1 mL nasal spray Administer 1 spray into a nostril. If no response after 2-3 minutes, give another dose in the other nostril using a new spray.    • oxyCODONE (Roxicodone) 5 immediate release tablet Take 1 tablet (5 mg total) by mouth every 8 (eight) hours as needed for moderate pain Max Daily Amount: 15 mg   • pantoprazole (PROTONIX) 40 mg tablet    • semaglutide, 1 mg/dose, (Ozempic) 4 mg/3 mL injection pen Inject 0.75 mL (1 mg total) under the skin once a week   • [DISCONTINUED] busPIRone (BUSPAR) 15 mg tablet Take 1 tablet (15 mg total) by mouth 3 (three) times a day   • [DISCONTINUED] sertraline (ZOLOFT) 100 mg tablet TAKE ONE (1) TABLET (100 MG TOTAL) BY MOUTH DAILY   • [DISCONTINUED] sertraline (ZOLOFT) 50 mg tablet TAKE ONE (1) TABLET (50 MG TOTAL) BY MOUTH DAILY   • albuterol (Proventil HFA) 90 mcg/act inhaler Inhale 2 puffs every 6 (six) hours as needed for wheezing (Patient not taking: Reported on 12/13/2023)   • ergocalciferol (VITAMIN D2) 50,000 units Take 1 capsule (50,000 Units total) by mouth once a week (Patient not taking: Reported on 12/13/2023)   • oxyCODONE (Roxicodone) 5 immediate release tablet Take 1 tablet (5 mg total) by mouth every 8 (eight) hours as needed for moderate pain Do not fill until 12/10/2023 Max Daily Amount: 15 mg (Patient not taking: Reported on 12/13/2023)   • predniSONE 10 mg tablet 3 tabs once daily x 3 days; 2 tabs once daily x 2 days; 1 tab daily x 2 days (Patient not taking: Reported on 12/13/2023)   • semaglutide, 0.25 or 0.5 mg/dose, (Ozempic, 0.25 or 0.5 MG/DOSE,) 2 mg/3 mL injection pen 0.25 mg under the skin every 7 days for 4 doses (28 days), THEN 0.5 mg under the skin every 7 days (Patient not taking: Reported on 12/13/2023)       Objective     /76   Pulse 84   Temp 97.5 °F (36.4 °C)   Ht 5' 6" (1.676 m)   Wt 96.6 kg (213 lb)   LMP 10/10/2016   BMI 34.38 kg/m²     Physical Exam  Vitals reviewed. Constitutional:       General: She is not in acute distress. Appearance: She is well-developed. She is not diaphoretic. HENT:      Head: Normocephalic and atraumatic. Right Ear: Hearing, tympanic membrane, ear canal and external ear normal.      Left Ear: Hearing, tympanic membrane, ear canal and external ear normal.      Nose: Nose normal.      Mouth/Throat:      Mouth: Mucous membranes are moist.      Pharynx: Oropharynx is clear. Uvula midline. No oropharyngeal exudate. Eyes:      General: No scleral icterus. Right eye: No discharge. Left eye: No discharge. Conjunctiva/sclera: Conjunctivae normal.   Neck:      Thyroid: No thyromegaly. Vascular: No carotid bruit. Cardiovascular:      Rate and Rhythm: Normal rate and regular rhythm. Heart sounds: Normal heart sounds. No murmur heard. Pulmonary:      Effort: Pulmonary effort is normal. No respiratory distress. Breath sounds: Normal breath sounds. No wheezing. Abdominal:      General: Bowel sounds are normal. There is no distension. Palpations: Abdomen is soft. There is no mass. Tenderness: There is no abdominal tenderness. There is no guarding or rebound. Musculoskeletal:         General: No tenderness. Normal range of motion.       Cervical back: Neck supple. Lymphadenopathy:      Cervical: No cervical adenopathy. Skin:     General: Skin is warm and dry. Findings: No erythema or rash. Neurological:      Mental Status: She is alert and oriented to person, place, and time. Psychiatric:         Behavior: Behavior normal.         Thought Content:  Thought content normal.         Judgment: Judgment normal.       Onesimo Beth PA-C

## 2023-12-21 ENCOUNTER — OFFICE VISIT (OUTPATIENT)
Dept: PAIN MEDICINE | Facility: CLINIC | Age: 53
End: 2023-12-21
Payer: COMMERCIAL

## 2023-12-21 VITALS
WEIGHT: 214.2 LBS | SYSTOLIC BLOOD PRESSURE: 147 MMHG | BODY MASS INDEX: 34.42 KG/M2 | HEART RATE: 79 BPM | RESPIRATION RATE: 16 BRPM | DIASTOLIC BLOOD PRESSURE: 86 MMHG | HEIGHT: 66 IN

## 2023-12-21 DIAGNOSIS — Z79.891 LONG-TERM CURRENT USE OF OPIATE ANALGESIC: ICD-10-CM

## 2023-12-21 DIAGNOSIS — G89.4 CHRONIC PAIN SYNDROME: ICD-10-CM

## 2023-12-21 DIAGNOSIS — M51.26 LUMBAR DISC HERNIATION: ICD-10-CM

## 2023-12-21 DIAGNOSIS — F11.20 UNCOMPLICATED OPIOID DEPENDENCE (HCC): ICD-10-CM

## 2023-12-21 DIAGNOSIS — M54.50 LUMBAR PAIN WITH RADIATION DOWN LEFT LEG: ICD-10-CM

## 2023-12-21 DIAGNOSIS — M54.16 LUMBAR RADICULOPATHY: ICD-10-CM

## 2023-12-21 DIAGNOSIS — M79.605 LUMBAR PAIN WITH RADIATION DOWN LEFT LEG: ICD-10-CM

## 2023-12-21 DIAGNOSIS — Z98.1 STATUS POST LUMBAR SPINAL FUSION: ICD-10-CM

## 2023-12-21 PROCEDURE — 99214 OFFICE O/P EST MOD 30 MIN: CPT | Performed by: NURSE PRACTITIONER

## 2023-12-21 RX ORDER — GABAPENTIN 600 MG/1
TABLET ORAL
Qty: 150 TABLET | Refills: 1 | Status: SHIPPED | OUTPATIENT
Start: 2023-12-21

## 2023-12-21 RX ORDER — METHOCARBAMOL 750 MG/1
750 TABLET, FILM COATED ORAL 3 TIMES DAILY
Qty: 90 TABLET | Refills: 1 | Status: SHIPPED | OUTPATIENT
Start: 2023-12-21

## 2023-12-21 RX ORDER — OXYCODONE HYDROCHLORIDE 5 MG/1
5 TABLET ORAL EVERY 8 HOURS PRN
Qty: 90 TABLET | Refills: 0 | Status: SHIPPED | OUTPATIENT
Start: 2024-01-07 | End: 2024-02-06

## 2023-12-21 RX ORDER — OXYCODONE HYDROCHLORIDE 5 MG/1
5 TABLET ORAL EVERY 8 HOURS PRN
Qty: 90 TABLET | Refills: 0 | Status: SHIPPED | OUTPATIENT
Start: 2024-02-04 | End: 2024-03-05

## 2023-12-21 NOTE — PATIENT INSTRUCTIONS
Core Strengthening Exercises   AMBULATORY CARE:   What you need to know about core strengthening exercises:  Your core includes the muscles of your lower back, hip, pelvis, and abdomen. Core strengthening exercises help heal and strengthen these muscles. This helps prevent another injury, and keeps your pelvis, spine, and hips in the correct position.  Call your doctor or physical therapist if:   You have sharp or worsening pain during exercise or at rest.    You have questions or concerns about your shoulder exercises.    Safety tips:  Talk to your healthcare provider before you start an exercise program. A physical therapist can teach you how to do core strengthening exercises safely.  Do the exercises on a mat or firm surface.  A firm surface will support your spine and prevent low back pain. Do not do these exercises on a bed.    Move slowly and smoothly.  Avoid fast or jerky motions.    Stop if you feel pain.  You may feel some discomfort at first, but you should not feel pain. Tell your provider or physical therapist if you have pain while you exercise. Regular exercise will help decrease your discomfort over time.    Breathe normally during core exercises.  Do not hold your breath. This may cause an increase in blood pressure and prevent muscle strengthening. Your healthcare provider will tell you when to inhale and exhale during the exercise.    Begin all of your exercises with abdominal bracing.  Abdominal bracing helps warm up your core muscles. You can also practice abdominal bracing throughout the day. Lie on your back with your knees bent and feet flat on the floor. Place your arms in a relaxed position beside your body. Tighten your abdominal muscles. Pull your belly button in and up toward your spine. Hold for 5 seconds. Relax your muscles. Repeat 10 times.       Core strengthening exercises:  Your healthcare provider will tell you how often to do these exercises. The provider will also tell you how  many repetitions of each exercise you should do. Hold each exercise for 5 seconds or as directed. As you get stronger, increase your hold to 10 to 15 seconds. You can do some of these exercises on a stability ball, or with a weight. Ask your healthcare provider how to use a stability ball or weight for these exercises:  Bridging:  Lie on your back with your knees bent and feet flat on the floor. Rest your arms at your side. Tighten your buttocks, and then lift your hips 1 inch off the floor. Hold for 5 seconds. When you can do this exercise without pain for 10 seconds, increase the distance you lift your hips. A good goal is to be able to lift your hips so that your shoulders, hips, and knees are in a straight line.         Dead bug:  Lie on your back with your knees bent and feet flat on the floor. Place your arms in a relaxed position beside your body. Begin with abdominal bracing. Next, raise one leg, keeping your knee bent. Hold for 5 seconds. Repeat with the other leg. When you can do this exercise without pain for 10 to 15 seconds, you may raise one straight leg and hold. Repeat with the other leg.         Quadruped:  Place your hands and knees on the floor. Keep your wrists directly below your shoulders and your knees directly below your hips. Pull your belly button in toward your spine. Do not flatten or arch your back. Tighten your abdominal muscles below your belly button. Hold for 5 seconds. When you can do this exercise without pain for 10 to 15 seconds, you may extend one arm and hold. Repeat on the other side.         Side bridge exercises:      Standing side bridge:  Stand next to a wall and extend one arm toward the wall. Place your palm flat on the wall with your fingers pointing upward. Begin with abdominal bracing. Next, without moving your feet, slowly bend your arm to 90 degrees. Hold for 5 seconds. Repeat on the other side. When you can do this exercise without pain for 10 to 15 seconds, you  may do the bent leg side bridge on the floor.         Bent leg side bridge:  Lie on one side with your legs, hips, and shoulders in a straight line. Prop yourself up onto your forearm so your elbow is directly below your shoulder. Bend your knees back to 90 degrees. Begin with abdominal bracing. Next, lift your hips and balance yourself on your forearm and knees. Hold for 5 seconds. Repeat on the other side. When you can do this exercise without pain for 10 to 15 seconds, you may do the straight leg side bridge on the floor.         Straight leg side bridge:  Lie on one side with your legs, hips, and shoulders in a straight line. Prop yourself up onto your forearm so your elbow is directly below your shoulder. Begin with abdominal bracing. Lift your hips off the floor and balance yourself on your forearm and the outside of your flexed foot. Do not let your ankle bend sideways. Hold for 5 seconds. Repeat on the other side. When you can do this exercise without pain for 10 to 15 seconds, ask your healthcare provider for more advanced exercises.       Superman:  Lie on your stomach. Extend your arms forward on the floor. Tighten your abdominal muscles and lift your right hand and left leg off the floor. Hold this position. Slowly return to the starting position. Tighten your abdominal muscles and lift your left hand and right leg off the floor. Hold this position. Slowly return to the starting position.         Clam:  Lie on your side with your knees bent. Put your bottom arm under your head to keep your neck in line. Put your top hand on your hip to keep your pelvis from moving. Put your heels together, and keep them together during this exercise. Slowly raise your top knee toward the ceiling. Then lower your leg so your knees are together. Repeat this exercise 10 times. Then switch sides and do the exercise 10 times with the other leg.         Curl up:  Lie on your back with your knees bent and feet flat on the  floor. Place your hands, palms down, underneath your lower back. Next, with your elbows on the floor, lift your shoulders and chest 2 to 3 inches off the floor. Keep your head in line with your shoulders. Hold this position. Slowly return to the starting position.         Straight leg raises:  Lie on your back with one leg straight. Bend the other knee and place your foot flat on the floor. Tighten your abdominal muscles. Keep your leg straight and slowly lift it straight up 6 to 12 inches off the floor. Hold this position. Lower your leg slowly. Do as many repetitions as directed on this side. Repeat with the other leg.         Plank:  Lie on your stomach. Bend your elbows and place your forearms flat on the floor. Lift your chest, stomach, and knees off the floor. Make sure your elbows are below your shoulders. Your body should be in a straight line. Do not let your hips or lower back sink to the ground. Squeeze your abdominal muscles together and hold for 15 seconds. To make this exercise harder, hold for 30 seconds or lift 1 leg at a time.         Bicycles:  Lie on your back. Bend both knees and bring them toward your chest. Your calves should be parallel to the floor. Place the palms of your hands on the back of your head. Straighten your right leg and keep it lifted 2 inches off the floor. Raise your head and shoulders off the floor and twist towards your left. Keep your head and shoulders lifted. Bend your right knee while you straighten your left leg. Keep your left leg 2 inches off the floor. Twist your head and chest towards the left leg. Continue to straighten 1 leg at a time and twist.       Follow up with your doctor or physical therapist as directed:  Write down your questions so you remember to ask them during your visits.  © Copyright [employer] 2023 Information is for End User's use only and may not be sold, redistributed or otherwise used for commercial purposes.  The above information is an   only. It is not intended as medical advice for individual conditions or treatments. Talk to your doctor, nurse or pharmacist before following any medical regimen to see if it is safe and effective for you.        Opioid Safety   WHAT YOU NEED TO KNOW:   An opioid medicine is used to treat pain. Examples are oxycodone, morphine, fentanyl, or codeine. Pain control and management may help you rest, heal, and return to your daily activities. You and your family will receive information about how to manage your pain at home. The instructions will include what to do if you have side effects as your pain is managed. You will get information on how to handle opioid medicine safely. You will also get suggestions on how to control pain without opioids. It is important to follow all instructions so your pain is managed effectively.  DISCHARGE INSTRUCTIONS:   Call your local emergency number (911 in the ), or have someone else call if:   You have a seizure.    You cannot be woken.    You have trouble staying awake and your breathing is slow or shallow.    Your speech is slurred, or you are confused.    You are dizzy or stumble when you walk.    Call your doctor, or have someone close to you call if:   You are extremely drowsy, or you have trouble staying awake or speaking.    You have pale or clammy skin.    You have blue fingernails or lips.    Your heartbeat is slower than normal.    You cannot stop vomiting.    You have questions or concerns about your condition or care.    Use opioids safely:   Take prescribed opioids exactly as directed.  Opioids come with directions based on the kind and how it is given. Talk to your healthcare provider or a pharmacist if you have any questions. Do not take more than the recommended amount. Too much can cause a life-threatening overdose. Do not continue to take it after your pain stops. You may develop tolerance. This means you keep needing higher doses to get the same  effect. You may also develop opioid use disorder. This means you are not able to control your opioid use.    Do not give opioids to others or take opioids that belong to someone else.  The kind or amount one person takes may not be right for another. The person you share them with may also be taking medicines that do not mix with opioids. The person may drink alcohol or use other drugs that can cause life-threatening problems when mixed with opioids.    Do not mix opioids with other medicines or alcohol.  The combination can cause an overdose, or cause you to stop breathing. Alcohol, sleeping pills, and medicines such as antihistamines can make you sleepy. A combination with opioids can lead to a coma.    Do not drive or operate heavy machinery after you use an opioid.  You may feel drowsy or have trouble concentrating. You can injure yourself or others if you drive or use heavy machinery when you are not alert. Your provider or pharmacist can tell you how long to wait after a dose before you do these activities.    Talk to your healthcare provider if you have any side effects.  Side effects include nausea, sleepiness, itching, and trouble thinking clearly. Your provider may need to make changes to the kind or amount of opioid you are taking. Your provider can also help you find ways to prevent or relieve side effects.    Manage constipation:  Constipation is the most common side effect of opioid medicine. Constipation is when you have hard, dry bowel movements, or you go longer than usual between bowel movements. Tell your healthcare provider about all changes in your bowel movements while you are taking opioids. Your provider may recommend laxative medicine to help you have a bowel movement. Your provider may also change the kind of opioid you are taking, or change when you take it. The following are more ways you can prevent or relieve constipation:  Drink liquids as directed.  You may need to drink extra liquids  to help soften and move your bowels. Ask how much liquid to drink each day and which liquids are best for you.    Eat high-fiber foods.  This may help decrease constipation by adding bulk to your bowel movements. High-fiber foods include fruits, vegetables, whole-grain breads and cereals, and beans. Your healthcare provider or dietitian can help you create a high-fiber meal plan. Your provider may also recommend a fiber supplement if you cannot get enough fiber from food.         Exercise regularly.  Regular physical activity can help stimulate your intestines. Walking is a good exercise to prevent or relieve constipation. Ask which exercises are best for you.         Schedule a time each day to have a bowel movement.  This may help train your body to have regular bowel movements. Bend forward while you are on the toilet to help move the bowel movement out. Sit on the toilet for at least 10 minutes, even if you do not have a bowel movement.    Store opioids safely:   Store opioids where others cannot easily get them.  Keep them in a locked cabinet or secure area. Do not  keep them in a purse or other bag you carry with you. A person may be looking for something else and find the opioids.         Make sure opioids are stored out of the reach of children.  A child can easily overdose on opioids. Opioids may look like candy to a small child.    The best way to dispose of opioids:  The laws vary by country and area. In the [place], the best way is to return the opioids through a take-back program. This program is offered by the [place] Drug Enforcement Agency ([employer]). The following are options for using the program:  Take the opioids to a [employer] collection site.  The site is often a law enforcement center. Call your local law enforcement center for scheduled take-back days in your area. You will be given information on where to go if the collection site is in a different location.    Take the opioids to an approved pharmacy  or hospital.  A pharmacy or hospital may be set up as a collection site. You will need to ask if it is a [employer] collection site if you were not directed there. A pharmacy or doctor's office may not be able to take back opioids unless it is a [employer] site.    Use a mail-back system.  This means you are given containers to put the opioids into. You will then mail them in the containers.    Use a take-back drop box.  This is a place to leave the opioids at any time. People and animals will not be able to get into the box. Your local law enforcement agency can tell you where to find a drop box in your area.    Other safe ways to dispose of opioids:  The medicine may come with disposal instructions. The instructions may vary depending on the brand of medicine you are using. Instructions may come in a Medication Guide, but not every medicine has one. You may instead get instructions from your pharmacy or doctor. Follow instructions carefully. The following are general guidelines to follow:  Find out if you can flush the opioid.  Some opioids can be flushed down the toilet or poured into the sink. You will need to contact authorities in your area to see if this is an option for you. The FDA also offers a list of medicines that are safe to flush down the toilet. You can check the list if you cannot get the information for your local area.    Ask your waste management company about rules for putting opioids in the trash.  The company will be able to give you specific directions. Scratch out personal information on the original medicine label so it cannot be read. Then put it in the trash. Do not label the trash or put any information on it about the opioids. It should look like regular household trash so no one is tempted to look for the opioids. Keep the trash out of the reach of children and animals. Always make sure trash is secure.    Talk to officials if you live in a facility.  If you live in a nursing home or assisted living  center, talk to an official. The person will know the rules for your area.    Other ways to manage pain:   Ask your healthcare provider about non-opioid medicines to control pain.  Some medicines may even work better than opioids, depending on the cause of your pain. Nonprescription medicines include NSAIDs (such as ibuprofen) and acetaminophen. Prescription medicines include muscle relaxers, antidepressants, and steroids.    Pain may be managed without any medicines.  Some ways to relieve pain include massage, aromatherapy, or meditation. Physical or occupational therapy may also help.    Follow up with your doctor or pain specialist as directed:  You may need to have your dose adjusted. Your doctor or pain specialist can also help you find ways to manage pain without opioids. Write down your questions so you remember to ask them during your visits.  For more information:   Drug Enforcement Administration  [address] [id number]  Phone: 0- 590 - 929-3894  Web Address: https://[email or web address]_[email or web address]    [place] Food and Drug Administration  [address] [place] [id number]  Phone: 2- 826 - 260-9129  Web Address: http://www.fda.gov  © Copyright [employer] 2023 Information is for End User's use only and may not be sold, redistributed or otherwise used for commercial purposes.  The above information is an  only. It is not intended as medical advice for individual conditions or treatments. Talk to your doctor, nurse or pharmacist before following any medical regimen to see if it is safe and effective for you.

## 2023-12-21 NOTE — PROGRESS NOTES
Assessment:  1. Chronic pain syndrome    2. Lumbar pain with radiation down left leg    3. Lumbar radiculopathy    4. Lumbar disc herniation    5. Status post lumbar spinal fusion    6. Long-term current use of opiate analgesic    7. Uncomplicated opioid dependence (HCC)        Plan:  While the patient was in the office today, I did have a thorough conversation regarding their chronic pain syndrome, medication management, and treatment plan options.  Patient is being seen for a follow-up visit.  She was last seen here on 11/13/2023 at which time she was given a steroid taper for increased pain.  Overall, there has been some improvement with the use of the taper.  However she continues to have good and bad days.  She is very limited in her ability to lift and walk due to pain.    She has participated in physical therapy and aqua therapy in the past she states that therapies tend to aggravate her symptoms.  Today, I provided her with a home exercise program for lumbar core strengthening/stretching.  She was advised to do the exercises for 30 minutes a day for at least 3 to 4 days a week.    Will order an updated MRI of her lumbar spine.    We had previously discussed possibility of neuromodulation.  Surgery was not planned because she went into respiratory arrest postoperatively the last time she had surgery.    Continue oxycodone 5 mg 3 times daily if needed for pain.  The patient's opioid scripts were sent to their pharmacy electronically and was given a 2 month supply of prescriptions with a Do Not Fill date(s) of 1/7/2024, 2/4/2024.    Continue gabapentin 600/600/1800.  A prescription was sent to her pharmacy.    Continue Robaxin 750 mg 3 times daily if needed for spasms.  A prescription was sent to her pharmacy.    There are risks associated with opioid medications, including dependence, addiction and tolerance. The patient understands and agrees to use these medications only as prescribed. Potential side effects  of the medications include, but are not limited to, constipation, drowsiness, addiction, impaired judgment and risk of fatal overdose if not taken as prescribed. The patient was warned against driving while taking sedation medications.  Sharing medications is a felony. At this point in time, the patient is showing no signs of addiction, abuse, diversion or suicidal ideation.    A urine drug screen was collected at today's office visit as part of our medication management protocol. The point of care testing results were appropriate for what was being prescribed. The specimen will be sent for confirmatory testing. The drug screen is medically necessary because the patient is either dependent on opioid medication or is being considered for opioid medication therapy and the results could impact ongoing or future treatment. The drug screen is to evaluate for the presences or absence of prescribed, non-prescribed, and/or illicit drugs/substances.    [place] Prescription Drug Monitoring Program report was reviewed and was appropriate       History of Present Illness:  The patient is a 53 y.o. female who presents for a follow up office visit in regards to Back Pain and Leg Pain (left).   The patient’s current symptoms include complains of low back and left leg pain.  Current pain level is an 8/10.  Quality pain is described as sharp, throbbing, shooting.    Current pain medications includes: Oxycodone 5 mg 3 times daily if needed for pain, gabapentin 600/600/1800, Robaxin 750 mg 3 times daily if needed for spasms.  The patient reports that this regimen is providing   20% pain relief.  The patient is reporting no side effects from this pain medication regimen.    I have personally reviewed and/or updated the patient's past medical history, past surgical history, family history, social history, current medications, allergies, and vital signs today.         Review of Systems  Review of Systems   Musculoskeletal:  Positive  for back pain and gait problem.        Decreased ROM  pain   All other systems reviewed and are negative.          Past Medical History:   Diagnosis Date    Anxiety     Back pain     Depression     Insomnia        Past Surgical History:   Procedure Laterality Date    BACK SURGERY      cage in lumbar L5 area    BACK SURGERY      CAUDAL BLOCK N/A 11/15/2022    Procedure: BLOCK / INJECTION CAUDAL;  Surgeon: Nuno Farrar MD;  Location: [facility];  Service: Pain Management     CHOLECYSTECTOMY      LUMBAR DISC SURGERY      CO REPAIR FIRST ABDOMINAL WALL HERNIA N/A 3/16/2018    Procedure: REPAIR HERNIA VENTRAL with mesh;  Surgeon: David Chicas DO;  Location: [facility];  Service: General    CO SURGICAL ARTHROSCOPY SHOULDER W/ROTATOR CUFF RPR Left 2020    Procedure: SHOULDER ARTHROSCOPY, ROTATOR CUFF REPAIR, SYNEVECTOMY, ACROMIOPLASTY DEBRIDEMENT, INJECTION;  Surgeon: Brigido Mota DO;  Location:  MAIN OR;  Service: Orthopedics       Family History   Adopted: Yes   Family history unknown: Yes       Social History     Occupational History    Not on file   Tobacco Use    Smoking status: Former     Current packs/day: 0.00     Types: Cigarettes     Quit date: 10/17/2010     Years since quittin.1    Smokeless tobacco: Never    Tobacco comments:     Recently quit   Vaping Use    Vaping status: Never Used   Substance and Sexual Activity    Alcohol use: No    Drug use: No    Sexual activity: Not Currently     Partners: Female         Current Outpatient Medications:     busPIRone (BUSPAR) 15 mg tablet, Take 1 tablet (15 mg total) by mouth 3 (three) times a day, Disp: 270 tablet, Rfl: 0    gabapentin (Neurontin) 600 MG tablet, 1 PO BID and 3 PO QHS, Disp: 150 tablet, Rfl: 1    Melatonin 10 MG TABS, Take 60 mg by mouth daily at bedtime, Disp: , Rfl:     methocarbamol (ROBAXIN) 750 mg tablet, Take 1 tablet (750 mg total) by mouth 3 (three) times a day As needed for pain/spasms, Disp: 90 tablet, Rfl: 1     naloxone (NARCAN) 4 mg/0.1 mL nasal spray, Administer 1 spray into a nostril. If no response after 2-3 minutes, give another dose in the other nostril using a new spray., Disp: 1 each, Rfl: 1    [START ON 1/7/2024] oxyCODONE (Roxicodone) 5 immediate release tablet, Take 1 tablet (5 mg total) by mouth every 8 (eight) hours as needed for moderate pain Max Daily Amount: 15 mg Do not start before January 7, 2024., Disp: 90 tablet, Rfl: 0    [START ON 2/4/2024] oxyCODONE (Roxicodone) 5 immediate release tablet, Take 1 tablet (5 mg total) by mouth every 8 (eight) hours as needed for moderate pain Max Daily Amount: 15 mg Do not start before February 4, 2024., Disp: 90 tablet, Rfl: 0    pantoprazole (PROTONIX) 40 mg tablet, , Disp: , Rfl:     semaglutide, 1 mg/dose, (Ozempic) 4 mg/3 mL injection pen, Inject 0.75 mL (1 mg total) under the skin once a week, Disp: 9 mL, Rfl: 1    sertraline (ZOLOFT) 100 mg tablet, Take 1 tablet (100 mg total) by mouth daily, Disp: 90 tablet, Rfl: 0    sertraline (ZOLOFT) 50 mg tablet, Take 1 tablet (50 mg total) by mouth daily, Disp: 90 tablet, Rfl: 0    albuterol (Proventil HFA) 90 mcg/act inhaler, Inhale 2 puffs every 6 (six) hours as needed for wheezing (Patient not taking: Reported on 12/13/2023), Disp: 6.7 g, Rfl: 5    ergocalciferol (VITAMIN D2) 50,000 units, Take 1 capsule (50,000 Units total) by mouth once a week (Patient not taking: Reported on 12/13/2023), Disp: 12 capsule, Rfl: 0    predniSONE 10 mg tablet, 3 tabs once daily x 3 days; 2 tabs once daily x 2 days; 1 tab daily x 2 days (Patient not taking: Reported on 12/13/2023), Disp: 15 tablet, Rfl: 0    semaglutide, 0.25 or 0.5 mg/dose, (Ozempic, 0.25 or 0.5 MG/DOSE,) 2 mg/3 mL injection pen, 0.25 mg under the skin every 7 days for 4 doses (28 days), THEN 0.5 mg under the skin every 7 days (Patient not taking: Reported on 12/13/2023), Disp: 9 mL, Rfl: 0    No Known Allergies    Physical Exam:    /86   Pulse 79   Resp 16    "Ht 5' 6\" (1.676 m)   Wt 97.2 kg (214 lb 3.2 oz)   LMP 10/10/2016   BMI 34.57 kg/m²     Constitutional:normal, well developed, well nourished, alert, in no distress and non-toxic and no overt pain behavior.  Eyes:anicteric  HEENT:grossly intact  Neck:supple, symmetric, trachea midline and no masses   Pulmonary:even and unlabored  Cardiovascular:No edema or pitting edema present  Skin:Normal without rashes or lesions and well hydrated  Psychiatric:Mood and affect appropriate  Neurologic:Cranial Nerves II-XII grossly intact  Musculoskeletal: Gait is slow and guarded.    Imaging        Study Result    Narrative & Impression   MRI LUMBAR SPINE WITHOUT CONTRAST     INDICATION: M54.31: Sciatica, right side  M54.32: Sciatica, left side.   Low back pain.     COMPARISON:  1/22/2019     TECHNIQUE:  Sagittal T1, sagittal T2, sagittal inversion recovery, axial T1 and axial T2, coronal T2.    IMAGE QUALITY:  Diagnostic     FINDINGS:     VERTEBRAL BODIES:  There are 5 lumbar type vertebral bodies.  Trace grade 1 anterolisthesis of L5 on S1 is stable as are bilateral pedicle screws at L5 5.  Scattered multilevel spondylotic changes noted.  No bone marrow edema or focally suspicious marrow   lesions.     SACRUM:  Scattered degenerative endplate changes.  No focally suspicious marrow lesions.  No bone marrow edema or compression abnormality.     DISTAL CORD AND CONUS:  Normal size and signal within the distal cord and conus.  The conus medullaris terminates at this superior endplate of L1.     PARASPINAL SOFT TISSUES:  Small bilateral T2 hyperintense lesions noted in both kidneys, too small to characterize.  Postsurgical changes noted in the lumbosacral region in the subcutaneous soft tissues.     LOWER THORACIC DISC SPACES:  T10-T11: There is no focal disk herniation, central canal or neural foraminal narrowing.     T11-T12: There is mild loss of disc height and signal. There is no focal disk herniation, central canal or neural " foraminal narrowing. This level is similar to the prior study.     T12-L1: There is no focal disk herniation, central canal or neural foraminal narrowing.  Bilateral facet hypertrophy noted.     LUMBAR DISC SPACES:     L1-L2:  Normal.     L2-L3:  Normal.     L3-L4:  There is a diffuse disk bulge.  No significant central canal or neural foraminal narrowing.  Bilateral facet hypertrophy noted. This level is similar to the prior study.     L4-L5:  Postlaminectomy changes noted.  No evidence of recurrent or residual disc herniation.  Central canal surgically capacious.  Very mild residual right neural foraminal narrowing is stable.  Left neural foramen patent.     L5-S1:  There is uncovering the intervertebral discs.  There is bilateral facet hypertrophy.  Moderate bilateral neural foraminal narrowing on the basis of the anterolisthesis is stable.  Central canal patent. This level is similar to the prior study.     IMPRESSION:     Persistent grade 1 anterolisthesis of L5 on S1 with associated bilateral neural foraminal narrowing.  Postoperative changes at L4-5 are stable.        Workstation performed: [id number]           MRI lumbar spine without contrast    (Results Pending)       Orders Placed This Encounter   Procedures    MRI lumbar spine without contrast    MM ALL_Prescribed Meds and Special Instructions    MM DT_Alprazolam Definitive Test    MM DT_Amitriptyline Definitive Test    MM DT_Amphetamine Definitive Test    MM DT_Aripiprazole Definitive Test    MM DT_Buprenorphine Definitive Test    MM DT_Bupropion Definitive Test    MM DT_Citalopram/Escitalopram Definitive Test    MM DT_Clonazepam Definitive Test    MM DT_Clozapine Definitive Test    MM DT_Cocaine Definitive Test    MM DT_Desipramine Definitive Test    MM Diazepam Definitive Test    MM DT_Ethyl Glucuronide/Ethyl Sulfate Definitive Test    MM DT_Fentanyl Definitive Test    MM DT_Fluoxetine Definitive Test    MM DT_Haloperidol Definitive Test    MM  DT_Heroin Definitive Test    MM DT_Hydrocodone Definitive Test    MM DT_Hydromorphone Definitive Test    MM DT_Kratom Definitive Test    MM DT_Levorphanol Definitive Test    MM Lorazepam Definitive Test    MM DT_MDMA Definitive Test    MM DT_Meperidine Definitive Test    MM DT_Methadone Definitive Test    MM DT_Methamphetamine Definitive Test    MM DT_Methylphenidate Definitive Test    MM DT_Morphine Definitive Test    MM DT_Olanzapine Definitive Test    MM DT_Oxazepam Definitive Test    MM DT_Oxycodone Definitive Test    MM DT_Oxymorphone Definitive Test    MM DT_Paroxetine Definitive Test    MM DT_Pregablin Definitive    MM DT_Phentermine Definitive Test    MM DT_Quetiapine Definitive Test    MM DT_Risperidone Definitive Test    MM DT_Secobarbital Definitive Test    MM DT_Tapentadol Definitive Test    MM DT_Temazapam Definitive Test    MM DT_THC Definitive Test    MM DT_Tramadol Definitive Test    MM DT_Validity Creatinine    MM DT_Validity Oxidant    MM DT_Validity pH    MM DT_Validity Specific

## 2023-12-22 ENCOUNTER — TELEPHONE (OUTPATIENT)
Dept: FAMILY MEDICINE CLINIC | Facility: CLINIC | Age: 53
End: 2023-12-22

## 2023-12-23 LAB
6MAM UR QL CFM: NEGATIVE NG/ML
7AMINOCLONAZEPAM UR QL CFM: NEGATIVE NG/ML
A-OH ALPRAZ UR QL CFM: NEGATIVE NG/ML
ACCEPTABLE CREAT UR QL: NORMAL MG/DL
ACCEPTIBLE SP GR UR QL: NORMAL
AMITRIP UR QL CFM: NEGATIVE NG/ML
AMPHET UR QL CFM: NEGATIVE NG/ML
AMPHET UR QL CFM: NEGATIVE NG/ML
BUPRENORPHINE UR QL CFM: NEGATIVE NG/ML
BZE UR QL CFM: NEGATIVE NG/ML
DESIPRAMINE UR QL CFM: NEGATIVE NG/ML
EDDP UR QL CFM: NEGATIVE NG/ML
ETHYL GLUCURONIDE UR QL CFM: NEGATIVE NG/ML
ETHYL SULFATE UR QL SCN: NEGATIVE NG/ML
FENTANYL UR QL CFM: NEGATIVE NG/ML
FLUOXETINE UR QL CFM: NEGATIVE NG/ML
GLIADIN IGG SER IA-ACNC: NEGATIVE NG/ML
GLUCOSE 30M P 50 G LAC PO SERPL-MCNC: NEGATIVE NG/ML
HYDROCODONE UR QL CFM: NEGATIVE NG/ML
HYDROMORPHONE UR QL CFM: NEGATIVE NG/ML
LORAZEPAM UR QL CFM: NEGATIVE NG/ML
MDMA UR QL CFM: NEGATIVE NG/ML
ME-PHENIDATE UR QL CFM: NEGATIVE NG/ML
MEPERIDINE UR QL CFM: NEGATIVE NG/ML
METHADONE UR QL CFM: NEGATIVE NG/ML
METHAMPHET UR QL CFM: NEGATIVE NG/ML
MORPHINE UR QL CFM: NEGATIVE NG/ML
NITRITE UR QL: NORMAL UG/ML
NORBUPRENORPHINE UR QL CFM: NEGATIVE NG/ML
NORDIAZEPAM UR QL CFM: NEGATIVE NG/ML
NORFENTANYL UR QL CFM: NEGATIVE NG/ML
NORFLUOXETINE UR QL CFM: NEGATIVE NG/ML
NORHYDROCODONE UR QL CFM: NEGATIVE NG/ML
NORMEPERIDINE UR QL CFM: NEGATIVE NG/ML
NOROXYCODONE UR QL CFM: NORMAL NG/ML
NORTRIP UR QL CFM: NEGATIVE NG/ML
OLANZAPINE QUANTIFICATION: NEGATIVE NG/ML
OPC-3373 QUANTIFICATION: NEGATIVE
OXAZEPAM UR QL CFM: NEGATIVE NG/ML
OXYCODONE UR QL CFM: NORMAL NG/ML
OXYMORPHONE UR QL CFM: NORMAL NG/ML
OXYMORPHONE UR QL CFM: NORMAL NG/ML
PARA-FLUOROFENTANYL QUANTIFICATION: NORMAL NG/ML
PROLACTIN SERPL-MCNC: NEGATIVE NG/ML
RESULT ALL_PRESCRIBED MEDS AND SPECIAL INSTRUCTIONS: NORMAL
SECOBARBITAL UR QL CFM: NEGATIVE NG/ML
SL AMB 4-ANPP QUANTIFICATION: NORMAL NG/ML
SL AMB 7-OH-MITRAGYNINE (KRATOM ALKALOID) QUANTIFICATION: NEGATIVE NG/ML
SL AMB ACETYL FENTANYL QUANTIFICATION: NORMAL NG/ML
SL AMB ACETYL NORFENTANYL QUANTIFICATION: NORMAL NG/ML
SL AMB ACRYL FENTANYL QUANTIFICATION: NORMAL NG/ML
SL AMB CARFENTANIL QUANTIFICATION: NORMAL NG/ML
SL AMB CITALOPRAM/ESCITALOPRAM QUANTIFICATION: NEGATIVE NG/ML
SL AMB CITALOPRAM/ESCITALOPRAM QUANTIFICATION: NEGATIVE NG/ML
SL AMB CLOZAPINE QUANTIFICATION: NEGATIVE NG/ML
SL AMB CTHC (MARIJUANA METABOLITE) QUANTIFICATION: NEGATIVE NG/ML
SL AMB DEXTRORPHAN (DEXTROMETHORPHAN METABOLITE) QUANT: ABNORMAL NG/ML
SL AMB HALOPERIDOL  QUANTIFICATION: NEGATIVE NG/ML
SL AMB HALOPERIDOL METABOLITE QUANTIFICATION: NEGATIVE NG/ML
SL AMB HYDROXYBUPROPION QUANTIFICATION: NEGATIVE NG/ML
SL AMB HYDROXYRISPERIDONE QUANTIFICATION: NEGATIVE NG/ML
SL AMB N-DESMETHYL-TRAMADOL QUANTIFICATION: NEGATIVE NG/ML
SL AMB N-DESMETHYLCLOZAPINE QUANTIFICATION: NEGATIVE NG/ML
SL AMB NORQUETIAPINE QUANTIFICATION: NEGATIVE NG/ML
SL AMB PHENTERMINE QUANTIFICATION: NEGATIVE NG/ML
SL AMB PREGABALIN QUANTIFICATION: NEGATIVE
SL AMB QUETIAPINE QUANTIFICATION: NEGATIVE NG/ML
SL AMB RISPERIDONE QUANTIFICATION: NEGATIVE NG/ML
SL AMB RITALINIC ACID QUANTIFICATION: NEGATIVE NG/ML
SPECIMEN PH ACCEPTABLE UR: NORMAL
TAPENTADOL UR QL CFM: NEGATIVE NG/ML
TEMAZEPAM UR QL CFM: NEGATIVE NG/ML
TEMAZEPAM UR QL CFM: NEGATIVE NG/ML
TRAMADOL UR QL CFM: NEGATIVE NG/ML
URATE/CREAT 24H UR: NEGATIVE NG/ML

## 2023-12-28 ENCOUNTER — HOSPITAL ENCOUNTER (OUTPATIENT)
Dept: MRI IMAGING | Facility: HOSPITAL | Age: 53
Discharge: HOME | End: 2023-12-28
Payer: COMMERCIAL

## 2023-12-28 DIAGNOSIS — F11.20 UNCOMPLICATED OPIOID DEPENDENCE (HCC): ICD-10-CM

## 2023-12-28 DIAGNOSIS — M51.26 LUMBAR DISC HERNIATION: ICD-10-CM

## 2023-12-28 DIAGNOSIS — M79.605 LUMBAR PAIN WITH RADIATION DOWN LEFT LEG: ICD-10-CM

## 2023-12-28 DIAGNOSIS — M54.16 LUMBAR RADICULOPATHY: ICD-10-CM

## 2023-12-28 DIAGNOSIS — Z98.1 STATUS POST LUMBAR SPINAL FUSION: ICD-10-CM

## 2023-12-28 DIAGNOSIS — Z79.891 LONG-TERM CURRENT USE OF OPIATE ANALGESIC: ICD-10-CM

## 2023-12-28 DIAGNOSIS — M54.50 LUMBAR PAIN WITH RADIATION DOWN LEFT LEG: ICD-10-CM

## 2023-12-28 DIAGNOSIS — G89.4 CHRONIC PAIN SYNDROME: ICD-10-CM

## 2023-12-28 PROCEDURE — 72148 MRI LUMBAR SPINE W/O DYE: CPT

## 2023-12-28 PROCEDURE — G1004 CDSM NDSC: HCPCS

## 2024-01-08 ENCOUNTER — TELEPHONE (OUTPATIENT)
Dept: PAIN MEDICINE | Facility: CLINIC | Age: 54
End: 2024-01-08

## 2024-01-08 DIAGNOSIS — Z98.1 STATUS POST LUMBAR SPINAL FUSION: ICD-10-CM

## 2024-01-08 DIAGNOSIS — M43.17 SPONDYLOLISTHESIS AT L5-S1 LEVEL: ICD-10-CM

## 2024-01-08 DIAGNOSIS — M54.16 LUMBAR RADICULOPATHY: Primary | ICD-10-CM

## 2024-01-08 NOTE — TELEPHONE ENCOUNTER
Caller: Kimberly ESCOTO    Doctor: Calista LOCKWOOD    Reason for call: MRI results     Call back#: [phone]

## 2024-01-08 NOTE — TELEPHONE ENCOUNTER
----- Message from Barbara Kocher, CRNP sent at 1/8/2024  7:41 AM EST -----  Please call patient and let her know that the recent MRI of her lumbar spine reveals progressive arthritic changes at L5-S1 resulting in progressive anterior listhesis at this level, now measuring 8 mm compared to 4 mm on prior.  This leads to severe bilateral foraminal narrowing.  There is also significant arthritic changes at L3-4 which result in severe central and moderate bilateral lateral recess stenosis.  I am going to place an order in her chart for a flexion-extension x-ray of her lumbar spine to determine if there is any instability at L5-S1.  Please advise her to have the x-ray on an outpatient basis before next appointment.

## 2024-01-09 ENCOUNTER — HOSPITAL ENCOUNTER (OUTPATIENT)
Dept: RADIOLOGY | Facility: HOSPITAL | Age: 54
Discharge: HOME | End: 2024-01-09
Payer: COMMERCIAL

## 2024-01-09 DIAGNOSIS — M54.16 LUMBAR RADICULOPATHY: ICD-10-CM

## 2024-01-09 DIAGNOSIS — M43.17 SPONDYLOLISTHESIS AT L5-S1 LEVEL: ICD-10-CM

## 2024-01-09 DIAGNOSIS — Z98.1 STATUS POST LUMBAR SPINAL FUSION: ICD-10-CM

## 2024-01-09 PROCEDURE — 72114 X-RAY EXAM L-S SPINE BENDING: CPT

## 2024-01-11 NOTE — TELEPHONE ENCOUNTER
I ordered the x-ray of her lumbar spine because I was concerned about instability.  The anterolisthesis appears stable.  There appears to be no dynamic instability.  He is advised her to keep scheduled follow-up visit to discuss results /options in more detail.

## 2024-01-11 NOTE — TELEPHONE ENCOUNTER
Caller: Kimberly    Doctor: Kocher    Reason for call: Pt would like to know what the results are of her xray.    Please advise    Call back#: [phone]

## 2024-01-18 ENCOUNTER — TELEPHONE (OUTPATIENT)
Dept: FAMILY MEDICINE CLINIC | Facility: CLINIC | Age: 54
End: 2024-01-18

## 2024-01-24 ENCOUNTER — TELEPHONE (OUTPATIENT)
Dept: FAMILY MEDICINE CLINIC | Facility: CLINIC | Age: 54
End: 2024-01-24

## 2024-01-24 DIAGNOSIS — E66.9 OBESITY (BMI 30-39.9): ICD-10-CM

## 2024-01-24 NOTE — TELEPHONE ENCOUNTER
Pts OZEMPIC was denied a week ago, and was sent for EXPEDITED APPEAL.  I called for a status on the appeal, per pharm services - DENIAL UPHELD    Pt has been w/o medication    [employer] test claim states [facility] will need a prior auth, but was accepted on test claim....please order .  Thank  you!

## 2024-01-25 ENCOUNTER — VBI (OUTPATIENT)
Dept: ADMINISTRATIVE | Facility: OTHER | Age: 54
End: 2024-01-25

## 2024-01-25 ENCOUNTER — TELEPHONE (OUTPATIENT)
Dept: FAMILY MEDICINE CLINIC | Facility: CLINIC | Age: 54
End: 2024-01-25

## 2024-01-25 NOTE — TELEPHONE ENCOUNTER
Kimberly's SANDYRO has also been denied - along with OZEMPIC    Denials UPHELD   Med for WEIGHT LOSS will not be covered

## 2024-01-25 NOTE — TELEPHONE ENCOUNTER
Please let patient know that we could not get mounjaro approved. She is going to have to see weight management and hopefully they will be able to get something worked out. Will place referral if she would like. Allyson was on the phone for 4 hours trying to get this approved for her.

## 2024-01-31 ENCOUNTER — TELEPHONE (OUTPATIENT)
Dept: FAMILY MEDICINE CLINIC | Facility: CLINIC | Age: 54
End: 2024-01-31

## 2024-01-31 DIAGNOSIS — E66.9 OBESITY (BMI 30-39.9): Primary | ICD-10-CM

## 2024-01-31 NOTE — TELEPHONE ENCOUNTER
"Unfortunately we do not prescribe oral medications for this, will have to see weight management will place referral.        ----- Message from Araseli Corbett sent at 1/31/2024  3:26 PM EST -----  Regarding: FW: Weigh loss  Contact: [phone]    ----- Message -----  From: Calista Umana \"Kimberly\"  Sent: 1/31/2024   3:17 PM EST  To: [facility]  Subject: Weigh loss                                       I was wondering if there was another weigh loss option for me? The Ozempic & other injections are not covered by insurance. Is there anything in pill for or otherwise that can be prescribed for weigh loss? I was so happy I’m down almost 40lbs. I really want & need to continue. Thank you    "

## 2024-02-09 ENCOUNTER — HOSPITAL ENCOUNTER (OUTPATIENT)
Dept: MAMMOGRAPHY | Facility: HOSPITAL | Age: 54
Discharge: HOME | End: 2024-02-09
Payer: COMMERCIAL

## 2024-02-09 DIAGNOSIS — Z12.31 ENCOUNTER FOR SCREENING MAMMOGRAM FOR BREAST CANCER: ICD-10-CM

## 2024-02-09 PROCEDURE — 77067 SCR MAMMO BI INCL CAD: CPT

## 2024-02-09 PROCEDURE — 77063 BREAST TOMOSYNTHESIS BI: CPT

## 2024-02-26 ENCOUNTER — OFFICE VISIT (OUTPATIENT)
Dept: PAIN MEDICINE | Facility: CLINIC | Age: 54
End: 2024-02-26
Payer: COMMERCIAL

## 2024-02-26 VITALS
WEIGHT: 193 LBS | DIASTOLIC BLOOD PRESSURE: 67 MMHG | SYSTOLIC BLOOD PRESSURE: 96 MMHG | HEART RATE: 83 BPM | HEIGHT: 66 IN | BODY MASS INDEX: 31.02 KG/M2

## 2024-02-26 DIAGNOSIS — M51.26 LUMBAR DISC HERNIATION: ICD-10-CM

## 2024-02-26 DIAGNOSIS — M79.605 LUMBAR PAIN WITH RADIATION DOWN LEFT LEG: ICD-10-CM

## 2024-02-26 DIAGNOSIS — M43.16 ANTEROLISTHESIS OF LUMBAR SPINE: ICD-10-CM

## 2024-02-26 DIAGNOSIS — M54.50 LUMBAR PAIN WITH RADIATION DOWN LEFT LEG: ICD-10-CM

## 2024-02-26 DIAGNOSIS — M54.16 LUMBAR RADICULOPATHY: ICD-10-CM

## 2024-02-26 DIAGNOSIS — G89.4 CHRONIC PAIN SYNDROME: Primary | ICD-10-CM

## 2024-02-26 DIAGNOSIS — Z98.1 STATUS POST LUMBAR SPINAL FUSION: ICD-10-CM

## 2024-02-26 PROCEDURE — 99214 OFFICE O/P EST MOD 30 MIN: CPT | Performed by: NURSE PRACTITIONER

## 2024-02-26 RX ORDER — OXYCODONE HYDROCHLORIDE 5 MG/1
5 TABLET ORAL EVERY 8 HOURS PRN
Qty: 90 TABLET | Refills: 0 | Status: SHIPPED | OUTPATIENT
Start: 2024-04-16 | End: 2024-05-16

## 2024-02-26 RX ORDER — GABAPENTIN 600 MG/1
TABLET ORAL
Qty: 150 TABLET | Refills: 1 | Status: SHIPPED | OUTPATIENT
Start: 2024-02-26

## 2024-02-26 RX ORDER — METHOCARBAMOL 750 MG/1
750 TABLET, FILM COATED ORAL 3 TIMES DAILY
Qty: 90 TABLET | Refills: 1 | Status: SHIPPED | OUTPATIENT
Start: 2024-02-26

## 2024-02-26 RX ORDER — OXYCODONE HYDROCHLORIDE 5 MG/1
5 TABLET ORAL EVERY 8 HOURS PRN
Qty: 90 TABLET | Refills: 0 | Status: SHIPPED | OUTPATIENT
Start: 2024-03-19

## 2024-02-26 NOTE — PATIENT INSTRUCTIONS
Opioid Safety   WHAT YOU NEED TO KNOW:   An opioid medicine is used to treat pain. Examples are oxycodone, morphine, fentanyl, or codeine. Pain control and management may help you rest, heal, and return to your daily activities. You and your family will receive information about how to manage your pain at home. The instructions will include what to do if you have side effects as your pain is managed. You will get information on how to handle opioid medicine safely. You will also get suggestions on how to control pain without opioids. It is important to follow all instructions so your pain is managed effectively.  DISCHARGE INSTRUCTIONS:   Call your local emergency number (911 in the ), or have someone else call if:   You have a seizure.    You cannot be woken.    You have trouble staying awake and your breathing is slow or shallow.    Your speech is slurred, or you are confused.    You are dizzy or stumble when you walk.    Call your doctor, or have someone close to you call if:   You are extremely drowsy, or you have trouble staying awake or speaking.    You have pale or clammy skin.    You have blue fingernails or lips.    Your heartbeat is slower than normal.    You cannot stop vomiting.    You have questions or concerns about your condition or care.    Use opioids safely:   Take prescribed opioids exactly as directed.  Opioids come with directions based on the kind and how it is given. Talk to your healthcare provider or a pharmacist if you have any questions. Do not take more than the recommended amount. Too much can cause a life-threatening overdose. Do not continue to take it after your pain stops. You may develop tolerance. This means you keep needing higher doses to get the same effect. You may also develop opioid use disorder. This means you are not able to control your opioid use.    Do not give opioids to others or take opioids that belong to someone else.  The kind or amount one person takes may not  be right for another. The person you share them with may also be taking medicines that do not mix with opioids. The person may drink alcohol or use other drugs that can cause life-threatening problems when mixed with opioids.    Do not mix opioids with other medicines or alcohol.  The combination can cause an overdose, or cause you to stop breathing. Alcohol, sleeping pills, and medicines such as antihistamines can make you sleepy. A combination with opioids can lead to a coma.    Do not drive or operate heavy machinery after you use an opioid.  You may feel drowsy or have trouble concentrating. You can injure yourself or others if you drive or use heavy machinery when you are not alert. Your provider or pharmacist can tell you how long to wait after a dose before you do these activities.    Talk to your healthcare provider if you have any side effects.  Side effects include nausea, sleepiness, itching, and trouble thinking clearly. Your provider may need to make changes to the kind or amount of opioid you are taking. Your provider can also help you find ways to prevent or relieve side effects.    Manage constipation:  Constipation is the most common side effect of opioid medicine. Constipation is when you have hard, dry bowel movements, or you go longer than usual between bowel movements. Tell your healthcare provider about all changes in your bowel movements while you are taking opioids. Your provider may recommend laxative medicine to help you have a bowel movement. Your provider may also change the kind of opioid you are taking, or change when you take it. The following are more ways you can prevent or relieve constipation:  Drink liquids as directed.  You may need to drink extra liquids to help soften and move your bowels. Ask how much liquid to drink each day and which liquids are best for you.    Eat high-fiber foods.  This may help decrease constipation by adding bulk to your bowel movements. High-fiber  foods include fruits, vegetables, whole-grain breads and cereals, and beans. Your healthcare provider or dietitian can help you create a high-fiber meal plan. Your provider may also recommend a fiber supplement if you cannot get enough fiber from food.         Exercise regularly.  Regular physical activity can help stimulate your intestines. Walking is a good exercise to prevent or relieve constipation. Ask which exercises are best for you.         Schedule a time each day to have a bowel movement.  This may help train your body to have regular bowel movements. Bend forward while you are on the toilet to help move the bowel movement out. Sit on the toilet for at least 10 minutes, even if you do not have a bowel movement.    Store opioids safely:   Store opioids where others cannot easily get them.  Keep them in a locked cabinet or secure area. Do not  keep them in a purse or other bag you carry with you. A person may be looking for something else and find the opioids.         Make sure opioids are stored out of the reach of children.  A child can easily overdose on opioids. Opioids may look like candy to a small child.    The best way to dispose of opioids:  The laws vary by country and area. In the [place], the best way is to return the opioids through a take-back program. This program is offered by the [place] Drug Enforcement Agency ([employer]). The following are options for using the program:  Take the opioids to a [employer] collection site.  The site is often a law enforcement center. Call your local law enforcement center for scheduled take-back days in your area. You will be given information on where to go if the collection site is in a different location.    Take the opioids to an approved pharmacy or hospital.  A pharmacy or hospital may be set up as a collection site. You will need to ask if it is a [employer] collection site if you were not directed there. A pharmacy or doctor's office may not be able to take back opioids  unless it is a [employer] site.    Use a mail-back system.  This means you are given containers to put the opioids into. You will then mail them in the containers.    Use a take-back drop box.  This is a place to leave the opioids at any time. People and animals will not be able to get into the box. Your local law enforcement agency can tell you where to find a drop box in your area.    Other safe ways to dispose of opioids:  The medicine may come with disposal instructions. The instructions may vary depending on the brand of medicine you are using. Instructions may come in a Medication Guide, but not every medicine has one. You may instead get instructions from your pharmacy or doctor. Follow instructions carefully. The following are general guidelines to follow:  Find out if you can flush the opioid.  Some opioids can be flushed down the toilet or poured into the sink. You will need to contact authorities in your area to see if this is an option for you. The FDA also offers a list of medicines that are safe to flush down the toilet. You can check the list if you cannot get the information for your local area.    Ask your waste management company about rules for putting opioids in the trash.  The company will be able to give you specific directions. Scratch out personal information on the original medicine label so it cannot be read. Then put it in the trash. Do not label the trash or put any information on it about the opioids. It should look like regular household trash so no one is tempted to look for the opioids. Keep the trash out of the reach of children and animals. Always make sure trash is secure.    Talk to officials if you live in a facility.  If you live in a nursing home or assisted living center, talk to an official. The person will know the rules for your area.    Other ways to manage pain:   Ask your healthcare provider about non-opioid medicines to control pain.  Some medicines may even work better than  opioids, depending on the cause of your pain. Nonprescription medicines include NSAIDs (such as ibuprofen) and acetaminophen. Prescription medicines include muscle relaxers, antidepressants, and steroids.    Pain may be managed without any medicines.  Some ways to relieve pain include massage, aromatherapy, or meditation. Physical or occupational therapy may also help.    Follow up with your doctor or pain specialist as directed:  You may need to have your dose adjusted. Your doctor or pain specialist can also help you find ways to manage pain without opioids. Write down your questions so you remember to ask them during your visits.  For more information:   Drug Enforcement Administration  [address] [place] [address]  Phone: 1- 605 - 475-9190  Web Address: https://www.deadiversion.[facility].gov/drug_disposal/    US Food and Drug Administration  [address] [place] [address]  Phone: 5- 265 - 873-7567  Web Address: http://www.fda.gov  © Copyright [employer] 2023 Information is for End User's use only and may not be sold, redistributed or otherwise used for commercial purposes.  The above information is an  only. It is not intended as medical advice for individual conditions or treatments. Talk to your doctor, nurse or pharmacist before following any medical regimen to see if it is safe and effective for you.

## 2024-02-26 NOTE — PROGRESS NOTES
Assessment:  1. Chronic pain syndrome    2. Lumbar disc herniation    3. Lumbar radiculopathy    4. Status post lumbar spinal fusion    5. Anterolisthesis of lumbar spine    6. Lumbar pain with radiation down left leg        Plan:  While the patient was in the office today, I did have a thorough conversation regarding their chronic pain syndrome, medication management, and treatment plan options.  Patient was last seen here on 12/21/2023 at which time an MRI of the lumbar spine was ordered.  MRI reveals progressive facet degenerative changes at L5-S1 which result in progressive anterior listhesis now measuring 8 mm compared to 4 mm on prior exam.  There is severe bilateral foraminal narrowing noted at this level.  There is progressive spondylotic degenerative disease at L3-4 now resulting in moderate to severe central and moderate bilateral lateral recess stenosis.  Lection extension x-ray of the lumbar spine does not reveal dynamic instability.  MRI and x-ray results were reviewed with patient during today's visit.    Will refer patient for surgical evaluation.    Continue gabapentin 600/600/1800.  A prescription was sent to her pharmacy with a refill.    Continue Robaxin 750 mg every 8 hours as needed for spasms.  A prescription was sent to her pharmacy with a refill.    Continue oxycodone 5 mg every 8 hours as needed for pain.  The patient's opioid scripts were sent to their pharmacy electronically and was given a 2 month supply of prescriptions with a Do Not Fill date(s) of 3/19/2024, 4/16/2024.    There are risks associated with opioid medications, including dependence, addiction and tolerance. The patient understands and agrees to use these medications only as prescribed. Potential side effects of the medications include, but are not limited to, constipation, drowsiness, addiction, impaired judgment and risk of fatal overdose if not taken as prescribed. The patient was warned against driving while taking  sedation medications.  Sharing medications is a felony. At this point in time, the patient is showing no signs of addiction, abuse, diversion or suicidal ideation.    [place] Prescription Drug Monitoring Program report was reviewed and was appropriate     While the patient was in the office today an opioid contract was thoroughly reviewed and signed by the patient. The patient was given adequate time to ask questions in regards to the contract and a signed copy was sent home for his/her records.    The patient will follow-up in 2 months for medication prescription refill and reevaluation. The patient was advised to contact the office should their symptoms worsen in the interim. The patient was agreeable and verbalized an understanding.    History of Present Illness:  The patient is a 53 y.o. female who presents for a follow up office visit in regards to Follow-up.   The patient’s current symptoms include complaints of low back and bilateral leg pain.  Current pain level is a 7/10.  Quality pain is described as sharp, throbbing, shooting, pins-and-needles.    Current pain medications includes: Codon 5 mg every 8 hours as needed for pain, gabapentin 600/600/1800, Robaxin 750 mg 3 times daily if needed for spasms.  The patient reports that this regimen is providing 10% pain relief.  The patient is reporting no side effects from this pain medication regimen.    I have personally reviewed and/or updated the patient's past medical history, past surgical history, family history, social history, current medications, allergies, and vital signs today.         Review of Systems  Review of Systems   Musculoskeletal:  Positive for gait problem.        Decrease ROM  Pain in extremity   All other systems reviewed and are negative.          Past Medical History:   Diagnosis Date    Anxiety     Back pain     Depression     Insomnia        Past Surgical History:   Procedure Laterality Date    BACK SURGERY  2006    cage in lumbar  L5 area    BACK SURGERY      CAUDAL BLOCK N/A 11/15/2022    Procedure: BLOCK / INJECTION CAUDAL;  Surgeon: Nuno Farrar MD;  Location: [facility];  Service: Pain Management     CHOLECYSTECTOMY      LUMBAR DISC SURGERY      MT REPAIR FIRST ABDOMINAL WALL HERNIA N/A 3/16/2018    Procedure: REPAIR HERNIA VENTRAL with mesh;  Surgeon: David Chicas DO;  Location: [facility];  Service: General    MT SURGICAL ARTHROSCOPY SHOULDER W/ROTATOR CUFF RPR Left 2020    Procedure: SHOULDER ARTHROSCOPY, ROTATOR CUFF REPAIR, SYNEVECTOMY, ACROMIOPLASTY DEBRIDEMENT, INJECTION;  Surgeon: Brigido Mota DO;  Location:  MAIN OR;  Service: Orthopedics       Family History   Adopted: Yes   Family history unknown: Yes       Social History     Occupational History    Not on file   Tobacco Use    Smoking status: Former     Current packs/day: 0.00     Types: Cigarettes     Quit date: 10/17/2010     Years since quittin.3    Smokeless tobacco: Never    Tobacco comments:     Recently quit   Vaping Use    Vaping status: Never Used   Substance and Sexual Activity    Alcohol use: No    Drug use: No    Sexual activity: Not Currently     Partners: Female         Current Outpatient Medications:     busPIRone (BUSPAR) 15 mg tablet, Take 1 tablet (15 mg total) by mouth 3 (three) times a day, Disp: 270 tablet, Rfl: 0    gabapentin (Neurontin) 600 MG tablet, 1 PO BID and 3 PO QHS, Disp: 150 tablet, Rfl: 1    Melatonin 10 MG TABS, Take 60 mg by mouth daily at bedtime, Disp: , Rfl:     methocarbamol (ROBAXIN) 750 mg tablet, Take 1 tablet (750 mg total) by mouth 3 (three) times a day As needed for pain/spasms, Disp: 90 tablet, Rfl: 1    [START ON 2024] oxyCODONE (Roxicodone) 5 immediate release tablet, Take 1 tablet (5 mg total) by mouth every 8 (eight) hours as needed for moderate pain Max Daily Amount: 15 mg Do not start before 2024., Disp: 90 tablet, Rfl: 0    [START ON 3/19/2024] oxyCODONE (Roxicodone) 5 immediate release  "tablet, Take 1 tablet (5 mg total) by mouth every 8 (eight) hours as needed for moderate pain Max Daily Amount: 15 mg Do not start before March 19, 2024., Disp: 90 tablet, Rfl: 0    sertraline (ZOLOFT) 100 mg tablet, Take 1 tablet (100 mg total) by mouth daily, Disp: 90 tablet, Rfl: 0    sertraline (ZOLOFT) 50 mg tablet, Take 1 tablet (50 mg total) by mouth daily, Disp: 90 tablet, Rfl: 0    tirzepatide (Mounjaro) 2.5 MG/0.5ML, Inject 0.5 mL (2.5 mg total) under the skin every 7 days (Patient not taking: Reported on 2/26/2024), Disp: 2 mL, Rfl: 0    No Known Allergies    Physical Exam:    BP 96/67   Pulse 83   Ht 5' 6\" (1.676 m)   Wt 87.5 kg (193 lb)   LMP 10/10/2016   BMI 31.15 kg/m²     Constitutional:normal, well developed, well nourished, alert, in no distress and non-toxic and no overt pain behavior. and overweight  Eyes:anicteric  HEENT:grossly intact  Neck:supple, symmetric, trachea midline and no masses   Pulmonary:even and unlabored  Cardiovascular:No edema or pitting edema present  Skin:Normal without rashes or lesions and well hydrated  Psychiatric:Mood and affect appropriate  Neurologic:Cranial Nerves II-XII grossly intact  Musculoskeletal: Gait is slow and guarded.    Imaging  No orders to display       Orders Placed This Encounter   Procedures    Ambulatory Referral to Orthopedic Surgery      "

## 2024-02-27 ENCOUNTER — TELEPHONE (OUTPATIENT)
Dept: FAMILY MEDICINE CLINIC | Facility: CLINIC | Age: 54
End: 2024-02-27

## 2024-02-27 DIAGNOSIS — Z13.220 SCREENING FOR HYPERLIPIDEMIA: ICD-10-CM

## 2024-02-27 DIAGNOSIS — Z13.89 SCREENING FOR MULTIPLE CONDITIONS: ICD-10-CM

## 2024-02-27 DIAGNOSIS — E55.9 VITAMIN D DEFICIENCY: Primary | ICD-10-CM

## 2024-02-27 NOTE — TELEPHONE ENCOUNTER
"----- Message from Jessica Zimmerman sent at 2/26/2024  6:12 PM EST -----  Regarding: FW: Bloodwork   Contact: [phone]    ----- Message -----  From: Calista Umana \"Kimberly\"  Sent: 2/26/2024   5:30 PM EST  To: [facility]  Subject: Bloodwork                                        I was wanting to get A1 tested & cholesterol. To see if I can get back on the Ozempic. I was hoping to get it done before my upcoming appointment in March. Thank you    "

## 2024-02-28 ENCOUNTER — APPOINTMENT (OUTPATIENT)
Dept: LAB | Facility: MEDICAL CENTER | Age: 54
End: 2024-02-28
Payer: COMMERCIAL

## 2024-02-28 DIAGNOSIS — E55.9 VITAMIN D DEFICIENCY: ICD-10-CM

## 2024-02-28 DIAGNOSIS — Z13.220 SCREENING FOR HYPERLIPIDEMIA: ICD-10-CM

## 2024-02-28 DIAGNOSIS — Z13.89 SCREENING FOR MULTIPLE CONDITIONS: ICD-10-CM

## 2024-02-28 LAB
25(OH)D3 SERPL-MCNC: 29.8 NG/ML (ref 30–100)
ALBUMIN SERPL BCP-MCNC: 4.2 G/DL (ref 3.5–5)
ALP SERPL-CCNC: 56 U/L (ref 34–104)
ALT SERPL W P-5'-P-CCNC: 17 U/L (ref 7–52)
ANION GAP SERPL CALCULATED.3IONS-SCNC: 8 MMOL/L
AST SERPL W P-5'-P-CCNC: 14 U/L (ref 13–39)
BILIRUB SERPL-MCNC: 0.33 MG/DL (ref 0.2–1)
BUN SERPL-MCNC: 10 MG/DL (ref 5–25)
CALCIUM SERPL-MCNC: 9.3 MG/DL (ref 8.4–10.2)
CHLORIDE SERPL-SCNC: 107 MMOL/L (ref 96–108)
CHOLEST SERPL-MCNC: 153 MG/DL
CO2 SERPL-SCNC: 27 MMOL/L (ref 21–32)
CREAT SERPL-MCNC: 0.65 MG/DL (ref 0.6–1.3)
ERYTHROCYTE [DISTWIDTH] IN BLOOD BY AUTOMATED COUNT: 14.6 % (ref 11.6–15.1)
EST. AVERAGE GLUCOSE BLD GHB EST-MCNC: 94 MG/DL
GFR SERPL CREATININE-BSD FRML MDRD: 100 ML/MIN/1.73SQ M
GLUCOSE P FAST SERPL-MCNC: 94 MG/DL (ref 65–99)
HBA1C MFR BLD: 4.9 %
HCT VFR BLD AUTO: 45.5 % (ref 34.8–46.1)
HDLC SERPL-MCNC: 50 MG/DL
HGB BLD-MCNC: 14.7 G/DL (ref 11.5–15.4)
LDLC SERPL CALC-MCNC: 88 MG/DL (ref 0–100)
MCH RBC QN AUTO: 31.1 PG (ref 26.8–34.3)
MCHC RBC AUTO-ENTMCNC: 32.3 G/DL (ref 31.4–37.4)
MCV RBC AUTO: 96 FL (ref 82–98)
PLATELET # BLD AUTO: 293 THOUSANDS/UL (ref 149–390)
PMV BLD AUTO: 10.5 FL (ref 8.9–12.7)
POTASSIUM SERPL-SCNC: 4.1 MMOL/L (ref 3.5–5.3)
PROT SERPL-MCNC: 6.2 G/DL (ref 6.4–8.4)
RBC # BLD AUTO: 4.73 MILLION/UL (ref 3.81–5.12)
SODIUM SERPL-SCNC: 142 MMOL/L (ref 135–147)
TRIGL SERPL-MCNC: 77 MG/DL
WBC # BLD AUTO: 6.15 THOUSAND/UL (ref 4.31–10.16)

## 2024-02-28 PROCEDURE — 36415 COLL VENOUS BLD VENIPUNCTURE: CPT

## 2024-02-28 PROCEDURE — 80053 COMPREHEN METABOLIC PANEL: CPT

## 2024-02-28 PROCEDURE — 80061 LIPID PANEL: CPT

## 2024-02-28 PROCEDURE — 82306 VITAMIN D 25 HYDROXY: CPT

## 2024-02-28 PROCEDURE — 85027 COMPLETE CBC AUTOMATED: CPT

## 2024-02-28 PROCEDURE — 83036 HEMOGLOBIN GLYCOSYLATED A1C: CPT

## 2024-03-01 ENCOUNTER — TELEPHONE (OUTPATIENT)
Dept: FAMILY MEDICINE CLINIC | Facility: CLINIC | Age: 54
End: 2024-03-01

## 2024-03-01 DIAGNOSIS — E66.9 OBESITY (BMI 30-39.9): Primary | ICD-10-CM

## 2024-03-01 NOTE — TELEPHONE ENCOUNTER
"Unfortunately, her insurance is denying these medications because she is not diabetic.      ----- Message from Jessica Elsie sent at 3/1/2024  7:07 AM EST -----  Regarding: FW: Results  Contact: [phone]    ----- Message -----  From: Calista Umana \"Kimberly\"  Sent: 2/29/2024   4:19 PM EST  To: [facility]  Subject: Results                                          I thought maybe because my BMI is so high?    "

## 2024-03-01 NOTE — TELEPHONE ENCOUNTER
I called pt and she states that she made her insurance aware that she was taking for weight loss and that she was not Diabetic   She states she know needs a PA

## 2024-03-01 NOTE — TELEPHONE ENCOUNTER
"----- Message from Jessica Zimmerman sent at 3/1/2024  9:44 AM EST -----  Regarding: FW: Reply  Contact: [phone]    ----- Message -----  From: Calista Umana \"Kimberly\"  Sent: 3/1/2024   9:06 AM EST  To: [facility]  Subject: Reply                                            I changed insurance so I could get it.     "

## 2024-03-01 NOTE — TELEPHONE ENCOUNTER
I'm not sure what else to do, she can try calling her insurance because our prior auths have been denied.

## 2024-03-01 NOTE — TELEPHONE ENCOUNTER
"----- Message from Fanny San sent at 3/1/2024  1:58 PM EST -----  Regarding: FW: Reply  Contact: [phone]    ----- Message -----  From: Calista Umana \"Kimberly\"  Sent: 3/1/2024   1:55 PM EST  To: [facility]  Subject: Reply                                            Now insurance wants a pre authorization sorry    "

## 2024-03-01 NOTE — TELEPHONE ENCOUNTER
"When she called her insurance, did she just ask a blanket question such as \"do you cover ozempic?\" Did she specify that she was NOT diabetic and that she was looking to be put on it for obesity? If she did not specify her diagnosis, that could be where the confusion is happening.  "

## 2024-03-01 NOTE — TELEPHONE ENCOUNTER
Called in semaglutide which is the generic.   Pt will be started at lowest dose and will need to titrate up again each month.

## 2024-03-05 ENCOUNTER — OFFICE VISIT (OUTPATIENT)
Dept: OBGYN CLINIC | Facility: CLINIC | Age: 54
End: 2024-03-05
Payer: COMMERCIAL

## 2024-03-05 VITALS
SYSTOLIC BLOOD PRESSURE: 121 MMHG | WEIGHT: 193 LBS | BODY MASS INDEX: 31.02 KG/M2 | HEIGHT: 66 IN | DIASTOLIC BLOOD PRESSURE: 62 MMHG

## 2024-03-05 DIAGNOSIS — M43.17 SPONDYLOLISTHESIS AT L5-S1 LEVEL: ICD-10-CM

## 2024-03-05 DIAGNOSIS — Z98.1 STATUS POST LUMBAR SPINAL FUSION: ICD-10-CM

## 2024-03-05 DIAGNOSIS — Z98.1 ADJACENT SEGMENT DISEASE OF LUMBAR SPINE WITH HISTORY OF FUSION PROCEDURE: Primary | ICD-10-CM

## 2024-03-05 DIAGNOSIS — M51.36 DDD (DEGENERATIVE DISC DISEASE), LUMBAR: ICD-10-CM

## 2024-03-05 DIAGNOSIS — M54.16 LUMBAR RADICULOPATHY: ICD-10-CM

## 2024-03-05 DIAGNOSIS — M51.36 ADJACENT SEGMENT DISEASE OF LUMBAR SPINE WITH HISTORY OF FUSION PROCEDURE: Primary | ICD-10-CM

## 2024-03-05 PROCEDURE — 99204 OFFICE O/P NEW MOD 45 MIN: CPT | Performed by: ORTHOPAEDIC SURGERY

## 2024-03-05 NOTE — TELEPHONE ENCOUNTER
Please let patient know her insurance is not approving the weight loss mediation (ozempic or wegovy, also already tried mounjaro).

## 2024-03-05 NOTE — PROGRESS NOTES
[facility] ORTHOPEDIC SPINE SURGERY  DR.AMIR EMILY MD  [address] [facility] [address]  [phone]    HISTORY OF PRESENT ILLNESS:    Calista Umana is a 53 y.o. female who presents for initial evaluation of lumbar spine. Patient was referred today by Barabara Kocher, CRNP at pain management. Patient reports she was referred due to changes on recent MRI that showed increasing anterolisthesis of L5-S1. Patient reports she has low back pain that goes into bilateral lower extremities ending at the feet, right side currently more than left. Patient also has numbness and tingling that is chronic in left lower extremity since her second surgery. Patient reports she had three prior lumbar surgeries. Patient reports she has done aqua therapy, land based physical therapy, and had three injections within the past six months without relief of symptoms.       ALLERGIES: No Known Allergies    MEDICATIONS:    Current Outpatient Medications:     busPIRone (BUSPAR) 15 mg tablet, Take 1 tablet (15 mg total) by mouth 3 (three) times a day, Disp: 270 tablet, Rfl: 0    gabapentin (Neurontin) 600 MG tablet, 1 PO BID and 3 PO QHS, Disp: 150 tablet, Rfl: 1    Melatonin 10 MG TABS, Take 60 mg by mouth daily at bedtime, Disp: , Rfl:     methocarbamol (ROBAXIN) 750 mg tablet, Take 1 tablet (750 mg total) by mouth 3 (three) times a day As needed for pain/spasms, Disp: 90 tablet, Rfl: 1    [START ON 4/16/2024] oxyCODONE (Roxicodone) 5 immediate release tablet, Take 1 tablet (5 mg total) by mouth every 8 (eight) hours as needed for moderate pain Max Daily Amount: 15 mg Do not start before April 16, 2024., Disp: 90 tablet, Rfl: 0    [START ON 3/19/2024] oxyCODONE (Roxicodone) 5 immediate release tablet, Take 1 tablet (5 mg total) by mouth every 8 (eight) hours as needed for moderate pain Max Daily Amount: 15 mg Do not start before March 19, 2024., Disp: 90 tablet, Rfl: 0    Semaglutide-Weight Management (WEGOVY) 0.25 MG/0.5ML,  Inject 0.5 mL (0.25 mg total) under the skin once a week for 28 days, Disp: 2 mL, Rfl: 0    sertraline (ZOLOFT) 100 mg tablet, Take 1 tablet (100 mg total) by mouth daily, Disp: 90 tablet, Rfl: 0    sertraline (ZOLOFT) 50 mg tablet, Take 1 tablet (50 mg total) by mouth daily, Disp: 90 tablet, Rfl: 0     PAST MEDICAL HISTORY:   Past Medical History:   Diagnosis Date    Anxiety     Back pain     Depression     Insomnia        PAST SURGICAL HISTORY:  Past Surgical History:   Procedure Laterality Date    BACK SURGERY      cage in lumbar L5 area    BACK SURGERY      CAUDAL BLOCK N/A 11/15/2022    Procedure: BLOCK / INJECTION CAUDAL;  Surgeon: Nuno Farrar MD;  Location: [facility];  Service: Pain Management     CHOLECYSTECTOMY      LUMBAR DISC SURGERY      MA REPAIR FIRST ABDOMINAL WALL HERNIA N/A 3/16/2018    Procedure: REPAIR HERNIA VENTRAL with mesh;  Surgeon: David Chicas DO;  Location: [facility];  Service: General    MA SURGICAL ARTHROSCOPY SHOULDER W/ROTATOR CUFF RPR Left 2020    Procedure: SHOULDER ARTHROSCOPY, ROTATOR CUFF REPAIR, SYNEVECTOMY, ACROMIOPLASTY DEBRIDEMENT, INJECTION;  Surgeon: Brigido Mota DO;  Location:  MAIN OR;  Service: Orthopedics       SOCIAL HISTORY:  Social History     Tobacco Use   Smoking Status Former    Current packs/day: 0.00    Types: Cigarettes    Quit date: 10/17/2010    Years since quittin.3   Smokeless Tobacco Never   Tobacco Comments    Recently quit          PHYSICAL EXAM:  53 y.o. female sitting comfortably on exam chair in no apparent distress.   Ambulates with normal gait and leaning forward  TTP over lumbar spine  Able to go up on toes and heels   Able to balance on one leg   5/5 motor strength in bilateral lower extremities except 4/5 strength with ankle dorsiflexion on left  Sensation intact to light touch right lower extremity  Sensation diminished to light touch left lower extremity along L4-S1 distribution   1+ patellar reflex on the  right  Absent patellar reflex on the left       RADIOGRAPHIC STUDIES:  MRI, lumbar spine, 12/28/2023: Prior lumbar laminectomy and fusion at L4-5.  The instrumentation is in appropriate position.  There is evidence of spondylolisthesis at L5-S1 with severe bilateral neuroforaminal stenosis.  At L3-4 there is evidence of adjacent segment degeneration retrolisthesis and severe central and lateral recess stenosis.  Xrays, lumbar spine, 1/09/22024: Prior lumbar fusion L4-5.  Grade 1 spondylolisthesis L5-S1 with significant loss of disc height.  Retrolisthesis and degenerative disc disease, moderate in degree at L3-4.      ASSESSMENT:  1. Adjacent segment disease of lumbar spine with history of fusion procedure  -     Ambulatory Referral to Orthopedic Surgery    2. Spondylolisthesis at L5-S1 level  -     Ambulatory Referral to Orthopedic Surgery    3. DDD (degenerative disc disease), lumbar  -     Ambulatory Referral to Orthopedic Surgery    4. Lumbar radiculopathy  -     Ambulatory Referral to Orthopedic Surgery    5. Status post lumbar spinal fusion  -     Ambulatory Referral to Orthopedic Surgery        PLAN:  53 y.o. female with lumbar spinal stenosis, lumbar DDD, lumbar spondylolisthesis, and history of three prior lumbar fusions.     Imaging including xrays and MRI of lumbar spine were reviewed in the office today.  Her symptoms appear to be related to both the L3-4 spinal stenosis which is quite significant and severe neuroforaminal stenosis instability at L5-S1.  I explained to the patient that surgery would involve decompression at both levels.  This will be difficult given the prior surgical procedures.  L5-S1 will need to be fused.  In this case a standing lateral scoliosis film is necessary to evaluate suppresses alignment.  Alignment issues need to be addressed prior to surgical planning.  I did explain to her that there are multiple ways of performing a fusion either posteriorly or anteriorly depending on  the degree of sagittal imbalance and the requirement for the correction of deformity.    Patient will follow-up in 2 weeks for re-evaluation.  She will obtain the x-rays prior to the next visit..     Scribe Attestation      I,:  Jacqueline Lock PA-C am acting as a scribe while in the presence of the attending physician.:       I,:  Ahmet العلي MD personally performed the services described in this documentation    as scribed in my presence.:

## 2024-03-07 ENCOUNTER — HOSPITAL ENCOUNTER (OUTPATIENT)
Dept: RADIOLOGY | Facility: HOSPITAL | Age: 54
Discharge: HOME | End: 2024-03-07
Payer: COMMERCIAL

## 2024-03-07 DIAGNOSIS — M51.36 DDD (DEGENERATIVE DISC DISEASE), LUMBAR: ICD-10-CM

## 2024-03-07 DIAGNOSIS — Z98.1 STATUS POST LUMBAR SPINAL FUSION: ICD-10-CM

## 2024-03-07 DIAGNOSIS — Z98.1 ADJACENT SEGMENT DISEASE OF LUMBAR SPINE WITH HISTORY OF FUSION PROCEDURE: ICD-10-CM

## 2024-03-07 DIAGNOSIS — M51.36 ADJACENT SEGMENT DISEASE OF LUMBAR SPINE WITH HISTORY OF FUSION PROCEDURE: ICD-10-CM

## 2024-03-07 DIAGNOSIS — M43.17 SPONDYLOLISTHESIS AT L5-S1 LEVEL: ICD-10-CM

## 2024-03-07 DIAGNOSIS — M54.16 LUMBAR RADICULOPATHY: ICD-10-CM

## 2024-03-07 PROCEDURE — 72081 X-RAY EXAM ENTIRE SPI 1 VW: CPT

## 2024-03-13 ENCOUNTER — OFFICE VISIT (OUTPATIENT)
Dept: FAMILY MEDICINE CLINIC | Facility: CLINIC | Age: 54
End: 2024-03-13
Payer: COMMERCIAL

## 2024-03-13 VITALS
TEMPERATURE: 97.8 F | WEIGHT: 193 LBS | HEART RATE: 73 BPM | RESPIRATION RATE: 16 BRPM | SYSTOLIC BLOOD PRESSURE: 110 MMHG | HEIGHT: 66 IN | OXYGEN SATURATION: 96 % | BODY MASS INDEX: 31.02 KG/M2 | DIASTOLIC BLOOD PRESSURE: 72 MMHG

## 2024-03-13 DIAGNOSIS — Z53.20 OSTEOPOROSIS SCREENING DECLINED: ICD-10-CM

## 2024-03-13 DIAGNOSIS — F41.9 ANXIETY AND DEPRESSION: Primary | ICD-10-CM

## 2024-03-13 DIAGNOSIS — F32.A ANXIETY AND DEPRESSION: Primary | ICD-10-CM

## 2024-03-13 PROBLEM — F11.20 UNCOMPLICATED OPIOID DEPENDENCE (HCC): Status: RESOLVED | Noted: 2023-02-16 | Resolved: 2024-03-13

## 2024-03-13 PROCEDURE — 99213 OFFICE O/P EST LOW 20 MIN: CPT | Performed by: PHYSICIAN ASSISTANT

## 2024-03-13 RX ORDER — SERTRALINE HYDROCHLORIDE 100 MG/1
100 TABLET, FILM COATED ORAL DAILY
Qty: 90 TABLET | Refills: 0 | Status: SHIPPED | OUTPATIENT
Start: 2024-03-13

## 2024-03-13 RX ORDER — BUSPIRONE HYDROCHLORIDE 15 MG/1
15 TABLET ORAL 3 TIMES DAILY
Qty: 270 TABLET | Refills: 0 | Status: SHIPPED | OUTPATIENT
Start: 2024-03-13 | End: 2024-06-11

## 2024-03-13 NOTE — PROGRESS NOTES
Name: Calista Umana      : 1970      MRN: [id number]  Encounter Provider: Fidelia Alvarado PA-C  Encounter Date: 3/13/2024   Encounter department: [facility]    Assessment & Plan     1. Anxiety and depression  Assessment & Plan:  Continue same, refills given.    Orders:  -     busPIRone (BUSPAR) 15 mg tablet; Take 1 tablet (15 mg total) by mouth 3 (three) times a day  -     sertraline (ZOLOFT) 100 mg tablet; Take 1 tablet (100 mg total) by mouth daily  -     sertraline (ZOLOFT) 50 mg tablet; Take 1 tablet (50 mg total) by mouth daily    2. Osteoporosis screening declined      Depression Screening and Follow-up Plan: Patient's depression screening was positive with a PHQ-9 score of 7.         Subjective      Kimberly is here today for routine follow up.  Pt was disappointed that insurance wound no longer cover weight loss injections.  States anxiety/depression is stable, breathing has been stable. Is seeing a neurosurgeon for possible back surgery.      Review of Systems   Constitutional:  Negative for activity change, appetite change, chills, diaphoresis, fatigue, fever and unexpected weight change.   HENT:  Negative for congestion, ear pain, postnasal drip, rhinorrhea, sinus pressure, sinus pain, sneezing, sore throat, tinnitus and voice change.    Eyes:  Negative for pain, redness and visual disturbance.   Respiratory:  Negative for cough, chest tightness, shortness of breath and wheezing.    Cardiovascular:  Negative for chest pain, palpitations and leg swelling.   Gastrointestinal:  Negative for abdominal pain, blood in stool, constipation, diarrhea, nausea and vomiting.   Genitourinary:  Negative for difficulty urinating, dysuria, frequency, hematuria and urgency.   Musculoskeletal:  Negative for arthralgias, back pain, gait problem, joint swelling, myalgias, neck pain and neck stiffness.   Skin:  Negative for color change, pallor, rash and wound.   Neurological:  Negative for dizziness,  "tremors, weakness, light-headedness and headaches.   Psychiatric/Behavioral:  Negative for dysphoric mood, self-injury, sleep disturbance and suicidal ideas. The patient is not nervous/anxious.        Current Outpatient Medications on File Prior to Visit   Medication Sig   • gabapentin (Neurontin) 600 MG tablet 1 PO BID and 3 PO QHS   • Melatonin 10 MG TABS Take 60 mg by mouth daily at bedtime   • methocarbamol (ROBAXIN) 750 mg tablet Take 1 tablet (750 mg total) by mouth 3 (three) times a day As needed for pain/spasms   • [START ON 4/16/2024] oxyCODONE (Roxicodone) 5 immediate release tablet Take 1 tablet (5 mg total) by mouth every 8 (eight) hours as needed for moderate pain Max Daily Amount: 15 mg Do not start before April 16, 2024.   • [START ON 3/19/2024] oxyCODONE (Roxicodone) 5 immediate release tablet Take 1 tablet (5 mg total) by mouth every 8 (eight) hours as needed for moderate pain Max Daily Amount: 15 mg Do not start before March 19, 2024.   • [DISCONTINUED] busPIRone (BUSPAR) 15 mg tablet Take 1 tablet (15 mg total) by mouth 3 (three) times a day   • [DISCONTINUED] sertraline (ZOLOFT) 100 mg tablet Take 1 tablet (100 mg total) by mouth daily   • [DISCONTINUED] sertraline (ZOLOFT) 50 mg tablet Take 1 tablet (50 mg total) by mouth daily   • Semaglutide-Weight Management (WEGOVY) 0.25 MG/0.5ML Inject 0.5 mL (0.25 mg total) under the skin once a week for 28 days (Patient not taking: Reported on 3/13/2024)       Objective     /72 (BP Location: Left arm, Patient Position: Sitting, Cuff Size: Adult)   Pulse 73   Temp 97.8 °F (36.6 °C) (Temporal)   Resp 16   Ht 5' 6\" (1.676 m)   Wt 87.5 kg (193 lb)   LMP 10/10/2016   SpO2 96%   BMI 31.15 kg/m²     Physical Exam  Vitals reviewed.   Constitutional:       General: She is not in acute distress.     Appearance: She is well-developed. She is not diaphoretic.   HENT:      Head: Normocephalic and atraumatic.      Right Ear: Hearing, tympanic membrane, " ear canal and external ear normal.      Left Ear: Hearing, tympanic membrane, ear canal and external ear normal.      Nose: Nose normal.      Mouth/Throat:      Mouth: Mucous membranes are moist.      Pharynx: Oropharynx is clear. Uvula midline. No oropharyngeal exudate.   Eyes:      General: No scleral icterus.        Right eye: No discharge.         Left eye: No discharge.      Conjunctiva/sclera: Conjunctivae normal.   Neck:      Thyroid: No thyromegaly.      Vascular: No carotid bruit.   Cardiovascular:      Rate and Rhythm: Normal rate and regular rhythm.      Heart sounds: Normal heart sounds. No murmur heard.  Pulmonary:      Effort: Pulmonary effort is normal. No respiratory distress.      Breath sounds: Normal breath sounds. No wheezing.   Abdominal:      General: Bowel sounds are normal. There is no distension.      Palpations: Abdomen is soft. There is no mass.      Tenderness: There is no abdominal tenderness. There is no guarding or rebound.   Musculoskeletal:         General: No tenderness. Normal range of motion.      Cervical back: Neck supple.   Lymphadenopathy:      Cervical: No cervical adenopathy.   Skin:     General: Skin is warm and dry.      Findings: No erythema or rash.   Neurological:      Mental Status: She is alert and oriented to person, place, and time.   Psychiatric:         Behavior: Behavior normal.         Thought Content: Thought content normal.         Judgment: Judgment normal.       Fidelia Alvarado PA-C

## 2024-03-19 ENCOUNTER — OFFICE VISIT (OUTPATIENT)
Dept: OBGYN CLINIC | Facility: CLINIC | Age: 54
End: 2024-03-19
Payer: COMMERCIAL

## 2024-03-19 VITALS
DIASTOLIC BLOOD PRESSURE: 76 MMHG | HEIGHT: 66 IN | WEIGHT: 193 LBS | SYSTOLIC BLOOD PRESSURE: 132 MMHG | BODY MASS INDEX: 31.02 KG/M2

## 2024-03-19 DIAGNOSIS — Z98.1 STATUS POST LUMBAR SPINAL FUSION: ICD-10-CM

## 2024-03-19 DIAGNOSIS — M43.17 SPONDYLOLISTHESIS AT L5-S1 LEVEL: ICD-10-CM

## 2024-03-19 DIAGNOSIS — M51.36 ADJACENT SEGMENT DISEASE OF LUMBAR SPINE WITH HISTORY OF FUSION PROCEDURE: Primary | ICD-10-CM

## 2024-03-19 DIAGNOSIS — Z98.1 ADJACENT SEGMENT DISEASE OF LUMBAR SPINE WITH HISTORY OF FUSION PROCEDURE: Primary | ICD-10-CM

## 2024-03-19 PROCEDURE — 99214 OFFICE O/P EST MOD 30 MIN: CPT | Performed by: ORTHOPAEDIC SURGERY

## 2024-03-19 NOTE — PROGRESS NOTES
[facility] ORTHOPEDIC SPINE SURGERY  DR.AMIR EMILY MD  [address] [facility] [address] [phone]    HISTORY OF PRESENT ILLNESS:    Calista Umana is a 53 y.o. female who presents for follow up evaluation of lumbar spine.   Patient reports she has low back pain that goes into bilateral lower extremities ending at the feet, right side currently more than left. Patient also has numbness and tingling that is chronic in left lower extremity since her second surgery. She notes increasing nighttime pain. She can walk for 10 minutes before needing to take a break due to pain. Patient reports she had three prior lumbar surgeries. Patient reports she has done aqua therapy, land based physical therapy, and had three injections within the past six months without relief of symptoms. At her last visit on 3/5/2024, surgical intervention was discussed, however scoliosis film needed to be obtained for surgical planning purposes.       ALLERGIES: No Known Allergies    MEDICATIONS:    Current Outpatient Medications:     busPIRone (BUSPAR) 15 mg tablet, Take 1 tablet (15 mg total) by mouth 3 (three) times a day, Disp: 270 tablet, Rfl: 0    gabapentin (Neurontin) 600 MG tablet, 1 PO BID and 3 PO QHS, Disp: 150 tablet, Rfl: 1    Melatonin 10 MG TABS, Take 60 mg by mouth daily at bedtime, Disp: , Rfl:     methocarbamol (ROBAXIN) 750 mg tablet, Take 1 tablet (750 mg total) by mouth 3 (three) times a day As needed for pain/spasms, Disp: 90 tablet, Rfl: 1    oxyCODONE (Roxicodone) 5 immediate release tablet, Take 1 tablet (5 mg total) by mouth every 8 (eight) hours as needed for moderate pain Max Daily Amount: 15 mg Do not start before March 19, 2024., Disp: 90 tablet, Rfl: 0    sertraline (ZOLOFT) 100 mg tablet, Take 1 tablet (100 mg total) by mouth daily, Disp: 90 tablet, Rfl: 0    sertraline (ZOLOFT) 50 mg tablet, Take 1 tablet (50 mg total) by mouth daily, Disp: 90 tablet, Rfl: 0    [START ON 4/16/2024] oxyCODONE  (Roxicodone) 5 immediate release tablet, Take 1 tablet (5 mg total) by mouth every 8 (eight) hours as needed for moderate pain Max Daily Amount: 15 mg Do not start before 2024. (Patient not taking: Reported on 3/19/2024 Do not start before 2024.), Disp: 90 tablet, Rfl: 0    Semaglutide-Weight Management (WEGOVY) 0.25 MG/0.5ML, Inject 0.5 mL (0.25 mg total) under the skin once a week for 28 days (Patient not taking: Reported on 3/13/2024), Disp: 2 mL, Rfl: 0     PAST MEDICAL HISTORY:   Past Medical History:   Diagnosis Date    Anxiety     Back pain     Depression     Insomnia        PAST SURGICAL HISTORY:  Past Surgical History:   Procedure Laterality Date    BACK SURGERY      cage in lumbar L5 area    BACK SURGERY      CAUDAL BLOCK N/A 11/15/2022    Procedure: BLOCK / INJECTION CAUDAL;  Surgeon: Nuno Farrar MD;  Location: [facility];  Service: Pain Management     CHOLECYSTECTOMY      LUMBAR DISC SURGERY      ME REPAIR FIRST ABDOMINAL WALL HERNIA N/A 3/16/2018    Procedure: REPAIR HERNIA VENTRAL with mesh;  Surgeon: David Chicas DO;  Location: [facility];  Service: General    ME SURGICAL ARTHROSCOPY SHOULDER W/ROTATOR CUFF RPR Left 2020    Procedure: SHOULDER ARTHROSCOPY, ROTATOR CUFF REPAIR, SYNEVECTOMY, ACROMIOPLASTY DEBRIDEMENT, INJECTION;  Surgeon: Brigido Mota DO;  Location:  MAIN OR;  Service: Orthopedics       SOCIAL HISTORY:  Social History     Tobacco Use   Smoking Status Former    Current packs/day: 0.00    Types: Cigarettes    Quit date: 10/17/2010    Years since quittin.4   Smokeless Tobacco Never   Tobacco Comments    Recently quit          PHYSICAL EXAM:  53 y.o. female sitting comfortably on exam chair in no apparent distress.   Ambulates with normal gait and leaning forward  TTP over lumbar spine  Able to go up on toes and heels   Able to balance on one leg   5/5 motor strength in bilateral lower extremities except 4/5 strength with ankle dorsiflexion on  left  Sensation intact to light touch right lower extremity  Sensation diminished to light touch left lower extremity along L4-S1 distribution   1+ patellar reflex on the right  Absent patellar reflex on the left       RADIOGRAPHIC STUDIES:  MRI, lumbar spine, 12/28/2023: Prior lumbar laminectomy and fusion at L4-5.  The instrumentation is in appropriate position.  There is evidence of spondylolisthesis at L5-S1 with severe bilateral neuroforaminal stenosis.  At L3-4 there is evidence of adjacent segment degeneration retrolisthesis and severe central and lateral recess stenosis.  Xrays, lumbar spine, 1/09/22024: Prior lumbar fusion L4-5.  Grade 1 spondylolisthesis L5-S1 with significant loss of disc height.  Retrolisthesis and degenerative disc disease, moderate in degree at L3-4.  X-ray, scoliosis film, 3/7/2024: stable L4-L5 fusion, grade 1 anterolisthesis at L5-S1, grade 1 retrolisthesis at L2-3, L3-4, L4-5      ASSESSMENT:  1. Adjacent segment disease of lumbar spine with history of fusion procedure  -     XR spine lumbar 2 or 3 views injury; Future; Expected date: 03/19/2024    2. Spondylolisthesis at L5-S1 level    3. Status post lumbar spinal fusion        PLAN:  53 y.o. female with lumbar spinal stenosis, lumbar DDD, lumbar spondylolisthesis, and history of three prior lumbar fusions.     Surgery plan and expected outcome and recovery discussed at length with the patient today. I recommend continued therapy and proceed with operative intervention when pain becomes daily and interferes with ADLs.  The surgery most likely will need to be performed in 2 separate stages.  At L5-S1 there is evidence of spondylolisthesis and significant neuroforaminal stenosis.  Unfortunate the posterior column has been previously treated surgically which will make an posterior approach very difficult.  If possible anterior lumbar interbody fusion is the best option.  Prior to the anterior procedure, as supine crosstable lateral  of her a bump  x-ray will be necessary to confirm that the spondylosis reduces in the displaced gaps open.  At L3-4 posterior approach is indicated to perform a laminectomy.  Finally the instrumentation will need to be extended from L4-S1.  She has instrumentation at L4-5.  The source of the hardware is unknown at this time.  I discussed these at issues with the patient going to see her back in the future for further surgical discussion.    Patient will follow-up in 2 months for re-evaluation.  She will obtain the x-rays prior to the next visit..     Scribe Attestation      I,:  Yisel Bone am acting as a scribe while in the presence of the attending physician.:       I,:  Ahmet العلي MD personally performed the services described in this documentation    as scribed in my presence.:

## 2024-04-05 ENCOUNTER — TELEPHONE (OUTPATIENT)
Dept: PAIN MEDICINE | Facility: CLINIC | Age: 54
End: 2024-04-05

## 2024-04-05 NOTE — TELEPHONE ENCOUNTER
----- Message from Na Tolbert sent at 4/5/2024  1:11 PM EDT -----  Regarding: FW: Upcoming appointment   Contact: [phone]  [facility] to call # to change appt Per Dr Kocher

## 2024-04-09 ENCOUNTER — OFFICE VISIT (OUTPATIENT)
Dept: PAIN MEDICINE | Facility: CLINIC | Age: 54
End: 2024-04-09
Payer: COMMERCIAL

## 2024-04-09 VITALS
DIASTOLIC BLOOD PRESSURE: 80 MMHG | BODY MASS INDEX: 30.98 KG/M2 | WEIGHT: 192.8 LBS | RESPIRATION RATE: 16 BRPM | HEART RATE: 76 BPM | HEIGHT: 66 IN | SYSTOLIC BLOOD PRESSURE: 127 MMHG

## 2024-04-09 DIAGNOSIS — M54.50 LUMBAR PAIN WITH RADIATION DOWN LEFT LEG: ICD-10-CM

## 2024-04-09 DIAGNOSIS — Z79.891 LONG-TERM CURRENT USE OF OPIATE ANALGESIC: Primary | ICD-10-CM

## 2024-04-09 DIAGNOSIS — G89.4 CHRONIC PAIN SYNDROME: ICD-10-CM

## 2024-04-09 DIAGNOSIS — M79.605 LUMBAR PAIN WITH RADIATION DOWN LEFT LEG: ICD-10-CM

## 2024-04-09 DIAGNOSIS — M54.16 LUMBAR RADICULOPATHY: ICD-10-CM

## 2024-04-09 DIAGNOSIS — F11.20 UNCOMPLICATED OPIOID DEPENDENCE (HCC): ICD-10-CM

## 2024-04-09 DIAGNOSIS — Z98.1 STATUS POST LUMBAR SPINAL FUSION: ICD-10-CM

## 2024-04-09 DIAGNOSIS — M43.16 ANTEROLISTHESIS OF LUMBAR SPINE: ICD-10-CM

## 2024-04-09 DIAGNOSIS — M51.26 LUMBAR DISC HERNIATION: ICD-10-CM

## 2024-04-09 PROCEDURE — 99214 OFFICE O/P EST MOD 30 MIN: CPT | Performed by: NURSE PRACTITIONER

## 2024-04-09 RX ORDER — OXYCODONE HYDROCHLORIDE 5 MG/1
5 TABLET ORAL EVERY 8 HOURS PRN
Qty: 90 TABLET | Refills: 0 | Status: SHIPPED | OUTPATIENT
Start: 2024-05-14

## 2024-04-09 RX ORDER — METHOCARBAMOL 750 MG/1
750 TABLET, FILM COATED ORAL 3 TIMES DAILY
Qty: 90 TABLET | Refills: 1 | Status: SHIPPED | OUTPATIENT
Start: 2024-04-09

## 2024-04-09 RX ORDER — GABAPENTIN 600 MG/1
TABLET ORAL
Qty: 150 TABLET | Refills: 1 | Status: SHIPPED | OUTPATIENT
Start: 2024-04-09

## 2024-04-09 NOTE — PATIENT INSTRUCTIONS
Opioid Safety   WHAT YOU NEED TO KNOW:   An opioid medicine is used to treat pain. Examples are oxycodone, morphine, fentanyl, or codeine. Pain control and management may help you rest, heal, and return to your daily activities. You and your family will receive information about how to manage your pain at home. The instructions will include what to do if you have side effects as your pain is managed. You will get information on how to handle opioid medicine safely. You will also get suggestions on how to control pain without opioids. It is important to follow all instructions so your pain is managed effectively.  DISCHARGE INSTRUCTIONS:   Call your local emergency number (911 in the ), or have someone else call if:   You have a seizure.    You cannot be woken.    You have trouble staying awake and your breathing is slow or shallow.    Your speech is slurred, or you are confused.    You are dizzy or stumble when you walk.    Call your doctor, or have someone close to you call if:   You are extremely drowsy, or you have trouble staying awake or speaking.    You have pale or clammy skin.    You have blue fingernails or lips.    Your heartbeat is slower than normal.    You cannot stop vomiting.    You have questions or concerns about your condition or care.    Use opioids safely:   Take prescribed opioids exactly as directed.  Opioids come with directions based on the kind and how it is given. Talk to your healthcare provider or a pharmacist if you have any questions. Do not take more than the recommended amount. Too much can cause a life-threatening overdose. Do not continue to take it after your pain stops. You may develop tolerance. This means you keep needing higher doses to get the same effect. You may also develop opioid use disorder. This means you are not able to control your opioid use.    Do not give opioids to others or take opioids that belong to someone else.  The kind or amount one person takes may not  be right for another. The person you share them with may also be taking medicines that do not mix with opioids. The person may drink alcohol or use other drugs that can cause life-threatening problems when mixed with opioids.    Do not mix opioids with other medicines or alcohol.  The combination can cause an overdose, or cause you to stop breathing. Alcohol, sleeping pills, and medicines such as antihistamines can make you sleepy. A combination with opioids can lead to a coma.    Do not drive or operate heavy machinery after you use an opioid.  You may feel drowsy or have trouble concentrating. You can injure yourself or others if you drive or use heavy machinery when you are not alert. Your provider or pharmacist can tell you how long to wait after a dose before you do these activities.    Talk to your healthcare provider if you have any side effects.  Side effects include nausea, sleepiness, itching, and trouble thinking clearly. Your provider may need to make changes to the kind or amount of opioid you are taking. Your provider can also help you find ways to prevent or relieve side effects.    Manage constipation:  Constipation is the most common side effect of opioid medicine. Constipation is when you have hard, dry bowel movements, or you go longer than usual between bowel movements. Tell your healthcare provider about all changes in your bowel movements while you are taking opioids. Your provider may recommend laxative medicine to help you have a bowel movement. Your provider may also change the kind of opioid you are taking, or change when you take it. The following are more ways you can prevent or relieve constipation:  Drink liquids as directed.  You may need to drink extra liquids to help soften and move your bowels. Ask how much liquid to drink each day and which liquids are best for you.    Eat high-fiber foods.  This may help decrease constipation by adding bulk to your bowel movements. High-fiber  foods include fruits, vegetables, whole-grain breads and cereals, and beans. Your healthcare provider or dietitian can help you create a high-fiber meal plan. Your provider may also recommend a fiber supplement if you cannot get enough fiber from food.         Exercise regularly.  Regular physical activity can help stimulate your intestines. Walking is a good exercise to prevent or relieve constipation. Ask which exercises are best for you.         Schedule a time each day to have a bowel movement.  This may help train your body to have regular bowel movements. Bend forward while you are on the toilet to help move the bowel movement out. Sit on the toilet for at least 10 minutes, even if you do not have a bowel movement.    Store opioids safely:   Store opioids where others cannot easily get them.  Keep them in a locked cabinet or secure area. Do not  keep them in a purse or other bag you carry with you. A person may be looking for something else and find the opioids.         Make sure opioids are stored out of the reach of children.  A child can easily overdose on opioids. Opioids may look like candy to a small child.    The best way to dispose of opioids:  The laws vary by country and area. In the [place], the best way is to return the opioids through a take-back program. This program is offered by the [place] Drug Enforcement Agency ([employer]). The following are options for using the program:  Take the opioids to a [employer] collection site.  The site is often a law enforcement center. Call your local law enforcement center for scheduled take-back days in your area. You will be given information on where to go if the collection site is in a different location.    Take the opioids to an approved pharmacy or hospital.  A pharmacy or hospital may be set up as a collection site. You will need to ask if it is a [employer] collection site if you were not directed there. A pharmacy or doctor's office may not be able to take back opioids  unless it is a [employer] site.    Use a mail-back system.  This means you are given containers to put the opioids into. You will then mail them in the containers.    Use a take-back drop box.  This is a place to leave the opioids at any time. People and animals will not be able to get into the box. Your local law enforcement agency can tell you where to find a drop box in your area.    Other safe ways to dispose of opioids:  The medicine may come with disposal instructions. The instructions may vary depending on the brand of medicine you are using. Instructions may come in a Medication Guide, but not every medicine has one. You may instead get instructions from your pharmacy or doctor. Follow instructions carefully. The following are general guidelines to follow:  Find out if you can flush the opioid.  Some opioids can be flushed down the toilet or poured into the sink. You will need to contact authorities in your area to see if this is an option for you. The FDA also offers a list of medicines that are safe to flush down the toilet. You can check the list if you cannot get the information for your local area.    Ask your waste management company about rules for putting opioids in the trash.  The company will be able to give you specific directions. Scratch out personal information on the original medicine label so it cannot be read. Then put it in the trash. Do not label the trash or put any information on it about the opioids. It should look like regular household trash so no one is tempted to look for the opioids. Keep the trash out of the reach of children and animals. Always make sure trash is secure.    Talk to officials if you live in a facility.  If you live in a nursing home or assisted living center, talk to an official. The person will know the rules for your area.    Other ways to manage pain:   Ask your healthcare provider about non-opioid medicines to control pain.  Some medicines may even work better than  opioids, depending on the cause of your pain. Nonprescription medicines include NSAIDs (such as ibuprofen) and acetaminophen. Prescription medicines include muscle relaxers, antidepressants, and steroids.    Pain may be managed without any medicines.  Some ways to relieve pain include massage, aromatherapy, or meditation. Physical or occupational therapy may also help.    Follow up with your doctor or pain specialist as directed:  You may need to have your dose adjusted. Your doctor or pain specialist can also help you find ways to manage pain without opioids. Write down your questions so you remember to ask them during your visits.  For more information:   Drug Enforcement Administration  [address] [place] [address]  Phone: 7- 614 - [phone]  Web Address: https://www.deadiversion.[facility].gov/drug_disposal/    US Food and Drug Administration  [address] [place] [address]  Phone: 7- 570 - 045-3907  Web Address: http://www.fda.gov  © Copyright [employer] 2023 Information is for End User's use only and may not be sold, redistributed or otherwise used for commercial purposes.  The above information is an  only. It is not intended as medical advice for individual conditions or treatments. Talk to your doctor, nurse or pharmacist before following any medical regimen to see if it is safe and effective for you.

## 2024-04-09 NOTE — PROGRESS NOTES
Assessment:  1. Chronic pain syndrome    2. Lumbar disc herniation    3. Lumbar radiculopathy    4. Status post lumbar spinal fusion    5. Lumbar pain with radiation down left leg    6. Anterolisthesis of lumbar spine        Plan:  While the patient was in the office today, I did have a thorough conversation regarding their chronic pain syndrome, medication management, and treatment plan options.  Patient is being seen for a follow-up visit.  Patient is scheduled to have lumbar fusion revision and expansion performed by surgeon at [facility] on 4/24/2024.    In the meantime, we will continue with oxycodone 5 mg every 8 hours as needed for pain.  There is already a prescription at the pharmacy with a fill date of 4/16/2024.  I will send over a second prescription with a fill date of 5/14/2024.    Renewed gabapentin 600 mg twice daily and 1800 mg at bedtime.  A prescription was sent to her pharmacy with a refill.    Renewed Robaxin 750 mg 3 times daily if needed for spasms.  A prescription was sent to her pharmacy with a refill.    There are risks associated with opioid medications, including dependence, addiction and tolerance. The patient understands and agrees to use these medications only as prescribed. Potential side effects of the medications include, but are not limited to, constipation, drowsiness, addiction, impaired judgment and risk of fatal overdose if not taken as prescribed. The patient was warned against driving while taking sedation medications.  Sharing medications is a felony. At this point in time, the patient is showing no signs of addiction, abuse, diversion or suicidal ideation.    A urine drug screen was collected at today's office visit as part of our medication management protocol. The point of care testing results were appropriate for what was being prescribed. The specimen will be sent for confirmatory testing. The drug screen is medically necessary because the patient is either  dependent on opioid medication or is being considered for opioid medication therapy and the results could impact ongoing or future treatment. The drug screen is to evaluate for the presences or absence of prescribed, non-prescribed, and/or illicit drugs/substances.    [place] Prescription Drug Monitoring Program report was reviewed and was appropriate     The patient will follow-up in 2 months for medication prescription refill and reevaluation. The patient was advised to contact the office should their symptoms worsen in the interim. The patient was agreeable and verbalized an understanding.    History of Present Illness:  The patient is a 53 y.o. female who presents for a follow up office visit in regards to Back Pain and Leg Pain (bilateral).   The patient’s current symptoms include complaints of low back and bilateral leg pain.  Weakness in both lower extremities.  Current pain level is an 8/10.  Quality pain is described as sharp, shooting, numb, pins-and-needles.    Current pain medications includes: Oxycodone 5 mg every 8 hours as needed for pain, gabapentin 600 mg twice daily and 1800 mg at bedtime, Robaxin 750 mg 3 times daily if needed for spasms.  The patient reports that this regimen is providing 5% pain relief.  The patient is reporting no side effects from this pain medication regimen.    I have personally reviewed and/or updated the patient's past medical history, past surgical history, family history, social history, current medications, allergies, and vital signs today.         Review of Systems  Review of Systems   Musculoskeletal:  Positive for back pain and gait problem.        Pain legs   All other systems reviewed and are negative.          Past Medical History:   Diagnosis Date    Anxiety     Back pain     Depression     Insomnia        Past Surgical History:   Procedure Laterality Date    BACK SURGERY  2006    cage in lumbar L5 area    BACK SURGERY      CAUDAL BLOCK N/A 11/15/2022     Procedure: BLOCK / INJECTION CAUDAL;  Surgeon: Nuno Farrar MD;  Location: [facility];  Service: Pain Management     CHOLECYSTECTOMY      LUMBAR DISC SURGERY      OR REPAIR FIRST ABDOMINAL WALL HERNIA N/A 3/16/2018    Procedure: REPAIR HERNIA VENTRAL with mesh;  Surgeon: David Chicas DO;  Location: [facility];  Service: General    OR SURGICAL ARTHROSCOPY SHOULDER W/ROTATOR CUFF RPR Left 2020    Procedure: SHOULDER ARTHROSCOPY, ROTATOR CUFF REPAIR, SYNEVECTOMY, ACROMIOPLASTY DEBRIDEMENT, INJECTION;  Surgeon: Brigido Mota DO;  Location:  MAIN OR;  Service: Orthopedics       Family History   Adopted: Yes   Family history unknown: Yes       Social History     Occupational History    Not on file   Tobacco Use    Smoking status: Former     Current packs/day: 0.00     Types: Cigarettes     Quit date: 10/17/2010     Years since quittin.4    Smokeless tobacco: Never    Tobacco comments:     Recently quit   Vaping Use    Vaping status: Never Used   Substance and Sexual Activity    Alcohol use: No    Drug use: No    Sexual activity: Not Currently     Partners: Female         Current Outpatient Medications:     busPIRone (BUSPAR) 15 mg tablet, Take 1 tablet (15 mg total) by mouth 3 (three) times a day, Disp: 270 tablet, Rfl: 0    gabapentin (Neurontin) 600 MG tablet, 1 PO BID and 3 PO QHS, Disp: 150 tablet, Rfl: 1    Melatonin 10 MG TABS, Take 60 mg by mouth daily at bedtime, Disp: , Rfl:     methocarbamol (ROBAXIN) 750 mg tablet, Take 1 tablet (750 mg total) by mouth 3 (three) times a day As needed for pain/spasms, Disp: 90 tablet, Rfl: 1    [START ON 2024] oxyCODONE (Roxicodone) 5 immediate release tablet, Take 1 tablet (5 mg total) by mouth every 8 (eight) hours as needed for moderate pain Max Daily Amount: 15 mg Do not start before May 14, 2024., Disp: 90 tablet, Rfl: 0    sertraline (ZOLOFT) 100 mg tablet, Take 1 tablet (100 mg total) by mouth daily, Disp: 90 tablet, Rfl: 0    sertraline  "(ZOLOFT) 50 mg tablet, Take 1 tablet (50 mg total) by mouth daily, Disp: 90 tablet, Rfl: 0    [START ON 4/16/2024] oxyCODONE (Roxicodone) 5 immediate release tablet, Take 1 tablet (5 mg total) by mouth every 8 (eight) hours as needed for moderate pain Max Daily Amount: 15 mg Do not start before April 16, 2024. (Patient not taking: Reported on 3/19/2024 Do not start before April 16, 2024.), Disp: 90 tablet, Rfl: 0    No Known Allergies    Physical Exam:    /80   Pulse 76   Resp 16   Ht 5' 6\" (1.676 m)   Wt 87.5 kg (192 lb 12.8 oz)   LMP 10/10/2016   BMI 31.12 kg/m²     Constitutional:normal, well developed, well nourished, alert, in no distress and non-toxic and no overt pain behavior.  Eyes:anicteric  HEENT:grossly intact  Neck:supple, symmetric, trachea midline and no masses   Pulmonary:even and unlabored  Cardiovascular:No edema or pitting edema present  Skin:Normal without rashes or lesions and well hydrated  Psychiatric:Mood and affect appropriate  Neurologic:Cranial Nerves II-XII grossly intact  Musculoskeletal: It is slow and guarded.    Imaging    Study Result    Narrative & Impression         LUMBAR SPINE     INDICATION:   Other intervertebral disc degeneration, lumbar region. Arthrodesis status.      COMPARISON: 1/9/2024     VIEWS:  XR SPINE LUMBAR 2 OR 3 VIEWS INJURY  Images: 2     FINDINGS:     There are 5 non rib bearing lumbar vertebral bodies.      There is no evidence of acute fracture or destructive osseous lesion.     Grade I spondylolithesis L5 on S1.      Stable L4-5 posterior instrumented fusion with intervertebral spacer without hardware complication. Stable L5-S1 disc space narrowing.     The pedicles appear intact.     Soft tissues are unremarkable.     IMPRESSION:        Stable postsurgical and degenerative change.     Electronically signed: 03/19/2024 01:22 PM Cuco Jenkins, MPH,MD           Study Result    Narrative & Impression   MRI LUMBAR SPINE WITHOUT CONTRAST   "   INDICATION: G89.4: Chronic pain syndrome  Z98.1: Arthrodesis status  M54.50: Low back pain, unspecified  M79.605: Pain in left leg  Z79.891: Long term (current) use of opiate analgesic  F11.20: Opioid dependence, uncomplicated  M54.16: Radiculopathy, lumbar region  M51.26: Other intervertebral disc displacement, lumbar region.     COMPARISON: Prior MRI of October 9, 2020     TECHNIQUE:  Multiplanar, multisequence imaging of the lumbar spine was performed. .        IMAGE QUALITY:  Diagnostic     FINDINGS:     VERTEBRAL BODIES:  There are 5 lumbar type vertebral bodies. 8 mm anterolisthesis at L5 on S1 has progressed since the prior study on which it measured approximately 4 mm. Patient status post L4-5 pedicle screw fixation. Modic type I degenerative marrow   signal L5-S1 and T11-12.     SACRUM:  Normal signal within the sacrum. No evidence of insufficiency or stress fracture.     DISTAL CORD AND CONUS:  Normal size and signal within the distal cord and conus.     PARASPINAL SOFT TISSUES:  Paraspinal soft tissues are unremarkable.     LOWER THORACIC DISC SPACES:  Normal disc height and signal.  No disc herniation, canal stenosis or foraminal narrowing.     LUMBAR DISC SPACES:     L1-L2: No central or foraminal narrowing.     L2-L3: Mild annular bulge. No significant central or foraminal narrowing.     L3-L4: Mild annular bulge with small marginal osteophytes and moderate to severe facet hypertrophy combined result in moderate to severe central and moderate bilateral lateral recess stenosis. Moderate right and mild left foraminal narrowing. Findings   have progressed since the prior study.     L4-L5: Patient is status post decompressive laminectomy and fusion with adequate decompression of the central canal. No significant central or foraminal narrowing.     L5-S1: Severe facet degenerative changes result in progressive anterolisthesis at this level. Uncovering of the posterior disc margin is noted with severe  foraminal narrowing bilaterally. Mild central stenosis.     OTHER FINDINGS:  None.     IMPRESSION:     Progressive facet degenerative change at L5-S1 results in progressive anterolisthesis now measuring 8 mm compared to 4 mm on the prior. Severe bilateral foraminal narrowing noted at this level.     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              Workstation performed: [id number]     No orders to display       No orders of the defined types were placed in this encounter.

## 2024-04-16 ENCOUNTER — PRE-ADMISSION TESTING (OUTPATIENT)
Dept: PREADMISSION TESTING | Age: 54
End: 2024-04-16
Payer: COMMERCIAL

## 2024-04-16 VITALS — WEIGHT: 189 LBS | HEIGHT: 66 IN | BODY MASS INDEX: 30.37 KG/M2

## 2024-04-16 RX ORDER — OXYCODONE HYDROCHLORIDE 5 MG/1
5 TABLET ORAL EVERY 8 HOURS PRN
COMMUNITY

## 2024-04-16 RX ORDER — SERTRALINE HYDROCHLORIDE 100 MG/1
150 TABLET, FILM COATED ORAL DAILY
COMMUNITY

## 2024-04-16 RX ORDER — METHOCARBAMOL 750 MG/1
750 TABLET, FILM COATED ORAL 3 TIMES DAILY
COMMUNITY
End: 2024-04-29 | Stop reason: HOSPADM

## 2024-04-16 RX ORDER — BUSPIRONE HYDROCHLORIDE 10 MG/1
15 TABLET ORAL 3 TIMES DAILY
COMMUNITY

## 2024-04-16 RX ORDER — GABAPENTIN 300 MG/1
600 CAPSULE ORAL 3 TIMES DAILY
COMMUNITY

## 2024-04-16 ASSESSMENT — PAIN SCALES - GENERAL: PAINLEVEL: 7

## 2024-04-16 NOTE — PRE-PROCEDURE INSTRUCTIONS
1.       We will call you between 3 pm and 7 pm on 4/23/24 to inform you of arrival time for your procedure. If you do not hear by 6PM, please call [phone] for arrival time.     2.        Please arrive through the Main Entrance of the [facility] (across from the parking garage) and report to the Surgery Registration Desk on the day of your procedure.    3. Please follow the following fasting guidelines:     No solid food EIGHT HOURS prior to arrival time on day of surgery.  6 ounces of clear liquids, meaning water or PLAIN black coffee WITHOUT any milk, cream, sugar, or sweetener are permitted up to TWO HOURS prior to arrival at the hospital.    4. Please take ONLY the following medications with a sip of water on the morning of your procedure: Buspar, Gabapentin and Zoloft.    5. Other Instructions: You may brush your teeth the morning of the procedure. Rinse and spit, do not swallow.  Bring a list of your medications with dosages.  Use surgical wash as directed.     6. If you develop a cold, cough, fever, rash, or other symptom prior to the day of the procedure, please report it to your physician immediately.    7. If you need to cancel the procedure for any reason, please contact your physician.    8. Make arrangements to have safe transportation home accompanied by a responsible adult. If you have not arranged safe transportation home, your surgery will be cancelled. Safe transportation may include private vehicle, ride-share service, taxi and public transportation when accompanied by a responsible adult who will assist you home. A responsible adult is someone known to you and does not include the taxi, ride-share or public transit drive transporting you.    9.  If it is medically necessary for you to have a longer stay, you will be informed as soon as the decision is made.    10. Only bring essential items to the hospital.  Do not wear or bring anything of value to the hospital including jewelry of any kind,  money, or wallet. Do not wear make-up or contact lenses.  DO NOT BRING MEDICATIONS FROM HOME unless instructed to do so. DO bring your hearing aids, glasses, and a case    11. No lotion, creams, powders, or oils on skin the morning of procedure     12. Dress in comfortable clothes.    13.  If instructed, please bring a copy of your Advanced Directive (Living Will/Durable Power of ) on the day of your procedure.     14. Ensuring your safety at all times is a very important part of our [facility] Culture of Safety. After having surgery and sedation, you are at risk for falling and balance issues. Although you may feel awake, the effects of the medication can last up to 24 hours after anesthesia. If you need to use the bathroom during your recovery period, nursing staff will escort you there and stay with you to ensure your safety.    15. Refrain from drinking alcohol and smoking cigarettes for 24 hours prior to surgery.    16. Shower with antibacterial soap  the night before and morning of your procedure.  If your procedure indicates the need for CHG antiseptic wash (Bactoshield or Hibiclens), please use this instead and follow instructions as discussed at the time of your Pre-Admission Testing phone interview or visit.  [facility]  Patient Education Preoperative Showers    Good hygiene, such as frequent handwashing and daily skin cleansing, promotes good health. Daily skin cleansing helps remove germs that may cause infections. The following instructions should be followed to help reduce germs on your skin prior to your surgical procedure.     Bactoshield/Hibiclens CHG 4% is an antiseptic soap. The active ingredient is chlorhexidine gluconate. Do not use this product if you are allergic to chlorhexidine gluconate.  The NIGHT before and the MORNING of your procedure, shower or bathe with Bactoshield. This product should replace your regular soap used for cleansing most of your body surfaces. Bactoshield should  not be used on your head or face; keep out of your eyes, ears and mouth.  If you plan to wash your hair, do so with your regular shampoo. Then, rinse the hair and your body thoroughly to remove any shampoo residue.  Wash your face with regular soap and water only.  Wash your genital area with soap and water only.  Thoroughly rinse your body with warm water from the neck down.  Apply the minimum amount of Bactoshield to cover the skin. Use this product as you would any liquid soap. Leave this on for 2 minutes. NOTE- Bactoshield DOES NOT LATHER like normal soap.   Wash the skin gently and rinse thoroughly with warm water. You do not need to scrub the skin to remove germs.  Do not use regular soap after you have applied and rinsed the Bactoshield.  Change into clean clothes after each shower.   Do not apply any lotions, powders, or perfumes to the body areas that have been cleansed with Bactoshield.  No use of hair removal products or shaving at or near the surgical site 48 hours before your procedure. (72 hours for cardiac patients.)  For those having perineal surgeries (i.e.: vaginal, rectal or cystoscopy) - please use Dial soap.  Above instructions reviewed with patient and patient acknowledges understanding.    Form explained by: Nina Davis RN

## 2024-04-18 ENCOUNTER — APPOINTMENT (OUTPATIENT)
Dept: LAB | Facility: HOSPITAL | Age: 54
End: 2024-04-18
Attending: ORTHOPAEDIC SURGERY
Payer: COMMERCIAL

## 2024-04-18 ENCOUNTER — TRANSCRIBE ORDERS (OUTPATIENT)
Dept: REGISTRATION | Facility: HOSPITAL | Age: 54
End: 2024-04-18

## 2024-04-18 ENCOUNTER — PRE-ADMISSION TESTING (OUTPATIENT)
Dept: PREADMISSION TESTING | Facility: HOSPITAL | Age: 54
End: 2024-04-18
Attending: ORTHOPAEDIC SURGERY
Payer: COMMERCIAL

## 2024-04-18 ENCOUNTER — HOSPITAL ENCOUNTER (OUTPATIENT)
Dept: CARDIOLOGY | Facility: HOSPITAL | Age: 54
Discharge: HOME | End: 2024-04-18
Attending: ORTHOPAEDIC SURGERY
Payer: COMMERCIAL

## 2024-04-18 VITALS
BODY MASS INDEX: 30.7 KG/M2 | DIASTOLIC BLOOD PRESSURE: 70 MMHG | HEART RATE: 64 BPM | OXYGEN SATURATION: 98 % | RESPIRATION RATE: 18 BRPM | HEIGHT: 66 IN | SYSTOLIC BLOOD PRESSURE: 154 MMHG | TEMPERATURE: 97.3 F | WEIGHT: 191 LBS

## 2024-04-18 DIAGNOSIS — F32.A DEPRESSION, UNSPECIFIED DEPRESSION TYPE: ICD-10-CM

## 2024-04-18 DIAGNOSIS — M48.062 SPINAL STENOSIS, LUMBAR REGION WITH NEUROGENIC CLAUDICATION: ICD-10-CM

## 2024-04-18 DIAGNOSIS — M54.16 LUMBAR RADICULOPATHY, CHRONIC: ICD-10-CM

## 2024-04-18 DIAGNOSIS — Z87.898 HISTORY OF SNORING: ICD-10-CM

## 2024-04-18 DIAGNOSIS — M43.17 SPONDYLOLISTHESIS, LUMBOSACRAL REGION: Primary | ICD-10-CM

## 2024-04-18 DIAGNOSIS — G89.4 CHRONIC PAIN SYNDROME: ICD-10-CM

## 2024-04-18 DIAGNOSIS — R03.0 ELEVATED BLOOD PRESSURE READING IN OFFICE WITHOUT DIAGNOSIS OF HYPERTENSION: ICD-10-CM

## 2024-04-18 DIAGNOSIS — E66.811 OBESITY (BMI 30.0-34.9): ICD-10-CM

## 2024-04-18 DIAGNOSIS — M43.17 SPONDYLOLISTHESIS, LUMBOSACRAL REGION: ICD-10-CM

## 2024-04-18 DIAGNOSIS — Z01.818 PRE-OP EVALUATION: Primary | ICD-10-CM

## 2024-04-18 LAB
ABO + RH BLD: NORMAL
ANION GAP SERPL CALC-SCNC: 9 MEQ/L (ref 3–15)
APTT PPP: 28 SEC (ref 23–35)
BLD GP AB SCN SERPL QL: NEGATIVE
BLOOD BANK CMNT PATIENT-IMP: NORMAL
BUN SERPL-MCNC: 11 MG/DL (ref 7–25)
CALCIUM SERPL-MCNC: 8.9 MG/DL (ref 8.6–10.3)
CHLORIDE SERPL-SCNC: 110 MEQ/L (ref 98–107)
CO2 SERPL-SCNC: 23 MEQ/L (ref 21–31)
CREAT SERPL-MCNC: 0.6 MG/DL (ref 0.6–1.2)
D AG BLD QL: POSITIVE
EGFRCR SERPLBLD CKD-EPI 2021: >60 ML/MIN/1.73M*2
ERYTHROCYTE [DISTWIDTH] IN BLOOD BY AUTOMATED COUNT: 14.4 % (ref 11.7–14.4)
EST. AVERAGE GLUCOSE BLD GHB EST-MCNC: 85 MG/DL
GLUCOSE SERPL-MCNC: 84 MG/DL (ref 70–99)
HBA1C MFR BLD: 4.6 %
HCT VFR BLD AUTO: 44.5 % (ref 35–45)
HGB BLD-MCNC: 14.4 G/DL (ref 11.8–15.7)
INR PPP: 1
LABORATORY COMMENT REPORT: NORMAL
MCH RBC QN AUTO: 31.4 PG (ref 28–33.2)
MCHC RBC AUTO-ENTMCNC: 32.4 G/DL (ref 32.2–35.5)
MCV RBC AUTO: 97.2 FL (ref 83–98)
PDW BLD AUTO: 11 FL (ref 9.4–12.3)
PLATELET # BLD AUTO: 263 K/UL (ref 150–369)
POTASSIUM SERPL-SCNC: 4.4 MEQ/L (ref 3.5–5.1)
PROTHROMBIN TIME: 12.6 SEC (ref 12.2–14.5)
RBC # BLD AUTO: 4.58 M/UL (ref 3.93–5.22)
SODIUM SERPL-SCNC: 142 MEQ/L (ref 136–145)
SPECIMEN EXP DATE BLD: NORMAL
WBC # BLD AUTO: 5.61 K/UL (ref 3.8–10.5)

## 2024-04-18 PROCEDURE — 80048 BASIC METABOLIC PNL TOTAL CA: CPT

## 2024-04-18 PROCEDURE — 85610 PROTHROMBIN TIME: CPT

## 2024-04-18 PROCEDURE — 93005 ELECTROCARDIOGRAM TRACING: CPT

## 2024-04-18 PROCEDURE — 86850 RBC ANTIBODY SCREEN: CPT

## 2024-04-18 PROCEDURE — 86900 BLOOD TYPING SEROLOGIC ABO: CPT

## 2024-04-18 PROCEDURE — 85730 THROMBOPLASTIN TIME PARTIAL: CPT

## 2024-04-18 PROCEDURE — 83036 HEMOGLOBIN GLYCOSYLATED A1C: CPT

## 2024-04-18 PROCEDURE — 36415 COLL VENOUS BLD VENIPUNCTURE: CPT

## 2024-04-18 PROCEDURE — 99204 OFFICE O/P NEW MOD 45 MIN: CPT | Performed by: HOSPITALIST

## 2024-04-18 PROCEDURE — 85027 COMPLETE CBC AUTOMATED: CPT

## 2024-04-18 NOTE — ASSESSMENT & PLAN NOTE
Patient able to perform greater than 4 METS activity without any chest pain or dyspnea  EKG shows normal sinus rhythm and no acute ischemic changes  Nuclear stress test done 4/21 normal study, after pharmacologic vasodilatation there was image artifact at peak without diagnostic evidence of perfusion abnormality, LV systolic function was normal  Echo done 4/21 showed normal LVEF 60%, grade 1 diastolic dysfunction, normal RV size and function, normal valves  No additional cardiac workup required prior to surgery  Discussed with patient to stop supplements, vitamins, NSAIDs 1 week prior to surgery

## 2024-04-18 NOTE — ASSESSMENT & PLAN NOTE
Never had sleep study  Follow BENJAMIN protocol postoperatively  Patient reports history of dyspnea and acute respiratory acidosis after shoulder surgery in 2021.  Monitor patient on telemetry, postoperatively  Use continuous CO2 monitor postoperatively and BiPAP,as needed

## 2024-04-18 NOTE — CONSULTS
[facility] Medicine Service -  Pre-Operative Consultation       Patient Name: Aleyda Palma  Referring Surgeon: Dr. Piter Crane    Reason for Referral: Pre-Operative Evaluation  Surgical Procedure: L3-S1 posterior lumbar decompression and fusion  Operative Date: 4/24/24  Other Providers:      PCP: Sintia Snyder PA C        HISTORY OF PRESENT ILLNESS      Aleyda Palma is a 53 y.o. female with past medical history of depression, chronic back pain, lumbar surgeries x 3, history of hypoxemic ,hypercapnic respiratory failure after shoulder surgery in 2020, obesity presenting today to the [facility] at [facility] for pre-operative evaluation prior to planned surgery.    Patient reports that she has been having lower back pain for the past few years which is progressively getting worse.  She has underwent 3 lumbar surgeries and 9 epidural shots with intermittent relief in pain.  She reports radiation of pain down both legs more on the left side.  She reports pins-and-needles in bilateral lower extremity and left leg numbness.  She denies focal deficits.  She is active at baseline and walks around the house and goes up and down stairs without any chest pain or dyspnea     The patient denies any current or recent chest pain or pressure, dyspnea, cough, sputum, fevers, chills, abdominal pain, nausea, vomiting, diarrhea or other symptoms.     Functionally, the patient is able to ascend a flight or so of stairs with no dyspnea or chest pain.     The patient denies, on specific questioning, the following:  No history of MI, arrhythmia,or CHF.  No history of BENJAMIN.  No history of DVT/PE.  No history of COPD.  No history of CVA.  No history of DM.   No history of CKD.     PAST MEDICAL AND SURGICAL HISTORY      Past Medical History:   Diagnosis Date    Anxiety     COVID-19 2023    several times    COVID-19 vaccine series completed     Depression     Diverticulitis of colon  "    Fall     Kidney stones     Numbness and tingling of left leg     Unsteady gait     due to numbness in left leg       Past Surgical History:   Procedure Laterality Date    BACK SURGERY      8 years ago, pt has cage    HERNIA REPAIR      SHOULDER SURGERY Left 2021       MEDICATIONS        Current Outpatient Medications:     busPIRone (BUSPAR) 10 mg tablet, Take 15 mg by mouth 3 (three) times a day., Disp: , Rfl:     gabapentin (NEURONTIN) 300 mg capsule, Take 600 mg by mouth 3 (three) times a day. Please clarify dosage, Disp: , Rfl:     methocarbamoL (ROBAXIN) 750 mg tablet, Take 750 mg by mouth 3 (three) times a day., Disp: , Rfl:     oxyCODONE (ROXICODONE) 5 mg immediate release tablet, Take 5 mg by mouth every 8 (eight) hours as needed for pain., Disp: , Rfl:     sertraline (ZOLOFT) 100 mg tablet, Take 150 mg by mouth daily. am, Disp: , Rfl:     ALLERGIES      Patient has no known allergies.    FAMILY HISTORY      family history is not on file.    Denies any prior known family history of DVTs/PEs/clotting disorder    SOCIAL HISTORY      Social History     Tobacco Use    Smoking status: Former     Types: Cigarettes    Smokeless tobacco: Never   Substance Use Topics    Alcohol use: Not Currently    Drug use: Never       REVIEW OF SYSTEMS      All other systems reviewed and negative except as noted in HPI    PHYSICAL EXAMINATION      Visit Vitals  BP (!) 154/70 (BP Location: Left upper arm, Patient Position: Sitting)   Pulse 64   Temp 36.3 °C (97.3 °F) (Temporal)   Resp 18   Ht 1.676 m (5' 6\")   Wt 86.6 kg (191 lb)   SpO2 98%   BMI 30.83 kg/m²     Body mass index is 30.83 kg/m².    Physical Exam  Constitutional:       Appearance: Normal appearance.   HENT:      Head: Normocephalic.      Nose: Nose normal.      Mouth/Throat:      Mouth: Mucous membranes are moist.   Eyes:      Extraocular Movements: Extraocular movements intact.      Pupils: Pupils are equal, round, and reactive to light.   Cardiovascular:      " Rate and Rhythm: Normal rate and regular rhythm.   Pulmonary:      Effort: Pulmonary effort is normal.      Breath sounds: Normal breath sounds.   Abdominal:      General: Abdomen is flat.      Palpations: Abdomen is soft.   Musculoskeletal:         General: Normal range of motion.      Cervical back: Normal range of motion and neck supple.   Skin:     General: Skin is warm and dry.   Neurological:      General: No focal deficit present.      Mental Status: She is alert and oriented to person, place, and time.   Psychiatric:         Mood and Affect: Mood normal.         LABS / EKG        Labs  I have reviewed the patient's pertinent labs. Pertinent labs are within normal limits.    Lab Results   Component Value Date     04/18/2024    K 4.4 04/18/2024     (H) 04/18/2024    BUN 11 04/18/2024    CREATININE 0.6 04/18/2024    WBC 5.61 04/18/2024    HGB 14.4 04/18/2024    HCT 44.5 04/18/2024     04/18/2024    INR 1.0 04/18/2024    HGBA1C 4.6 04/18/2024         ECG/Telemetry  I have independently reviewed the ECG. No significant findings.  EKG shows sinus rhythm and no acute ischemic changes    ASSESSMENT AND PLAN         Depression  Stable  Continue BuSpar, Zoloft    Lumbar radiculopathy, chronic  Scheduled for L3-S1 posterior decompression and fusion by Dr. Crane 4/24/24  Preop antibiotics, postop DVT prophylaxis pain management per orthopedics    Chronic pain syndrome  Patient follows up with pain management  She is on oxycodone, Robaxin, Neurontin    Pre-op evaluation  Patient able to perform greater than 4 METS activity without any chest pain or dyspnea  EKG shows normal sinus rhythm and no acute ischemic changes  Nuclear stress test done 4/21 normal study, after pharmacologic vasodilatation there was image artifact at peak without diagnostic evidence of perfusion abnormality, LV systolic function was normal  Echo done 4/21 showed normal LVEF 60%, grade 1 diastolic dysfunction, normal RV size and  function, normal valves  No additional cardiac workup required prior to surgery  Discussed with patient to stop supplements, vitamins, NSAIDs 1 week prior to surgery     History of snoring  Never had sleep study  Follow BENJAMIN protocol postoperatively  Patient reports history of dyspnea and acute respiratory acidosis after shoulder surgery in 2021.  Monitor patient on telemetry, postoperatively  Use continuous CO2 monitor postoperatively and BiPAP,as needed    Obesity (BMI 30.0-34.9)  BMI 30  Lifestyle modifications as outpatient once recovered from surgery    Elevated blood pressure reading in office without diagnosis of hypertension  Likely situational   Patient denies history of hypertension  Follow blood pressure when admitted     In regards to perioperative cardiac risk:  The patient denies any history of ischemic heart disease, denies any history of CHF, denies any history of CVA, is not on pre-operative treatment with insulin, and does not have a pre-operative creatinine > 2 mg/dL.   The Revised Cardiac Risk Index (RCRI) for this patient indicates 0.4% risk.     Further comments:  Resume supplements when OK with surgical team.  I would encourage incentive spirometry to assist with minimizing adrian-operative pulmonary risk.  DVT prophylaxis and timing of such per the discretion of the surgeon.     Please do not hesitate to contact [facility] during the upcoming hospitalization with any questions or concerns.     Joe Galaviz MD  4/19/2024

## 2024-04-18 NOTE — H&P (VIEW-ONLY)
[facility] Medicine Service -  Pre-Operative Consultation       Patient Name: Aleyda Palma  Referring Surgeon: Dr. Piter Crane    Reason for Referral: Pre-Operative Evaluation  Surgical Procedure: L3-S1 posterior lumbar decompression and fusion  Operative Date: 4/24/24  Other Providers:      PCP: Sintia Snyder PA C        HISTORY OF PRESENT ILLNESS      Aleyda Palma is a 53 y.o. female with past medical history of depression, chronic back pain, lumbar surgeries x 3, history of hypoxemic ,hypercapnic respiratory failure after shoulder surgery in 2020, obesity presenting today to the [facility] at [facility] for pre-operative evaluation prior to planned surgery.    Patient reports that she has been having lower back pain for the past few years which is progressively getting worse.  She has underwent 3 lumbar surgeries and 9 epidural shots with intermittent relief in pain.  She reports radiation of pain down both legs more on the left side.  She reports pins-and-needles in bilateral lower extremity and left leg numbness.  She denies focal deficits.  She is active at baseline and walks around the house and goes up and down stairs without any chest pain or dyspnea     The patient denies any current or recent chest pain or pressure, dyspnea, cough, sputum, fevers, chills, abdominal pain, nausea, vomiting, diarrhea or other symptoms.     Functionally, the patient is able to ascend a flight or so of stairs with no dyspnea or chest pain.     The patient denies, on specific questioning, the following:  No history of MI, arrhythmia,or CHF.  No history of BENJAMIN.  No history of DVT/PE.  No history of COPD.  No history of CVA.  No history of DM.   No history of CKD.     PAST MEDICAL AND SURGICAL HISTORY      Past Medical History:   Diagnosis Date    Anxiety     COVID-19 2023    several times    COVID-19 vaccine series completed     Depression     Diverticulitis of colon  "    Fall     Kidney stones     Numbness and tingling of left leg     Unsteady gait     due to numbness in left leg       Past Surgical History:   Procedure Laterality Date    BACK SURGERY      8 years ago, pt has cage    HERNIA REPAIR      SHOULDER SURGERY Left 2021       MEDICATIONS        Current Outpatient Medications:     busPIRone (BUSPAR) 10 mg tablet, Take 15 mg by mouth 3 (three) times a day., Disp: , Rfl:     gabapentin (NEURONTIN) 300 mg capsule, Take 600 mg by mouth 3 (three) times a day. Please clarify dosage, Disp: , Rfl:     methocarbamoL (ROBAXIN) 750 mg tablet, Take 750 mg by mouth 3 (three) times a day., Disp: , Rfl:     oxyCODONE (ROXICODONE) 5 mg immediate release tablet, Take 5 mg by mouth every 8 (eight) hours as needed for pain., Disp: , Rfl:     sertraline (ZOLOFT) 100 mg tablet, Take 150 mg by mouth daily. am, Disp: , Rfl:     ALLERGIES      Patient has no known allergies.    FAMILY HISTORY      family history is not on file.    Denies any prior known family history of DVTs/PEs/clotting disorder    SOCIAL HISTORY      Social History     Tobacco Use    Smoking status: Former     Types: Cigarettes    Smokeless tobacco: Never   Substance Use Topics    Alcohol use: Not Currently    Drug use: Never       REVIEW OF SYSTEMS      All other systems reviewed and negative except as noted in HPI    PHYSICAL EXAMINATION      Visit Vitals  BP (!) 154/70 (BP Location: Left upper arm, Patient Position: Sitting)   Pulse 64   Temp 36.3 °C (97.3 °F) (Temporal)   Resp 18   Ht 1.676 m (5' 6\")   Wt 86.6 kg (191 lb)   SpO2 98%   BMI 30.83 kg/m²     Body mass index is 30.83 kg/m².    Physical Exam  Constitutional:       Appearance: Normal appearance.   HENT:      Head: Normocephalic.      Nose: Nose normal.      Mouth/Throat:      Mouth: Mucous membranes are moist.   Eyes:      Extraocular Movements: Extraocular movements intact.      Pupils: Pupils are equal, round, and reactive to light.   Cardiovascular:      " Rate and Rhythm: Normal rate and regular rhythm.   Pulmonary:      Effort: Pulmonary effort is normal.      Breath sounds: Normal breath sounds.   Abdominal:      General: Abdomen is flat.      Palpations: Abdomen is soft.   Musculoskeletal:         General: Normal range of motion.      Cervical back: Normal range of motion and neck supple.   Skin:     General: Skin is warm and dry.   Neurological:      General: No focal deficit present.      Mental Status: She is alert and oriented to person, place, and time.   Psychiatric:         Mood and Affect: Mood normal.         LABS / EKG        Labs  I have reviewed the patient's pertinent labs. Pertinent labs are within normal limits.    Lab Results   Component Value Date     04/18/2024    K 4.4 04/18/2024     (H) 04/18/2024    BUN 11 04/18/2024    CREATININE 0.6 04/18/2024    WBC 5.61 04/18/2024    HGB 14.4 04/18/2024    HCT 44.5 04/18/2024     04/18/2024    INR 1.0 04/18/2024    HGBA1C 4.6 04/18/2024         ECG/Telemetry  I have independently reviewed the ECG. No significant findings.  EKG shows sinus rhythm and no acute ischemic changes    ASSESSMENT AND PLAN         Depression  Stable  Continue BuSpar, Zoloft    Lumbar radiculopathy, chronic  Scheduled for L3-S1 posterior decompression and fusion by Dr. Crane 4/24/24  Preop antibiotics, postop DVT prophylaxis pain management per orthopedics    Chronic pain syndrome  Patient follows up with pain management  She is on oxycodone, Robaxin, Neurontin    Pre-op evaluation  Patient able to perform greater than 4 METS activity without any chest pain or dyspnea  EKG shows normal sinus rhythm and no acute ischemic changes  Nuclear stress test done 4/21 normal study, after pharmacologic vasodilatation there was image artifact at peak without diagnostic evidence of perfusion abnormality, LV systolic function was normal  Echo done 4/21 showed normal LVEF 60%, grade 1 diastolic dysfunction, normal RV size and  function, normal valves  No additional cardiac workup required prior to surgery  Discussed with patient to stop supplements, vitamins, NSAIDs 1 week prior to surgery     History of snoring  Never had sleep study  Follow BENJAMIN protocol postoperatively  Patient reports history of dyspnea and acute respiratory acidosis after shoulder surgery in 2021.  Monitor patient on telemetry, postoperatively  Use continuous CO2 monitor postoperatively and BiPAP,as needed    Obesity (BMI 30.0-34.9)  BMI 30  Lifestyle modifications as outpatient once recovered from surgery    Elevated blood pressure reading in office without diagnosis of hypertension  Likely situational   Patient denies history of hypertension  Follow blood pressure when admitted     In regards to perioperative cardiac risk:  The patient denies any history of ischemic heart disease, denies any history of CHF, denies any history of CVA, is not on pre-operative treatment with insulin, and does not have a pre-operative creatinine > 2 mg/dL.   The Revised Cardiac Risk Index (RCRI) for this patient indicates 0.4% risk.     Further comments:  Resume supplements when OK with surgical team.  I would encourage incentive spirometry to assist with minimizing adrian-operative pulmonary risk.  DVT prophylaxis and timing of such per the discretion of the surgeon.     Please do not hesitate to contact [facility] during the upcoming hospitalization with any questions or concerns.     Joe Galaviz MD  4/19/2024

## 2024-04-18 NOTE — ASSESSMENT & PLAN NOTE
Scheduled for L3-S1 posterior decompression and fusion by Dr. Crane 4/24/24  Preop antibiotics, postop DVT prophylaxis pain management per orthopedics

## 2024-04-19 LAB
ATRIAL RATE: 60
P AXIS: 54
PR INTERVAL: 160
QRS DURATION: 98
QT INTERVAL: 456
QTC CALCULATION(BAZETT): 456
R AXIS: 68
T WAVE AXIS: 65
VENTRICULAR RATE: 60

## 2024-04-22 ENCOUNTER — ANESTHESIA EVENT (OUTPATIENT)
Dept: OPERATING ROOM | Facility: HOSPITAL | Age: 54
Setting detail: SURGERY ADMIT
DRG: 455 | End: 2024-04-22
Payer: COMMERCIAL

## 2024-04-24 ENCOUNTER — HOSPITAL ENCOUNTER (INPATIENT)
Facility: HOSPITAL | Age: 54
LOS: 5 days | Discharge: HOME | DRG: 455 | End: 2024-04-29
Attending: ORTHOPAEDIC SURGERY | Admitting: ORTHOPAEDIC SURGERY
Payer: COMMERCIAL

## 2024-04-24 ENCOUNTER — ANESTHESIA (OUTPATIENT)
Dept: OPERATING ROOM | Facility: HOSPITAL | Age: 54
Setting detail: SURGERY ADMIT
DRG: 455 | End: 2024-04-24
Payer: COMMERCIAL

## 2024-04-24 ENCOUNTER — HOSPITAL ENCOUNTER (OUTPATIENT)
Dept: RADIOLOGY | Facility: HOSPITAL | Age: 54
Setting detail: SURGERY ADMIT
Discharge: HOME | DRG: 455 | End: 2024-04-24
Attending: ORTHOPAEDIC SURGERY
Payer: COMMERCIAL

## 2024-04-24 PROBLEM — M54.50 LOW BACK PAIN: Status: ACTIVE | Noted: 2024-04-24

## 2024-04-24 LAB
ABO + RH BLD: NORMAL
D AG BLD QL: POSITIVE
LABORATORY COMMENT REPORT: NORMAL

## 2024-04-24 PROCEDURE — 0SB20ZZ EXCISION OF LUMBAR VERTEBRAL DISC, OPEN APPROACH: ICD-10-PCS | Performed by: ORTHOPAEDIC SURGERY

## 2024-04-24 PROCEDURE — 63600000 HC DRUGS/DETAIL CODE: Mod: JZ | Performed by: NURSE ANESTHETIST, CERTIFIED REGISTERED

## 2024-04-24 PROCEDURE — 0SG30AJ FUSION OF LUMBOSACRAL JOINT WITH INTERBODY FUSION DEVICE, POSTERIOR APPROACH, ANTERIOR COLUMN, OPEN APPROACH: ICD-10-PCS | Performed by: ORTHOPAEDIC SURGERY

## 2024-04-24 PROCEDURE — 4A11X4G MONITORING OF PERIPHERAL NERVOUS ELECTRICAL ACTIVITY, INTRAOPERATIVE, EXTERNAL APPROACH: ICD-10-PCS | Performed by: ORTHOPAEDIC SURGERY

## 2024-04-24 PROCEDURE — 63600000 HC DRUGS/DETAIL CODE: Performed by: STUDENT IN AN ORGANIZED HEALTH CARE EDUCATION/TRAINING PROGRAM

## 2024-04-24 PROCEDURE — 25800000 HC PHARMACY IV SOLUTIONS: Performed by: ORTHOPAEDIC SURGERY

## 2024-04-24 PROCEDURE — 36415 COLL VENOUS BLD VENIPUNCTURE: CPT | Performed by: ORTHOPAEDIC SURGERY

## 2024-04-24 PROCEDURE — 36000004 HC OR LEVEL 4 INITIAL 30MIN: Performed by: ORTHOPAEDIC SURGERY

## 2024-04-24 PROCEDURE — 63700000 HC SELF-ADMINISTRABLE DRUG: Performed by: STUDENT IN AN ORGANIZED HEALTH CARE EDUCATION/TRAINING PROGRAM

## 2024-04-24 PROCEDURE — 63600000 HC DRUGS/DETAIL CODE: Mod: JZ | Performed by: ORTHOPAEDIC SURGERY

## 2024-04-24 PROCEDURE — 71000011 HC PACU PHASE 1 EA ADDL MIN: Performed by: ORTHOPAEDIC SURGERY

## 2024-04-24 PROCEDURE — 27800000 HC SUPPLY/IMPLANTS: Performed by: ORTHOPAEDIC SURGERY

## 2024-04-24 PROCEDURE — 72100 X-RAY EXAM L-S SPINE 2/3 VWS: CPT

## 2024-04-24 PROCEDURE — 25000000 HC PHARMACY GENERAL: Performed by: NURSE ANESTHETIST, CERTIFIED REGISTERED

## 2024-04-24 PROCEDURE — 0QP004Z REMOVAL OF INTERNAL FIXATION DEVICE FROM LUMBAR VERTEBRA, OPEN APPROACH: ICD-10-PCS | Performed by: ORTHOPAEDIC SURGERY

## 2024-04-24 PROCEDURE — 0SB40ZZ EXCISION OF LUMBOSACRAL DISC, OPEN APPROACH: ICD-10-PCS | Performed by: ORTHOPAEDIC SURGERY

## 2024-04-24 PROCEDURE — 25800000 HC PHARMACY IV SOLUTIONS: Performed by: NURSE ANESTHETIST, CERTIFIED REGISTERED

## 2024-04-24 PROCEDURE — 3E0U0GB INTRODUCTION OF RECOMBINANT BONE MORPHOGENETIC PROTEIN INTO JOINTS, OPEN APPROACH: ICD-10-PCS | Performed by: ORTHOPAEDIC SURGERY

## 2024-04-24 PROCEDURE — 01NR0ZZ RELEASE SACRAL NERVE, OPEN APPROACH: ICD-10-PCS | Performed by: ORTHOPAEDIC SURGERY

## 2024-04-24 PROCEDURE — 27200000 HC STERILE SUPPLY: Performed by: ORTHOPAEDIC SURGERY

## 2024-04-24 PROCEDURE — 71000001 HC PACU PHASE 1 INITIAL 30MIN: Performed by: ORTHOPAEDIC SURGERY

## 2024-04-24 PROCEDURE — 0SG3071 FUSION OF LUMBOSACRAL JOINT WITH AUTOLOGOUS TISSUE SUBSTITUTE, POSTERIOR APPROACH, POSTERIOR COLUMN, OPEN APPROACH: ICD-10-PCS | Performed by: ORTHOPAEDIC SURGERY

## 2024-04-24 PROCEDURE — 01NB0ZZ RELEASE LUMBAR NERVE, OPEN APPROACH: ICD-10-PCS | Performed by: ORTHOPAEDIC SURGERY

## 2024-04-24 PROCEDURE — C1713 ANCHOR/SCREW BN/BN,TIS/BN: HCPCS | Performed by: ORTHOPAEDIC SURGERY

## 2024-04-24 PROCEDURE — 37000001 HC ANESTHESIA GENERAL: Performed by: ORTHOPAEDIC SURGERY

## 2024-04-24 PROCEDURE — 25800000 HC PHARMACY IV SOLUTIONS: Performed by: STUDENT IN AN ORGANIZED HEALTH CARE EDUCATION/TRAINING PROGRAM

## 2024-04-24 PROCEDURE — 0SG00AJ FUSION OF LUMBAR VERTEBRAL JOINT WITH INTERBODY FUSION DEVICE, POSTERIOR APPROACH, ANTERIOR COLUMN, OPEN APPROACH: ICD-10-PCS | Performed by: ORTHOPAEDIC SURGERY

## 2024-04-24 PROCEDURE — 25000000 HC PHARMACY GENERAL: Performed by: ORTHOPAEDIC SURGERY

## 2024-04-24 PROCEDURE — 21400000 HC ROOM AND CARE CCU/INTERMEDIATE

## 2024-04-24 PROCEDURE — 0SG0071 FUSION OF LUMBAR VERTEBRAL JOINT WITH AUTOLOGOUS TISSUE SUBSTITUTE, POSTERIOR APPROACH, POSTERIOR COLUMN, OPEN APPROACH: ICD-10-PCS | Performed by: ORTHOPAEDIC SURGERY

## 2024-04-24 PROCEDURE — 36000014 HC OR LEVEL 4 EA ADDL MIN: Performed by: ORTHOPAEDIC SURGERY

## 2024-04-24 PROCEDURE — 36000025 HC CELL SAVER PROCED

## 2024-04-24 DEVICE — ROD EXPEDIUM 85MM: Type: IMPLANTABLE DEVICE | Site: SPINE LUMBAR | Status: FUNCTIONAL

## 2024-04-24 DEVICE — SCREW EXPEDIUM 7.0 X 40MM: Type: IMPLANTABLE DEVICE | Site: SPINE LUMBAR | Status: FUNCTIONAL

## 2024-04-24 DEVICE — CAGE 12X26/8-12MM EXPANDABLE CALIBER: Type: IMPLANTABLE DEVICE | Site: SPINE LUMBAR | Status: FUNCTIONAL

## 2024-04-24 DEVICE — SCREW EXPEDIUM 6 X 45MM: Type: IMPLANTABLE DEVICE | Site: SPINE LUMBAR | Status: FUNCTIONAL

## 2024-04-24 DEVICE — OSTEOSPONGE FINE FILLER 15CC: Type: IMPLANTABLE DEVICE | Site: SPINE LUMBAR | Status: FUNCTIONAL

## 2024-04-24 DEVICE — SCREW EXPEDIUM 7.0 X 45MM: Type: IMPLANTABLE DEVICE | Site: SPINE LUMBAR | Status: FUNCTIONAL

## 2024-04-24 DEVICE — ROD 90MM SPINE: Type: IMPLANTABLE DEVICE | Site: SPINE LUMBAR | Status: FUNCTIONAL

## 2024-04-24 DEVICE — SET SCREWS EXPEDIUM: Type: IMPLANTABLE DEVICE | Site: SPINE LUMBAR | Status: FUNCTIONAL

## 2024-04-24 DEVICE — CAGE 12 X 26/7-10MM EXPANDABLE CALIBER: Type: IMPLANTABLE DEVICE | Site: SPINE LUMBAR | Status: FUNCTIONAL

## 2024-04-24 DEVICE — INFUSE BONE GRAFT LARGE II KIT: Type: IMPLANTABLE DEVICE | Site: SPINE LUMBAR | Status: FUNCTIONAL

## 2024-04-24 RX ORDER — SODIUM CHLORIDE 9 MG/ML
INJECTION, SOLUTION INTRAVENOUS CONTINUOUS
Status: ACTIVE | OUTPATIENT
Start: 2024-04-24 | End: 2024-04-25

## 2024-04-24 RX ORDER — IBUPROFEN 200 MG
16-32 TABLET ORAL AS NEEDED
Status: DISCONTINUED | OUTPATIENT
Start: 2024-04-24 | End: 2024-04-24

## 2024-04-24 RX ORDER — SODIUM CHLORIDE 9 MG/ML
INJECTION, SOLUTION INTRAVENOUS CONTINUOUS PRN
Status: DISCONTINUED | OUTPATIENT
Start: 2024-04-24 | End: 2024-04-24 | Stop reason: SURG

## 2024-04-24 RX ORDER — DIAZEPAM 10 MG/2ML
5 INJECTION INTRAMUSCULAR ONCE
Status: COMPLETED | OUTPATIENT
Start: 2024-04-24 | End: 2024-04-24

## 2024-04-24 RX ORDER — MIDAZOLAM HYDROCHLORIDE 2 MG/2ML
INJECTION, SOLUTION INTRAMUSCULAR; INTRAVENOUS AS NEEDED
Status: DISCONTINUED | OUTPATIENT
Start: 2024-04-24 | End: 2024-04-24 | Stop reason: SURG

## 2024-04-24 RX ORDER — OXYCODONE HYDROCHLORIDE 5 MG/1
5 TABLET ORAL EVERY 4 HOURS PRN
Status: DISCONTINUED | OUTPATIENT
Start: 2024-04-24 | End: 2024-04-25

## 2024-04-24 RX ORDER — DIAZEPAM 5 MG/1
5 TABLET ORAL EVERY 8 HOURS PRN
Status: DISCONTINUED | OUTPATIENT
Start: 2024-04-24 | End: 2024-04-29 | Stop reason: HOSPADM

## 2024-04-24 RX ORDER — DEXAMETHASONE SODIUM PHOSPHATE 4 MG/ML
INJECTION, SOLUTION INTRA-ARTICULAR; INTRALESIONAL; INTRAMUSCULAR; INTRAVENOUS; SOFT TISSUE AS NEEDED
Status: DISCONTINUED | OUTPATIENT
Start: 2024-04-24 | End: 2024-04-24 | Stop reason: SURG

## 2024-04-24 RX ORDER — DEXTROSE 40 %
15-30 GEL (GRAM) ORAL AS NEEDED
Status: DISCONTINUED | OUTPATIENT
Start: 2024-04-24 | End: 2024-04-29 | Stop reason: HOSPADM

## 2024-04-24 RX ORDER — ONDANSETRON HYDROCHLORIDE 2 MG/ML
4 INJECTION, SOLUTION INTRAVENOUS
Status: DISCONTINUED | OUTPATIENT
Start: 2024-04-24 | End: 2024-04-24 | Stop reason: HOSPADM

## 2024-04-24 RX ORDER — VANCOMYCIN HYDROCHLORIDE
1250 ONCE
Status: COMPLETED | OUTPATIENT
Start: 2024-04-24 | End: 2024-04-24

## 2024-04-24 RX ORDER — ROCURONIUM BROMIDE 10 MG/ML
INJECTION, SOLUTION INTRAVENOUS AS NEEDED
Status: DISCONTINUED | OUTPATIENT
Start: 2024-04-24 | End: 2024-04-24 | Stop reason: SURG

## 2024-04-24 RX ORDER — DEXTROSE 40 %
15-30 GEL (GRAM) ORAL AS NEEDED
Status: DISCONTINUED | OUTPATIENT
Start: 2024-04-24 | End: 2024-04-24

## 2024-04-24 RX ORDER — OXYCODONE HYDROCHLORIDE 5 MG/1
10 TABLET ORAL EVERY 4 HOURS PRN
Status: DISCONTINUED | OUTPATIENT
Start: 2024-04-24 | End: 2024-04-25

## 2024-04-24 RX ORDER — DIPHENHYDRAMINE HCL 25 MG
25 CAPSULE ORAL EVERY 6 HOURS PRN
Status: DISCONTINUED | OUTPATIENT
Start: 2024-04-24 | End: 2024-04-29 | Stop reason: HOSPADM

## 2024-04-24 RX ORDER — LIDOCAINE HYDROCHLORIDE 10 MG/ML
INJECTION, SOLUTION INFILTRATION; PERINEURAL AS NEEDED
Status: DISCONTINUED | OUTPATIENT
Start: 2024-04-24 | End: 2024-04-24 | Stop reason: SURG

## 2024-04-24 RX ORDER — DEXTROSE 50 % IN WATER (D50W) INTRAVENOUS SYRINGE
25 AS NEEDED
Status: DISCONTINUED | OUTPATIENT
Start: 2024-04-24 | End: 2024-04-29 | Stop reason: HOSPADM

## 2024-04-24 RX ORDER — HYDROMORPHONE HYDROCHLORIDE 1 MG/ML
0.5 INJECTION, SOLUTION INTRAMUSCULAR; INTRAVENOUS; SUBCUTANEOUS
Status: DISCONTINUED | OUTPATIENT
Start: 2024-04-24 | End: 2024-04-24 | Stop reason: HOSPADM

## 2024-04-24 RX ORDER — PROPOFOL 10 MG/ML
INJECTION, EMULSION INTRAVENOUS AS NEEDED
Status: DISCONTINUED | OUTPATIENT
Start: 2024-04-24 | End: 2024-04-24 | Stop reason: SURG

## 2024-04-24 RX ORDER — PHENYLEPHRINE HYDROCHLORIDE 10 MG/ML
INJECTION INTRAVENOUS AS NEEDED
Status: DISCONTINUED | OUTPATIENT
Start: 2024-04-24 | End: 2024-04-24 | Stop reason: SURG

## 2024-04-24 RX ORDER — SODIUM CHLORIDE 0.9 G/100ML
INJECTION, SOLUTION IRRIGATION
Status: DISCONTINUED | OUTPATIENT
Start: 2024-04-24 | End: 2024-04-24 | Stop reason: HOSPADM

## 2024-04-24 RX ORDER — ACETAMINOPHEN 325 MG/1
975 TABLET ORAL EVERY 6 HOURS
Status: DISCONTINUED | OUTPATIENT
Start: 2024-04-24 | End: 2024-04-29 | Stop reason: HOSPADM

## 2024-04-24 RX ORDER — IBUPROFEN 200 MG
16-32 TABLET ORAL AS NEEDED
Status: DISCONTINUED | OUTPATIENT
Start: 2024-04-24 | End: 2024-04-29 | Stop reason: HOSPADM

## 2024-04-24 RX ORDER — GLYCOPYRROLATE 0.6MG/3ML
SYRINGE (ML) INTRAVENOUS AS NEEDED
Status: DISCONTINUED | OUTPATIENT
Start: 2024-04-24 | End: 2024-04-24 | Stop reason: SURG

## 2024-04-24 RX ORDER — FENTANYL CITRATE 50 UG/ML
INJECTION, SOLUTION INTRAMUSCULAR; INTRAVENOUS AS NEEDED
Status: DISCONTINUED | OUTPATIENT
Start: 2024-04-24 | End: 2024-04-24 | Stop reason: SURG

## 2024-04-24 RX ORDER — EPHEDRINE SULFATE 50 MG/ML
INJECTION, SOLUTION INTRAVENOUS AS NEEDED
Status: DISCONTINUED | OUTPATIENT
Start: 2024-04-24 | End: 2024-04-24 | Stop reason: SURG

## 2024-04-24 RX ORDER — ALUMINUM HYDROXIDE, MAGNESIUM HYDROXIDE, AND SIMETHICONE 1200; 120; 1200 MG/30ML; MG/30ML; MG/30ML
30 SUSPENSION ORAL EVERY 4 HOURS PRN
Status: DISCONTINUED | OUTPATIENT
Start: 2024-04-24 | End: 2024-04-29 | Stop reason: HOSPADM

## 2024-04-24 RX ORDER — ONDANSETRON HYDROCHLORIDE 2 MG/ML
INJECTION, SOLUTION INTRAVENOUS AS NEEDED
Status: DISCONTINUED | OUTPATIENT
Start: 2024-04-24 | End: 2024-04-24 | Stop reason: SURG

## 2024-04-24 RX ORDER — HYDROMORPHONE HYDROCHLORIDE 1 MG/ML
INJECTION, SOLUTION INTRAMUSCULAR; INTRAVENOUS; SUBCUTANEOUS AS NEEDED
Status: DISCONTINUED | OUTPATIENT
Start: 2024-04-24 | End: 2024-04-24 | Stop reason: SURG

## 2024-04-24 RX ORDER — VANCOMYCIN HYDROCHLORIDE 1 G/20ML
INJECTION, POWDER, LYOPHILIZED, FOR SOLUTION INTRAVENOUS
Status: DISCONTINUED | OUTPATIENT
Start: 2024-04-24 | End: 2024-04-24 | Stop reason: HOSPADM

## 2024-04-24 RX ORDER — HYDROMORPHONE HYDROCHLORIDE 1 MG/ML
0.5 INJECTION, SOLUTION INTRAMUSCULAR; INTRAVENOUS; SUBCUTANEOUS
Status: DISCONTINUED | OUTPATIENT
Start: 2024-04-24 | End: 2024-04-25

## 2024-04-24 RX ORDER — AMOXICILLIN 250 MG
1 CAPSULE ORAL 2 TIMES DAILY
Status: DISCONTINUED | OUTPATIENT
Start: 2024-04-24 | End: 2024-04-29 | Stop reason: HOSPADM

## 2024-04-24 RX ORDER — DEXTROSE 50 % IN WATER (D50W) INTRAVENOUS SYRINGE
25 AS NEEDED
Status: DISCONTINUED | OUTPATIENT
Start: 2024-04-24 | End: 2024-04-24

## 2024-04-24 RX ORDER — SODIUM CHLORIDE, SODIUM GLUCONATE, SODIUM ACETATE, POTASSIUM CHLORIDE AND MAGNESIUM CHLORIDE 30; 37; 368; 526; 502 MG/100ML; MG/100ML; MG/100ML; MG/100ML; MG/100ML
INJECTION, SOLUTION INTRAVENOUS CONTINUOUS PRN
Status: DISCONTINUED | OUTPATIENT
Start: 2024-04-24 | End: 2024-04-24 | Stop reason: SURG

## 2024-04-24 RX ORDER — FENTANYL CITRATE 50 UG/ML
50 INJECTION, SOLUTION INTRAMUSCULAR; INTRAVENOUS EVERY 5 MIN PRN
Status: COMPLETED | OUTPATIENT
Start: 2024-04-24 | End: 2024-04-24

## 2024-04-24 RX ORDER — DEXAMETHASONE SODIUM PHOSPHATE 4 MG/ML
10 INJECTION, SOLUTION INTRA-ARTICULAR; INTRALESIONAL; INTRAMUSCULAR; INTRAVENOUS; SOFT TISSUE EVERY 8 HOURS
Qty: 9 ML | Refills: 0 | Status: COMPLETED | OUTPATIENT
Start: 2024-04-24 | End: 2024-04-25

## 2024-04-24 RX ORDER — POLYETHYLENE GLYCOL 3350 17 G/17G
17 POWDER, FOR SOLUTION ORAL DAILY
Status: DISCONTINUED | OUTPATIENT
Start: 2024-04-24 | End: 2024-04-29 | Stop reason: HOSPADM

## 2024-04-24 RX ORDER — METOCLOPRAMIDE 10 MG/1
10 TABLET ORAL EVERY 6 HOURS PRN
Status: DISCONTINUED | OUTPATIENT
Start: 2024-04-24 | End: 2024-04-29 | Stop reason: HOSPADM

## 2024-04-24 RX ADMIN — FENTANYL CITRATE 50 MCG: 50 INJECTION, SOLUTION INTRAMUSCULAR; INTRAVENOUS at 15:30

## 2024-04-24 RX ADMIN — PHENYLEPHRINE HYDROCHLORIDE 100 MCG: 10 INJECTION INTRAVENOUS at 13:46

## 2024-04-24 RX ADMIN — MIDAZOLAM 2 MG: 1 INJECTION INTRAMUSCULAR; INTRAVENOUS at 11:17

## 2024-04-24 RX ADMIN — LIDOCAINE HYDROCHLORIDE 5 ML: 10 INJECTION, SOLUTION INFILTRATION; PERINEURAL at 11:26

## 2024-04-24 RX ADMIN — PHENYLEPHRINE HYDROCHLORIDE 50 MCG: 10 INJECTION INTRAVENOUS at 12:49

## 2024-04-24 RX ADMIN — ONDANSETRON 4 MG: 2 INJECTION INTRAMUSCULAR; INTRAVENOUS at 13:39

## 2024-04-24 RX ADMIN — FENTANYL CITRATE 100 MCG: 50 INJECTION, SOLUTION INTRAMUSCULAR; INTRAVENOUS at 11:26

## 2024-04-24 RX ADMIN — DOCUSATE SODIUM AND SENNOSIDES 1 TABLET: 8.6; 5 TABLET, FILM COATED ORAL at 20:11

## 2024-04-24 RX ADMIN — HYDROMORPHONE HYDROCHLORIDE 0.5 MG: 1 INJECTION, SOLUTION INTRAMUSCULAR; INTRAVENOUS; SUBCUTANEOUS at 14:27

## 2024-04-24 RX ADMIN — OXYCODONE HYDROCHLORIDE 10 MG: 5 TABLET ORAL at 20:08

## 2024-04-24 RX ADMIN — PHENYLEPHRINE HYDROCHLORIDE 100 MCG: 10 INJECTION INTRAVENOUS at 13:22

## 2024-04-24 RX ADMIN — ROCURONIUM BROMIDE 50 MG: 10 SOLUTION INTRAVENOUS at 12:06

## 2024-04-24 RX ADMIN — METOCLOPRAMIDE 10 MG: 10 TABLET ORAL at 20:11

## 2024-04-24 RX ADMIN — HYDROMORPHONE HYDROCHLORIDE 0.5 MG: 0.5 INJECTION, SOLUTION INTRAMUSCULAR; INTRAVENOUS; SUBCUTANEOUS at 22:57

## 2024-04-24 RX ADMIN — ROCURONIUM BROMIDE 50 MG: 10 SOLUTION INTRAVENOUS at 11:26

## 2024-04-24 RX ADMIN — SODIUM CHLORIDE: 9 INJECTION, SOLUTION INTRAVENOUS at 20:12

## 2024-04-24 RX ADMIN — DIAZEPAM 5 MG: 5 INJECTION INTRAMUSCULAR; INTRAVENOUS at 15:52

## 2024-04-24 RX ADMIN — EPHEDRINE SULFATE 10 MG: 50 INJECTION, SOLUTION INTRAVENOUS at 12:49

## 2024-04-24 RX ADMIN — CEFAZOLIN 2 G: 2 INJECTION, POWDER, FOR SOLUTION INTRAMUSCULAR; INTRAVENOUS at 20:12

## 2024-04-24 RX ADMIN — HYDROMORPHONE HYDROCHLORIDE 0.5 MG: 0.5 INJECTION, SOLUTION INTRAMUSCULAR; INTRAVENOUS; SUBCUTANEOUS at 15:33

## 2024-04-24 RX ADMIN — ROCURONIUM BROMIDE 20 MG: 10 SOLUTION INTRAVENOUS at 12:17

## 2024-04-24 RX ADMIN — PHENYLEPHRINE HYDROCHLORIDE 100 MCG: 10 INJECTION INTRAVENOUS at 14:14

## 2024-04-24 RX ADMIN — SODIUM CHLORIDE: 0.9 INJECTION, SOLUTION INTRAVENOUS at 11:19

## 2024-04-24 RX ADMIN — HYDROMORPHONE HYDROCHLORIDE 0.5 MG: 0.5 INJECTION, SOLUTION INTRAMUSCULAR; INTRAVENOUS; SUBCUTANEOUS at 16:53

## 2024-04-24 RX ADMIN — DEXAMETHASONE SODIUM PHOSPHATE 10 MG: 4 INJECTION, SOLUTION INTRA-ARTICULAR; INTRALESIONAL; INTRAMUSCULAR; INTRAVENOUS; SOFT TISSUE at 22:57

## 2024-04-24 RX ADMIN — SODIUM CHLORIDE, SODIUM GLUCONATE, SODIUM ACETATE, POTASSIUM CHLORIDE AND MAGNESIUM CHLORIDE: 526; 502; 368; 37; 30 INJECTION, SOLUTION INTRAVENOUS at 14:50

## 2024-04-24 RX ADMIN — TRANEXAMIC ACID 1000 MG: 100 INJECTION, SOLUTION INTRAVENOUS at 14:06

## 2024-04-24 RX ADMIN — DEXAMETHASONE SODIUM PHOSPHATE 10 MG: 4 INJECTION, SOLUTION INTRA-ARTICULAR; INTRALESIONAL; INTRAMUSCULAR; INTRAVENOUS; SOFT TISSUE at 11:50

## 2024-04-24 RX ADMIN — EPHEDRINE SULFATE 10 MG: 50 INJECTION, SOLUTION INTRAVENOUS at 11:37

## 2024-04-24 RX ADMIN — CEFAZOLIN 2 G: 2 INJECTION, POWDER, FOR SOLUTION INTRAMUSCULAR; INTRAVENOUS at 11:25

## 2024-04-24 RX ADMIN — HYDROMORPHONE HYDROCHLORIDE 0.5 MG: 0.5 INJECTION, SOLUTION INTRAMUSCULAR; INTRAVENOUS; SUBCUTANEOUS at 15:48

## 2024-04-24 RX ADMIN — POLYETHYLENE GLYCOL 3350 17 G: 17 POWDER, FOR SOLUTION ORAL at 20:16

## 2024-04-24 RX ADMIN — HYDROMORPHONE HYDROCHLORIDE 0.5 MG: 0.5 INJECTION, SOLUTION INTRAMUSCULAR; INTRAVENOUS; SUBCUTANEOUS at 21:21

## 2024-04-24 RX ADMIN — PHENYLEPHRINE HYDROCHLORIDE 100 MCG: 10 INJECTION INTRAVENOUS at 13:10

## 2024-04-24 RX ADMIN — GLYCOPYRROLATE 0.2 MG: 0.2 INJECTION, SOLUTION INTRAMUSCULAR; INTRAVITREAL at 11:37

## 2024-04-24 RX ADMIN — PROPOFOL 200 MG: 10 INJECTION, EMULSION INTRAVENOUS at 11:26

## 2024-04-24 RX ADMIN — PHENYLEPHRINE HYDROCHLORIDE 100 MCG: 10 INJECTION INTRAVENOUS at 12:24

## 2024-04-24 RX ADMIN — HYDROMORPHONE HYDROCHLORIDE 0.5 MG: 1 INJECTION, SOLUTION INTRAMUSCULAR; INTRAVENOUS; SUBCUTANEOUS at 14:35

## 2024-04-24 RX ADMIN — VANCOMYCIN HYDROCHLORIDE 1250 MG: 500 INJECTION, POWDER, LYOPHILIZED, FOR SOLUTION INTRAVENOUS at 09:31

## 2024-04-24 RX ADMIN — PHENYLEPHRINE HYDROCHLORIDE 100 MCG: 10 INJECTION INTRAVENOUS at 13:34

## 2024-04-24 RX ADMIN — FENTANYL CITRATE 50 MCG: 50 INJECTION, SOLUTION INTRAMUSCULAR; INTRAVENOUS at 15:25

## 2024-04-24 RX ADMIN — ACETAMINOPHEN 975 MG: 325 TABLET, FILM COATED ORAL at 17:48

## 2024-04-24 RX ADMIN — SUGAMMADEX 200 MG: 100 INJECTION, SOLUTION INTRAVENOUS at 13:56

## 2024-04-24 RX ADMIN — HYDROMORPHONE HYDROCHLORIDE 0.5 MG: 0.5 INJECTION, SOLUTION INTRAMUSCULAR; INTRAVENOUS; SUBCUTANEOUS at 16:34

## 2024-04-24 ASSESSMENT — COGNITIVE AND FUNCTIONAL STATUS - GENERAL
CLIMB 3 TO 5 STEPS WITH RAILING: 2 - A LOT
MOVING TO AND FROM BED TO CHAIR: 3 - A LITTLE
WALKING IN HOSPITAL ROOM: 3 - A LITTLE
STANDING UP FROM CHAIR USING ARMS: 3 - A LITTLE

## 2024-04-24 NOTE — NURSING NOTE
Patient to floor from PACU via bed, awake, alert and oriented x4, VSS, IV fluids running as ordered, Patient reports pain as tolerable, back dressing CDI with DIMITRI in place.  [place] in place and draining, emptied for 150 cc.  Patient started on clear liquid diet.  Call bell in reach and bed alarm on.  Family at bedside, report provided to night shift RN.

## 2024-04-24 NOTE — OR SURGEON
Pre-Procedure patient identification:  I am the primary operating surgeon/proceduralist and I have reviewed the applicable pathology reports and radiology studies for this procedure. I have identified the patient on 04/24/24 at 10:05 AM Piter Crane MD  Phone Number: [phone]

## 2024-04-24 NOTE — PROGRESS NOTES
Pt without new complaints, doing well in pacu  AFVSS DIMITRI in place  5/5 motor BLE all muscle groups  SILT BLE all sens dist  Dressing dry     A/P stable post op  -pt, oob  -pain control  -abx/drain  -scds  -med management

## 2024-04-24 NOTE — DISCHARGE INSTRUCTIONS
DISCHARGE INSTRUCTIONS:  LUMBAR FUSION  INCISON  Please make sure your incisions are checked at least twice daily for signs and symptoms of infection. If any of the below should occur, please call the office.  ? Draining of incisional site  ? Opening of incision  ? Fevers greater than 101.5 (low grade fevers post op are normal)  ? Flu-like symptoms  ? Increased redness and/or tenderness around the incision  If you have staples or sutures (not tape) in your incision they may be removed 2 weeks following your surgery. This may be done by a visiting nurse, family physician or by making an appointment to come into the office.  SHOWERING and the GAUZE DRESSING  ? You should change the gauze dressing over your incision every day and keep the incision  covered with dry sterile gauze and tape.  ? You may stop covering the incision with gauze five days after your surgery, as long as  the incision site is not leaking or draining fluid,  ? You may shower starting five days after your surgery (if incision is dry and intact). After  showering, gently dry the incision site.  ? Hair washing is permissible while in the shower. No tub baths, hot tubs or whirlpools until seen in the office. Do not rub cream or lotion on the incision until seen in the office.  EXERCISE  ? Only lift objects weighing less than 10-15 lbs  ? Do not bend or twist at the waist. Always bend your knees!!  ? Limit your sitting to 20-30 minute intervals. You should lie down or walk in between  sitting periods. There are no limitations for sitting in a recliner chair.  ? Walk as much as possible-let discomfort be your guide.  You may also go up and down stairs as much as you can tolerate.  ? Walking outside (as long as it is nice weather) or walking on a treadmill is permitted (no incline).  PAIN  ? Pain is expected after surgery. You should have a pain medication and muscle relaxer when you leave the hospital. Take the pain medication as needed. Start with 1  and supplement with extra strength Tylenol before using another. Take 1 muscle relaxer for muscle spasms in the back, you can take it 3 times a day if needed.  It is usually most helpful at night and in the morning.  ? Take your pain medication as needed. As your pain level decreases, you may begin to take over-the counter Extra Strength Tylenol (3000mg/day maximum).  ? DO NOT take any anti-inflammatories (like Motrin, Ibuprofen, Advil, Aleve, Celebrex, etc) for 12 weeks after surgery. Taking anti-inflammatories can interfere with fusion healing.  ? Do not resume taking Fosamax or Actonel for 12 weeks after your fusion surgery.  ? You must avoid nicotine exposure for at least 12 weeks after surgery including smoking, the patch, nicotine gum and second hand smoke.  BLOODCLOTS  ? Some swelling if normal post operatively. If you notice swelling in one or both of your legs that is painful and/or hot to the touch and/or you have shortness of breath - please give us a call.  CONSTIPATION  ? It is normal to be constipated after surgery due to anesthesia and narcotics. Make sure to stay hydrated, eat fiber, get up and walk and use the Colace that is prescribed. You can also use warm prune juice and get over the counter Miralax, suppositories, enemas and Magnesium Citrate if constipation persists.  DRIVING  ? You may not drive a car until told otherwise by your physician. You may be a passenger for short distances (20-30 minutes).  ? If you must take a longer trip make sure to make several stops so that you can walk  around and stretch your legs. Reclining the passenger seat seems to be the most  comfortable position for most patients.        FOLLOW-UP APPOINTMENTS  ? Your first post-op visit will be with Molly Ngo, ALEXANDER, ANP-C, ONP-C. This appointment was made for you when you signed up for surgery. If you do not have a post op appointment, please call to make one.  ? If you have any additional questions/concerns please  contact Molly Ngo DNP, ANP-C, ONP-C., or Lissette Medina [facility] at [phone].

## 2024-04-24 NOTE — ANESTHESIOLOGIST PRE-PROCEDURE ATTESTATION
Pre-Procedure Patient Identification:  I am the Primary Anesthesiologist and have identified the patient on 04/24/24 at 9:30 AM.   I have confirmed the procedure(s) will be performed by the following surgeon/proceduralist Piter Crane MD.

## 2024-04-24 NOTE — ANESTHESIA PREPROCEDURE EVALUATION
Relevant Problems   NEUROLOGY   (+) Depression   (+) Lumbar radiculopathy, chronic   Obesity  Hypercapnic respiratory failure sp L shoulder arthroplasty (2021)    ROS/Med Hx     Past Surgical History:   Procedure Laterality Date    BACK SURGERY      8 years ago, pt has cage    HERNIA REPAIR      SHOULDER SURGERY Left 2021       Physical Exam    Airway   Mallampati: III   TM distance: <3 FB   Neck ROM: limited  Dental    Teeth Problems: missing        Anesthesia Plan    Plan: general    Technique: total IV anesthesia      Airway: oral intubation    2 ASA  Anesthetic plan and risks discussed with: patient  Induction:    intravenous        Detail Level: Zone

## 2024-04-24 NOTE — ANESTHESIA PROCEDURE NOTES
Airway  Urgency: elective    Start Time: 4/24/2024 11:29 AM  Stop Time: 4/24/2024 11:29 AM    Airway not difficult    General Information and Staff    Patient location during procedure: OR  Anesthesiologist: Angel Fleming MD  Resident/CRNA: Damien Martinez CRNA  Performed: resident/CRNA   Performed by: Damien Martinez CRNA  Authorized by: Tyler Frias MD      Indications and Patient Condition  Indications for airway management: anesthesia  Sedation level: deep  Preoxygenated: yes  Patient position: sniffing  Mask difficulty assessment: 1 - vent by mask    Final Airway Details  Final airway type: endotracheal airway      Successful airway: ETT  Cuffed: yes   Successful intubation technique: direct laryngoscopy  Facilitating devices/methods: intubating stylet  Endotracheal tube insertion site: oral  Blade: Danica  Blade size: #4  ETT size (mm): 7.0  Cormack-Lehane Classification: grade IIb - view of arytenoids or posterior of glottis only  Placement verified by: chest auscultation and capnometry   Measured from: lips  ETT to lips (cm): 22  Number of attempts at approach: 1  Ventilation between attempts: none  Number of other approaches attempted: 0  Atraumatic airway insertion

## 2024-04-24 NOTE — PROGRESS NOTES
Patient: Aleyda Palma  Location: [facility] Operating Room OR  MRN:  [id number]  Today's date:  4/24/2024    Attempted to see patient for therapy. Unable due to medical hold (per discussion with PACU, pt not ready for PT at this time due to low BP. will continue to follow.).

## 2024-04-25 LAB
ANION GAP SERPL CALC-SCNC: 3 MEQ/L (ref 3–15)
BUN SERPL-MCNC: 14 MG/DL (ref 7–25)
CALCIUM SERPL-MCNC: 7.8 MG/DL (ref 8.6–10.3)
CHLORIDE SERPL-SCNC: 109 MEQ/L (ref 98–107)
CO2 SERPL-SCNC: 26 MEQ/L (ref 21–31)
CREAT SERPL-MCNC: 0.7 MG/DL (ref 0.6–1.2)
EGFRCR SERPLBLD CKD-EPI 2021: >60 ML/MIN/1.73M*2
ERYTHROCYTE [DISTWIDTH] IN BLOOD BY AUTOMATED COUNT: 14.2 % (ref 11.7–14.4)
GLUCOSE SERPL-MCNC: 138 MG/DL (ref 70–99)
HCT VFR BLD AUTO: 35.3 % (ref 35–45)
HGB BLD-MCNC: 11 G/DL (ref 11.8–15.7)
MCH RBC QN AUTO: 30.5 PG (ref 28–33.2)
MCHC RBC AUTO-ENTMCNC: 31.2 G/DL (ref 32.2–35.5)
MCV RBC AUTO: 97.8 FL (ref 83–98)
PDW BLD AUTO: 9.8 FL (ref 9.4–12.3)
PLATELET # BLD AUTO: 204 K/UL (ref 150–369)
POTASSIUM SERPL-SCNC: 4.4 MEQ/L (ref 3.5–5.1)
RBC # BLD AUTO: 3.61 M/UL (ref 3.93–5.22)
SODIUM SERPL-SCNC: 138 MEQ/L (ref 136–145)
WBC # BLD AUTO: 13.95 K/UL (ref 3.8–10.5)

## 2024-04-25 PROCEDURE — 99232 SBSQ HOSP IP/OBS MODERATE 35: CPT | Performed by: STUDENT IN AN ORGANIZED HEALTH CARE EDUCATION/TRAINING PROGRAM

## 2024-04-25 PROCEDURE — 97530 THERAPEUTIC ACTIVITIES: CPT | Mod: GP

## 2024-04-25 PROCEDURE — 25800000 HC PHARMACY IV SOLUTIONS: Performed by: STUDENT IN AN ORGANIZED HEALTH CARE EDUCATION/TRAINING PROGRAM

## 2024-04-25 PROCEDURE — 97166 OT EVAL MOD COMPLEX 45 MIN: CPT | Mod: GO

## 2024-04-25 PROCEDURE — 85027 COMPLETE CBC AUTOMATED: CPT | Performed by: STUDENT IN AN ORGANIZED HEALTH CARE EDUCATION/TRAINING PROGRAM

## 2024-04-25 PROCEDURE — 63700000 HC SELF-ADMINISTRABLE DRUG: Performed by: STUDENT IN AN ORGANIZED HEALTH CARE EDUCATION/TRAINING PROGRAM

## 2024-04-25 PROCEDURE — 63600000 HC DRUGS/DETAIL CODE: Performed by: STUDENT IN AN ORGANIZED HEALTH CARE EDUCATION/TRAINING PROGRAM

## 2024-04-25 PROCEDURE — 21400000 HC ROOM AND CARE CCU/INTERMEDIATE

## 2024-04-25 PROCEDURE — 36415 COLL VENOUS BLD VENIPUNCTURE: CPT | Performed by: STUDENT IN AN ORGANIZED HEALTH CARE EDUCATION/TRAINING PROGRAM

## 2024-04-25 PROCEDURE — 63700000 HC SELF-ADMINISTRABLE DRUG: Performed by: NURSE PRACTITIONER

## 2024-04-25 PROCEDURE — 82435 ASSAY OF BLOOD CHLORIDE: CPT | Performed by: STUDENT IN AN ORGANIZED HEALTH CARE EDUCATION/TRAINING PROGRAM

## 2024-04-25 PROCEDURE — 97162 PT EVAL MOD COMPLEX 30 MIN: CPT | Mod: GP

## 2024-04-25 RX ORDER — HYDROMORPHONE HYDROCHLORIDE 4 MG/1
2-4 TABLET ORAL
Status: DISCONTINUED | OUTPATIENT
Start: 2024-04-25 | End: 2024-04-29 | Stop reason: HOSPADM

## 2024-04-25 RX ORDER — GABAPENTIN 300 MG/1
600 CAPSULE ORAL 2 TIMES DAILY
Status: DISCONTINUED | OUTPATIENT
Start: 2024-04-25 | End: 2024-04-29 | Stop reason: HOSPADM

## 2024-04-25 RX ORDER — GABAPENTIN 300 MG/1
600 CAPSULE ORAL 3 TIMES DAILY
Status: DISCONTINUED | OUTPATIENT
Start: 2024-04-25 | End: 2024-04-25

## 2024-04-25 RX ADMIN — ACETAMINOPHEN 975 MG: 325 TABLET, FILM COATED ORAL at 11:29

## 2024-04-25 RX ADMIN — HYDROMORPHONE HYDROCHLORIDE 4 MG: 4 TABLET ORAL at 14:56

## 2024-04-25 RX ADMIN — SERTRALINE HYDROCHLORIDE 150 MG: 50 TABLET ORAL at 11:28

## 2024-04-25 RX ADMIN — ACETAMINOPHEN 975 MG: 325 TABLET, FILM COATED ORAL at 17:50

## 2024-04-25 RX ADMIN — BUSPIRONE HYDROCHLORIDE 15 MG: 10 TABLET ORAL at 20:25

## 2024-04-25 RX ADMIN — SODIUM CHLORIDE 125 ML/HR: 9 INJECTION, SOLUTION INTRAVENOUS at 03:53

## 2024-04-25 RX ADMIN — DIAZEPAM 5 MG: 5 TABLET ORAL at 20:25

## 2024-04-25 RX ADMIN — HYDROMORPHONE HYDROCHLORIDE 4 MG: 4 TABLET ORAL at 20:25

## 2024-04-25 RX ADMIN — CEFAZOLIN 2 G: 2 INJECTION, POWDER, FOR SOLUTION INTRAMUSCULAR; INTRAVENOUS at 03:53

## 2024-04-25 RX ADMIN — CEFAZOLIN 2 G: 2 INJECTION, POWDER, FOR SOLUTION INTRAMUSCULAR; INTRAVENOUS at 20:25

## 2024-04-25 RX ADMIN — DOCUSATE SODIUM AND SENNOSIDES 1 TABLET: 8.6; 5 TABLET, FILM COATED ORAL at 20:25

## 2024-04-25 RX ADMIN — POLYETHYLENE GLYCOL 3350 17 G: 17 POWDER, FOR SOLUTION ORAL at 09:16

## 2024-04-25 RX ADMIN — CEFAZOLIN 2 G: 2 INJECTION, POWDER, FOR SOLUTION INTRAMUSCULAR; INTRAVENOUS at 12:46

## 2024-04-25 RX ADMIN — BUSPIRONE HYDROCHLORIDE 15 MG: 10 TABLET ORAL at 09:16

## 2024-04-25 RX ADMIN — HYDROMORPHONE HYDROCHLORIDE 2 MG: 4 TABLET ORAL at 11:27

## 2024-04-25 RX ADMIN — DEXAMETHASONE SODIUM PHOSPHATE 10 MG: 4 INJECTION, SOLUTION INTRA-ARTICULAR; INTRALESIONAL; INTRAMUSCULAR; INTRAVENOUS; SOFT TISSUE at 14:54

## 2024-04-25 RX ADMIN — DEXAMETHASONE SODIUM PHOSPHATE 10 MG: 4 INJECTION, SOLUTION INTRA-ARTICULAR; INTRALESIONAL; INTRAMUSCULAR; INTRAVENOUS; SOFT TISSUE at 06:46

## 2024-04-25 RX ADMIN — OXYCODONE HYDROCHLORIDE 5 MG: 5 TABLET ORAL at 09:16

## 2024-04-25 RX ADMIN — DOCUSATE SODIUM AND SENNOSIDES 1 TABLET: 8.6; 5 TABLET, FILM COATED ORAL at 09:15

## 2024-04-25 RX ADMIN — HYDROMORPHONE HYDROCHLORIDE 0.5 MG: 0.5 INJECTION, SOLUTION INTRAMUSCULAR; INTRAVENOUS; SUBCUTANEOUS at 03:59

## 2024-04-25 RX ADMIN — BUSPIRONE HYDROCHLORIDE 15 MG: 10 TABLET ORAL at 14:54

## 2024-04-25 RX ADMIN — GABAPENTIN 600 MG: 300 CAPSULE ORAL at 20:25

## 2024-04-25 ASSESSMENT — COGNITIVE AND FUNCTIONAL STATUS - GENERAL
WALKING IN HOSPITAL ROOM: 3 - A LITTLE
DRESSING REGULAR LOWER BODY CLOTHING: 3 - A LITTLE
CLIMB 3 TO 5 STEPS WITH RAILING: 3 - A LITTLE
HELP NEEDED FOR PERSONAL GROOMING: 4 - NONE
STANDING UP FROM CHAIR USING ARMS: 4 - NONE
STANDING UP FROM CHAIR USING ARMS: 3 - A LITTLE
DRESSING REGULAR UPPER BODY CLOTHING: 4 - NONE
CLIMB 3 TO 5 STEPS WITH RAILING: 3 - A LITTLE
AFFECT: ANXIOUS
HELP NEEDED FOR BATHING: 3 - A LITTLE
EATING MEALS: 4 - NONE
WALKING IN HOSPITAL ROOM: 3 - A LITTLE
TOILETING: 4 - NONE
MOVING TO AND FROM BED TO CHAIR: 3 - A LITTLE
MOVING TO AND FROM BED TO CHAIR: 3 - A LITTLE
AFFECT: WFL

## 2024-04-25 NOTE — PROGRESS NOTES
Orthopedic Progress Note    Subjective     Seen at bedside. Resting comfortably. NAEON. Pain controlled. Tolerating diet. Is passing gas. No BM. [place] out this am.  Has been OOB. Denies fevers, CP, HA, SOB,  N/V, numbness, paresthesias. Post op Hgb 11. AVSS. Motor/sensory intact. Drain 235cc 24h, 85cc last 8.      Objective     Objective   Temp:  [35.3 °C (95.6 °F)-36.4 °C (97.6 °F)] 36.2 °C (97.2 °F)  Heart Rate:  [60-95] 60  Resp:  [10-29] 18  BP: ()/(49-88) 127/62  SpO2:  [94 %-97 %] 94 %    Lab Results   Component Value Date    WBC 13.95 (H) 04/25/2024    HGB 11.0 (L) 04/25/2024    HCT 35.3 04/25/2024    MCV 97.8 04/25/2024     04/25/2024     Lab Results   Component Value Date    GLUCOSE 138 (H) 04/25/2024    CALCIUM 7.8 (L) 04/25/2024     04/25/2024    K 4.4 04/25/2024    CO2 26 04/25/2024     (H) 04/25/2024    BUN 14 04/25/2024    CREATININE 0.7 04/25/2024       Physical Exam:  General: VSS, NAD    Extremities:    Gen: NAD, resting comfortably    Spine:  Dressings CDI  Drain with SS output  Appropriate TTP around incision    UE:  Motor 5/5 strength C5-T1 bilaterally  SILT C5-T1 bilaterally  2+ brachioradialis, triceps. neg hoffmans. bilaterally  2+ R/U pules  BCR all digits BL    LE:  Motor 5/5 strength L2-S1 bilaterally  SILT L2-S1 bilaterally  2+ reflexes patella, achilles. no clonus. downgoing babinski bilaterally  2+DP/PT pulses  BCR      Assessment   53 y.o. female s/p L3-S1 PLIF on 4/24 with Dr. Crane, doing well post op    Plan     -Pain control  -DVT ppx: SCD  -ANCEF Q8 with drain  -WBAT   -[place] out POD 1  -Drain: monitor and record output  -Advance diet as tolerated  -Incentive spirometry  -PT/OT  -Fall Precautions  -OOB as tolerated  -Maintain dressing clean, dry, and intact  -Monitor hemoglobin  -CM/SW for discharge planning  -Medical - appreciate medical co-management  -Disposition planning: DC pending drain output and PT progress and recs      Messi Tanner, DO  Orthopedic  Surgery, PGY-1  [place] Pager: [phone]

## 2024-04-25 NOTE — PROGRESS NOTES
Physical Therapy -  Initial Evaluation     Patient: Aleyda Palma  Location: [facility] [address]  MRN: [id number]  Today's date: 4/25/2024    HISTORY OF PRESENT ILLNESS     Fouzia is a 53 y.o. female admitted on 4/24/2024 with Spondylolisthesis, lumbosacral region [M43.17]  Spinal stenosis, lumbar region, with neurogenic claudication [M48.062]  Low back pain [M54.50]. Principal problem is Low back pain.    Past Medical History  Fouzia has a past medical history of Anxiety, COVID-19 (2023), COVID-19 vaccine series completed, Depression, Diverticulitis of colon, Fall, Kidney stones, Numbness and tingling of left leg, and Unsteady gait.    History of Present Illness   S/p  L3-S1 posterior decompression and fusion by Dr. Crane 4/24/24   PRIOR LEVEL OF FUNCTION AND LIVING ENVIRONMENT     Prior Level of Function      Flowsheet Row Most Recent Value   Dominant Hand right   Ambulation independent   Transferring independent   Toileting independent   Bathing independent   Dressing independent   Eating independent   IADLs independent   Driving/Transportation    Communication understands/communicates without difficulty   Assistive Device Currently Used at Home none          Prior Living Environment      Flowsheet Row Most Recent Value   People in Home spouse  [lives with wife]   Current Living Arrangements home   Living Environment Comment [facility], 4 [address] with 1 HR, first floor full BR with tub, FF to 2nd floor bed and stall shower. Plans to have adjustable bed on first floor ready for return home          VITALS AND PAIN     PT Vitals      Date/Time Pulse SpO2 Pt Activity O2 Therapy BP Pt Position Who   04/25/24 0936 87 97 % [place] 0/10 At rest None (Room air) 92/55 Sitting ERS   04/25/24 0959 96 97 % [place] 3/10 Walking None (Room air) 121/60 Standing ERS          PT Pain      Date/Time Pain Type Location Rating: Rest Rating: Activity Interventions [facility]   04/25/24 0936 -- back 6 6 relaxation techniques  promoted;quiet environment facilitated;premedicated for activity;diversional activity provided;breathing exercises utilized ERS   04/25/24 1001 Pain Reassessment back 3 -- -- BS             Objective   OBJECTIVE     Start time:  0936  End time:  1001  Session Length: 25 min  Mode of Treatment: individual therapy, physical therapy    General Observations  Patient received in chair. She was agreeable to therapy, no issues or concerns identified by nurse prior to session. + DIMITRI drain    Precautions: fall, spinal, head of bed elevated 30 degrees       Limitations/Impairments: insensate body part (LLE)      PT Eval and Treat - 04/25/24 0936          Cognition    Orientation Status oriented x 4     Affect/Mental Status WFL     Follows Commands WFL     Cognitive Function WFL        Vision Assessment/Intervention    Vision Assessment corrective lenses for reading        Hearing Assessment    Hearing Status WFL        Sensory Assessment    Sensory Assessment sensation intact except     Sensation Impaired Location(s) left LE     Left LE Sensory Impairment deep pressure awareness;general sensation;light touch awareness;light touch localization;proprioception;sharp-dull discrimination;absent     Sensory Subjective Reports numbness;insensate;tingling        Lower Extremity Assessment    LE Assessment ROM and Strength WFL except        Lower Extremity Strength    Hip, Left (Strength) flex 4/5, abd 4+/5     Knee, Left (Strength) ext 4+/5     Ankle, Left (Strength) DF 4/5        Bed Mobility    Bed Mobility Activities left;supine to sit;sit to supine     [place] supervision     Safety/Cues verbal cues;maintaining precautions;preparatory posture     Assistive Device bed rails;head of bed elevated     Comment log roll technique        Mobility Belt    Mobility Belt Used for All Out of Bed Activity no     Reason Mobility Belt Not Used medical contraindication     Reason Mobility Belt Not Used spinal surgery and DIMITRI drain         Sit/Stand Transfer    Surface chair with arm rests;edge of bed     [place] supervision     Assistive Device none        Gait Training    [place], Gait supervision     Assistive Device none     Distance in Feet 200 feet     Pattern step-through     Deviations/Abnormal Patterns left sided deviations;weight shifting decreased;base of support, wide     Comment (Gait/Stairs) pt requiring 1 static standing rest x 30 sec due to MORENO and feeling dizzy        Balance    Static Sitting Balance WFL;sitting, edge of bed     Dynamic Sitting Balance WFL;sitting, edge of bed     Sit to Stand Dynamic Balance mild impairment;unsupported     Static Standing Balance mild impairment;unsupported     Dynamic Standing Balance mild impairment;unsupported     Comment, Balance no AD        Impairments/Safety Issues    Impairments Affecting Function balance;shortness of breath;strength;pain;endurance/activity tolerance;range of motion (ROM);sensation/sensory awareness     Functional Endurance fair +, + MORENO and pain with mobility but VSS on RA                                    Education Documentation  Unresolved/Worsening Symptoms, taught by Lucia Antoine PT at 4/25/2024 10:47 AM.  Learner: Patient  Readiness: Acceptance  Method: Explanation, Demonstration  Response: Verbalizes Understanding, Demonstrated Understanding  Comment: role of PT, POC, safety, precautions    Joint Mobility/Strength, taught by Lucia Antoine PT at 4/25/2024 10:47 AM.  Learner: Patient  Readiness: Acceptance  Method: Explanation, Demonstration  Response: Verbalizes Understanding, Demonstrated Understanding  Comment: role of PT, POC, safety, precautions    Home Safety, taught by Lucia Antoine PT at 4/25/2024 10:47 AM.  Learner: Patient  Readiness: Acceptance  Method: Explanation, Demonstration  Response: Verbalizes Understanding, Demonstrated Understanding  Comment: role of PT, POC, safety, precautions    Energy Conservation, taught by Arash  Lucia JADE PT at 4/25/2024 10:47 AM.  Learner: Patient  Readiness: Acceptance  Method: Explanation, Demonstration  Response: Verbalizes Understanding, Demonstrated Understanding  Comment: role of PT, POC, safety, precautions    Assistive/Adaptive Devices, taught by Lucia Antoine PT at 4/25/2024 10:47 AM.  Learner: Patient  Readiness: Acceptance  Method: Explanation, Demonstration  Response: Verbalizes Understanding, Demonstrated Understanding  Comment: role of PT, POC, safety, precautions    Signs/Symptoms, taught by Lucia Antoine PT at 4/25/2024 10:47 AM.  Learner: Patient  Readiness: Acceptance  Method: Explanation, Demonstration  Response: Verbalizes Understanding, Demonstrated Understanding  Comment: role of PT, POC, safety, precautions    Risk Factors, taught by Lucia Antoine PT at 4/25/2024 10:47 AM.  Learner: Patient  Readiness: Acceptance  Method: Explanation, Demonstration  Response: Verbalizes Understanding, Demonstrated Understanding  Comment: role of PT, POC, safety, precautions        Session Outcome  Patient in chair at end of session, all needs met, call light in reach, personal items in reach (RN aware and agreeable to no chair alarm). Nursing notified about change in vital signs, patient's performance, patient's position, and patient's response to therapy/activity.    AM-PAC™ - Mobility (Current Function)     Turning form your back to your side while in flat bed without using bedrails 3 - A Little   Moving from lying on your back to sitting on the side of a flat bed without using bedrails 3 - A Little   Moving to and from a bed to a chair 3 - A Little   Standing up from a chair using your arms 3 - A Little   To walk in a hospital room 3 - A Little   Climbing 3-5 steps with a railing 3 - A Little   AM-PAC™ Mobility Score 18      ASSESSMENT AND PLAN     Progress Summary  Pt presents at supervision functional mobility level with no AD, which is a mild decline from her baseline independent  mobility with no AD.  Ampac= 18, which indicates home dispo.  Pt will benefit from continued skilled PT to maximize independence, aerobic capacity, and safety.    Patient/Family Therapy Goals Statement: travel to home in [place], socialize without pain    PT Plan      Flowsheet Row Most Recent Value   Rehab Potential good, to achieve stated therapy goals at 04/25/2024 0936   Therapy Frequency 5 times/wk at 04/25/2024 0936   Planned Therapy Interventions balance training, bed mobility training, gait training, home exercise program, joint mobilization, lumbar stabilization, patient/family education, postural re-education, ROM (range of motion), stair training, strengthening, transfer training at 04/25/2024 0936            PT Discharge Recommendations      Flowsheet Row Most Recent Value   PT Recommended Discharge Disposition home with outpatient services, home with assistance at 04/25/2024 0936   Anticipated Equipment Needs if Discharged Home (PT) none at 04/25/2024 0936                 PT Goals      Flowsheet Row Most Recent Value   Bed Mobility Goal 1    Activity/Assistive Device sit to supine/supine to sit at 04/25/2024 0936   [place] modified independence at 04/25/2024 0936   Time Frame by discharge at 04/25/2024 0936   Strategies/Barriers log roll technique at 04/25/2024 0936   Progress/Outcome goal ongoing at 04/25/2024 0936   Transfer Goal 1    Activity/Assistive Device sit-to-stand/stand-to-sit at 04/25/2024 0936   [place] independent at 04/25/2024 0936   Time Frame by discharge at 04/25/2024 0936   Progress/Outcome goal ongoing at 04/25/2024 0936   Gait Training Goal 1    Activity/Assistive Device gait (walking locomotion) at 04/25/2024 0936   [place] independent at 04/25/2024 0936   Distance 300 at 04/25/2024 0936   Time Frame by discharge at 04/25/2024 0936   Progress/Outcome goal ongoing at 04/25/2024 0936   Stairs Goal 1    Activity/Assistive Device ascending stairs, descending stairs,  using handrail, right at 04/25/2024 0936   [place] modified independence at 04/25/2024 0936   Number of Stairs 12 at 04/25/2024 0936   Time Frame by discharge at 04/25/2024 0936   Progress/Outcome goal ongoing at 04/25/2024 0936

## 2024-04-25 NOTE — HOSPITAL COURSE
Fouzia is a 53 y.o. female admitted on 4/24/2024 with Spondylolisthesis, lumbosacral region [M43.17]  Spinal stenosis, lumbar region, with neurogenic claudication [M48.062]  Low back pain [M54.50]. Principal problem is Low back pain.    Past Medical History  Fouzia has a past medical history of Anxiety, COVID-19 (2023), COVID-19 vaccine series completed, Depression, Diverticulitis of colon, Fall, Kidney stones, Numbness and tingling of left leg, and Unsteady gait.    History of Present Illness   S/p  L3-S1 posterior decompression and fusion by Dr. Crane 4/24/24

## 2024-04-25 NOTE — PROGRESS NOTES
Occupational Therapy -  Initial Evaluation     Patient: Aleyda Palma  Location: [facility] 4 Main 0429  MRN: [id number]  Today's date: 4/25/2024    HISTORY OF PRESENT ILLNESS     Fouzia is a 53 y.o. female admitted on 4/24/2024 with Spondylolisthesis, lumbosacral region [M43.17]  Spinal stenosis, lumbar region, with neurogenic claudication [M48.062]  Low back pain [M54.50]. Principal problem is Low back pain.    Past Medical History  Fouzia has a past medical history of Anxiety, COVID-19 (2023), COVID-19 vaccine series completed, Depression, Diverticulitis of colon, Fall, Kidney stones, Numbness and tingling of left leg, and Unsteady gait.    History of Present Illness   S/p  L3-S1 posterior decompression and fusion by Dr. Crane 4/24/24   PRIOR LEVEL OF FUNCTION AND LIVING ENVIRONMENT     Prior Level of Function      Flowsheet Row Most Recent Value   Dominant Hand right   Ambulation independent   Transferring independent   Toileting independent   Bathing independent   Dressing independent   Eating independent   IADLs independent   Driving/Transportation    Communication understands/communicates without difficulty   Assistive Device Currently Used at Home scale          Prior Living Environment      Flowsheet Row Most Recent Value   People in Home significant other   Current Living Arrangements home   Home Accessibility stairs to enter home (Group), stairs within home (Group)   Living Environment Comment [facility], 4 JOCELYN with 1 HR, first floor full BR with tub, FF to 2nd floor bed and stall shower. Plans to have adjustable bed on first floor ready for return home          Occupational Profile      Flowsheet Row Most Recent Value   Occupational History/Life Experiences not employed   Performance Patterns +    Environmental Supports and Barriers significant other          VITALS AND PAIN     OT Vitals      Date/Time Pulse HR Source O2 Therapy BP BP Location BP Method Pt Position Who   04/25/24  1107 79 Monitor None (Room air) 124/79 Right upper arm Automatic Sitting PATT   04/25/24 1120 82 Monitor None (Room air) 123/72 Right upper arm Automatic Sitting PATT          OT Pain      Date/Time Pain Type Location Rating: Rest Rating: Activity Interventions [facility]   04/25/24 1107 Pain Assessment back 6 6 care clustered;quiet environment facilitated;relaxation techniques promoted PATT             Objective   OBJECTIVE     Start time:  1107  End time:  1121  Session Length: 14 min  Mode of Treatment: individual therapy, occupational therapy    General Observations  Patient received in chair. She was agreeable to therapy, no issues or concerns identified by nurse prior to session.      Precautions: fall, spinal, head of bed elevated 30 degrees              OT Eval and Treat - 04/25/24 1107          Cognition    Orientation Status oriented x 4     Affect/Mental Status anxious     Follows Commands WFL     Cognitive Function WFL        Vision Assessment/Intervention    Vision Assessment WFL        Hearing Assessment    Hearing Status WFL        Sensory Assessment    Sensory Assessment sensation intact, upper extremities        Upper Extremity Assessment    UE Assessment ROM and Strength WFL        Mobility Belt    Mobility Belt Used for All Out of Bed Activity no     Reason Mobility Belt Not Used medical contraindication     Reason Mobility Belt Not Used spinal surgery and DIMITRI drain        Sit/Stand Transfer    Surface chair with arm rests     [place] independent     Assistive Device none        Toilet Transfer    Transfer Technique sit/stand     [place] independent     Assistive Device none        Shower/Tub Transfer    Comment Pt educated on sit/sping technique with use of tub seat        Functional Mobility    Distance in room/bathroom;hallway     Functional Mobility [place] independent     Assistive Device none        Lower Body Dressing    Comment Pt already dressed, educated in compensatory dressing  techniques, pt reports A is available at home        Grooming    Tasks washes, rinses and dries hands     [place] independent     Position sink side        Balance    Static Sitting Balance WNL;WFL     Dynamic Sitting Balance WFL     Sit to Stand Dynamic Balance WFL     Static Standing Balance WFL     Dynamic Standing Balance WFL     Comment, Balance no AD        Impairments/Safety Issues    Impairments Affecting Function pain;endurance/activity tolerance                                    Education Documentation  Self-Care, taught by Yfn Taylor, OT at 4/25/2024  2:21 PM.  Learner: Patient  Readiness: Eager  Method: Explanation  Response: Verbalizes Understanding  Comment: Role of OT, safety with transfers, spinal precautions, use of call bell        Session Outcome  Patient in chair at end of session, all needs met, call light in reach, personal items in reach. Nursing notified about patient's performance, patient's position, and patient's response to therapy/activity.    AM-PAC™ - ADL (Current Function)     Putting on/taking off regular lower body clothing 3 - A Little   Bathing 3 - A Little   Toileting 4 - None   Putting on/taking off regular upper body clothing 4 - None   Help for taking care of personal grooming 4 - None   Eating meals 4 - None   AM-PAC™ ADL Score 22      ASSESSMENT AND PLAN     Progress Summary  [facility] 22. Transfers Ind, Functional mobility no AD Ind, grooming at sink Ind, Toiletig INd.  Pt educated on LB dressing using compensatory techniques.  Pt reports she will have A from signifcant other at home.  Pt safe for D/C home when medically cleared.  OT will sign off         OT Plan      Flowsheet Row Most Recent Value   Therapy Frequency evaluation only at 04/25/2024 1107            OT Discharge Recommendations      Flowsheet Row Most Recent Value   OT Recommended Discharge Disposition home at 04/25/2024 1107   Anticipated Equipment Needs if Discharged Home (OT) shower chair at  04/25/2024 1107

## 2024-04-25 NOTE — PLAN OF CARE
Care Coordination Discharge Plan Note     Discharge Needs Assessment  Concerns to be Addressed: care coordination/care conferences, discharge planning  Current Discharge Risk:      Anticipated Discharge Plan  Anticipated Discharge Disposition: home with assistance       Patient Choice  Offered/Gave Vendor List:    Patient's Choice of Community Agency(s):           ---------------------------------------------------------------------------------------------------------------------    Interdisciplinary Discharge Plan Review:  Participants:social work/services, patient    Concerns Comments:  Patient discussed in interdisciplinary rounds. Pt has DIMITRI drain in. Pt's diet advanced to regular. Pt ambulating in hallway independently.    Discharge Plan:   Disposition/Destination: Home  / Home  Discharge Facility:    Community Resources:      Discharge Transportation:  Is Out of Hospital DNR needed at Discharge: no  Does patient need discharge transport? No

## 2024-04-25 NOTE — PROGRESS NOTES
Pt without new complaints, doing well  AFVSS DIMITRI in place  5/5 motor BLE all muscle groups  SILT BLE all sens dist  Dressing dry     A/P stable post op day 1  -pt, oob  -pain control  -abx/drain  -scds  -med management

## 2024-04-25 NOTE — CONSULTS
Hospital Medicine Service -  Daily Progress Note       SUBJECTIVE   Interval History: Patient seen in follow-up postoperatively.  She has some expected postoperative discomfort but otherwise offers no new complaints.     OBJECTIVE      Vital signs in last 24 hours:  Temp:  [35.3 °C (95.6 °F)-36.4 °C (97.6 °F)] 36.2 °C (97.2 °F)  Heart Rate:  [60-96] 96  Resp:  [10-29] 18  BP: ()/(49-64) 121/60    Intake/Output Summary (Last 24 hours) at 4/25/2024 1346  Last data filed at 4/25/2024 1300  Gross per 24 hour   Intake 3340 ml   Output 3175 ml   Net 165 ml       PHYSICAL EXAMINATION      Sitting up in chair no acute distress  Sclera anicteric  No JVD  Lungs clear bilateral  Regular rate rhythm no murmurs  Abdomen soft nontender nondistended  Skin clean dry warm  Radial 2+ bilateral  No lower extremity edema  Expected low back pain  DIMITRI drain in place  Awake alert oriented x 3 speech clear moves all extremities sensation intact  Pleasant       LINES, CATHETERS, DRAINS, AIRWAYS, AND WOUNDS   Lines, Drains, and Airways:  Wounds (agree with documentation and present on admission):  Peripheral IV (Adult) 04/24/24 Left Forearm (Active)   Number of days: 1       Peripheral IV (Adult) 04/24/24 Right Hand (Active)   Number of days: 1       Drain 1 Back 7 Fr. (Active)   Number of days: 1       Surgical Incision Back (Active)   Number of days: 1         Comments:      LABS / IMAGING / TELE      Labs  Labs reviewed      Imaging  I have independently reviewed the patient's pertinent imaging for this hospital visit.   X-RAY LUMBAR SPINE 2 OR 3 VIEWS    Result Date: 4/24/2024  Narrative: CLINICAL HISTORY:  L3-S1 Posterior Lumbar Decompressin, Posterior Lumbar Interbody Fusion, Instrumentation, Cage, Allograft, BMP, Autograft     Impression: IMPRESSION:  Status post instrumented lumbar fusion, L3-S1.  See comment for details. COMPARISON: None  available. COMMENT:  AP and lateral views of the lumbar spine are completed  postoperatively. Osseous detail is limited at the fused levels on the lateral projection, likely due to technical factors. Posterior instrumented lumbar fusion noted spanning L3-S1, noting paired pedicle screws with interlocking vertical bars.  There are interbody spacers at the L3-L4, L4-L5 and L5-S1 disc spaces.  The L4-L5 spacer is not well demonstrated on lateral projection. Posterior decompression has been completed at the fused levels. There is a surgical drain in place posteriorly.       ECG/Telemetry  Patient is not on telemetry.    ASSESSMENT AND PLAN      Lumbar radiculopathy, chronic  Assessment & Plan  Continue gabapentin    Depression  Assessment & Plan  Resumed buspar, zoloft    * Low back pain  Assessment & Plan  S/p PLIF  Management per ortho  Medically stable         VTE Assessment: [place]    VTE Prophylaxis:  Current anticoagulants:    None      Code Status: Full Code      Estimated Discharge Date: 4/27/2024        Koko Trent DO  4/25/2024

## 2024-04-25 NOTE — PLAN OF CARE
Care Coordination Admission Assessment Note    General Information:  Readmission Within the last 30 days: no previous admission in last 30 days  Does patient have a : No  Patient-Specific Goals (include timeframe): medical stability    Living Arrangements:  Arrived From: home  Current Living Arrangements: home  People in Home: significant other  Home Accessibility: stairs to enter home (Group), stairs within home (Group)  Living Arrangement Comments: Pt lives w/ partner in 3sh w/ 6ste.    Housing Stability and Utility Access (SDOH):  In the last 12 months, was there a time when you were not able to pay the mortgage or rent on time?: No  In the last 12 months, how many places have you lived?:    In the last 12 months, was there a time when you did not have a steady place to sleep or slept in a shelter (including now)?: No  In the past 12 months has the electric, gas, oil, or water company threatened to shut off services in your home?: No    Functional Status Prior to Admission:   Assistive Device/Animal Currently Used at Home: scale  Functional Status Comments: w/o DME, IADLS and per pt, if she needs assistance partner assists. Partner does the cooking.  IADL Comments: Per pt, she drives self to dr appts     Supports and Services:  Current Outpatient/Agency/Support Group: none  Type of Current Home Care Services:    History of home care episode or rehab stay: None    Discharge Needs Assessment:   Concerns to be Addressed: care coordination/care conferences, discharge planning  Current Discharge Risk:    Anticipated Changes Related to Illness: none    Patient/Family Anticipated Discharge Plan:  Patient/Family Anticipates Transition To: home  Patient/Family Anticipated Services at Transition: none    Connection to Community  Not applicable    Patient Choice:   Offered/Gave Vendor List:    Patient's Choice of Community Agency(s):         Anticipated Discharge Plan:  Met with patient. Provided education  and contact information for Care Coordination services.: yes  Anticipated Discharge Disposition: home with assistance     Transportation Needs (SDOH):  Transportation Concerns: none  Transportation Anticipated: family or friend will provide  Is Out of Hospital DNR needed at discharge?: no    In the past 12 months, has lack of transportation kept you from medical appointments or from getting medications?: No  In the past 12 months, has lack of transportation kept you from meetings, work, or from getting things needed for daily living?: No    Concerns - comments:  CCMSW met with patient at bedside. SW introduced self/ explained CCMSW role. SW confirmed pcp, pharmacy, address, contacts, POA, and demo info. Pt admitted for L3-S1 posterior lumbar decompression and fusion. Pt lives w/ partner in 3sh w/ 6ste. w/o DME, IADLS and per pt, if she needs assistance partner assists. Partner does the cooking. No hx of SNF/HC, no O2. Jean Crystal is POA. Partner will transport at d/ch. SW will continue to follow patient for ongoing care and after care needs.

## 2024-04-25 NOTE — ANESTHESIA POSTPROCEDURE EVALUATION
Patient: Aleyda Palma    Procedure Summary       Date: 04/24/24 Room / Location:  OR 4 / PH OR    Anesthesia Start: 1119 Anesthesia Stop: 1525    Procedure: L3-S1 Posterior Lumbar Decompressin, Posterior Lumbar Interbody Fusion, Instrumentation, Cage, Allograft, BMP, Autograft (Spine Lumbar) Diagnosis:       Spondylolisthesis, lumbosacral region      Spinal stenosis, lumbar region, with neurogenic claudication      (Spondylolisthesis, lumbosacral region [M43.17])      (Spinal stenosis, lumbar region, with neurogenic claudication [M48.062])    Surgeons: Piter Crane MD Responsible Provider: Tyler Frias MD    Anesthesia Type: general ASA Status: 2            Anesthesia Type: general  PACU Vitals  4/24/2024 1518 - 4/24/2024 1618        4/24/2024  1530 4/24/2024  1545 4/24/2024  1600 4/24/2024  1615    BP: 102/63 112/55 90/51 86/49    Temp: 36.4 °C (97.6 °F) -- -- --    Pulse: 70 76 64 62    Resp: 14 24 10 13    SpO2: 94 % 95 % 97 % 95 %              Anesthesia Post Evaluation    Pain management: adequate  Patient participation: complete - patient participated  Level of consciousness: awake and alert  Cardiovascular status: acceptable  Airway Patency: adequate  Respiratory status: acceptable  Hydration status: acceptable  Anesthetic complications: no

## 2024-04-25 NOTE — PLAN OF CARE
Problem: Adult Inpatient Plan of Care  Goal: Plan of Care Review  Outcome: Progressing  Flowsheets (Taken 4/25/2024 1043)  Progress: improving  Outcome Evaluation: PT eval complete  Plan of Care Reviewed With: patient     Problem: Mobility Impairment  Goal: Optimal Mobility  Outcome: Progressing

## 2024-04-25 NOTE — PLAN OF CARE
Plan of Care Review  Plan of Care Reviewed With: patient  Progress: improving  Outcome Evaluation: Pt a&ox4, resting comfortably in bed. Blood pressure improved throughout shift. Bilateral SCDs on. Pain managed with dilaudid and ice. IVF infusing all night, stopped this AM. Dressing remains c/d/i. DIMITRI drain emptied frequently, output documented. [place] cath pulled this morning at 5am, pt educated on need to void. OOB this morning and ambulating in room with assistx2. Tolerating clears well. Bed alarm on, call bell within reach.

## 2024-04-25 NOTE — PLAN OF CARE
Problem: Adult Inpatient Plan of Care  Goal: Plan of Care Review  Outcome: Progressing  Flowsheets (Taken 4/25/2024 1420)  Progress: improving  Outcome Evaluation: OT adolph completed. Recommend home  Plan of Care Reviewed With: patient     Problem: Adult Inpatient Plan of Care  Goal: Patient-Specific Goal (Individualized)  Outcome: Progressing  Flowsheets (Taken 4/25/2024 1420)  Patient/Family-Specific Goals (Include Timeframe): home

## 2024-04-26 PROCEDURE — 63700000 HC SELF-ADMINISTRABLE DRUG: Performed by: STUDENT IN AN ORGANIZED HEALTH CARE EDUCATION/TRAINING PROGRAM

## 2024-04-26 PROCEDURE — 97110 THERAPEUTIC EXERCISES: CPT | Mod: GP

## 2024-04-26 PROCEDURE — 12000000 HC ROOM AND CARE MED/SURG

## 2024-04-26 PROCEDURE — 25800000 HC PHARMACY IV SOLUTIONS: Performed by: STUDENT IN AN ORGANIZED HEALTH CARE EDUCATION/TRAINING PROGRAM

## 2024-04-26 PROCEDURE — 63700000 HC SELF-ADMINISTRABLE DRUG: Performed by: NURSE PRACTITIONER

## 2024-04-26 PROCEDURE — 63600000 HC DRUGS/DETAIL CODE: Mod: JZ | Performed by: STUDENT IN AN ORGANIZED HEALTH CARE EDUCATION/TRAINING PROGRAM

## 2024-04-26 PROCEDURE — 97116 GAIT TRAINING THERAPY: CPT | Mod: GP

## 2024-04-26 RX ADMIN — ACETAMINOPHEN 975 MG: 325 TABLET, FILM COATED ORAL at 05:43

## 2024-04-26 RX ADMIN — DOCUSATE SODIUM AND SENNOSIDES 1 TABLET: 8.6; 5 TABLET, FILM COATED ORAL at 08:52

## 2024-04-26 RX ADMIN — BUSPIRONE HYDROCHLORIDE 15 MG: 10 TABLET ORAL at 08:51

## 2024-04-26 RX ADMIN — DIAZEPAM 5 MG: 5 TABLET ORAL at 08:58

## 2024-04-26 RX ADMIN — SERTRALINE HYDROCHLORIDE 150 MG: 50 TABLET ORAL at 08:52

## 2024-04-26 RX ADMIN — BUSPIRONE HYDROCHLORIDE 15 MG: 10 TABLET ORAL at 15:09

## 2024-04-26 RX ADMIN — GABAPENTIN 600 MG: 300 CAPSULE ORAL at 19:41

## 2024-04-26 RX ADMIN — DOCUSATE SODIUM AND SENNOSIDES 1 TABLET: 8.6; 5 TABLET, FILM COATED ORAL at 19:41

## 2024-04-26 RX ADMIN — GABAPENTIN 600 MG: 300 CAPSULE ORAL at 08:51

## 2024-04-26 RX ADMIN — CEFAZOLIN 2 G: 2 INJECTION, POWDER, FOR SOLUTION INTRAMUSCULAR; INTRAVENOUS at 05:42

## 2024-04-26 RX ADMIN — HYDROMORPHONE HYDROCHLORIDE 4 MG: 4 TABLET ORAL at 12:17

## 2024-04-26 RX ADMIN — HYDROMORPHONE HYDROCHLORIDE 4 MG: 4 TABLET ORAL at 19:50

## 2024-04-26 RX ADMIN — CEFAZOLIN 2 G: 2 INJECTION, POWDER, FOR SOLUTION INTRAMUSCULAR; INTRAVENOUS at 12:19

## 2024-04-26 RX ADMIN — CEFAZOLIN 2 G: 2 INJECTION, POWDER, FOR SOLUTION INTRAMUSCULAR; INTRAVENOUS at 19:41

## 2024-04-26 RX ADMIN — ACETAMINOPHEN 975 MG: 325 TABLET, FILM COATED ORAL at 00:29

## 2024-04-26 RX ADMIN — BUSPIRONE HYDROCHLORIDE 15 MG: 10 TABLET ORAL at 19:41

## 2024-04-26 RX ADMIN — ACETAMINOPHEN 975 MG: 325 TABLET, FILM COATED ORAL at 17:45

## 2024-04-26 RX ADMIN — ACETAMINOPHEN 975 MG: 325 TABLET, FILM COATED ORAL at 12:17

## 2024-04-26 RX ADMIN — DIAZEPAM 5 MG: 5 TABLET ORAL at 17:50

## 2024-04-26 RX ADMIN — HYDROMORPHONE HYDROCHLORIDE 4 MG: 4 TABLET ORAL at 15:12

## 2024-04-26 ASSESSMENT — COGNITIVE AND FUNCTIONAL STATUS - GENERAL
CLIMB 3 TO 5 STEPS WITH RAILING: 3 - A LITTLE
STANDING UP FROM CHAIR USING ARMS: 4 - NONE
AFFECT: WFL
MOVING TO AND FROM BED TO CHAIR: 3 - A LITTLE
WALKING IN HOSPITAL ROOM: 4 - NONE
MOVING TO AND FROM BED TO CHAIR: 4 - NONE
WALKING IN HOSPITAL ROOM: 3 - A LITTLE
STANDING UP FROM CHAIR USING ARMS: 3 - A LITTLE
CLIMB 3 TO 5 STEPS WITH RAILING: 3 - A LITTLE

## 2024-04-26 NOTE — PLAN OF CARE
Plan of Care Review  Plan of Care Reviewed With: patient  Progress: improving  Outcome Evaluation: Patient is AAOx4, ambulating indepdently around the hallway throughout the entire day. Significant other at bedside majority of the day. Pain medications administered and PRN pain medications given. Call bell in reach, bed alarm and chair alarm refused.

## 2024-04-26 NOTE — CONSULTS
I visited with Ms. Palma who was being supported by a loved one at the bedside during our visit together. Ms. Palma was optimistic about feeling better post surgery and was very encouraged by her support system. Spiritual care will remain available for patient emotional and spiritual needs.    Donald Rachel,  intern   [phone], #[phone]

## 2024-04-26 NOTE — PROGRESS NOTES
Orthopedic Progress Note    Subjective     Seen at bedside. Resting comfortably. NAEON. Pain controlled. Tolerating diet. Is passing gas. Has had a BM.  Has been OOB, 200 ft with PT. Denies fevers, CP, HA, SOB,  N/V, numbness, paresthesias. Post op Hgb 11. AVSS. Motor/sensory intact. Drain 380cc 24h, 60cc last 8.      Objective     Objective   Temp:  [36.6 °C (97.8 °F)-36.8 °C (98.3 °F)] 36.6 °C (97.8 °F)  Heart Rate:  [72-96] 72  Resp:  [18-20] 20  BP: ()/(55-79) 151/73  SpO2:  [95 %-98 %] 95 %    Lab Results   Component Value Date    WBC 13.95 (H) 04/25/2024    HGB 11.0 (L) 04/25/2024    HCT 35.3 04/25/2024    MCV 97.8 04/25/2024     04/25/2024     Lab Results   Component Value Date    GLUCOSE 138 (H) 04/25/2024    CALCIUM 7.8 (L) 04/25/2024     04/25/2024    K 4.4 04/25/2024    CO2 26 04/25/2024     (H) 04/25/2024    BUN 14 04/25/2024    CREATININE 0.7 04/25/2024       Physical Exam:  General: VSS, NAD    Extremities:    Gen: NAD, resting comfortably    Spine:  Dressings CDI  Drain with SS output  Appropriate TTP around incision    UE:  Motor 5/5 strength C5-T1 bilaterally  SILT C5-T1 bilaterally  2+ brachioradialis, triceps. neg hoffmans. bilaterally  2+ R/U pules  BCR all digits BL    LE:  Motor 5/5 strength L2-S1 bilaterally  SILT L2-S1 bilaterally  2+ reflexes patella, achilles. no clonus. downgoing babinski bilaterally  2+DP/PT pulses  BCR      Assessment   53 y.o. female s/p L3-S1 PLIF on 4/24 with Dr. Crane, doing well post op    Plan     -Pain control  -DVT ppx: SCD  -ANCEF Q8 with drain  -WBAT   -Drain: monitor and record output  -Advance diet as tolerated  -Incentive spirometry  -PT/OT  -Fall Precautions  -OOB as tolerated  -Maintain dressing clean, dry, and intact  -Monitor hemoglobin  -CM/SW for discharge planning  -Medical - appreciate medical co-management  -Disposition planning: DC pending drain output and PT progress and recs      Messi Tanner, DO  Orthopedic Surgery,  PGY-1  [place] Pager: [phone]

## 2024-04-26 NOTE — PROGRESS NOTES
Orthopedic Progress Note    Subjective     Seen at bedside. Resting comfortably. NAEON. Pain controlled. Tolerating diet. Is passing gas. Has had a BM.  Has been OOB, 200 ft with PT. Denies fevers, CP, HA, SOB,  N/V, numbness, paresthesias. Post op Hgb 11. AVSS. Motor/sensory intact. Drain 195cc 24h, 70cc last 8.      Objective     Objective   Temp:  [36.3 °C (97.3 °F)-36.8 °C (98.3 °F)] 36.3 °C (97.3 °F)  Heart Rate:  [72-82] 72  Resp:  [16-20] 16  BP: (119-151)/(62-86) 150/86  SpO2:  [95 %-99 %] 99 %    Lab Results   Component Value Date    WBC 13.95 (H) 04/25/2024    HGB 11.0 (L) 04/25/2024    HCT 35.3 04/25/2024    MCV 97.8 04/25/2024     04/25/2024     Lab Results   Component Value Date    GLUCOSE 138 (H) 04/25/2024    CALCIUM 7.8 (L) 04/25/2024     04/25/2024    K 4.4 04/25/2024    CO2 26 04/25/2024     (H) 04/25/2024    BUN 14 04/25/2024    CREATININE 0.7 04/25/2024       Physical Exam:  General: VSS, NAD    Extremities:    Gen: NAD, resting comfortably    Spine:  Dressings CDI  Drain with SS output  Appropriate TTP around incision    UE:  Motor 5/5 strength C5-T1 bilaterally  SILT C5-T1 bilaterally  2+ brachioradialis, triceps. neg hoffmans. bilaterally  2+ R/U pules  BCR all digits BL    LE:  Motor 5/5 strength L2-S1 bilaterally  SILT L2-S1 bilaterally  2+ reflexes patella, achilles. no clonus. downgoing babinski bilaterally  2+DP/PT pulses  BCR      Assessment   53 y.o. female s/p L3-S1 PLIF on 4/24 with Dr. Crane, doing well post op    Plan     -Pain control  -DVT ppx: SCD  -ANCEF Q8 with drain  -WBAT   -Drain: monitor and record output  -Advance diet as tolerated  -Incentive spirometry  -PT/OT  -Fall Precautions  -OOB as tolerated  -Maintain dressing clean, dry, and intact  -Monitor hemoglobin  -CM/SW for discharge planning  -Medical - appreciate medical co-management  -Disposition planning: DC pending drain output and PT progress and recs      Diane Franklin, DO  Orthopedic Surgery,  PGY-2  [place] Pager: [phone]

## 2024-04-26 NOTE — PROGRESS NOTES
Physical Therapy -  Daily Treatment/Progress Note     Patient: Aleyda Palma  Location: [facility] 4 [address] 0429  MRN: [id number]  Today's date: 4/26/2024    HISTORY OF PRESENT ILLNESS     Fouzia is a 53 y.o. female admitted on 4/24/2024 with Spondylolisthesis, lumbosacral region [M43.17]  Spinal stenosis, lumbar region, with neurogenic claudication [M48.062]  Low back pain [M54.50]. Principal problem is Low back pain.    Past Medical History  Fouzia has a past medical history of Anxiety, COVID-19 (2023), COVID-19 vaccine series completed, Depression, Diverticulitis of colon, Fall, Kidney stones, Numbness and tingling of left leg, and Unsteady gait.    History of Present Illness   S/p  L3-S1 posterior decompression and fusion by Dr. Crane 4/24/24   PRIOR LEVEL OF FUNCTION AND LIVING ENVIRONMENT     Prior Level of Function      Flowsheet Row Most Recent Value   Dominant Hand right   Ambulation independent   Transferring independent   Toileting independent   Bathing independent   Dressing independent   Eating independent   IADLs independent   Driving/Transportation    Communication understands/communicates without difficulty   Assistive Device Currently Used at Home scale          Prior Living Environment      Flowsheet Row Most Recent Value   People in Home significant other   Current Living Arrangements home   Home Accessibility stairs to enter home (Group), stairs within home (Group)   Living Environment Comment [facility], 4 JOCELYN with 1 HR, first floor full BR with tub, FF to 2nd floor bed and stall shower. Plans to have adjustable bed on first floor ready for return home          VITALS AND PAIN     PT Vitals      Date/Time Pulse SpO2 Pt Activity O2 Therapy BP BP Location BP Method Pt Position [facility]   04/26/24 1513 71 98 % At rest None (Room air) 131/86 Right upper arm Automatic Sitting MRM          PT Pain      Date/Time Pain Type Rating: Rest Rating: Activity [facility]   04/26/24 1512 -- 6.5 6.5 DW    04/26/24 1513 Pain Assessment 6.5 6.5 MRM             Objective   OBJECTIVE     Start time:  1506  End time:  1530  Session Length: 24 min  Mode of Treatment: individual therapy, physical therapy    General Observations  Patient received in chair. She was agreeable to therapy, no issues or concerns identified by nurse prior to session. walking in room on approach.  sat on bed with    Precautions: fall, spinal, head of bed elevated 30 degrees       Limitations/Impairments: insensate body part (LLE)      PT Eval and Treat - 04/26/24 1506          Cognition    Orientation Status oriented x 4     Affect/Mental Status WFL     Follows Commands WFL     Cognitive Function WFL        Bed Mobility    [place] independent        Mobility Belt    Mobility Belt Used for All Out of Bed Activity no     Reason Mobility Belt Not Used medical contraindication     Reason Mobility Belt Not Used spinal surgery and DIMITRI drain        Sit/Stand Transfer    Surface edge of bed     [place] independent        Gait Training    [place], Gait independent     Safety/Cues --     Assistive Device none     Distance in Feet 200 feet     Pattern step-through        Stairs Training    [place], Stairs close supervision     Safety/Cues verbal cues;increased time to complete     Assistive Device railing     Handrail Location (Stairs) left side (ascending);right side (descending)     Number of Stairs 12     Ascending Stairs Technique step-over-step     Descending Stairs Technique step-to-step     Comment somewhat anxious due to LLE chronic numbness        Balance    Static Sitting Balance WFL;unsupported     Dynamic Sitting Balance WFL;unsupported     Sit to Stand Dynamic Balance WFL;unsupported     Static Standing Balance WFL;unsupported     Dynamic Standing Balance mild impairment;WFL   No impairment with gait.  Mild impairment on stairs       Impairments/Safety Issues    Impairments Affecting Function balance;endurance/activity  tolerance   on stairs    Functional Endurance good minus                                    Education Documentation  Post op Program Education, taught by Fabiana Eduardo, PT at 4/26/2024  4:19 PM.  Learner: Patient  Readiness: Acceptance  Method: Explanation  Response: Verbalizes Understanding  Comment: Review of spinal precautions.  Pacing.        Session Outcome  Patient  (Pt amb independently ad benji in hallway) at end of session, all needs met. Nursing notified about patient's response to therapy/activity, patient's performance, and patient's position.    AM-PAC™ - Mobility (Current Function)     Turning form your back to your side while in flat bed without using bedrails 4 - None   Moving from lying on your back to sitting on the side of a flat bed without using bedrails 4 - None   Moving to and from a bed to a chair 4 - None   Standing up from a chair using your arms 4 - None   To walk in a hospital room 4 - None   Climbing 3-5 steps with a railing 3 - A Little   AM-PAC™ Mobility Score 23      ASSESSMENT AND PLAN     Progress Summary  PT treatment tolerated well.  Independent with bed mob, transfers and gait w/o device.  Close supervision on stairs.  Educated pt on pacing and monitoring activity according to pain.  Would benefit from out pt PT once cleared for mobility from neurosurgery.  Safe for return home.    Patient/Family Therapy Goals Statement: home, decrease pain    PT Plan      Flowsheet Row Most Recent Value   Rehab Potential good, to achieve stated therapy goals at 04/25/2024 0936   Therapy Frequency 5 times/wk at 04/25/2024 0936   Planned Therapy Interventions patient/family education, stair training, strengthening at 04/26/2024 1506            PT Discharge Recommendations      Flowsheet Row Most Recent Value   PT Recommended Discharge Disposition home, home with assistance  [out pt when indicated] at 04/26/2024 1506   Anticipated Equipment Needs if Discharged Home (PT) none at 04/26/2024  1506                 PT Goals      Flowsheet Row Most Recent Value   Bed Mobility Goal 1    Activity/Assistive Device sit to supine/supine to sit at 04/25/2024 0936   [place] modified independence at 04/25/2024 0936   Time Frame by discharge at 04/25/2024 0936   Strategies/Barriers log roll technique at 04/25/2024 0936   Progress/Outcome goal ongoing at 04/25/2024 0936   Transfer Goal 1    Activity/Assistive Device sit-to-stand/stand-to-sit at 04/25/2024 0936   [place] independent at 04/25/2024 0936   Time Frame by discharge at 04/25/2024 0936   Progress/Outcome goal ongoing at 04/25/2024 0936   Gait Training Goal 1    Activity/Assistive Device gait (walking locomotion) at 04/25/2024 0936   [place] independent at 04/25/2024 0936   Distance 300 at 04/25/2024 0936   Time Frame by discharge at 04/25/2024 0936   Progress/Outcome goal ongoing at 04/25/2024 0936   Stairs Goal 1    Activity/Assistive Device ascending stairs, descending stairs, using handrail, right at 04/25/2024 0936   [place] modified independence at 04/25/2024 0936   Number of Stairs 12 at 04/25/2024 0936   Time Frame by discharge at 04/25/2024 0936   Progress/Outcome goal ongoing at 04/25/2024 0936

## 2024-04-26 NOTE — PLAN OF CARE
Plan of Care Review  Plan of Care Reviewed With: patient  Progress: improving  AA&Ox4. Pleasant and cooperative, indep. In ADL's ambulates indep throughout halls. Medicated prn with dilauded 4mg po with much relief. Johnathan continues to drain large am't bloody drng. Site soft non tender with occlusive drsg intact. Good PO intact denies n//v call light within reach.

## 2024-04-26 NOTE — PLAN OF CARE
Care Coordination Discharge Plan Note     Discharge Needs Assessment  Concerns to be Addressed: care coordination/care conferences, discharge planning  Current Discharge Risk:      Anticipated Discharge Plan  Anticipated Discharge Disposition: home with assistance       Patient Choice  Offered/Gave Vendor List:    Patient's Choice of Community Agency(s):           ---------------------------------------------------------------------------------------------------------------------    Interdisciplinary Discharge Plan Review:  Participants:social work/services, patient    Concerns Comments:  Patient discussed in interdisciplinary rounds. Pt has DIMITRI drain in place and has been ambulating in the hallway independently. Pt is anticipated to d/ch Sunday long as pt is medically stable to do so.    Discharge Plan:   Disposition/Destination: Home  / Home  Discharge Facility:    Community Resources:      Discharge Transportation:  Is Out of Hospital DNR needed at Discharge: no  Does patient need discharge transport? No

## 2024-04-26 NOTE — PROGRESS NOTES
Postop dressing removed  Sutures to midline lower back incision CDI  JPx1  Optifoam dressing applied  Pt tolerated well

## 2024-04-27 PROCEDURE — 12000000 HC ROOM AND CARE MED/SURG

## 2024-04-27 PROCEDURE — 63600000 HC DRUGS/DETAIL CODE: Mod: JZ | Performed by: STUDENT IN AN ORGANIZED HEALTH CARE EDUCATION/TRAINING PROGRAM

## 2024-04-27 PROCEDURE — 63700000 HC SELF-ADMINISTRABLE DRUG: Performed by: STUDENT IN AN ORGANIZED HEALTH CARE EDUCATION/TRAINING PROGRAM

## 2024-04-27 PROCEDURE — 63700000 HC SELF-ADMINISTRABLE DRUG: Performed by: NURSE PRACTITIONER

## 2024-04-27 PROCEDURE — 25800000 HC PHARMACY IV SOLUTIONS: Performed by: STUDENT IN AN ORGANIZED HEALTH CARE EDUCATION/TRAINING PROGRAM

## 2024-04-27 RX ADMIN — GABAPENTIN 600 MG: 300 CAPSULE ORAL at 09:24

## 2024-04-27 RX ADMIN — HYDROMORPHONE HYDROCHLORIDE 4 MG: 4 TABLET ORAL at 13:23

## 2024-04-27 RX ADMIN — HYDROMORPHONE HYDROCHLORIDE 4 MG: 4 TABLET ORAL at 20:27

## 2024-04-27 RX ADMIN — GABAPENTIN 600 MG: 300 CAPSULE ORAL at 19:32

## 2024-04-27 RX ADMIN — ACETAMINOPHEN 975 MG: 325 TABLET, FILM COATED ORAL at 17:17

## 2024-04-27 RX ADMIN — CEFAZOLIN 2 G: 2 INJECTION, POWDER, FOR SOLUTION INTRAMUSCULAR; INTRAVENOUS at 03:09

## 2024-04-27 RX ADMIN — HYDROMORPHONE HYDROCHLORIDE 4 MG: 4 TABLET ORAL at 09:23

## 2024-04-27 RX ADMIN — SERTRALINE HYDROCHLORIDE 150 MG: 50 TABLET ORAL at 09:23

## 2024-04-27 RX ADMIN — DIAZEPAM 5 MG: 5 TABLET ORAL at 03:12

## 2024-04-27 RX ADMIN — BUSPIRONE HYDROCHLORIDE 15 MG: 10 TABLET ORAL at 19:32

## 2024-04-27 RX ADMIN — CEFAZOLIN 2 G: 2 INJECTION, POWDER, FOR SOLUTION INTRAMUSCULAR; INTRAVENOUS at 11:19

## 2024-04-27 RX ADMIN — ACETAMINOPHEN 975 MG: 325 TABLET, FILM COATED ORAL at 11:17

## 2024-04-27 RX ADMIN — HYDROMORPHONE HYDROCHLORIDE 2 MG: 4 TABLET ORAL at 17:17

## 2024-04-27 RX ADMIN — BUSPIRONE HYDROCHLORIDE 15 MG: 10 TABLET ORAL at 13:23

## 2024-04-27 RX ADMIN — CEFAZOLIN 2 G: 2 INJECTION, POWDER, FOR SOLUTION INTRAMUSCULAR; INTRAVENOUS at 21:00

## 2024-04-27 RX ADMIN — DIAZEPAM 5 MG: 5 TABLET ORAL at 11:17

## 2024-04-27 RX ADMIN — BUSPIRONE HYDROCHLORIDE 15 MG: 10 TABLET ORAL at 09:24

## 2024-04-27 ASSESSMENT — COGNITIVE AND FUNCTIONAL STATUS - GENERAL
CLIMB 3 TO 5 STEPS WITH RAILING: 3 - A LITTLE
WALKING IN HOSPITAL ROOM: 4 - NONE
MOVING TO AND FROM BED TO CHAIR: 4 - NONE
WALKING IN HOSPITAL ROOM: 4 - NONE
STANDING UP FROM CHAIR USING ARMS: 4 - NONE
CLIMB 3 TO 5 STEPS WITH RAILING: 3 - A LITTLE
MOVING TO AND FROM BED TO CHAIR: 4 - NONE
STANDING UP FROM CHAIR USING ARMS: 4 - NONE

## 2024-04-27 NOTE — PLAN OF CARE
Plan of Care Review  Plan of Care Reviewed With: patient  Progress: improving  Outcome Evaluation: Patient independent in room, in morning reported episode of increased severe pain to lower back after ambulation around room, pain not controlled by Dilaudid, Valium. Order for heating pad, one time refractory dilaudid; patient reports heating pad effective. Ambulating around unit in afternoon and pain controlled with Tylenol and Dilaudid for moderate pain, with effect. Back dressing no new drainage observed, DIMITRI drain serosanguinous.

## 2024-04-28 PROCEDURE — 63700000 HC SELF-ADMINISTRABLE DRUG: Performed by: NURSE PRACTITIONER

## 2024-04-28 PROCEDURE — 63700000 HC SELF-ADMINISTRABLE DRUG: Performed by: STUDENT IN AN ORGANIZED HEALTH CARE EDUCATION/TRAINING PROGRAM

## 2024-04-28 PROCEDURE — 63600000 HC DRUGS/DETAIL CODE: Mod: JZ | Performed by: STUDENT IN AN ORGANIZED HEALTH CARE EDUCATION/TRAINING PROGRAM

## 2024-04-28 PROCEDURE — 12000000 HC ROOM AND CARE MED/SURG

## 2024-04-28 PROCEDURE — 25800000 HC PHARMACY IV SOLUTIONS: Performed by: STUDENT IN AN ORGANIZED HEALTH CARE EDUCATION/TRAINING PROGRAM

## 2024-04-28 PROCEDURE — 97530 THERAPEUTIC ACTIVITIES: CPT | Mod: GP,CQ

## 2024-04-28 RX ADMIN — BUSPIRONE HYDROCHLORIDE 15 MG: 10 TABLET ORAL at 13:01

## 2024-04-28 RX ADMIN — CEFAZOLIN 2 G: 2 INJECTION, POWDER, FOR SOLUTION INTRAMUSCULAR; INTRAVENOUS at 04:57

## 2024-04-28 RX ADMIN — HYDROMORPHONE HYDROCHLORIDE 4 MG: 4 TABLET ORAL at 08:15

## 2024-04-28 RX ADMIN — SERTRALINE HYDROCHLORIDE 150 MG: 50 TABLET ORAL at 07:39

## 2024-04-28 RX ADMIN — DIAZEPAM 5 MG: 5 TABLET ORAL at 17:45

## 2024-04-28 RX ADMIN — BUSPIRONE HYDROCHLORIDE 15 MG: 10 TABLET ORAL at 07:39

## 2024-04-28 RX ADMIN — HYDROMORPHONE HYDROCHLORIDE 4 MG: 4 TABLET ORAL at 05:01

## 2024-04-28 RX ADMIN — HYDROMORPHONE HYDROCHLORIDE 4 MG: 4 TABLET ORAL at 19:50

## 2024-04-28 RX ADMIN — ACETAMINOPHEN 975 MG: 325 TABLET, FILM COATED ORAL at 17:44

## 2024-04-28 RX ADMIN — DOCUSATE SODIUM AND SENNOSIDES 1 TABLET: 8.6; 5 TABLET, FILM COATED ORAL at 19:41

## 2024-04-28 RX ADMIN — ACETAMINOPHEN 975 MG: 325 TABLET, FILM COATED ORAL at 11:36

## 2024-04-28 RX ADMIN — GABAPENTIN 600 MG: 300 CAPSULE ORAL at 07:39

## 2024-04-28 RX ADMIN — HYDROMORPHONE HYDROCHLORIDE 4 MG: 4 TABLET ORAL at 13:01

## 2024-04-28 RX ADMIN — BUSPIRONE HYDROCHLORIDE 15 MG: 10 TABLET ORAL at 19:41

## 2024-04-28 RX ADMIN — DIAZEPAM 5 MG: 5 TABLET ORAL at 07:40

## 2024-04-28 RX ADMIN — GABAPENTIN 600 MG: 300 CAPSULE ORAL at 19:40

## 2024-04-28 RX ADMIN — CEFAZOLIN 2 G: 2 INJECTION, POWDER, FOR SOLUTION INTRAMUSCULAR; INTRAVENOUS at 11:50

## 2024-04-28 RX ADMIN — CEFAZOLIN 2 G: 2 INJECTION, POWDER, FOR SOLUTION INTRAMUSCULAR; INTRAVENOUS at 19:41

## 2024-04-28 RX ADMIN — ACETAMINOPHEN 975 MG: 325 TABLET, FILM COATED ORAL at 04:57

## 2024-04-28 ASSESSMENT — COGNITIVE AND FUNCTIONAL STATUS - GENERAL
STANDING UP FROM CHAIR USING ARMS: 4 - NONE
AFFECT: WFL
MOVING TO AND FROM BED TO CHAIR: 4 - NONE
WALKING IN HOSPITAL ROOM: 4 - NONE
CLIMB 3 TO 5 STEPS WITH RAILING: 3 - A LITTLE
CLIMB 3 TO 5 STEPS WITH RAILING: 3 - A LITTLE
WALKING IN HOSPITAL ROOM: 4 - NONE
STANDING UP FROM CHAIR USING ARMS: 4 - NONE
STANDING UP FROM CHAIR USING ARMS: 4 - NONE
CLIMB 3 TO 5 STEPS WITH RAILING: 4 - NONE
MOVING TO AND FROM BED TO CHAIR: 4 - NONE
MOVING TO AND FROM BED TO CHAIR: 4 - NONE
WALKING IN HOSPITAL ROOM: 4 - NONE

## 2024-04-28 NOTE — PLAN OF CARE
Problem: Adult Inpatient Plan of Care  Goal: Plan of Care Review  Outcome: Progressing  Flowsheets (Taken 4/28/2024 1829)  Progress: improving  Outcome Evaluation: Patient received at start of shift. VSS. Ambulating in room and hallway independent with no assistive device. PRN pain meds given per order and request. IV abx continued. DIMITRI drain with serosanguinous fluid and compressed. Incision dressing intact with dried drainage. Slept throughout night. No further needs at this time. Call bell within reach.  Plan of Care Reviewed With: patient

## 2024-04-28 NOTE — PROGRESS NOTES
Orthopedic Progress Note    Subjective     Seen at bedside. Resting comfortably. NAEON. Pain controlled. Tolerating diet. Is passing gas. Has had a BM.  Has been OOB, 200 ft with PT and has done stairs. Denies fevers, CP, HA, SOB,  N/V, numbness, paresthesias.   Objective     Objective   Temp:  [36.1 °C (97 °F)-36.8 °C (98.3 °F)] 36.8 °C (98.2 °F)  Heart Rate:  [68-90] 72  Resp:  [16-18] 16  BP: (128-154)/(58-97) 151/87  SpO2:  [95 %-99 %] 97 %    Lab Results   Component Value Date    WBC 13.95 (H) 04/25/2024    HGB 11.0 (L) 04/25/2024    HCT 35.3 04/25/2024    MCV 97.8 04/25/2024     04/25/2024     Lab Results   Component Value Date    GLUCOSE 138 (H) 04/25/2024    CALCIUM 7.8 (L) 04/25/2024     04/25/2024    K 4.4 04/25/2024    CO2 26 04/25/2024     (H) 04/25/2024    BUN 14 04/25/2024    CREATININE 0.7 04/25/2024       Physical Exam:  General: VSS, NAD    Extremities:    Gen: NAD, resting comfortably    Spine:  Dressings CDI  Drain with SS output  Appropriate TTP around incision        LE:  Motor 5/5 strength L2-S1 bilaterally  SILT L2-S1 bilaterally  2+ reflexes achilles. no clonus. downgoing babinski bilaterally  2+DP/PT pulses  BCR      Assessment   53 y.o. female s/p L3-S1 PLIF on 4/24 with Dr. Crane, doing well post op    Plan     -Pain control  -DVT ppx: SCD  -ANCEF Q8 with drain  -WBAT   -Drain: monitor and record output  -Advance diet as tolerated  -Incentive spirometry  -PT/OT  -Fall Precautions  -OOB as tolerated  -Maintain dressing clean, dry, and intact  -Monitor hemoglobin  -CM/SW for discharge planning  -Medical - appreciate medical co-management  -Disposition planning: DC pending drain output     Rojelio Pro, DO  PGY3 orthopaedics

## 2024-04-28 NOTE — PROGRESS NOTES
Physical Therapy -  Daily Treatment/Progress Note     Patient: Aleyda Palma  Location: [facility] [address]  MRN: [id number]  Today's date: 4/28/2024    HISTORY OF PRESENT ILLNESS     Fouzia is a 53 y.o. female admitted on 4/24/2024 with Spondylolisthesis, lumbosacral region [M43.17]  Spinal stenosis, lumbar region, with neurogenic claudication [M48.062]  Low back pain [M54.50]. Principal problem is Low back pain.    Past Medical History  Fouzia has a past medical history of Anxiety, COVID-19 (2023), COVID-19 vaccine series completed, Depression, Diverticulitis of colon, Fall, Kidney stones, Numbness and tingling of left leg, and Unsteady gait.    History of Present Illness   S/p  L3-S1 posterior decompression and fusion by Dr. Crane 4/24/24   PRIOR LEVEL OF FUNCTION AND LIVING ENVIRONMENT     Prior Level of Function      Flowsheet Row Most Recent Value   Dominant Hand right   Ambulation independent   Transferring independent   Toileting independent   Bathing independent   Dressing independent   Eating independent   IADLs independent   Driving/Transportation    Communication understands/communicates without difficulty   Assistive Device Currently Used at Home scale          Prior Living Environment      Flowsheet Row Most Recent Value   People in Home significant other   Current Living Arrangements home   Home Accessibility stairs to enter home (Group), stairs within home (Group)   Living Environment Comment [facility], 4 [address] with 1 HR, first floor full BR with tub, FF to 2nd floor bed and stall shower. Plans to have adjustable bed on first floor ready for return home          VITALS AND PAIN     PT Pain      Date/Time Pain Type Location Rating: Rest Rating: Activity Who   04/28/24 1550 Pain Assessment back 0 3 MM             Objective   OBJECTIVE     Start time:  1550  End time:  1600  Session Length: 10 min  Mode of Treatment: individual therapy, physical therapy    General  Observations  Patient received in bed. She was agreeable to therapy, no issues or concerns identified by nurse prior to session. asleep    Precautions: fall, spinal, head of bed elevated 30 degrees       Limitations/Impairments: insensate body part   Services  Do You Speak a Language Other Than English at Home?: no      PT Eval and Treat - 04/28/24 1550          Cognition    Orientation Status oriented x 4     Affect/Mental Status WFL     Follows Commands WFL     Cognitive Function WFL     Comment, Cognition alert, attentive, receptive        Bed Mobility    [place] modified independence     Assistive Device none     Comment Mod indep - log roll tech; HOB flat; trxfs on (L)        Mobility Belt    Mobility Belt Used for All Out of Bed Activity no     Reason Mobility Belt Not Used medical contraindication     Reason Mobility Belt Not Used spinal surg, (+) drain        Sit/Stand Transfer    Surface edge of bed     [place] independent     Assistive Device none        Car Transfer    [place] other (see comments)     Comment car trxf trng education provided via verbal instr, demo and discussion w/ reinforcement of body mechs and spinal precs/safety; education well rec'd, verbalisng understanding        Gait Training    [place], Gait independent     Assistive Device none     Distance in Feet 440 feet     Pattern step-through        Stairs Training    [place], Stairs not tested     Comment pt declined to perfom at this time stating she was comfortable w/ negotiation of stairs - reviewed        Balance    Static Sitting Balance WFL;unsupported     Dynamic Sitting Balance WFL;unsupported     Sit to Stand Dynamic Balance WFL;unsupported     Static Standing Balance WFL;unsupported     Dynamic Standing Balance WFL;unsupported     Comment, Balance fxnl mob and gait; level surfaces, tile and carpet        Impairments/Safety Issues    Impairments Affecting Function pain   pain: 0-3/10     Functional Endurance good                                    Education Documentation  Activity, taught by Denis Polanco PTA at 4/28/2024  4:03 PM.  Learner: Patient  Readiness: Acceptance  Method: Explanation, Demonstration  Response: Verbalizes Understanding  Comment: spinal prec/safety; log roll bed mob review, stairs review; car trxf education and demo; home walking prog; f/u care w/ surgeon        Session Outcome  Patient upright, in bed at end of session, all needs met, call light in reach, personal items in reach.      AM-PAC™ - Mobility (Current Function)     Turning form your back to your side while in flat bed without using bedrails 4 - None   Moving from lying on your back to sitting on the side of a flat bed without using bedrails 4 - None   Moving to and from a bed to a chair 4 - None   Standing up from a chair using your arms 4 - None   To walk in a hospital room 4 - None   Climbing 3-5 steps with a railing 3 - A Little   AM-PAC™ Mobility Score 23      ASSESSMENT AND PLAN     Progress Summary  [facility] mob 23/24.  Mod indep bed mob; Indep trxfs and gait level surfaces in this setting; may have (S)/asst for stairs as needed; car trxf education provided including demo and reinforcement of body mechs, and spinal prec/safety; anticipate d/c home when medically cleared.    Patient/Family Therapy Goals Statement: home    PT Plan      Flowsheet Row Most Recent Value   Rehab Potential good, to achieve stated therapy goals at 04/25/2024 0936   Therapy Frequency 5 times/wk at 04/28/2024 1550   Planned Therapy Interventions patient/family education, stair training, strengthening at 04/28/2024 1550            PT Discharge Recommendations      Flowsheet Row Most Recent Value   PT Recommended Discharge Disposition home, home with assistance at 04/28/2024 1550   Anticipated Equipment Needs if Discharged Home (PT) none at 04/28/2024 1550                 PT Goals      Flowsheet Row Most Recent Value   Bed Mobility Goal 1     Activity/Assistive Device sit to supine/supine to sit at 04/25/2024 0936   [place] modified independence at 04/25/2024 0936   Time Frame by discharge at 04/25/2024 0936   Strategies/Barriers log roll technique at 04/25/2024 0936   Progress/Outcome goal ongoing at 04/25/2024 0936   Transfer Goal 1    Activity/Assistive Device sit-to-stand/stand-to-sit at 04/25/2024 0936   [place] independent at 04/25/2024 0936   Time Frame by discharge at 04/25/2024 0936   Progress/Outcome goal ongoing at 04/25/2024 0936   Gait Training Goal 1    Activity/Assistive Device gait (walking locomotion) at 04/25/2024 0936   [place] independent at 04/25/2024 0936   Distance 300 at 04/25/2024 0936   Time Frame by discharge at 04/25/2024 0936   Progress/Outcome goal ongoing at 04/25/2024 0936   Stairs Goal 1    Activity/Assistive Device ascending stairs, descending stairs, using handrail, right at 04/25/2024 0936   [place] modified independence at 04/25/2024 0936   Number of Stairs 12 at 04/25/2024 0936   Time Frame by discharge at 04/25/2024 0936   Progress/Outcome goal ongoing at 04/25/2024 0936

## 2024-04-28 NOTE — OP NOTE
REPORT TYPE: Operative Note    DATE OF OPERATION: 04/24/2024    PREOPERATIVE DIAGNOSES:  Adjacent level lumbar stenosis and spondylolisthesis L5-S1 and L3-4, status post previous L4-5 fusion.    POSTOPERATIVE DIAGNOSES:  Adjacent level lumbar stenosis and spondylolisthesis L5-S1 and L3-4, status post previous L4-5 fusion.    PROCEDURES:  Bilateral lumbar laminectomy L3-4 and L5-S1, posterior interbody fusion L3-4 and L5-S1, use of posterior instrumentation L3-S1, use of interbody cages at L3-4 and L5-S1, removal of previous L4-5 instrumentation, exploration of spinal fusion   at L4-5; use of local autograft bone, crushed cancellous allograft bone, and bone morphogenic protein.    SURGEON:  Piter Crane MD    ASSISTANT:  None.    ANESTHESIA:  General endotracheal anesthesia.    COMPLICATIONS:  None.    SPECIMENS:  None.    ESTIMATED BLOOD LOSS: 1000 mL    INSTRUMENTATION:  DePuy Expedium screws and Globus Caliber cage.    For a complete discussion of indication of procedure as well as discussion I had with the patient in my office regarding risks, benefits, and alternatives of treatment, please refer to my office notes and the consent forms, which were signed.    DESCRIPTION OF PROCEDURE:  The patient was identified in the holding area.  Operative site was marked.  The patient was taken to the operating room.  Neurophysiologic monitoring leads were applied.  General anesthesia was administered.  The patient was   then prepped and draped in a prone position on a Samy table.  All bony and soft tissue protuberances were well padded throughout the procedure.  Throughout the procedure, the patient had SCDs on her bilateral lower extremities.  Prior to skin   incision, intravenous antibiotics were administered and a formal timeout was performed.  At the end of procedure, sponge and needle counts were correct x2.    After prepping and draping, an incision was made posteriorly over the L3-S1 levels in midline.   Dissection was carried down through subcutaneous tissue down to the level of the deep fascia.  Deep fascia was incised with electrocautery.  In subperiosteal   fashion, the posterior elements of L3-S1 were exposed including the previous instrumentation at L4-5.  Soft tissue retractors were placed.  The previous caps and rods were removed bilaterally at L4-5.  The fusion mass was explored posterolaterally at   L4-5, noted to be solidly fused.  A large rongeur was then used to remove the spinous processes of L3 and L4 and L5 and S1.  A large rongeur was then used to thin the lamina at these levels.  A 3 mm Kerrison rongeur was used to remove ligamentum flavum   and perform laminectomy, removing the superior one-half of the S1 lamina, the remainder of the L5 and L4 lamina and the inferior 2/3 of the L3 lamina.  A lateral recess decompression was performed with a 3 mm Kerrison rongeur, removing ligamentum flavum   and performing a partial medial facetectomy bilaterally at L3-4 and L5-S1.  Bilateral foraminotomies were performed at L3-4 and L5-S1 using a 3 mm Kerrison rongeur.  An osteotome and a mallet was used to osteotomize through the right L3 and L5 pars.  The   right L3 and L5 intraarticular processes were removed using a large rongeur.  A 15 blade scalpel was used to sharply incise the annulus on the right side at L3-4 and L5-S1, and through the annulotomies both interspaces were completely debrided free of   all disk and cartilaginous material back to bleeding endplate bone using a series of curved and straight curettes, pituitary rongeurs, and endplate olga lidia.  The interspaces were irrigated and inspected to ensure the bleeding bony endplates were exposed.    Local autograft bone from laminectomy was morcellized, it was combined with crushed cancellous allograft bone and bone morphogenic protein.  This bone graft combination was impacted using tamp and mallet in the L3-4 and L5-S1 interspaces.  Additional    amounts of this bone graft combination were placed in Globus Caliber cages of appropriate size.  The cages were impacted using tamp and mallet into the L3-4 and L5-S1 interspace and expanded up to appropriate height.  Secure fit was noted with each cage.    Attention was turned towards instrumentation.  A high speed bur was used to make starting points for the pedicle screws bilaterally at L3 and S1 followed by advancement of pedicle probe through the isthmus, the pedicle and vertebral bodies to   appropriate depth.  The holes were tapped and felt with a ball-tipped probe to ensure there were no breaches.  Following this, screws of appropriate length were placed bilaterally at L3 and S1 and screws were replaced bilaterally at L4 and L5.  Secure   fixation was noted with each screw purchase.  All screws were tested well with EMG without evidence of medial breach.  Following this, bilateral transverse processes and remaining facet joints L3-S1 were decorticated using a high-speed bur.  The   remainder of the bone graft combination was packed out of the decorticated bone surfaces bilaterally at L3-S1.  Precut and contoured rods were placed in the screws bilaterally at L3-S1.  Caps were placed over the rods into the screws and finally   tightened in place according to 's recommendations.  Intraoperative AP and lateral x-rays were taken, which showed adequate position of instrumentation and interbody cages.  The axial decompression was confirmed with a Adi, which was   passed out the foramen along lateral recess bilaterally at L3-4 and L5-S1.  There was no remaining stenosis or compression noted.  The exiting traversing nerves bilaterally at L3-4 and L5-S1 were fully decompressed.  Hemostasis was obtained using   electrocautery, bipolar, and FloSeal.  Under Valsalva maneuver, there was no significant bleeding noted.  No evidence of any spinal fluid leakage.  The wound was copiously irrigated with  antibiotic-impregnated solution.  Hemostasis was felt to be   adequate.  A drain was placed deep to deep fascia.  Deep fascia was closed in interrupted fashion.  Subcutaneous tissue was closed in interrupted fashion.  Skin was closed in running fashion followed by dry dressing and tape.  The patient was turned   supine on the hospital bed, extubated, and transferred to PACU in stable condition.  There were no complications.      Piter Crane MD    DD: 04/28/2024 16:49  DT: 04/28/2024 19:35  Voice ID: [id number]/Report ID: [id number]  godwin

## 2024-04-29 VITALS
SYSTOLIC BLOOD PRESSURE: 137 MMHG | WEIGHT: 190 LBS | HEIGHT: 66 IN | RESPIRATION RATE: 18 BRPM | DIASTOLIC BLOOD PRESSURE: 71 MMHG | HEART RATE: 85 BPM | BODY MASS INDEX: 30.53 KG/M2 | OXYGEN SATURATION: 95 % | TEMPERATURE: 98.2 F

## 2024-04-29 PROCEDURE — 63700000 HC SELF-ADMINISTRABLE DRUG: Performed by: STUDENT IN AN ORGANIZED HEALTH CARE EDUCATION/TRAINING PROGRAM

## 2024-04-29 PROCEDURE — 63700000 HC SELF-ADMINISTRABLE DRUG: Performed by: NURSE PRACTITIONER

## 2024-04-29 PROCEDURE — 63600000 HC DRUGS/DETAIL CODE: Mod: JZ | Performed by: STUDENT IN AN ORGANIZED HEALTH CARE EDUCATION/TRAINING PROGRAM

## 2024-04-29 PROCEDURE — 25800000 HC PHARMACY IV SOLUTIONS: Performed by: STUDENT IN AN ORGANIZED HEALTH CARE EDUCATION/TRAINING PROGRAM

## 2024-04-29 RX ORDER — AMOXICILLIN 250 MG
1 CAPSULE ORAL 2 TIMES DAILY
COMMUNITY
Start: 2024-04-29 | End: 2024-05-29

## 2024-04-29 RX ORDER — ACETAMINOPHEN 325 MG/1
975 TABLET ORAL EVERY 6 HOURS
COMMUNITY
Start: 2024-04-29 | End: 2024-05-29

## 2024-04-29 RX ORDER — DIAZEPAM 5 MG/1
5 TABLET ORAL EVERY 8 HOURS PRN
Qty: 15 TABLET | Refills: 0 | Status: SHIPPED | OUTPATIENT
Start: 2024-04-29 | End: 2024-05-04

## 2024-04-29 RX ORDER — POLYETHYLENE GLYCOL 3350 17 G/17G
17 POWDER, FOR SOLUTION ORAL DAILY
COMMUNITY
Start: 2024-04-30 | End: 2024-05-03

## 2024-04-29 RX ADMIN — DIAZEPAM 5 MG: 5 TABLET ORAL at 09:09

## 2024-04-29 RX ADMIN — HYDROMORPHONE HYDROCHLORIDE 4 MG: 4 TABLET ORAL at 07:50

## 2024-04-29 RX ADMIN — SERTRALINE HYDROCHLORIDE 150 MG: 50 TABLET ORAL at 07:50

## 2024-04-29 RX ADMIN — GABAPENTIN 600 MG: 300 CAPSULE ORAL at 07:50

## 2024-04-29 RX ADMIN — CEFAZOLIN 2 G: 2 INJECTION, POWDER, FOR SOLUTION INTRAMUSCULAR; INTRAVENOUS at 04:02

## 2024-04-29 RX ADMIN — ACETAMINOPHEN 975 MG: 325 TABLET, FILM COATED ORAL at 07:50

## 2024-04-29 RX ADMIN — DIAZEPAM 5 MG: 5 TABLET ORAL at 02:00

## 2024-04-29 RX ADMIN — ACETAMINOPHEN 975 MG: 325 TABLET, FILM COATED ORAL at 02:00

## 2024-04-29 RX ADMIN — BUSPIRONE HYDROCHLORIDE 15 MG: 10 TABLET ORAL at 07:50

## 2024-04-29 ASSESSMENT — COGNITIVE AND FUNCTIONAL STATUS - GENERAL
CLIMB 3 TO 5 STEPS WITH RAILING: 3 - A LITTLE
STANDING UP FROM CHAIR USING ARMS: 4 - NONE
MOVING TO AND FROM BED TO CHAIR: 4 - NONE
WALKING IN HOSPITAL ROOM: 4 - NONE

## 2024-04-29 NOTE — PLAN OF CARE
Plan of Care Review  Plan of Care Reviewed With: patient  Progress: improving  Outcome Evaluation: Patient independent in room, drain removed this morning and dressings clean and dry. No complaints, dilaudid given with effect. VSS on room air. Valium prior to discharge. Escorted home by partner.

## 2024-04-29 NOTE — PROGRESS NOTES
Orthopedic Progress Note    Subjective     Seen at bedside. Resting comfortably. NAEON. Pain controlled. Tolerating diet. Is passing gas. Has had a BM.  Has been OOB, 440 ft with PT and has done stairs. Denies fevers, CP, HA, SOB,  N/V, numbness, paresthesias. Drain out this AM.     Objective     Objective   Temp:  [36.5 °C (97.7 °F)-36.8 °C (98.2 °F)] 36.6 °C (97.9 °F)  Heart Rate:  [] 89  Resp:  [19] 19  BP: (103-144)/(67-75) 135/75  SpO2:  [95 %-100 %] 95 %    Lab Results   Component Value Date    WBC 13.95 (H) 04/25/2024    HGB 11.0 (L) 04/25/2024    HCT 35.3 04/25/2024    MCV 97.8 04/25/2024     04/25/2024     Lab Results   Component Value Date    GLUCOSE 138 (H) 04/25/2024    CALCIUM 7.8 (L) 04/25/2024     04/25/2024    K 4.4 04/25/2024    CO2 26 04/25/2024     (H) 04/25/2024    BUN 14 04/25/2024    CREATININE 0.7 04/25/2024       Physical Exam:  General: VSS, NAD    Extremities:    Gen: NAD, resting comfortably    Spine:  Dressings CDI  Drain with SS output  Appropriate TTP around incision        LE:  Motor 5/5 strength L2-S1 bilaterally  SILT L2-S1 bilaterally  2+ reflexes achilles. no clonus. downgoing babinski bilaterally  2+DP/PT pulses  BCR      Assessment   53 y.o. female s/p L3-S1 PLIF on 4/24 with Dr. Crane, doing well post op    Plan     -Pain control  -DVT ppx: SCD  -ANCEF Q8 with drain  -WBAT   -Drain: monitor and record output  -Advance diet as tolerated  -Incentive spirometry  -PT/OT  -Fall Precautions  -OOB as tolerated  -Maintain dressing clean, dry, and intact  -Monitor hemoglobin  -CM/SW for discharge planning  -Medical - appreciate medical co-management  -Disposition planning: DC pending drain output     Rojelio Villatoro DO  PGY1 orthopaedics

## 2024-05-03 NOTE — DISCHARGE SUMMARY
Inpatient Discharge Summary    BRIEF OVERVIEW  Admitting Provider:      Attending Provider: No att. providers found Attending phys phone: N/A  Primary Care Physician at Discharge: Sintia Snyder PA C [phone]    Admission Date: 4/24/2024     Discharge Date: 5/3/2024    Primary Discharge Diagnosis  Low back pain    Secondary Discharge Diagnosis  Active Hospital Problems    Diagnosis Date Noted    Low back pain 04/24/2024    Depression 04/18/2024    Lumbar radiculopathy, chronic 04/18/2024      Resolved Hospital Problems   No resolved problems to display.       DETAILS OF HOSPITAL STAY    Operative Procedures Performed  Procedure(s):  L3-S1 Posterior Lumbar Decompressin, Posterior Lumbar Interbody Fusion, Instrumentation, Cage, Allograft, BMP, Autograft    Consults:   Consult Notes 4/3/2024 to 5/3/2024             Date of Service Author Author Type Status Note Type File Time    04/26/24 1218 Donald Rachel Signed Consults 04/26/24 1916    04/25/24 1346 Koko Trent DO Physician Signed Consults 04/25/24 1349    04/18/24 1100 Joe Galaviz MD Physician Signed Consults 04/19/24 1057            Consult Orders During Admission:  IP CONSULT TO HOSPITALIST  IP CONSULT TO CASE MANAGEMENT         Imaging  X-RAY LUMBAR SPINE 2 OR 3 VIEWS    Result Date: 4/24/2024  IMPRESSION:  Status post instrumented lumbar fusion, L3-S1.  See comment for details. COMPARISON: None  available. COMMENT:  AP and lateral views of the lumbar spine are completed postoperatively. Osseous detail is limited at the fused levels on the lateral projection, likely due to technical factors. Posterior instrumented lumbar fusion noted spanning L3-S1, noting paired pedicle screws with interlocking vertical bars.  There are interbody spacers at the L3-L4, L4-L5 and L5-S1 disc spaces.  The L4-L5 spacer is not well demonstrated on lateral projection. Posterior decompression has been completed at the fused levels. There is a surgical drain in  place posteriorly.        Presenting Problem/History of Present Illness  Spondylolisthesis, lumbosacral region [M43.17]  Spinal stenosis, lumbar region, with neurogenic claudication [M48.062]  Low back pain [M54.50]       Exam on Day of Discharge  Patient seen and examined on day of discharge.  Temp:  [36.5 °C (97.7 °F)-36.8 °C (98.2 °F)] 36.6 °C (97.9 °F)  Heart Rate:  [] 89  Resp:  [19] 19  BP: (103-144)/(67-75) 135/75  SpO2:  [95 %-100 %] 95 %           Lab Results   Component Value Date     WBC 13.95 (H) 04/25/2024     HGB 11.0 (L) 04/25/2024     HCT 35.3 04/25/2024     MCV 97.8 04/25/2024      04/25/2024            Lab Results   Component Value Date     GLUCOSE 138 (H) 04/25/2024     CALCIUM 7.8 (L) 04/25/2024      04/25/2024     K 4.4 04/25/2024     CO2 26 04/25/2024      (H) 04/25/2024     BUN 14 04/25/2024     CREATININE 0.7 04/25/2024         Physical Exam:  General: VSS, NAD     Extremities:     Gen: NAD, resting comfortably     Spine:  Dressings CDI  Drain with SS output  Appropriate TTP around incision           LE:  Motor 5/5 strength L2-S1 bilaterally  SILT L2-S1 bilaterally  2+ reflexes achilles. no clonus. downgoing babinski bilaterally  2+DP/PT pulses  BCR           Hospital Course  Patient presented to the office with complaint of back pain. Completed a conservative management course without complete resolution.  After discussion patient decided on operative management and was admitted to the hospital on 4/24 with a plan for surgery that day.  They underwent a L3-S1 Posterior Lumbar Decompressin, Posterior Lumbar Interbody Fusion, Instrumentation, Cage, Allograft, BMP, Autograft with Dr. Crane.  Their hospital course was uncomplicated.  They  worked with physical therapy.  Their drain was DC'd on 4/29.  During hospitalization they were on mechanical DVT prophylaxis only and were prescribed a multimodal regimen for pain control.  Prior to discharge patient had an outpatient  appointment set up and worked with physical therapy.  Physical therapy cleared him for discharge to home.  He was provided written instructions for discharge.  The instructions were discussed and his questions were answered.      Discharge Orders     Medication List        START taking these medications      acetaminophen 325 mg tablet  Commonly known as: TYLENOL  Take 3 tablets (975 mg total) by mouth every 6 (six) hours.  Dose: 975 mg     diazePAM 5 mg tablet  Commonly known as: VALIUM  Take 1 tablet (5 mg total) by mouth every 8 (eight) hours as needed for muscle spasms for up to 5 days.  Dose: 5 mg     polyethylene glycol 17 gram packet  Commonly known as: MIRALAX  Take 17 g by mouth daily for 3 days.  Dose: 17 g     sennosides-docusate sodium 8.6-50 mg  Commonly known as: SENOKOT-S  Take 1 tablet by mouth 2 (two) times a day.  Dose: 1 tablet            CONTINUE taking these medications      busPIRone 10 mg tablet  Commonly known as: BUSPAR  Take 15 mg by mouth 3 (three) times a day.  Dose: 15 mg     gabapentin 300 mg capsule  Commonly known as: NEURONTIN  Take 600 mg by mouth 3 (three) times a day. Please clarify dosage  Dose: 600 mg     oxyCODONE 5 mg immediate release tablet  Commonly known as: ROXICODONE  Take 5 mg by mouth every 8 (eight) hours as needed for pain.  Dose: 5 mg     sertraline 100 mg tablet  Commonly known as: ZOLOFT  Take 150 mg by mouth daily. am  Dose: 150 mg            STOP taking these medications      methocarbamoL 750 mg tablet  Commonly known as: ROBAXIN                Instructions for after discharge       Follow-up with Provider:      In 2 weeks-please call for an appt if not already made    Piter Crane MD   [phone] [address] [facility] [address]       Follow-up with primary physician (PCP)      Within 1 week of discharge            Outpatient Follow-Ups  Encounter Information    This patient does not currently have any appointments scheduled.       Referrals:  No  orders of the defined types were placed in this encounter.      Active Issues Requiring Follow-up  Issue: post op follow up  What is Needed: office appointment      Test Results Pending at Discharge  Unresulted Labs (From admission, onward)      None              Discharge Disposition  Disposition: Home   Destination: Home      Code Status at Discharge: Prior  Physician Order for Life-Sustaining Treatment Document Status        No documents found

## 2024-06-06 ENCOUNTER — HOSPITAL ENCOUNTER (OUTPATIENT)
Dept: MRI IMAGING | Facility: HOSPITAL | Age: 54
Discharge: HOME | End: 2024-06-06
Payer: COMMERCIAL

## 2024-06-06 DIAGNOSIS — M54.16 RADICULOPATHY, LUMBAR REGION: ICD-10-CM

## 2024-06-06 DIAGNOSIS — Z98.1 ARTHRODESIS STATUS: ICD-10-CM

## 2024-06-06 PROCEDURE — 72158 MRI LUMBAR SPINE W/O & W/DYE: CPT

## 2024-06-06 PROCEDURE — A9585 GADOBUTROL INJECTION: HCPCS | Performed by: RADIOLOGY

## 2024-06-06 RX ORDER — GADOBUTROL 604.72 MG/ML
8 INJECTION INTRAVENOUS
Status: COMPLETED | OUTPATIENT
Start: 2024-06-06 | End: 2024-06-06

## 2024-06-06 RX ADMIN — GADOBUTROL 8 ML: 604.72 INJECTION INTRAVENOUS at 12:55

## 2024-06-13 ENCOUNTER — OFFICE VISIT (OUTPATIENT)
Dept: FAMILY MEDICINE CLINIC | Facility: CLINIC | Age: 54
End: 2024-06-13
Payer: COMMERCIAL

## 2024-06-13 VITALS
DIASTOLIC BLOOD PRESSURE: 80 MMHG | SYSTOLIC BLOOD PRESSURE: 138 MMHG | WEIGHT: 189 LBS | OXYGEN SATURATION: 98 % | HEART RATE: 83 BPM | TEMPERATURE: 97 F | HEIGHT: 66 IN | BODY MASS INDEX: 30.37 KG/M2

## 2024-06-13 DIAGNOSIS — F32.A ANXIETY AND DEPRESSION: ICD-10-CM

## 2024-06-13 DIAGNOSIS — F41.9 ANXIETY AND DEPRESSION: ICD-10-CM

## 2024-06-13 PROCEDURE — 99213 OFFICE O/P EST LOW 20 MIN: CPT | Performed by: PHYSICIAN ASSISTANT

## 2024-06-13 RX ORDER — CELECOXIB 200 MG/1
CAPSULE ORAL
COMMUNITY
Start: 2024-05-29

## 2024-06-13 RX ORDER — SERTRALINE HYDROCHLORIDE 100 MG/1
100 TABLET, FILM COATED ORAL DAILY
Qty: 90 TABLET | Refills: 0 | Status: SHIPPED | OUTPATIENT
Start: 2024-06-13

## 2024-06-13 RX ORDER — BUSPIRONE HYDROCHLORIDE 15 MG/1
15 TABLET ORAL 3 TIMES DAILY
Qty: 270 TABLET | Refills: 0 | Status: SHIPPED | OUTPATIENT
Start: 2024-06-13 | End: 2024-09-11

## 2024-06-13 RX ORDER — PREDNISONE 10 MG/1
TABLET ORAL
COMMUNITY
Start: 2024-06-11 | End: 2024-06-13

## 2024-06-13 NOTE — PROGRESS NOTES
Ambulatory Visit  Name: Calista Umana      : 1970      MRN: [id number]  Encounter Provider: Fidelia Alvarado PA-C  Encounter Date: 2024   Encounter department: [facility]    Assessment & Plan   1. Anxiety and depression  Assessment & Plan:  Stable, continue same. Refills given.   Orders:  -     busPIRone (BUSPAR) 15 mg tablet; Take 1 tablet (15 mg total) by mouth 3 (three) times a day  -     sertraline (ZOLOFT) 100 mg tablet; Take 1 tablet (100 mg total) by mouth daily  -     sertraline (ZOLOFT) 50 mg tablet; Take 1 tablet (50 mg total) by mouth daily       History of Present Illness     Kimberly is here today for 3 month follow up.  Anxiety and depression remain relatively stable.         Review of Systems   Constitutional:  Negative for activity change, appetite change, chills, diaphoresis, fatigue, fever and unexpected weight change.   HENT:  Negative for congestion, ear pain, postnasal drip, rhinorrhea, sinus pressure, sinus pain, sneezing, sore throat, tinnitus and voice change.    Eyes:  Negative for pain, redness and visual disturbance.   Respiratory:  Negative for cough, chest tightness, shortness of breath and wheezing.    Cardiovascular:  Negative for chest pain, palpitations and leg swelling.   Gastrointestinal:  Negative for abdominal pain, blood in stool, constipation, diarrhea, nausea and vomiting.   Genitourinary:  Negative for difficulty urinating, dysuria, frequency, hematuria and urgency.   Musculoskeletal:  Positive for back pain. Negative for arthralgias, gait problem, joint swelling, myalgias, neck pain and neck stiffness.   Skin:  Negative for color change, pallor, rash and wound.   Neurological:  Negative for dizziness, tremors, weakness, light-headedness and headaches.   Psychiatric/Behavioral:  Negative for dysphoric mood, self-injury, sleep disturbance and suicidal ideas. The patient is not nervous/anxious.        Objective     /80 (BP Location: Left arm, Patient  "Position: Sitting, Cuff Size: Standard)   Pulse 83   Temp (!) 97 °F (36.1 °C) (Temporal)   Ht 5' 6\" (1.676 m)   Wt 85.7 kg (189 lb)   LMP 10/10/2016   SpO2 98%   BMI 30.51 kg/m²     Physical Exam  Vitals reviewed.   Constitutional:       General: She is not in acute distress.     Appearance: She is well-developed. She is not diaphoretic.   HENT:      Head: Normocephalic and atraumatic.      Right Ear: Hearing, tympanic membrane, ear canal and external ear normal.      Left Ear: Hearing, tympanic membrane, ear canal and external ear normal.      Nose: Nose normal.      Mouth/Throat:      Mouth: Mucous membranes are moist.      Pharynx: Oropharynx is clear. Uvula midline. No oropharyngeal exudate.   Eyes:      General: No scleral icterus.        Right eye: No discharge.         Left eye: No discharge.      Conjunctiva/sclera: Conjunctivae normal.   Neck:      Thyroid: No thyromegaly.      Vascular: No carotid bruit.   Cardiovascular:      Rate and Rhythm: Normal rate and regular rhythm.      Heart sounds: Normal heart sounds. No murmur heard.  Pulmonary:      Effort: Pulmonary effort is normal. No respiratory distress.      Breath sounds: Normal breath sounds. No wheezing.   Abdominal:      General: Bowel sounds are normal. There is no distension.      Palpations: Abdomen is soft. There is no mass.      Tenderness: There is no abdominal tenderness. There is no guarding or rebound.   Musculoskeletal:         General: No tenderness. Normal range of motion.      Cervical back: Neck supple.   Lymphadenopathy:      Cervical: No cervical adenopathy.   Skin:     General: Skin is warm and dry.      Findings: No erythema or rash.   Neurological:      Mental Status: She is alert and oriented to person, place, and time.   Psychiatric:         Behavior: Behavior normal.         Thought Content: Thought content normal.         Judgment: Judgment normal.       Administrative Statements           "

## 2024-06-21 ENCOUNTER — OFFICE VISIT (OUTPATIENT)
Dept: PAIN MEDICINE | Facility: CLINIC | Age: 54
End: 2024-06-21
Payer: COMMERCIAL

## 2024-06-21 VITALS
SYSTOLIC BLOOD PRESSURE: 164 MMHG | WEIGHT: 191.6 LBS | DIASTOLIC BLOOD PRESSURE: 110 MMHG | BODY MASS INDEX: 30.79 KG/M2 | HEIGHT: 66 IN | HEART RATE: 79 BPM | RESPIRATION RATE: 16 BRPM

## 2024-06-21 DIAGNOSIS — M54.50 LUMBAR PAIN WITH RADIATION DOWN LEFT LEG: ICD-10-CM

## 2024-06-21 DIAGNOSIS — M51.26 LUMBAR DISC HERNIATION: ICD-10-CM

## 2024-06-21 DIAGNOSIS — G89.4 CHRONIC PAIN SYNDROME: ICD-10-CM

## 2024-06-21 DIAGNOSIS — M54.16 LUMBAR RADICULOPATHY: ICD-10-CM

## 2024-06-21 DIAGNOSIS — Z98.1 STATUS POST LUMBAR SPINAL FUSION: ICD-10-CM

## 2024-06-21 DIAGNOSIS — M79.605 LUMBAR PAIN WITH RADIATION DOWN LEFT LEG: ICD-10-CM

## 2024-06-21 DIAGNOSIS — M43.16 ANTEROLISTHESIS OF LUMBAR SPINE: ICD-10-CM

## 2024-06-21 PROCEDURE — 99214 OFFICE O/P EST MOD 30 MIN: CPT | Performed by: NURSE PRACTITIONER

## 2024-06-21 PROCEDURE — G2211 COMPLEX E/M VISIT ADD ON: HCPCS | Performed by: NURSE PRACTITIONER

## 2024-06-21 RX ORDER — METHOCARBAMOL 750 MG/1
750 TABLET, FILM COATED ORAL 3 TIMES DAILY
Qty: 90 TABLET | Refills: 1 | Status: SHIPPED | OUTPATIENT
Start: 2024-06-21

## 2024-06-21 RX ORDER — OXYCODONE HYDROCHLORIDE 5 MG/1
5 TABLET ORAL EVERY 4 HOURS PRN
Qty: 90 TABLET | Refills: 0 | Status: SHIPPED | OUTPATIENT
Start: 2024-06-21

## 2024-06-21 RX ORDER — GABAPENTIN 600 MG/1
TABLET ORAL
Qty: 150 TABLET | Refills: 1 | Status: SHIPPED | OUTPATIENT
Start: 2024-06-21

## 2024-06-21 RX ORDER — OXYCODONE HYDROCHLORIDE 5 MG/1
5 TABLET ORAL EVERY 8 HOURS PRN
Qty: 90 TABLET | Refills: 0 | Status: SHIPPED | OUTPATIENT
Start: 2024-07-19

## 2024-06-21 NOTE — PROGRESS NOTES
Assessment:  1. Chronic pain syndrome    2. Status post lumbar spinal fusion    3. Lumbar disc herniation    4. Lumbar radiculopathy    5. Lumbar pain with radiation down left leg    6. Anterolisthesis of lumbar spine        Plan:  While the patient was in the office today, I did have a thorough conversation regarding their chronic pain syndrome, medication management, and treatment plan options.  Patient is being seen for a follow-up visit.  She underwent an L3-S1 fusion/revision on 4/24/2024.  Unfortunately, so far there has been no improvement.  In fact, patient states that she is having more pain down the left leg and increased weakness in the left leg.  Leg has given out 3 times since surgery and she has fallen.  An updated MRI was performed on 6/6/2024.  MRI reveals L3-4 through L5-S1 decompressive laminectomies with improved patency of the central canal at L3-4 and L5-S1 levels.  A peripherally and enhancing postop fluid collection is noted within the laminectomy bed, probable postop seroma.  There is moderate to severe left L5-S1 neuroforaminal narrowing again present with encroachment of the exiting left L5 nerve root.  Patient was referred from the neurosurgeon  (Dr Ilan Grijalva) back her for a left L5-S1 TFESI.  Dr. Farrar does not have availability for the next 8 to 10 weeks.  I will reach out to his colleague  to see if she is willing/able to perform this injection since patient is in so much pain and has diminished physical capacity.    Recommend that patient use cane to ambulate.  I provided her with a prescription for quad cane to use for ambulation.    Patient states that her neurosurgeon did recommend surgery, but recommended that she increased walking.  She has been walking more and running to swim.    Renewed oxycodone 5 mg every 8 hours as needed for pain.  The patient's opioid scripts were sent to their pharmacy electronically and was given a 2 month supply of prescriptions with a Do  Not Fill date(s) of 6/21/2024, 7/19/2024.    Continue gabapentin 600 mg twice daily and 1800 mg at bedtime.  A prescription was sent to her pharmacy with a refill.    Continue Robaxin 750 mg 3 times daily if needed for spasms.  A prescription was sent to her pharmacy with a refill.    There are risks associated with opioid medications, including dependence, addiction and tolerance. The patient understands and agrees to use these medications only as prescribed. Potential side effects of the medications include, but are not limited to, constipation, drowsiness, addiction, impaired judgment and risk of fatal overdose if not taken as prescribed. The patient was warned against driving while taking sedation medications.  Sharing medications is a felony. At this point in time, the patient is showing no signs of addiction, abuse, diversion or suicidal ideation.    [place] Prescription Drug Monitoring Program report was reviewed and was appropriate     The patient will follow-up in 2 months for medication prescription refill and reevaluation. The patient was advised to contact the office should their symptoms worsen in the interim. The patient was agreeable and verbalized an understanding.    History of Present Illness:  The patient is a 54 y.o. female who presents for a follow up office visit in regards to Back Pain and Leg Pain.   The patient’s current symptoms include complaints of low back and left leg pain.  Increasing weakness in the left leg.  Current pain level is a 10/10.  Quality pain is described as sharp, throbbing, pressure-like, shooting, numb, pins-and-needles.    Current pain medications includes: Oxycodone 5 mg every 8 hours as needed for pain, gabapentin 600/600/1800, Robaxin 750 mg 3 times daily if needed for spasms.  The patient reports that this regimen is providing 10-15% pain relief.  The patient is reporting no side effects from this pain medication regimen.    I have personally reviewed and/or  updated the patient's past medical history, past surgical history, family history, social history, current medications, allergies, and vital signs today.         Review of Systems  Review of Systems   Musculoskeletal:  Positive for back pain, gait problem and myalgias.        Decreased ROM  Joint stiffness  Swelling  Pain legs   All other systems reviewed and are negative.          Past Medical History:   Diagnosis Date    Anxiety     Back pain     Depression     Insomnia        Past Surgical History:   Procedure Laterality Date    BACK SURGERY  2006    cage in lumbar L5 area    BACK SURGERY      CAUDAL BLOCK N/A 11/15/2022    Procedure: BLOCK / INJECTION CAUDAL;  Surgeon: Nuno Farrar MD;  Location: [facility] OR;  Service: Pain Management     CHOLECYSTECTOMY      DECOMPRESSION SPINE LUMBAR POSTERIOR  2024    LUMBAR DISC SURGERY      POSTERIOR FUSION LUMBAR SPINE  2024    AK REPAIR FIRST ABDOMINAL WALL HERNIA N/A 2018    Procedure: REPAIR HERNIA VENTRAL with mesh;  Surgeon: David Chicas DO;  Location: [facility] OR;  Service: General    AK SURGICAL ARTHROSCOPY SHOULDER W/ROTATOR CUFF RPR Left 2020    Procedure: SHOULDER ARTHROSCOPY, ROTATOR CUFF REPAIR, SYNEVECTOMY, ACROMIOPLASTY DEBRIDEMENT, INJECTION;  Surgeon: Brigido Mota DO;  Location:  MAIN OR;  Service: Orthopedics       Family History   Adopted: Yes   Family history unknown: Yes       Social History     Occupational History    Not on file   Tobacco Use    Smoking status: Former     Current packs/day: 0.00     Types: Cigarettes     Quit date: 10/17/2010     Years since quittin.6    Smokeless tobacco: Never    Tobacco comments:     Recently quit   Vaping Use    Vaping status: Never Used   Substance and Sexual Activity    Alcohol use: No    Drug use: No    Sexual activity: Not Currently     Partners: Female         Current Outpatient Medications:     busPIRone (BUSPAR) 15 mg tablet, Take 1 tablet (15 mg total) by mouth 3  "(three) times a day, Disp: 270 tablet, Rfl: 0    celecoxib (CeleBREX) 200 mg capsule, , Disp: , Rfl:     gabapentin (Neurontin) 600 MG tablet, 1 PO BID and 3 PO QHS, Disp: 150 tablet, Rfl: 1    Melatonin 10 MG TABS, Take 60 mg by mouth daily at bedtime, Disp: , Rfl:     methocarbamol (ROBAXIN) 750 mg tablet, Take 1 tablet (750 mg total) by mouth 3 (three) times a day As needed for pain/spasms, Disp: 90 tablet, Rfl: 1    [START ON 7/19/2024] oxyCODONE (Roxicodone) 5 immediate release tablet, Take 1 tablet (5 mg total) by mouth every 8 (eight) hours as needed for moderate pain Max Daily Amount: 15 mg Do not start before July 19, 2024., Disp: 90 tablet, Rfl: 0    oxyCODONE (Roxicodone) 5 immediate release tablet, Take 1 tablet (5 mg total) by mouth every 4 (four) hours as needed for moderate pain Max Daily Amount: 30 mg, Disp: 90 tablet, Rfl: 0    sertraline (ZOLOFT) 100 mg tablet, Take 1 tablet (100 mg total) by mouth daily, Disp: 90 tablet, Rfl: 0    sertraline (ZOLOFT) 50 mg tablet, Take 1 tablet (50 mg total) by mouth daily, Disp: 90 tablet, Rfl: 0    No Known Allergies    Physical Exam:    BP (!) 164/110   Pulse 79   Resp 16   Ht 5' 6\" (1.676 m)   Wt 86.9 kg (191 lb 9.6 oz)   LMP 10/10/2016   BMI 30.93 kg/m²     Constitutional:normal, well developed, well nourished, alert, in no distress and non-toxic and no overt pain behavior.  Eyes:anicteric  HEENT:grossly intact  Neck:supple, symmetric, trachea midline and no masses   Pulmonary:even and unlabored  Cardiovascular:No edema or pitting edema present  Skin:Normal without rashes or lesions and well hydrated  Psychiatric:Mood and affect appropriate  Neurologic:Cranial Nerves II-XII grossly intact  Musculoskeletal:ataxic and midline lumbar postop incision nicely healing.    Imaging      Study Result    Narrative & Impression   MRI LUMBAR SPINE WITH AND WITHOUT CONTRAST     INDICATION: M54.16: Radiculopathy, lumbar region  Z98.1: Arthrodesis status.   "   COMPARISON: MRI lumbar spine 12/28/2023     TECHNIQUE:  Multiplanar, multisequence imaging of the lumbar spine was performed before and after gadolinium administration. .        IV Contrast:  8 mL of Gadobutrol injection (SINGLE-DOSE)     IMAGE QUALITY:  Diagnostic     FINDINGS:     POSTSURGICAL FINDINGS: Previous PLIF L4-5 with now L3-S1 Posterior lumbar decompression and interbody fusion. Interbody spacer devices are noted at the L3-4 through the L5-S1 levels with bilateral decompressive laminectomies. There is a peripherally   enhancing postoperative fluid collection within the laminectomy bed .     LUMBAR DISC SPACES:        L1-2: No focal disc herniation, central canal stenosis, or neural foraminal narrowing.     L2-3: Mild diffuse disc bulge is again noted with superimposed bilateral foraminal disc protrusions. Bilateral ligamentum flavum and facet hypertrophy.. Mild central canal and bilateral subarticular/lateral recess narrowing, unchanged.  No neural   foraminal stenosis.     L3-4: New interbody spacer device.. The central canal is now patent status post bilateral decompressive laminectomies. No neural foraminal stenosis. There is enhancing granulation tissue within the laminectomy bed and central canal without impingement of   the thecal sac.     L4-5: Stable interbody spacer device. The central canal remains patent status post bilateral decompressive laminectomies. Unchanged mild bilateral neural foraminal stenosis.There is enhancing granulation tissue within the laminectomy bed and central   canal without impingement of the thecal sac.     L5-S1: Stable grade 1 anterolisthesis. New interbody spacer device.. The central canal is now patent status post bilateral decompressive laminectomies. Persistent moderate right and moderate to severe left neural foraminal stenosis with encroachment of   the exiting left L5 nerve root.There is enhancing granulation tissue within the laminectomy bed and central canal  without impingement of the thecal sac.     OTHER FINDINGS: Small bilateral parapelvic renal cysts are noted.     IMPRESSION:     Previous PLIF L4-5 with now L3-S1 Posterior lumbar decompression and interbody fusion.     L3-4 through L5-S1 decompressive laminectomies with improved patency of the central canal at the L3-4 and L5-S1 levels. A peripherally enhancing postoperative fluid collection is noted within the laminectomy bed, probable postoperative seroma. Moderate   to severe left L5-S1 neural foraminal narrowing is again noted with encroachment of the exiting left L5 nerve root.     Stable L2-3 disc bulge with mild central canal narrowing. There is no new disc herniation.                       Workstation performed: [id number]              No orders to display       Orders Placed This Encounter   Procedures    Cane

## 2024-06-21 NOTE — PATIENT INSTRUCTIONS
Opioid Safety   WHAT YOU NEED TO KNOW:   An opioid medicine is used to treat pain. Examples are oxycodone, morphine, fentanyl, or codeine. Pain control and management may help you rest, heal, and return to your daily activities. You and your family will receive information about how to manage your pain at home. The instructions will include what to do if you have side effects as your pain is managed. You will get information on how to handle opioid medicine safely. You will also get suggestions on how to control pain without opioids. It is important to follow all instructions so your pain is managed effectively.  DISCHARGE INSTRUCTIONS:   Call your local emergency number (911 in the ), or have someone else call if:   You have a seizure.    You cannot be woken.    You have trouble staying awake and your breathing is slow or shallow.    Your speech is slurred, or you are confused.    You are dizzy or stumble when you walk.    Call your doctor, or have someone close to you call if:   You are extremely drowsy, or you have trouble staying awake or speaking.    You have pale or clammy skin.    You have blue fingernails or lips.    Your heartbeat is slower than normal.    You cannot stop vomiting.    You have questions or concerns about your condition or care.    Use opioids safely:   Take prescribed opioids exactly as directed.  Opioids come with directions based on the kind and how it is given. Talk to your healthcare provider or a pharmacist if you have any questions. Do not take more than the recommended amount. Too much can cause a life-threatening overdose. Do not continue to take it after your pain stops. You may develop tolerance. This means you keep needing higher doses to get the same effect. You may also develop opioid use disorder. This means you are not able to control your opioid use.    Do not give opioids to others or take opioids that belong to someone else.  The kind or amount one person takes may not  be right for another. The person you share them with may also be taking medicines that do not mix with opioids. The person may drink alcohol or use other drugs that can cause life-threatening problems when mixed with opioids.    Do not mix opioids with other medicines or alcohol.  The combination can cause an overdose, or cause you to stop breathing. Alcohol, sleeping pills, and medicines such as antihistamines can make you sleepy. A combination with opioids can lead to a coma.    Do not drive or operate heavy machinery after you use an opioid.  You may feel drowsy or have trouble concentrating. You can injure yourself or others if you drive or use heavy machinery when you are not alert. Your provider or pharmacist can tell you how long to wait after a dose before you do these activities.    Talk to your healthcare provider if you have any side effects.  Side effects include nausea, sleepiness, itching, and trouble thinking clearly. Your provider may need to make changes to the kind or amount of opioid you are taking. Your provider can also help you find ways to prevent or relieve side effects.    Manage constipation:  Constipation is the most common side effect of opioid medicine. Constipation is when you have hard, dry bowel movements, or you go longer than usual between bowel movements. Tell your healthcare provider about all changes in your bowel movements while you are taking opioids. Your provider may recommend laxative medicine to help you have a bowel movement. Your provider may also change the kind of opioid you are taking, or change when you take it. The following are more ways you can prevent or relieve constipation:  Drink liquids as directed.  You may need to drink extra liquids to help soften and move your bowels. Ask how much liquid to drink each day and which liquids are best for you.    Eat high-fiber foods.  This may help decrease constipation by adding bulk to your bowel movements. High-fiber  foods include fruits, vegetables, whole-grain breads and cereals, and beans. Your healthcare provider or dietitian can help you create a high-fiber meal plan. Your provider may also recommend a fiber supplement if you cannot get enough fiber from food.         Exercise regularly.  Regular physical activity can help stimulate your intestines. Walking is a good exercise to prevent or relieve constipation. Ask which exercises are best for you.         Schedule a time each day to have a bowel movement.  This may help train your body to have regular bowel movements. Bend forward while you are on the toilet to help move the bowel movement out. Sit on the toilet for at least 10 minutes, even if you do not have a bowel movement.    Store opioids safely:   Store opioids where others cannot easily get them.  Keep them in a locked cabinet or secure area. Do not  keep them in a purse or other bag you carry with you. A person may be looking for something else and find the opioids.         Make sure opioids are stored out of the reach of children.  A child can easily overdose on opioids. Opioids may look like candy to a small child.    The best way to dispose of opioids:  The laws vary by country and area. In the [place], the best way is to return the opioids through a take-back program. This program is offered by the [place] Drug Enforcement Agency ([employer]). The following are options for using the program:  Take the opioids to a [employer] collection site.  The site is often a law enforcement center. Call your local law enforcement center for scheduled take-back days in your area. You will be given information on where to go if the collection site is in a different location.    Take the opioids to an approved pharmacy or hospital.  A pharmacy or hospital may be set up as a collection site. You will need to ask if it is a [employer] collection site if you were not directed there. A pharmacy or doctor's office may not be able to take back opioids  unless it is a [employer] site.    Use a mail-back system.  This means you are given containers to put the opioids into. You will then mail them in the containers.    Use a take-back drop box.  This is a place to leave the opioids at any time. People and animals will not be able to get into the box. Your local law enforcement agency can tell you where to find a drop box in your area.    Other safe ways to dispose of opioids:  The medicine may come with disposal instructions. The instructions may vary depending on the brand of medicine you are using. Instructions may come in a Medication Guide, but not every medicine has one. You may instead get instructions from your pharmacy or doctor. Follow instructions carefully. The following are general guidelines to follow:  Find out if you can flush the opioid.  Some opioids can be flushed down the toilet or poured into the sink. You will need to contact authorities in your area to see if this is an option for you. The FDA also offers a list of medicines that are safe to flush down the toilet. You can check the list if you cannot get the information for your local area.    Ask your waste management company about rules for putting opioids in the trash.  The company will be able to give you specific directions. Scratch out personal information on the original medicine label so it cannot be read. Then put it in the trash. Do not label the trash or put any information on it about the opioids. It should look like regular household trash so no one is tempted to look for the opioids. Keep the trash out of the reach of children and animals. Always make sure trash is secure.    Talk to officials if you live in a facility.  If you live in a nursing home or assisted living center, talk to an official. The person will know the rules for your area.    Other ways to manage pain:   Ask your healthcare provider about non-opioid medicines to control pain.  Some medicines may even work better than  opioids, depending on the cause of your pain. Nonprescription medicines include NSAIDs (such as ibuprofen) and acetaminophen. Prescription medicines include muscle relaxers, antidepressants, and steroids.    Pain may be managed without any medicines.  Some ways to relieve pain include massage, aromatherapy, or meditation. Physical or occupational therapy may also help.    Follow up with your doctor or pain specialist as directed:  You may need to have your dose adjusted. Your doctor or pain specialist can also help you find ways to manage pain without opioids. Write down your questions so you remember to ask them during your visits.  For more information:   Drug Enforcement Administration  [address] [place] [address]  Phone: 5- 010 - 797-1664  Web Address: https://www.deadiversion.[facility].gov/drug_disposal/    [place] Food and Drug Administration  [address] [place] [address]  Phone: 5- 016 - 575-5694  Web Address: http://www.fda.gov  © Copyright [employer] 2023 Information is for End User's use only and may not be sold, redistributed or otherwise used for commercial purposes.  The above information is an  only. It is not intended as medical advice for individual conditions or treatments. Talk to your doctor, nurse or pharmacist before following any medical regimen to see if it is safe and effective for you.

## 2024-06-25 ENCOUNTER — TELEPHONE (OUTPATIENT)
Age: 54
End: 2024-06-25

## 2024-07-01 ENCOUNTER — OFFICE VISIT (OUTPATIENT)
Dept: BARIATRICS | Facility: CLINIC | Age: 54
End: 2024-07-01
Payer: COMMERCIAL

## 2024-07-01 VITALS
BODY MASS INDEX: 30.57 KG/M2 | DIASTOLIC BLOOD PRESSURE: 80 MMHG | HEIGHT: 66 IN | HEART RATE: 77 BPM | WEIGHT: 190.2 LBS | RESPIRATION RATE: 16 BRPM | SYSTOLIC BLOOD PRESSURE: 136 MMHG | TEMPERATURE: 97.9 F | OXYGEN SATURATION: 98 %

## 2024-07-01 DIAGNOSIS — E66.9 CLASS 1 OBESITY: Primary | ICD-10-CM

## 2024-07-01 DIAGNOSIS — Z79.899 MEDICATION MANAGEMENT: ICD-10-CM

## 2024-07-01 PROBLEM — E66.811 CLASS 1 OBESITY: Status: ACTIVE | Noted: 2021-01-26

## 2024-07-01 PROCEDURE — 99203 OFFICE O/P NEW LOW 30 MIN: CPT | Performed by: PHYSICIAN ASSISTANT

## 2024-07-01 NOTE — PROGRESS NOTES
Assessment/Plan:    Class 1 obesity  -Discussed options of [facility]-Intensive Lifestyle Intervention Program, Very Low Calorie Diet-VLCD, and Conservative Program and the role of weight loss medications.  -Initial weight loss goal of 5-10% weight loss for improved health  -AVOID Contrave  -AVOID Topamax- hx of kidney stones   -Screening labs: BMP reviewed from 4/25/24, A1c reviewed from 4/18/24. LP reviewed from 2/28/24, TSH reviewed from 7/27/23  Denies hx of CAD, PAD, glaucoma, palpitations or arrhythmia.  -Patient is interested in pursuing Conservative Program  -Did well with Ozempic, but no longer covered  -she is interested in phentermine, but would like to see her BP lower before starting. Suspect it may be elevated as she has had experienced pain since her back surgery in April. In March prior to surgery BP was within acceptable range. ECG ordered      Follow up in approximately  5 months  with Non-Surgical Physician/Advanced Practitioner.    Goals:  Food log (ie.) [email or web address],[email or web address],[email or web address],[email or web address].com,etc. baritastic  No sugary beverages. At least 64oz of water daily.  Increase physical activity by 10 minutes daily. Gradually increase physical activity to a goal of 5 days per week for 30 minutes of MODERATE intensity PLUS 2 days per week of FULL BODY resistance training  5-10 servings of fruits and vegetables per day and 25-35 grams of dietary fiber per day, gradually increasing    Diagnoses and all orders for this visit:    Class 1 obesity  -     Ambulatory Referral to Weight Management  -     ECG 12 lead; Future    Medication management  -     ECG 12 lead; Future    Body mass index 30.0-30.9, adult        Subjective:   Chief Complaint   Patient presents with    Consult     New patient     Patient ID: Calista Umana  is a 54 y.o. female with excess weight/obesity here to pursue weight management.  Past Medical History:   Diagnosis Date    Anxiety     Back pain     Depression   "   Insomnia      HPI:  Obesity/Excess Weight:  Severity: Mild  Onset:  entire adult life    Modifiers:  Lost 50 lbs with Ozempic, but insurance no longer covers - has been able to maintain - feels appetite is increased so she states she I ssurprised she hasn't regained; struggles with evening snacking   Contributing factors: Insufficient Physical Activity  Associated symptoms: increased joint pain and decreased self esteem    Goals: 150  Hydration: minimal water; drinks diet soda and gatorade zero   Alcohol: denies  Exercise: no due to back   Dining out: 0  STOPBANG: 3/8    Goes to bed at 2 am, wakes around 9 am- due to pain and numbness     B: raisin bran + whole milk  S:no  L: skips- (not hungry)   S: no  D: protein + veg   S: struggles with evening snacking     The following portions of the patient's history were reviewed and updated as appropriate: allergies, current medications, past family history, past medical history, past social history, past surgical history, and problem list.    Review of Systems   Constitutional:  Negative for chills and fever.   HENT:  Negative for sore throat.    Respiratory:  Negative for cough and shortness of breath.    Cardiovascular:  Negative for chest pain and palpitations.   Gastrointestinal:  Negative for constipation and diarrhea.   Genitourinary:  Negative for dysuria.   Musculoskeletal:  Positive for back pain.   Skin:  Negative for rash.   Neurological:  Negative for headaches.   Psychiatric/Behavioral: Negative.       Objective:    /80 (BP Location: Right arm, Patient Position: Sitting, Cuff Size: Large)   Pulse 77   Temp 97.9 °F (36.6 °C)   Resp 16   Ht 5' 6\" (1.676 m)   Wt 86.3 kg (190 lb 3.2 oz)   LMP 10/10/2016   SpO2 98%   BMI 30.70 kg/m²     Physical Exam  Vitals and nursing note reviewed.     Constitutional   General appearance: Abnormal.  well developed and obese.   Pulmonary   Respiratory effort: No increased work of breathing or signs of " respiratory distress.    Abdomen   Abdomen: Abnormal.  The abdomen was obese.   Musculoskeletal   Gait and station: Normal.    Psychiatric   Orientation to person, place and time: Normal.    Affect: appropriate    40 minute visit, >50% time spent counseling patient on nonsurgical interventions for the treatment of excess weight.  Discussed in detail nonsurgical options including intensive lifestyle intervention program, very low-calorie diet program and conservative program.  Discussed the role of weight loss medications.  Counseled patient on diet behavior and exercise modification for weight loss.

## 2024-07-01 NOTE — ASSESSMENT & PLAN NOTE
-Discussed options of [facility]-Intensive Lifestyle Intervention Program, Very Low Calorie Diet-VLCD, and Conservative Program and the role of weight loss medications.  -Initial weight loss goal of 5-10% weight loss for improved health  -AVOID Contrave  -AVOID Topamax- hx of kidney stones   -Screening labs: BMP reviewed from 4/25/24, A1c reviewed from 4/18/24. LP reviewed from 2/28/24, TSH reviewed from 7/27/23  Denies hx of CAD, PAD, glaucoma, palpitations or arrhythmia.  -Patient is interested in pursuing Conservative Program  -Did well with Ozempic, but no longer covered  -she is interested in phentermine, but would like to see her BP lower before starting. Suspect it may be elevated as she has had experienced pain since her back surgery in April. In March prior to surgery BP was within acceptable range. ECG ordered

## 2024-07-08 ENCOUNTER — HOSPITAL ENCOUNTER (OUTPATIENT)
Dept: RADIOLOGY | Facility: HOSPITAL | Age: 54
Discharge: HOME | End: 2024-07-08
Payer: COMMERCIAL

## 2024-07-08 VITALS
DIASTOLIC BLOOD PRESSURE: 87 MMHG | OXYGEN SATURATION: 97 % | RESPIRATION RATE: 18 BRPM | SYSTOLIC BLOOD PRESSURE: 159 MMHG | HEART RATE: 78 BPM | TEMPERATURE: 97.6 F

## 2024-07-08 DIAGNOSIS — M54.16 LUMBAR RADICULOPATHY: ICD-10-CM

## 2024-07-08 PROCEDURE — 64483 NJX AA&/STRD TFRM EPI L/S 1: CPT | Performed by: STUDENT IN AN ORGANIZED HEALTH CARE EDUCATION/TRAINING PROGRAM

## 2024-07-08 RX ORDER — 0.9 % SODIUM CHLORIDE 0.9 %
1 VIAL (ML) INJECTION ONCE
Status: COMPLETED | OUTPATIENT
Start: 2024-07-08 | End: 2024-07-08

## 2024-07-08 RX ORDER — LIDOCAINE HYDROCHLORIDE 10 MG/ML
3 INJECTION, SOLUTION EPIDURAL; INFILTRATION; INTRACAUDAL; PERINEURAL ONCE
Status: COMPLETED | OUTPATIENT
Start: 2024-07-08 | End: 2024-07-08

## 2024-07-08 RX ORDER — PAPAVERINE HCL 150 MG
5 CAPSULE, EXTENDED RELEASE ORAL ONCE
Status: COMPLETED | OUTPATIENT
Start: 2024-07-08 | End: 2024-07-08

## 2024-07-08 RX ADMIN — LIDOCAINE HYDROCHLORIDE 3 ML: 10 INJECTION, SOLUTION EPIDURAL; INFILTRATION; INTRACAUDAL; PERINEURAL at 10:04

## 2024-07-08 RX ADMIN — IOHEXOL 0.5 ML: 300 INJECTION, SOLUTION INTRAVENOUS at 10:06

## 2024-07-08 RX ADMIN — Medication 1 ML: at 10:04

## 2024-07-08 RX ADMIN — Medication 5 MG: at 10:07

## 2024-07-08 NOTE — DISCHARGE INSTR - LAB
Epidural Steroid Injection   WHAT YOU NEED TO KNOW:   An epidural steroid injection (ADRY) is a procedure to inject steroid medicine into the epidural space. The epidural space is between your spinal cord and vertebrae. Steroids reduce inflammation and fluid buildup in your spine that may be causing pain. You may be given pain medicine along with the steroids.          ACTIVITY  Do not drive or operate machinery today.  No strenuous activity today - bending, lifting, etc.  You may resume normal activites starting tomorrow - start slowly and as tolerated.  You may shower today, but no tub baths or hot tubs.  You may have numbness for several hours from the local anesthetic. Please use caution and common sense, especially with weight-bearing activities.    CARE OF THE INJECTION SITE  If you have soreness or pain, apply ice to the area today (20 minutes on/20 minutes off).  Starting tomorrow, you may use warm, moist heat or ice if needed.  You may have an increase or change in your discomfort for 36-48 hours after your treatment.  Apply ice and continue with any pain medication you have been prescribed.  Notify the [facility] if you have any of the following: redness, drainage, swelling, headache, stiff neck or fever above 100°F.    SPECIAL INSTRUCTIONS  Our office will contact you in approximately 14 days for a progress report.    MEDICATIONS  Continue to take all routine medications.  Our office may have instructed you to hold some medications.    As no general anesthesia was used in today's procedure, you should not experience any side effects related to anesthesia.     If you are diabetic, the steroids used in today's injection may temporarily increase your blood sugar levels after the first few days after your injection. Please keep a close eye on your sugars and alert the doctor who manages your diabetes if your sugars are significantly high from your baseline or you are symptomatic.     If you have a  problem specifically related to your procedure, please call our office at [phone].  Problems not related to your procedure should be directed to your primary care physician.

## 2024-07-08 NOTE — H&P
History of Present Illness: The patient is a 54 y.o. female who presents with complaints of low back pain.    Past Medical History:   Diagnosis Date    Anxiety     Back pain     Depression     Insomnia        Past Surgical History:   Procedure Laterality Date    BACK SURGERY  2006    cage in lumbar L5 area    BACK SURGERY      CAUDAL BLOCK N/A 11/15/2022    Procedure: BLOCK / INJECTION CAUDAL;  Surgeon: Nuno Farrar MD;  Location: [facility];  Service: Pain Management     CHOLECYSTECTOMY      DECOMPRESSION SPINE LUMBAR POSTERIOR  04/24/2024    LUMBAR DISC SURGERY      POSTERIOR FUSION LUMBAR SPINE  04/24/2024    AK REPAIR FIRST ABDOMINAL WALL HERNIA N/A 03/16/2018    Procedure: REPAIR HERNIA VENTRAL with mesh;  Surgeon: David Chicas DO;  Location: [facility];  Service: General    AK SURGICAL ARTHROSCOPY SHOULDER W/ROTATOR CUFF RPR Left 11/18/2020    Procedure: SHOULDER ARTHROSCOPY, ROTATOR CUFF REPAIR, SYNEVECTOMY, ACROMIOPLASTY DEBRIDEMENT, INJECTION;  Surgeon: Brigido Mota DO;  Location:  MAIN OR;  Service: Orthopedics         Current Outpatient Medications:     busPIRone (BUSPAR) 15 mg tablet, Take 1 tablet (15 mg total) by mouth 3 (three) times a day, Disp: 270 tablet, Rfl: 0    celecoxib (CeleBREX) 200 mg capsule, , Disp: , Rfl:     gabapentin (Neurontin) 600 MG tablet, 1 PO BID and 3 PO QHS, Disp: 150 tablet, Rfl: 1    Melatonin 10 MG TABS, Take 60 mg by mouth daily at bedtime (Patient not taking: Reported on 7/1/2024), Disp: , Rfl:     methocarbamol (ROBAXIN) 750 mg tablet, Take 1 tablet (750 mg total) by mouth 3 (three) times a day As needed for pain/spasms, Disp: 90 tablet, Rfl: 1    [START ON 7/19/2024] oxyCODONE (Roxicodone) 5 immediate release tablet, Take 1 tablet (5 mg total) by mouth every 8 (eight) hours as needed for moderate pain Max Daily Amount: 15 mg Do not start before July 19, 2024. (Patient not taking: Reported on 7/1/2024 Do not start before July 19, 2024.), Disp: 90 tablet, Rfl:  0    oxyCODONE (Roxicodone) 5 immediate release tablet, Take 1 tablet (5 mg total) by mouth every 4 (four) hours as needed for moderate pain Max Daily Amount: 30 mg, Disp: 90 tablet, Rfl: 0    sertraline (ZOLOFT) 100 mg tablet, Take 1 tablet (100 mg total) by mouth daily, Disp: 90 tablet, Rfl: 0    sertraline (ZOLOFT) 50 mg tablet, Take 1 tablet (50 mg total) by mouth daily, Disp: 90 tablet, Rfl: 0    Current Facility-Administered Medications:     dexamethasone (PF) (DECADRON) injection 5 mg, 5 mg, Epidural, Once, Mercedes Dan MD    iohexol (OMNIPAQUE) 300 mg/mL injection 0.5 mL, 0.5 mL, Epidural, Once, Mercedes Dan MD    lidocaine (PF) (XYLOCAINE-MPF) 1 % injection 3 mL, 3 mL, Infiltration, Once, Mercedes Dan MD    sodium chloride (PF) 0.9 % injection 1 mL, 1 mL, Epidural, Once, Mercedes Dan MD    No Known Allergies    Physical Exam:   Vitals:    07/08/24 0944   BP: (!) 184/77   Pulse: 77   Resp: 18   Temp: 97.6 °F (36.4 °C)   SpO2: 98%     General: Awake, Alert, Oriented x 3, Mood and affect appropriate  Respiratory: Respirations even and unlabored  Cardiovascular: Peripheral pulses intact; no edema  Musculoskeletal Exam: antalgic gait    ASA Score: 2    Patient/Chart Verification  Patient ID Verified: Verbal  ID Band Applied: No  Consents Confirmed: Procedural, To be obtained in the Pre-Procedure area  H&P( within 30 days) Verified: To be obtained in the Pre-Procedure area  Interval H&P(within 24 hr) Complete (required for Outpatients and Surgery Admit only): To be obtained in the Pre-Procedure area  Allergies Reviewed: Yes  Anticoag/NSAID held?: NA  Currently on antibiotics?: No  Pregnancy denied?: NA    Assessment:   1. Lumbar radiculopathy        Plan: Left L5-S1 TFESI

## 2024-07-09 ENCOUNTER — TELEPHONE (OUTPATIENT)
Age: 54
End: 2024-07-09

## 2024-07-09 NOTE — TELEPHONE ENCOUNTER
S/w pt s/p Lt lumbar TFESI 7/8/24 RD. Pt states she has HA that began in evening post inj along w/ severe 9/10 Lt LE pain. Pt states pain sx same as prior to inj just exacerbated that this time and denies saddle anesthesia or b/b dysf. Pt states the HA is unrelieved by aleve, ice and is unchanged when in various pos (laying/sitting/standing). Pt denies blurred vision but states she is + for dizziness. RN advised pt that caffeine can aid in reducing HA sx and that it could be a sensitivity to the steroid itself. RN advised pt that sx should resolves within 24hrs post inj. RN advised pt that if sx worsen or if pt is experiencing addit sx such as excessive drowsiness, blurred vision, diff walking/talking pt is to be see in ER for eval. Pt verbalized understanding and apprec of call.     Pls advise on any further recs. Thank you!

## 2024-07-09 NOTE — TELEPHONE ENCOUNTER
Caller: yesenia Harris    Doctor: Leni    Reason for call: pt had a procedure completed by Dr. Dan yesterday.  Pt is having severe HA and left leg pain since the injection    Call back#: [phone]

## 2024-07-10 DIAGNOSIS — M79.18 MYOFASCIAL PAIN SYNDROME: Primary | ICD-10-CM

## 2024-07-10 RX ORDER — TIZANIDINE 4 MG/1
4 TABLET ORAL EVERY 8 HOURS PRN
Qty: 90 TABLET | Refills: 0 | Status: SHIPPED | OUTPATIENT
Start: 2024-07-10

## 2024-07-10 NOTE — TELEPHONE ENCOUNTER
S/W pt. Notes she continues to have a constant,pounding headache with no relief, not even with position change. Admits to pushing fluids as instructed. Also states the pain in her left leg and low back is worse than her usual pain.  Aware I would relay this information to RD and call her back

## 2024-07-10 NOTE — TELEPHONE ENCOUNTER
Called patient to discuss. Patient notes persistent non positional HA, w/o dizziness, blurry vision, and persistent low back and LLE pain. Denies new/progressive neuro deficits or other red flag sx. Advised patient to continue rest, warm/cold compress. Continue medication management with oxycodone 5 mg prn, gabapentin 600 mg as prescribed. D/c robaxin 2/2 lack of relief, sent new rx for tizanidine 4 mg tid prn. Also advised to add tylenol up to 3000 mg/day for symptom management. Reviewed red flag sx and advised to seek urgent evaluation at ER if these were to develop. Patient expresses understanding and appreciative of call.     Please call patient tomorrow, 7/11, to reassess. Patient also advised to call back if any other concerns/questions.

## 2024-07-11 DIAGNOSIS — M54.16 LUMBAR RADICULOPATHY: Primary | ICD-10-CM

## 2024-07-11 RX ORDER — PREDNISONE 5 MG/1
TABLET ORAL
Qty: 21 TABLET | Refills: 0 | Status: SHIPPED | OUTPATIENT
Start: 2024-07-11

## 2024-07-11 NOTE — TELEPHONE ENCOUNTER
Spoke with pt and advised.  She notes she took tizandine this morning and is feeling a little dizzy so she is going to lay down for awhile.  Will  new medication later.  Recommended if tizanidine is scored she can take half during waking hours. She verbalized uderstanding

## 2024-07-11 NOTE — TELEPHONE ENCOUNTER
"S/W pt again this morning.  She notes her head is still throbbing and back pain continues to feel worse than prior to procedure.  She is going to try alternating between motrin and tylenol today to see if that helps and will also take tizanidine as needed. I asked that she call me back later tis afternoon to let me know how she is feeling.  No \"red flag\" sx  "

## 2024-07-15 NOTE — TELEPHONE ENCOUNTER
"Pt sent my chart message today. Said she tried to call twice?   \"I wanted to let you know that the headache subsided yesterday thank goodness. No relief of pain in back or leg as of yet. \"  "

## 2024-07-22 ENCOUNTER — TELEPHONE (OUTPATIENT)
Dept: PAIN MEDICINE | Facility: CLINIC | Age: 54
End: 2024-07-22

## 2024-08-13 ENCOUNTER — OFFICE VISIT (OUTPATIENT)
Dept: PAIN MEDICINE | Facility: CLINIC | Age: 54
End: 2024-08-13
Payer: COMMERCIAL

## 2024-08-13 VITALS
HEART RATE: 84 BPM | WEIGHT: 192.4 LBS | DIASTOLIC BLOOD PRESSURE: 82 MMHG | BODY MASS INDEX: 30.92 KG/M2 | RESPIRATION RATE: 16 BRPM | OXYGEN SATURATION: 98 % | TEMPERATURE: 98 F | HEIGHT: 66 IN | SYSTOLIC BLOOD PRESSURE: 126 MMHG

## 2024-08-13 DIAGNOSIS — Z98.1 STATUS POST LUMBAR SPINAL FUSION: ICD-10-CM

## 2024-08-13 DIAGNOSIS — M43.16 ANTEROLISTHESIS OF LUMBAR SPINE: ICD-10-CM

## 2024-08-13 DIAGNOSIS — M79.605 LUMBAR PAIN WITH RADIATION DOWN LEFT LEG: ICD-10-CM

## 2024-08-13 DIAGNOSIS — F11.20 UNCOMPLICATED OPIOID DEPENDENCE (HCC): ICD-10-CM

## 2024-08-13 DIAGNOSIS — M51.26 LUMBAR DISC HERNIATION: ICD-10-CM

## 2024-08-13 DIAGNOSIS — G89.4 CHRONIC PAIN SYNDROME: ICD-10-CM

## 2024-08-13 DIAGNOSIS — M54.50 LUMBAR PAIN WITH RADIATION DOWN LEFT LEG: ICD-10-CM

## 2024-08-13 DIAGNOSIS — Z79.891 LONG-TERM CURRENT USE OF OPIATE ANALGESIC: Primary | ICD-10-CM

## 2024-08-13 DIAGNOSIS — M54.16 LUMBAR RADICULOPATHY: ICD-10-CM

## 2024-08-13 PROCEDURE — 99214 OFFICE O/P EST MOD 30 MIN: CPT | Performed by: NURSE PRACTITIONER

## 2024-08-13 PROCEDURE — G2211 COMPLEX E/M VISIT ADD ON: HCPCS | Performed by: NURSE PRACTITIONER

## 2024-08-13 RX ORDER — METHOCARBAMOL 750 MG/1
750 TABLET, FILM COATED ORAL 3 TIMES DAILY
Qty: 90 TABLET | Refills: 1 | Status: SHIPPED | OUTPATIENT
Start: 2024-08-13

## 2024-08-13 RX ORDER — OXYCODONE HYDROCHLORIDE 5 MG/1
5 TABLET ORAL EVERY 4 HOURS PRN
Qty: 90 TABLET | Refills: 0 | Status: SHIPPED | OUTPATIENT
Start: 2024-08-16

## 2024-08-13 RX ORDER — OXYCODONE HYDROCHLORIDE 5 MG/1
5 TABLET ORAL EVERY 8 HOURS PRN
Qty: 90 TABLET | Refills: 0 | Status: SHIPPED | OUTPATIENT
Start: 2024-09-13

## 2024-08-13 RX ORDER — GABAPENTIN 600 MG/1
TABLET ORAL
Qty: 150 TABLET | Refills: 1 | Status: SHIPPED | OUTPATIENT
Start: 2024-08-13

## 2024-08-13 NOTE — PROGRESS NOTES
Assessment:  1. Chronic pain syndrome    2. Status post lumbar spinal fusion    3. Lumbar disc herniation    4. Lumbar pain with radiation down left leg    5. Lumbar radiculopathy    6. Anterolisthesis of lumbar spine        Plan:  While the patient was in the office today, I did have a thorough conversation regarding their chronic pain syndrome, medication management, and treatment plan options.  Patient is being seen for a follow-up visit.  She underwent a left-sided L5-S1 transforaminal epidural steroid injection on 7/8/2024.  Unfortunately, she denies any improvement in her symptoms.  She continues with significant low back pain that radiates down the posterior left leg.  Underwent L3-S1 fusion/revision on 4/24/2024.  Unfortunately, she denies any improvement in her symptoms from surgery.  In fact, she states that she has been having more pain since surgery as well as increased weakness in the left leg.  Her neurosurgeon, Dr. Ilan Grijalva recommended the left-sided L5-S1 transforaminal epidural steroid injection.  She was seen for follow-up with him since the injection.  She was told that she needs to give it more time and that it can take 12 to 18 months to see full benefit from this surgery.    Recently updated MRI was performed on 6/6/2024.  MRI reveals L3-4 through L5-S1 decompressive laminectomies with improved patency of the central canal at L3-4 and L5-S1 levels. A peripherally and enhancing postop fluid collection is noted within the laminectomy bed, probable postop seroma. There is moderate to severe left L5-S1 neuroforaminal narrowing again present with encroachment of the exiting left L5 nerve root.     Renewed oxycodone 5 mg every 8 hours as needed for pain.  The patient's opioid scripts were sent to their pharmacy electronically and was given a 2 month supply of prescriptions with a Do Not Fill date(s) of 8/16/2024, 9/13/2024.    Continue gabapentin 600 mg twice daily and 1800 mg at bedtime.  A  prescription was sent to her pharmacy with a refill.    Continue Robaxin 750 mg 3 times daily if needed for spasms.  A prescription was sent to her pharmacy with a refill.    There are risks associated with opioid medications, including dependence, addiction and tolerance. The patient understands and agrees to use these medications only as prescribed. Potential side effects of the medications include, but are not limited to, constipation, drowsiness, addiction, impaired judgment and risk of fatal overdose if not taken as prescribed. The patient was warned against driving while taking sedation medications.  Sharing medications is a felony. At this point in time, the patient is showing no signs of addiction, abuse, diversion or suicidal ideation.    A urine drug screen was collected at today's office visit as part of our medication management protocol. The point of care testing results were appropriate for what was being prescribed. The specimen will be sent for confirmatory testing. The drug screen is medically necessary because the patient is either dependent on opioid medication or is being considered for opioid medication therapy and the results could impact ongoing or future treatment. The drug screen is to evaluate for the presences or absence of prescribed, non-prescribed, and/or illicit drugs/substances.    [place] Prescription Drug Monitoring Program report was reviewed and was appropriate     The patient will follow-up in 2 months for medication prescription refill and reevaluation. The patient was advised to contact the office should their symptoms worsen in the interim. The patient was agreeable and verbalized an understanding.    History of Present Illness:  The patient is a 54 y.o. female who presents for a follow up office visit in regards to Back Pain and Leg Pain.   The patient’s current symptoms include complaints of low back and left leg pain.  Current pain level is a 7/10.  Quality pain is  described as sharp, throbbing, pressure-like, shooting, numb, pins-and-needles.    Current pain medications includes: Oxycodone 5 mg every 8 hours as needed for pain, gabapentin 600/600/1800, Robaxin 750 mg 3 times daily if needed for spasms.  The patient reports that this regimen is providing 10% pain relief.  The patient is reporting no side effects from this pain medication regimen.    I have personally reviewed and/or updated the patient's past medical history, past surgical history, family history, social history, current medications, allergies, and vital signs today.         Review of Systems  Review of Systems   Musculoskeletal:  Positive for gait problem.        Pain in left leg and occasionally right leg.   All other systems reviewed and are negative.          Past Medical History:   Diagnosis Date    Anxiety     Back pain     Depression     Insomnia        Past Surgical History:   Procedure Laterality Date    BACK SURGERY  2006    cage in lumbar L5 area    BACK SURGERY      CAUDAL BLOCK N/A 11/15/2022    Procedure: BLOCK / INJECTION CAUDAL;  Surgeon: Nuno Farrar MD;  Location: [facility];  Service: Pain Management     CHOLECYSTECTOMY      DECOMPRESSION SPINE LUMBAR POSTERIOR  04/24/2024    LUMBAR DISC SURGERY      POSTERIOR FUSION LUMBAR SPINE  04/24/2024    CO REPAIR FIRST ABDOMINAL WALL HERNIA N/A 03/16/2018    Procedure: REPAIR HERNIA VENTRAL with mesh;  Surgeon: David Chicas DO;  Location: [facility];  Service: General    CO SURGICAL ARTHROSCOPY SHOULDER W/ROTATOR CUFF RPR Left 11/18/2020    Procedure: SHOULDER ARTHROSCOPY, ROTATOR CUFF REPAIR, SYNEVECTOMY, ACROMIOPLASTY DEBRIDEMENT, INJECTION;  Surgeon: Brigido Mota DO;  Location:  MAIN OR;  Service: Orthopedics       Family History   Adopted: Yes   Family history unknown: Yes       Social History     Occupational History    Not on file   Tobacco Use    Smoking status: Former     Current packs/day: 0.00     Types: Cigarettes     Quit  date: 10/17/2010     Years since quittin.8    Smokeless tobacco: Never    Tobacco comments:     Recently quit   Vaping Use    Vaping status: Never Used   Substance and Sexual Activity    Alcohol use: No    Drug use: No    Sexual activity: Not Currently     Partners: Female         Current Outpatient Medications:     busPIRone (BUSPAR) 15 mg tablet, Take 1 tablet (15 mg total) by mouth 3 (three) times a day, Disp: 270 tablet, Rfl: 0    celecoxib (CeleBREX) 200 mg capsule, , Disp: , Rfl:     gabapentin (Neurontin) 600 MG tablet, 1 PO BID and 3 PO QHS, Disp: 150 tablet, Rfl: 1    methocarbamol (ROBAXIN) 750 mg tablet, Take 1 tablet (750 mg total) by mouth 3 (three) times a day As needed for pain/spasms, Disp: 90 tablet, Rfl: 1    [START ON 2024] oxyCODONE (Roxicodone) 5 immediate release tablet, Take 1 tablet (5 mg total) by mouth every 4 (four) hours as needed for moderate pain Max Daily Amount: 30 mg Do not start before 2024., Disp: 90 tablet, Rfl: 0    [START ON 2024] oxyCODONE (Roxicodone) 5 immediate release tablet, Take 1 tablet (5 mg total) by mouth every 8 (eight) hours as needed for moderate pain Max Daily Amount: 15 mg Do not start before 2024., Disp: 90 tablet, Rfl: 0    predniSONE 5 mg tablet, Day 1: Take 2 tablets at breakfast, 1 tablet at lunch, 1 tablet at dinner, 2 tablets at bedtime. Day 2: Take 1 tablet at breakfast, lunch and dinner, 2 tablets at bedtime. Day 3: Take 1 tablet four times a day. Day 4: Take 1 tablet three times a day. Day 5: Take 1 tablet two times a day. Day 6: Take 1 tablet, Disp: 21 tablet, Rfl: 0    sertraline (ZOLOFT) 100 mg tablet, Take 1 tablet (100 mg total) by mouth daily, Disp: 90 tablet, Rfl: 0    sertraline (ZOLOFT) 50 mg tablet, Take 1 tablet (50 mg total) by mouth daily, Disp: 90 tablet, Rfl: 0    tiZANidine (ZANAFLEX) 4 mg tablet, Take 1 tablet (4 mg total) by mouth every 8 (eight) hours as needed for muscle spasms, Disp: 90  "tablet, Rfl: 0    Melatonin 10 MG TABS, Take 60 mg by mouth daily at bedtime (Patient not taking: Reported on 7/1/2024), Disp: , Rfl:     No Known Allergies    Physical Exam:    /82   Pulse 84   Temp 98 °F (36.7 °C) (Temporal)   Resp 16   Ht 5' 6\" (1.676 m)   Wt 87.3 kg (192 lb 6.4 oz)   LMP 10/10/2016   SpO2 98%   BMI 31.05 kg/m²     Constitutional:normal, well developed, well nourished, alert, in no distress and non-toxic and no overt pain behavior.  Eyes:anicteric  HEENT:grossly intact  Neck:supple, symmetric, trachea midline and no masses   Pulmonary:even and unlabored  Cardiovascular:No edema or pitting edema present  Skin:Normal without rashes or lesions and well hydrated  Psychiatric:Mood and affect appropriate  Neurologic:Cranial Nerves II-XII grossly intact  Musculoskeletal:antalgic    Imaging      Narrative & Impression   MRI LUMBAR SPINE WITH AND WITHOUT CONTRAST     INDICATION: M54.16: Radiculopathy, lumbar region  Z98.1: Arthrodesis status.     COMPARISON: MRI lumbar spine 12/28/2023     TECHNIQUE:  Multiplanar, multisequence imaging of the lumbar spine was performed before and after gadolinium administration. .        IV Contrast:  8 mL of Gadobutrol injection (SINGLE-DOSE)     IMAGE QUALITY:  Diagnostic     FINDINGS:     POSTSURGICAL FINDINGS: Previous PLIF L4-5 with now L3-S1 Posterior lumbar decompression and interbody fusion. Interbody spacer devices are noted at the L3-4 through the L5-S1 levels with bilateral decompressive laminectomies. There is a peripherally   enhancing postoperative fluid collection within the laminectomy bed .     LUMBAR DISC SPACES:        L1-2: No focal disc herniation, central canal stenosis, or neural foraminal narrowing.     L2-3: Mild diffuse disc bulge is again noted with superimposed bilateral foraminal disc protrusions. Bilateral ligamentum flavum and facet hypertrophy.. Mild central canal and bilateral subarticular/lateral recess narrowing, unchanged. "  No neural   foraminal stenosis.     L3-4: New interbody spacer device.. The central canal is now patent status post bilateral decompressive laminectomies. No neural foraminal stenosis. There is enhancing granulation tissue within the laminectomy bed and central canal without impingement of   the thecal sac.     L4-5: Stable interbody spacer device. The central canal remains patent status post bilateral decompressive laminectomies. Unchanged mild bilateral neural foraminal stenosis.There is enhancing granulation tissue within the laminectomy bed and central   canal without impingement of the thecal sac.     L5-S1: Stable grade 1 anterolisthesis. New interbody spacer device.. The central canal is now patent status post bilateral decompressive laminectomies. Persistent moderate right and moderate to severe left neural foraminal stenosis with encroachment of   the exiting left L5 nerve root.There is enhancing granulation tissue within the laminectomy bed and central canal without impingement of the thecal sac.     OTHER FINDINGS: Small bilateral parapelvic renal cysts are noted.     IMPRESSION:     Previous PLIF L4-5 with now L3-S1 Posterior lumbar decompression and interbody fusion.     L3-4 through L5-S1 decompressive laminectomies with improved patency of the central canal at the L3-4 and L5-S1 levels. A peripherally enhancing postoperative fluid collection is noted within the laminectomy bed, probable postoperative seroma. Moderate   to severe left L5-S1 neural foraminal narrowing is again noted with encroachment of the exiting left L5 nerve root.     Stable L2-3 disc bulge with mild central canal narrowing. There is no new disc herniation.            No orders of the defined types were placed in this encounter.

## 2024-08-15 LAB
6MAM UR QL CFM: NEGATIVE NG/ML
7AMINOCLONAZEPAM UR QL CFM: NEGATIVE NG/ML
A-OH ALPRAZ UR QL CFM: NEGATIVE NG/ML
ACCEPTABLE CREAT UR QL: NORMAL MG/DL
ACCEPTIBLE SP GR UR QL: NORMAL
AMITRIP UR QL CFM: NEGATIVE NG/ML
AMPHET UR QL CFM: NEGATIVE NG/ML
AMPHET UR QL CFM: NEGATIVE NG/ML
BUPRENORPHINE UR QL CFM: NEGATIVE NG/ML
BZE UR QL CFM: NEGATIVE NG/ML
DESIPRAMINE UR QL CFM: NEGATIVE NG/ML
EDDP UR QL CFM: NEGATIVE NG/ML
ETHYL GLUCURONIDE UR QL CFM: NEGATIVE NG/ML
ETHYL SULFATE UR QL SCN: NEGATIVE NG/ML
FENTANYL UR QL CFM: NEGATIVE NG/ML
FLUOXETINE UR QL CFM: NEGATIVE NG/ML
GLIADIN IGG SER IA-ACNC: NEGATIVE NG/ML
GLUCOSE 30M P 50 G LAC PO SERPL-MCNC: NEGATIVE NG/ML
HYDROCODONE UR QL CFM: NEGATIVE NG/ML
HYDROMORPHONE UR QL CFM: NEGATIVE NG/ML
LORAZEPAM UR QL CFM: NEGATIVE NG/ML
MDMA UR QL CFM: NEGATIVE NG/ML
ME-PHENIDATE UR QL CFM: NEGATIVE NG/ML
MEPERIDINE UR QL CFM: NEGATIVE NG/ML
METHADONE UR QL CFM: NEGATIVE NG/ML
METHAMPHET UR QL CFM: NEGATIVE NG/ML
MORPHINE UR QL CFM: NEGATIVE NG/ML
NITRITE UR QL: NORMAL UG/ML
NORBUPRENORPHINE UR QL CFM: NEGATIVE NG/ML
NORDIAZEPAM UR QL CFM: NEGATIVE NG/ML
NORFENTANYL UR QL CFM: NEGATIVE NG/ML
NORFLUOXETINE UR QL CFM: NEGATIVE NG/ML
NORHYDROCODONE UR QL CFM: NEGATIVE NG/ML
NORMEPERIDINE UR QL CFM: NEGATIVE NG/ML
NOROXYCODONE UR QL CFM: NORMAL NG/ML
NORTRIP UR QL CFM: NEGATIVE NG/ML
OLANZAPINE QUANTIFICATION: NEGATIVE NG/ML
OPC-3373 QUANTIFICATION: NEGATIVE NG/ML
OXAZEPAM UR QL CFM: NEGATIVE NG/ML
OXYCODONE UR QL CFM: NORMAL NG/ML
OXYMORPHONE UR QL CFM: NORMAL NG/ML
OXYMORPHONE UR QL CFM: NORMAL NG/ML
PARA-FLUOROFENTANYL QUANTIFICATION: NORMAL NG/ML
PROLACTIN SERPL-MCNC: NEGATIVE NG/ML
RESULT ALL_PRESCRIBED MEDS AND SPECIAL INSTRUCTIONS: NORMAL
SECOBARBITAL UR QL CFM: NEGATIVE NG/ML
SL AMB 4-ANPP QUANTIFICATION: NORMAL NG/ML
SL AMB 7-OH-MITRAGYNINE (KRATOM ALKALOID) QUANTIFICATION: NEGATIVE NG/ML
SL AMB ACETYL FENTANYL QUANTIFICATION: NORMAL NG/ML
SL AMB ACETYL NORFENTANYL QUANTIFICATION: NORMAL NG/ML
SL AMB ACRYL FENTANYL QUANTIFICATION: NORMAL NG/ML
SL AMB CARFENTANIL QUANTIFICATION: NORMAL NG/ML
SL AMB CITALOPRAM/ESCITALOPRAM QUANTIFICATION: NEGATIVE NG/ML
SL AMB CITALOPRAM/ESCITALOPRAM QUANTIFICATION: NEGATIVE NG/ML
SL AMB CLOZAPINE QUANTIFICATION: NEGATIVE NG/ML
SL AMB CTHC (MARIJUANA METABOLITE) QUANTIFICATION: NEGATIVE NG/ML
SL AMB DEXTRORPHAN (DEXTROMETHORPHAN METABOLITE) QUANT: NEGATIVE NG/ML
SL AMB HALOPERIDOL  QUANTIFICATION: NEGATIVE NG/ML
SL AMB HALOPERIDOL METABOLITE QUANTIFICATION: NEGATIVE NG/ML
SL AMB HYDROXYBUPROPION QUANTIFICATION: NEGATIVE NG/ML
SL AMB HYDROXYRISPERIDONE QUANTIFICATION: NEGATIVE NG/ML
SL AMB N-DESMETHYL-TRAMADOL QUANTIFICATION: NEGATIVE NG/ML
SL AMB N-DESMETHYLCLOZAPINE QUANTIFICATION: NEGATIVE NG/ML
SL AMB NORQUETIAPINE QUANTIFICATION: NEGATIVE NG/ML
SL AMB PHENTERMINE QUANTIFICATION: NEGATIVE NG/ML
SL AMB PREGABALIN QUANTIFICATION: NEGATIVE NG/ML
SL AMB QUETIAPINE QUANTIFICATION: NEGATIVE NG/ML
SL AMB RISPERIDONE QUANTIFICATION: NEGATIVE NG/ML
SL AMB RITALINIC ACID QUANTIFICATION: NEGATIVE NG/ML
SPECIMEN PH ACCEPTABLE UR: NORMAL
TAPENTADOL UR QL CFM: NEGATIVE NG/ML
TEMAZEPAM UR QL CFM: NEGATIVE NG/ML
TEMAZEPAM UR QL CFM: NEGATIVE NG/ML
TRAMADOL UR QL CFM: NEGATIVE NG/ML
URATE/CREAT 24H UR: NEGATIVE NG/ML

## 2024-09-16 ENCOUNTER — OFFICE VISIT (OUTPATIENT)
Dept: FAMILY MEDICINE CLINIC | Facility: CLINIC | Age: 54
End: 2024-09-16
Payer: COMMERCIAL

## 2024-09-16 VITALS
OXYGEN SATURATION: 98 % | TEMPERATURE: 98.1 F | HEART RATE: 82 BPM | HEIGHT: 66 IN | SYSTOLIC BLOOD PRESSURE: 120 MMHG | WEIGHT: 194 LBS | DIASTOLIC BLOOD PRESSURE: 78 MMHG | BODY MASS INDEX: 31.18 KG/M2 | RESPIRATION RATE: 18 BRPM

## 2024-09-16 DIAGNOSIS — F41.9 ANXIETY AND DEPRESSION: ICD-10-CM

## 2024-09-16 DIAGNOSIS — Z00.00 MEDICARE ANNUAL WELLNESS VISIT, SUBSEQUENT: Primary | ICD-10-CM

## 2024-09-16 DIAGNOSIS — Z23 NEED FOR VACCINATION: ICD-10-CM

## 2024-09-16 DIAGNOSIS — F32.A ANXIETY AND DEPRESSION: ICD-10-CM

## 2024-09-16 DIAGNOSIS — E55.9 VITAMIN D DEFICIENCY: ICD-10-CM

## 2024-09-16 DIAGNOSIS — Z13.89 SCREENING FOR MULTIPLE CONDITIONS: ICD-10-CM

## 2024-09-16 PROCEDURE — 99213 OFFICE O/P EST LOW 20 MIN: CPT | Performed by: PHYSICIAN ASSISTANT

## 2024-09-16 PROCEDURE — G0008 ADMIN INFLUENZA VIRUS VAC: HCPCS | Performed by: PHYSICIAN ASSISTANT

## 2024-09-16 PROCEDURE — 90673 RIV3 VACCINE NO PRESERV IM: CPT | Performed by: PHYSICIAN ASSISTANT

## 2024-09-16 PROCEDURE — G0439 PPPS, SUBSEQ VISIT: HCPCS | Performed by: PHYSICIAN ASSISTANT

## 2024-09-16 RX ORDER — BUSPIRONE HYDROCHLORIDE 15 MG/1
15 TABLET ORAL 3 TIMES DAILY
Qty: 270 TABLET | Refills: 0 | Status: SHIPPED | OUTPATIENT
Start: 2024-09-16 | End: 2024-12-15

## 2024-09-16 RX ORDER — SERTRALINE HYDROCHLORIDE 100 MG/1
200 TABLET, FILM COATED ORAL DAILY
Qty: 180 TABLET | Refills: 0 | Status: SHIPPED | OUTPATIENT
Start: 2024-09-16

## 2024-09-16 NOTE — ASSESSMENT & PLAN NOTE
Increase sertraline to 200 mg daily. Continue taking buspirone. Refills given for each.     Orders:    busPIRone (BUSPAR) 15 mg tablet; Take 1 tablet (15 mg total) by mouth 3 (three) times a day    sertraline (ZOLOFT) 100 mg tablet; Take 2 tablets (200 mg total) by mouth daily

## 2024-09-16 NOTE — PATIENT INSTRUCTIONS
Medicare Preventive Visit Patient Instructions  Thank you for completing your Welcome to Medicare Visit or Medicare Annual Wellness Visit today. Your next wellness visit will be due in one year (9/17/2025).  The screening/preventive services that you may require over the next 5-10 years are detailed below. Some tests may not apply to you based off risk factors and/or age. Screening tests ordered at today's visit but not completed yet may show as past due. Also, please note that scanned in results may not display below.  Preventive Screenings:  Service Recommendations Previous Testing/Comments   Colorectal Cancer Screening  * Colonoscopy    * Fecal Occult Blood Test (FOBT)/Fecal Immunochemical Test (FIT)  * Fecal DNA/Cologuard Test  * Flexible Sigmoidoscopy Age: 45-75 years old   Colonoscopy: every 10 years (may be performed more frequently if at higher risk)  OR  FOBT/FIT: every 1 year  OR  Cologuard: every 3 years  OR  Sigmoidoscopy: every 5 years  Screening may be recommended earlier than age 45 if at higher risk for colorectal cancer. Also, an individualized decision between you and your healthcare provider will decide whether screening between the ages of 76-85 would be appropriate. Colonoscopy: Not on file  FOBT/FIT: Not on file  Cologuard: 01/27/2023  Sigmoidoscopy: Not on file    Screening Current     Breast Cancer Screening Age: 40+ years old  Frequency: every 1-2 years  Not required if history of left and right mastectomy Mammogram: 02/09/2024    Screening Current   Cervical Cancer Screening Between the ages of 21-29, pap smear recommended once every 3 years.   Between the ages of 30-65, can perform pap smear with HPV co-testing every 5 years.   Recommendations may differ for women with a history of total hysterectomy, cervical cancer, or abnormal pap smears in past. Pap Smear: Not on file        Hepatitis C Screening Once for adults born between 1945 and 1965  More frequently in patients at high risk for  Hepatitis C Hep C Antibody: Not on file        Diabetes Screening 1-2 times per year if you're at risk for diabetes or have pre-diabetes Fasting glucose: 94 mg/dL (2/28/2024)  A1C: 4.9 % (2/28/2024)  Screening Current   Cholesterol Screening Once every 5 years if you don't have a lipid disorder. May order more often based on risk factors. Lipid panel: 02/28/2024    Screening Current     Other Preventive Screenings Covered by Medicare:  Abdominal Aortic Aneurysm (AAA) Screening: covered once if your at risk. You're considered to be at risk if you have a family history of AAA.  Lung Cancer Screening: covers low dose CT scan once per year if you meet all of the following conditions: (1) Age 55-77; (2) No signs or symptoms of lung cancer; (3) Current smoker or have quit smoking within the last 15 years; (4) You have a tobacco smoking history of at least 20 pack years (packs per day multiplied by number of years you smoked); (5) You get a written order from a healthcare provider.  Glaucoma Screening: covered annually if you're considered high risk: (1) You have diabetes OR (2) Family history of glaucoma OR (3)  aged 50 and older OR (4)  American aged 65 and older  Osteoporosis Screening: covered every 2 years if you meet one of the following conditions: (1) You're estrogen deficient and at risk for osteoporosis based off medical history and other findings; (2) Have a vertebral abnormality; (3) On glucocorticoid therapy for more than 3 months; (4) Have primary hyperparathyroidism; (5) On osteoporosis medications and need to assess response to drug therapy.   Last bone density test (DXA Scan): Not on file.  HIV Screening: covered annually if you're between the age of 15-65. Also covered annually if you are younger than 15 and older than 65 with risk factors for HIV infection. For pregnant patients, it is covered up to 3 times per pregnancy.    Immunizations:  Immunization Recommendations   Influenza  Vaccine Annual influenza vaccination during flu season is recommended for all persons aged >= 6 months who do not have contraindications   Pneumococcal Vaccine   * Pneumococcal conjugate vaccine = PCV13 (Prevnar 13), PCV15 (Vaxneuvance), PCV20 (Prevnar 20)  * Pneumococcal polysaccharide vaccine = PPSV23 (Pneumovax) Adults 19-63 yo with certain risk factors or if 65+ yo  If never received any pneumonia vaccine: recommend Prevnar 20 (PCV20)  Give PCV20 if previously received 1 dose of PCV13 or PPSV23   Hepatitis B Vaccine 3 dose series if at intermediate or high risk (ex: diabetes, end stage renal disease, liver disease)   Respiratory syncytial virus (RSV) Vaccine - COVERED BY MEDICARE PART D  * RSVPreF3 (Arexvy) CDC recommends that adults 60 years of age and older may receive a single dose of RSV vaccine using shared clinical decision-making (SCDM)   Tetanus (Td) Vaccine - COST NOT COVERED BY MEDICARE PART B Following completion of primary series, a booster dose should be given every 10 years to maintain immunity against tetanus. Td may also be given as tetanus wound prophylaxis.   Tdap Vaccine - COST NOT COVERED BY MEDICARE PART B Recommended at least once for all adults. For pregnant patients, recommended with each pregnancy.   Shingles Vaccine (Shingrix) - COST NOT COVERED BY MEDICARE PART B  2 shot series recommended in those 19 years and older who have or will have weakened immune systems or those 50 years and older     Health Maintenance Due:      Topic Date Due   • Hepatitis C Screening  Never done   • HIV Screening  Never done   • Cervical Cancer Screening  Never done   • Breast Cancer Screening: Mammogram  02/09/2025   • Colorectal Cancer Screening  01/27/2026     Immunizations Due:      Topic Date Due   • COVID-19 Vaccine (5 - 2023-24 season) 09/01/2024   • Influenza Vaccine (1) 09/01/2024     Advance Directives   What are advance directives?  Advance directives are legal documents that state your wishes  and plans for medical care. These plans are made ahead of time in case you lose your ability to make decisions for yourself. Advance directives can apply to any medical decision, such as the treatments you want, and if you want to donate organs.   What are the types of advance directives?  There are many types of advance directives, and each state has rules about how to use them. You may choose a combination of any of the following:  Living will:  This is a written record of the treatment you want. You can also choose which treatments you do not want, which to limit, and which to stop at a certain time. This includes surgery, medicine, IV fluid, and tube feedings.   Durable power of  for healthcare (DPAHC):  This is a written record that states who you want to make healthcare choices for you when you are unable to make them for yourself. This person, called a proxy, is usually a family member or a friend. You may choose more than 1 proxy.  Do not resuscitate (DNR) order:  A DNR order is used in case your heart stops beating or you stop breathing. It is a request not to have certain forms of treatment, such as CPR. A DNR order may be included in other types of advance directives.  Medical directive:  This covers the care that you want if you are in a coma, near death, or unable to make decisions for yourself. You can list the treatments you want for each condition. Treatment may include pain medicine, surgery, blood transfusions, dialysis, IV or tube feedings, and a ventilator (breathing machine).  Values history:  This document has questions about your views, beliefs, and how you feel and think about life. This information can help others choose the care that you would choose.  Why are advance directives important?  An advance directive helps you control your care. Although spoken wishes may be used, it is better to have your wishes written down. Spoken wishes can be misunderstood, or not followed.  Treatments may be given even if you do not want them. An advance directive may make it easier for your family to make difficult choices about your care.   Weight Management   Why it is important to manage your weight:  Being overweight increases your risk of health conditions such as heart disease, high blood pressure, type 2 diabetes, and certain types of cancer. It can also increase your risk for osteoarthritis, sleep apnea, and other respiratory problems. Aim for a slow, steady weight loss. Even a small amount of weight loss can lower your risk of health problems.  How to lose weight safely:  A safe and healthy way to lose weight is to eat fewer calories and get regular exercise. You can lose up about 1 pound a week by decreasing the number of calories you eat by 500 calories each day.   Healthy meal plan for weight management:  A healthy meal plan includes a variety of foods, contains fewer calories, and helps you stay healthy. A healthy meal plan includes the following:  Eat whole-grain foods more often.  A healthy meal plan should contain fiber. Fiber is the part of grains, fruits, and vegetables that is not broken down by your body. Whole-grain foods are healthy and provide extra fiber in your diet. Some examples of whole-grain foods are whole-wheat breads and pastas, oatmeal, brown rice, and bulgur.  Eat a variety of vegetables every day.  Include dark, leafy greens such as spinach, kale, valdez greens, and mustard greens. Eat yellow and orange vegetables such as carrots, sweet potatoes, and winter squash.   Eat a variety of fruits every day.  Choose fresh or canned fruit (canned in its own juice or light syrup) instead of juice. Fruit juice has very little or no fiber.  Eat low-fat dairy foods.  Drink fat-free (skim) milk or 1% milk. Eat fat-free yogurt and low-fat cottage cheese. Try low-fat cheeses such as mozzarella and other reduced-fat cheeses.  Choose meat and other protein foods that are low in fat.   Choose beans or other legumes such as split peas or lentils. Choose fish, skinless poultry (chicken or turkey), or lean cuts of red meat (beef or pork). Before you cook meat or poultry, cut off any visible fat.   Use less fat and oil.  Try baking foods instead of frying them. Add less fat, such as margarine, sour cream, regular salad dressing and mayonnaise to foods. Eat fewer high-fat foods. Some examples of high-fat foods include french fries, doughnuts, ice cream, and cakes.  Eat fewer sweets.  Limit foods and drinks that are high in sugar. This includes candy, cookies, regular soda, and sweetened drinks.  Exercise:  Exercise at least 30 minutes per day on most days of the week. Some examples of exercise include walking, biking, dancing, and swimming. You can also fit in more physical activity by taking the stairs instead of the elevator or parking farther away from stores. Ask your healthcare provider about the best exercise plan for you.   Narcotic (Opioid) Safety    Use narcotics safely:  Take prescribed narcotics exactly as directed  Do not give narcotics to others or take narcotics that belong to someone else  Do not mix narcotics without medicines or alcohol  Do not drive or operate heavy machinery after you take the narcotic  Monitor for side effects and notify your healthcare provider if you experienced side effects such as nausea, sleepiness, itching, or trouble thinking clearly.    Manage constipation:    Constipation is the most common side effect of narcotic medicine. Constipation is when you have hard, dry bowel movements, or you go longer than usual between bowel movements. Tell your healthcare provider about all changes in your bowel movements while you are taking narcotics. He or she may recommend laxative medicine to help you have a bowel movement. He or she may also change the kind of narcotic you are taking, or change when you take it. The following are more ways you can prevent or relieve  constipation:    Drink liquids as directed.  You may need to drink extra liquids to help soften and move your bowels. Ask how much liquid to drink each day and which liquids are best for you.  Eat high-fiber foods.  This may help decrease constipation by adding bulk to your bowel movements. High-fiber foods include fruits, vegetables, whole-grain breads and cereals, and beans. Your healthcare provider or dietitian can help you create a high-fiber meal plan. Your provider may also recommend a fiber supplement if you cannot get enough fiber from food.  Exercise regularly.  Regular physical activity can help stimulate your intestines. Walking is a good exercise to prevent or relieve constipation. Ask which exercises are best for you.  Schedule a time each day to have a bowel movement.  This may help train your body to have regular bowel movements. Bend forward while you are on the toilet to help move the bowel movement out. Sit on the toilet for at least 10 minutes, even if you do not have a bowel movement.    Store narcotics safely:   Store narcotics where others cannot easily get them.  Keep them in a locked cabinet or secure area. Do not  keep them in a purse or other bag you carry with you. A person may be looking for something else and find the narcotics.  Make sure narcotics are stored out of the reach of children.  A child can easily overdose on narcotics. Narcotics may look like candy to a small child.    The best way to dispose of narcotics:      The laws vary by country and area. In the [place], the best way is to return the narcotics through a take-back program. This program is offered by the [place] Drug Enforcement Agency ([employer]). The following are options for using the program:  Take the narcotics to a [employer] collection site.  The site is often a law enforcement center. Call your local law enforcement center for scheduled take-back days in your area. You will be given information on where to go if the  collection site is in a different location.  Take the narcotics to an approved pharmacy or hospital.  A pharmacy or hospital may be set up as a collection site. You will need to ask if it is a [employer] collection site if you were not directed there. A pharmacy or doctor's office may not be able to take back narcotics unless it is a [employer] site.  Use a mail-back system.  This means you are given containers to put the narcotics into. You will then mail them in the containers.  Use a take-back drop box.  This is a place to leave the narcotics at any time. People and animals will not be able to get into the box. Your local law enforcement agency can tell you where to find a drop box in your area.    Other ways to manage pain:   Ask your healthcare provider about non-narcotic medicines to control pain.  Nonprescription medicines include NSAIDs (such as ibuprofen) and acetaminophen. Prescription medicines include muscle relaxers, antidepressants, and steroids.  Pain may be managed without any medicines.  Some ways to relieve pain include massage, aromatherapy, or meditation. Physical or occupational therapy may also help.    For more information:   Drug Enforcement Administration  [address] [place] [id number]  Phone: 4- 059 - 439-7070  Web Address: https://www.deadiversion.[facility].gov/drug_disposal/    [place] Food and Drug Administration  [address] [place] [address]  Phone: 5- 306 - 826-7064  Web Address: http://www.fda.gov     © Copyright [employer] 2018 Information is for End User's use only and may not be sold, redistributed or otherwise used for commercial purposes. All illustrations and images included in CareNotes® are the copyrighted property of [employer] or [employer]

## 2024-09-16 NOTE — PROGRESS NOTES
Ambulatory Visit  Name: Calista Umana      : 1970      MRN: [id number]  Encounter Provider: Fidelia Alvarado PA-C  Encounter Date: 2024   Encounter department: [facility]    Assessment & Plan  Medicare annual wellness visit, subsequent         Anxiety and depression  Increase sertraline to 200 mg daily. Continue taking buspirone. Refills given for each.     Orders:    busPIRone (BUSPAR) 15 mg tablet; Take 1 tablet (15 mg total) by mouth 3 (three) times a day    sertraline (ZOLOFT) 100 mg tablet; Take 2 tablets (200 mg total) by mouth daily    Need for vaccination    Orders:    influenza vaccine, recombinant, PF, 0.5 mL IM (Flublok)    Vitamin D deficiency    Orders:    Vitamin D 25 hydroxy; Future    Screening for multiple conditions    Orders:    CBC; Future    Comprehensive metabolic panel; Future    Lipid Panel with Direct LDL reflex; Future      Depression Screening and Follow-up Plan: Patient was screened for depression during today's encounter. They screened negative with a PHQ-9 score of 0.      Preventive health issues were discussed with patient, and age appropriate screening tests were ordered as noted in patient's After Visit Summary. Personalized health advice and appropriate referrals for health education or preventive services given if needed, as noted in patient's After Visit Summary.    History of Present Illness     Kimberly is here today for AWV. Still with chronic back pain, states has been worsening. Feels nerves are worse due to the worsening pain, would like to increase sertraline to 200 mg daily.       Patient Care Team:  Fidelia Alvarado PA-C as PCP - General (Family Medicine)  Abiel Miller DO as PCP - PCP-[employer] ([facility])  Abiel Miller DO as PCP - PCP-[facility] ([facility])  Barbara Kocher, CRNP (Nurse Practitioner)  Nuno Farrar MD (Pain Medicine)    Review of Systems   Constitutional:  Negative for chills and fever.   HENT:  Negative for ear pain and  sore throat.    Eyes:  Negative for pain and visual disturbance.   Respiratory:  Negative for cough and shortness of breath.    Cardiovascular:  Negative for chest pain and palpitations.   Gastrointestinal:  Negative for abdominal pain and vomiting.   Genitourinary:  Negative for dysuria and hematuria.   Musculoskeletal:  Positive for back pain. Negative for arthralgias.   Skin:  Negative for color change and rash.   Neurological:  Negative for seizures and syncope.   Psychiatric/Behavioral:  Positive for dysphoric mood. The patient is nervous/anxious.    All other systems reviewed and are negative.    Medical History Reviewed by provider this encounter:       Annual Wellness Visit Questionnaire   Calista is here for her Subsequent Wellness visit. Last Medicare Wellness visit information reviewed, patient interviewed and updates made to the record today.      Health Risk Assessment:   Patient rates overall health as good. Patient feels that their physical health rating is slightly worse. Patient is satisfied with their life. Eyesight was rated as same. Hearing was rated as slightly worse. Patient feels that their emotional and mental health rating is slightly worse. Patients states they are never, rarely angry. Patient states they are sometimes unusually tired/fatigued. Pain experienced in the last 7 days has been a lot. Patient's pain rating has been 6/10. Patient states that she has experienced no weight loss or gain in last 6 months.     Depression Screening:   PHQ-9 Score: 0      Fall Risk Screening:   In the past year, patient has experienced: history of falling in past year      Urinary Incontinence Screening:   Patient has not leaked urine accidently in the last six months.     Home Safety:  Patient has trouble with stairs inside or outside of their home. Patient has working smoke alarms and has working carbon monoxide detector. Home safety hazards include: none.     Nutrition:   Current diet is Regular.      Medications:   Patient is not currently taking any over-the-counter supplements. Patient is able to manage medications.     Activities of Daily Living (ADLs)/Instrumental Activities of Daily Living (IADLs):   Walk and transfer into and out of bed and chair?: Yes  Dress and groom yourself?: Yes    Bathe or shower yourself?: Yes    Feed yourself? Yes  Do your laundry/housekeeping?: No  Manage your money, pay your bills and track your expenses?: Yes  Make your own meals?: No    Do your own shopping?: No    Previous Hospitalizations:   Any hospitalizations or ED visits within the last 12 months?: Yes    How many hospitalizations have you had in the last year?: 1-2    Advance Care Planning:   Living will: Yes    Durable POA for healthcare: Yes    Advanced directive: Yes      Cognitive Screening:   Provider or family/friend/caregiver concerned regarding cognition?: No    PREVENTIVE SCREENINGS      Cardiovascular Screening:    General: Screening Current      Diabetes Screening:     General: Screening Current      Colorectal Cancer Screening:     General: Screening Current      Breast Cancer Screening:     General: Screening Current      Osteoporosis Screening:    General: Screening Not Indicated      Abdominal Aortic Aneurysm (AAA) Screening:        General: Screening Not Indicated      Lung Cancer Screening:     General: Screening Not Indicated      Hepatitis C Screening:    General: Screening Not Indicated    Screening, Brief Intervention, and Referral to Treatment (SBIRT)    Screening  Typical number of drinks in a day: 0  Typical number of drinks in a week: 0  Interpretation: Low risk drinking behavior.    AUDIT-C Screenin) How often did you have a drink containing alcohol in the past year? never  2) How many drinks did you have on a typical day when you were drinking in the past year? 0  3) How often did you have 6 or more drinks on one occasion in the past year? never    AUDIT-C Score: 0  Interpretation:  "Score 0-2 (female): Negative screen for alcohol misuse    Single Item Drug Screening:  How often have you used an illegal drug (including marijuana) or a prescription medication for non-medical reasons in the past year? never    Single Item Drug Screen Score: 0  Interpretation: Negative screen for possible drug use disorder    Review of Current Opioid Use    Opioid Risk Tool (ORT) Interpretation: Complete Opioid Risk Tool (ORT)    Social Determinants of Health     Financial Resource Strain: High Risk (1/24/2023)    Overall Financial Resource Strain (CARDIA)     Difficulty of Paying Living Expenses: Hard   Food Insecurity: Food Insecurity Present (9/15/2024)    Hunger Vital Sign     Worried About Running Out of Food in the Last Year: Sometimes true     Ran Out of Food in the Last Year: Sometimes true   Transportation Needs: No Transportation Needs (9/15/2024)    PRAPARE - Transportation     Lack of Transportation (Medical): No     Lack of Transportation (Non-Medical): No   Housing Stability: High Risk (9/15/2024)    Housing Stability Vital Sign     Unable to Pay for Housing in the Last Year: Yes     Homeless in the Last Year: No   Utilities: Not At Risk (9/15/2024)    [facility] Utilities     Threatened with loss of utilities: No     No results found.    Objective     /78 (BP Location: Left arm, Patient Position: Sitting, Cuff Size: Standard)   Pulse 82   Temp 98.1 °F (36.7 °C) (Temporal)   Resp 18   Ht 5' 6\" (1.676 m)   Wt 88 kg (194 lb)   LMP 10/10/2016   SpO2 98%   BMI 31.31 kg/m²     Physical Exam  Vitals reviewed.   Constitutional:       General: She is not in acute distress.     Appearance: She is well-developed. She is not diaphoretic.   HENT:      Head: Normocephalic and atraumatic.      Right Ear: Hearing, tympanic membrane, ear canal and external ear normal.      Left Ear: Hearing, tympanic membrane, ear canal and external ear normal.      Nose: Nose normal.      Mouth/Throat:      Mouth: Mucous " membranes are moist.      Pharynx: Oropharynx is clear. Uvula midline. No oropharyngeal exudate.   Eyes:      General: No scleral icterus.        Right eye: No discharge.         Left eye: No discharge.      Conjunctiva/sclera: Conjunctivae normal.   Neck:      Thyroid: No thyromegaly.      Vascular: No carotid bruit.   Cardiovascular:      Rate and Rhythm: Normal rate and regular rhythm.      Heart sounds: Normal heart sounds. No murmur heard.  Pulmonary:      Effort: Pulmonary effort is normal. No respiratory distress.      Breath sounds: Normal breath sounds. No wheezing.   Abdominal:      General: Bowel sounds are normal. There is no distension.      Palpations: Abdomen is soft. There is no mass.      Tenderness: There is no abdominal tenderness. There is no guarding or rebound.   Musculoskeletal:      Cervical back: Neck supple.   Lymphadenopathy:      Cervical: No cervical adenopathy.   Skin:     General: Skin is warm and dry.      Findings: No erythema or rash.   Neurological:      Mental Status: She is alert and oriented to person, place, and time.   Psychiatric:         Behavior: Behavior normal.         Thought Content: Thought content normal.         Judgment: Judgment normal.

## 2024-10-14 ENCOUNTER — OFFICE VISIT (OUTPATIENT)
Dept: PAIN MEDICINE | Facility: CLINIC | Age: 54
End: 2024-10-14
Payer: COMMERCIAL

## 2024-10-14 ENCOUNTER — APPOINTMENT (OUTPATIENT)
Dept: LAB | Facility: MEDICAL CENTER | Age: 54
End: 2024-10-14
Payer: COMMERCIAL

## 2024-10-14 VITALS
HEIGHT: 66 IN | OXYGEN SATURATION: 99 % | TEMPERATURE: 98.2 F | WEIGHT: 195 LBS | HEART RATE: 83 BPM | RESPIRATION RATE: 16 BRPM | DIASTOLIC BLOOD PRESSURE: 85 MMHG | SYSTOLIC BLOOD PRESSURE: 143 MMHG | BODY MASS INDEX: 31.34 KG/M2

## 2024-10-14 DIAGNOSIS — Z98.1 STATUS POST LUMBAR SPINAL FUSION: ICD-10-CM

## 2024-10-14 DIAGNOSIS — Z79.891 LONG-TERM CURRENT USE OF OPIATE ANALGESIC: ICD-10-CM

## 2024-10-14 DIAGNOSIS — Z13.89 SCREENING FOR MULTIPLE CONDITIONS: ICD-10-CM

## 2024-10-14 DIAGNOSIS — G89.4 CHRONIC PAIN SYNDROME: Primary | ICD-10-CM

## 2024-10-14 DIAGNOSIS — M43.16 ANTEROLISTHESIS OF LUMBAR SPINE: ICD-10-CM

## 2024-10-14 DIAGNOSIS — M54.16 LUMBAR RADICULOPATHY: ICD-10-CM

## 2024-10-14 DIAGNOSIS — M51.26 LUMBAR DISC HERNIATION: ICD-10-CM

## 2024-10-14 DIAGNOSIS — E55.9 VITAMIN D DEFICIENCY: ICD-10-CM

## 2024-10-14 LAB
25(OH)D3 SERPL-MCNC: 31.4 NG/ML (ref 30–100)
ALBUMIN SERPL BCG-MCNC: 4.4 G/DL (ref 3.5–5)
ALP SERPL-CCNC: 85 U/L (ref 34–104)
ALT SERPL W P-5'-P-CCNC: 12 U/L (ref 7–52)
ANION GAP SERPL CALCULATED.3IONS-SCNC: 8 MMOL/L (ref 4–13)
AST SERPL W P-5'-P-CCNC: 11 U/L (ref 13–39)
BILIRUB SERPL-MCNC: 0.36 MG/DL (ref 0.2–1)
BUN SERPL-MCNC: 12 MG/DL (ref 5–25)
CALCIUM SERPL-MCNC: 9.3 MG/DL (ref 8.4–10.2)
CHLORIDE SERPL-SCNC: 105 MMOL/L (ref 96–108)
CHOLEST SERPL-MCNC: 178 MG/DL
CO2 SERPL-SCNC: 28 MMOL/L (ref 21–32)
CREAT SERPL-MCNC: 0.67 MG/DL (ref 0.6–1.3)
ERYTHROCYTE [DISTWIDTH] IN BLOOD BY AUTOMATED COUNT: 16.4 % (ref 11.6–15.1)
GFR SERPL CREATININE-BSD FRML MDRD: 98 ML/MIN/1.73SQ M
GLUCOSE P FAST SERPL-MCNC: 82 MG/DL (ref 65–99)
HCT VFR BLD AUTO: 47.5 % (ref 34.8–46.1)
HDLC SERPL-MCNC: 58 MG/DL
HGB BLD-MCNC: 15.2 G/DL (ref 11.5–15.4)
LDLC SERPL CALC-MCNC: 95 MG/DL (ref 0–100)
MCH RBC QN AUTO: 28.8 PG (ref 26.8–34.3)
MCHC RBC AUTO-ENTMCNC: 32 G/DL (ref 31.4–37.4)
MCV RBC AUTO: 90 FL (ref 82–98)
PLATELET # BLD AUTO: 278 THOUSANDS/UL (ref 149–390)
PMV BLD AUTO: 10.7 FL (ref 8.9–12.7)
POTASSIUM SERPL-SCNC: 3.8 MMOL/L (ref 3.5–5.3)
PROT SERPL-MCNC: 6.9 G/DL (ref 6.4–8.4)
RBC # BLD AUTO: 5.27 MILLION/UL (ref 3.81–5.12)
SODIUM SERPL-SCNC: 141 MMOL/L (ref 135–147)
TRIGL SERPL-MCNC: 125 MG/DL
WBC # BLD AUTO: 7.79 THOUSAND/UL (ref 4.31–10.16)

## 2024-10-14 PROCEDURE — 82306 VITAMIN D 25 HYDROXY: CPT

## 2024-10-14 PROCEDURE — 99214 OFFICE O/P EST MOD 30 MIN: CPT | Performed by: NURSE PRACTITIONER

## 2024-10-14 PROCEDURE — 85027 COMPLETE CBC AUTOMATED: CPT

## 2024-10-14 PROCEDURE — 80061 LIPID PANEL: CPT

## 2024-10-14 PROCEDURE — 36415 COLL VENOUS BLD VENIPUNCTURE: CPT

## 2024-10-14 PROCEDURE — G2211 COMPLEX E/M VISIT ADD ON: HCPCS | Performed by: NURSE PRACTITIONER

## 2024-10-14 PROCEDURE — 80053 COMPREHEN METABOLIC PANEL: CPT

## 2024-10-14 RX ORDER — OXYCODONE HYDROCHLORIDE 5 MG/1
5 TABLET ORAL EVERY 4 HOURS PRN
Qty: 90 TABLET | Refills: 0 | Status: SHIPPED | OUTPATIENT
Start: 2024-11-11

## 2024-10-14 RX ORDER — METHOCARBAMOL 750 MG/1
750 TABLET, FILM COATED ORAL 3 TIMES DAILY
Qty: 90 TABLET | Refills: 1 | Status: SHIPPED | OUTPATIENT
Start: 2024-10-14

## 2024-10-14 RX ORDER — GABAPENTIN 600 MG/1
TABLET ORAL
Qty: 150 TABLET | Refills: 1 | Status: SHIPPED | OUTPATIENT
Start: 2024-10-14

## 2024-10-14 RX ORDER — OXYCODONE HYDROCHLORIDE 5 MG/1
5 TABLET ORAL EVERY 8 HOURS PRN
Qty: 90 TABLET | Refills: 0 | Status: SHIPPED | OUTPATIENT
Start: 2024-10-14 | End: 2024-10-23

## 2024-10-14 NOTE — PROGRESS NOTES
Assessment:  1. Chronic pain syndrome    2. Status post lumbar spinal fusion    3. Lumbar disc herniation    4. Lumbar radiculopathy    5. Anterolisthesis of lumbar spine    6. Long-term current use of opiate analgesic        Plan:  While the patient was in the office today, I did have a thorough conversation regarding their chronic pain syndrome, medication management, and treatment plan options.  Patient is being seen for a follow-up visit.  She continues with low back and bilateral leg pain.  She underwent L3-S1 fusion/revision on 4/24/2024.  Unfortunately, she denies any improvement in her symptoms.  She underwent a left-sided L5-S1 transforaminal epidural steroid injection on 7/8/2024 without any relief.    Today, we briefly discussed neuromodulation.  I provided her with information to take home and read over.    Continue oxycodone 5 mg every 8 hours as needed for pain.  The patient's opioid scripts were sent to their pharmacy electronically and was given a 2 month supply of prescriptions with a Do Not Fill date(s) of 10/14/2024, 11/11/2024.    Continue gabapentin 600 mg twice daily and 1800 mg at bedtime.  A prescription was sent to her pharmacy with a refill.    Continue Robaxin 750 mg 3 times daily if needed for spasms.  A prescription was sent to her pharmacy with a refill.    There are risks associated with opioid medications, including dependence, addiction and tolerance. The patient understands and agrees to use these medications only as prescribed. Potential side effects of the medications include, but are not limited to, constipation, drowsiness, addiction, impaired judgment and risk of fatal overdose if not taken as prescribed. The patient was warned against driving while taking sedation medications.  Sharing medications is a felony. At this point in time, the patient is showing no signs of addiction, abuse, diversion or suicidal ideation.    [place] Prescription Drug Monitoring Program report  was reviewed and was appropriate     The patient will follow-up in 2 months for medication prescription refill and reevaluation. The patient was advised to contact the office should their symptoms worsen in the interim. The patient was agreeable and verbalized an understanding.    History of Present Illness:  The patient is a 54 y.o. female who presents for a follow up office visit in regards to Back Pain.   The patient’s current symptoms include complaints of low back and bilateral leg pain.  Current pain level is an 8/10.  Quality pain is described as burning, sharp, throbbing, shooting, pins-and-needles.    Current pain medications includes: Oxycodone 5 mg every 8 hours as needed for pain, gabapentin 600/600/1800, Robaxin 750 mg 3 times daily if needed for spasms.  The patient reports that this regimen is providing 10% % pain relief.  The patient is reporting no side effects from this pain medication regimen.    I have personally reviewed and/or updated the patient's past medical history, past surgical history, family history, social history, current medications, allergies, and vital signs today.         Review of Systems  Review of Systems   Constitutional: Negative.    HENT: Negative.     Eyes: Negative.    Respiratory: Negative.     Cardiovascular: Negative.    Gastrointestinal: Negative.    Endocrine: Negative.    Genitourinary: Negative.    Musculoskeletal:  Positive for gait problem.        Decreased ROM, pain in legs   Skin: Negative.    Allergic/Immunologic: Negative.    Hematological: Negative.    Psychiatric/Behavioral: Negative.             Past Medical History:   Diagnosis Date    Anxiety     Back pain     Depression     Insomnia        Past Surgical History:   Procedure Laterality Date    BACK SURGERY  2006    cage in lumbar L5 area    BACK SURGERY      CAUDAL BLOCK N/A 11/15/2022    Procedure: BLOCK / INJECTION CAUDAL;  Surgeon: Nuno Farrar MD;  Location: [facility];  Service: Pain  Management     CHOLECYSTECTOMY      DECOMPRESSION SPINE LUMBAR POSTERIOR  2024    LUMBAR DISC SURGERY      POSTERIOR FUSION LUMBAR SPINE  2024    ID REPAIR FIRST ABDOMINAL WALL HERNIA N/A 2018    Procedure: REPAIR HERNIA VENTRAL with mesh;  Surgeon: David Chicas DO;  Location: [facility];  Service: General    ID SURGICAL ARTHROSCOPY SHOULDER W/ROTATOR CUFF RPR Left 2020    Procedure: SHOULDER ARTHROSCOPY, ROTATOR CUFF REPAIR, SYNEVECTOMY, ACROMIOPLASTY DEBRIDEMENT, INJECTION;  Surgeon: Brigido Mota DO;  Location:  MAIN OR;  Service: Orthopedics       Family History   Adopted: Yes   Family history unknown: Yes       Social History     Occupational History    Not on file   Tobacco Use    Smoking status: Former     Current packs/day: 0.00     Types: Cigarettes     Quit date: 10/17/2010     Years since quittin.0    Smokeless tobacco: Never    Tobacco comments:     Recently quit   Vaping Use    Vaping status: Never Used   Substance and Sexual Activity    Alcohol use: No    Drug use: No    Sexual activity: Not Currently     Partners: Female         Current Outpatient Medications:     busPIRone (BUSPAR) 15 mg tablet, Take 1 tablet (15 mg total) by mouth 3 (three) times a day, Disp: 270 tablet, Rfl: 0    celecoxib (CeleBREX) 200 mg capsule, , Disp: , Rfl:     gabapentin (Neurontin) 600 MG tablet, 1 PO BID and 3 PO QHS, Disp: 150 tablet, Rfl: 1    methocarbamol (ROBAXIN) 750 mg tablet, Take 1 tablet (750 mg total) by mouth 3 (three) times a day As needed for pain/spasms, Disp: 90 tablet, Rfl: 1    naloxone (NARCAN) 4 mg/0.1 mL nasal spray, Administer 1 spray into a nostril. If no response after 2-3 minutes, give another dose in the other nostril using a new spray., Disp: 1 each, Rfl: 1    [START ON 2024] oxyCODONE (Roxicodone) 5 immediate release tablet, Take 1 tablet (5 mg total) by mouth every 4 (four) hours as needed for moderate pain Max Daily Amount: 30 mg Do not start before  "November 11, 2024., Disp: 90 tablet, Rfl: 0    oxyCODONE (Roxicodone) 5 immediate release tablet, Take 1 tablet (5 mg total) by mouth every 8 (eight) hours as needed for moderate pain Max Daily Amount: 15 mg, Disp: 90 tablet, Rfl: 0    sertraline (ZOLOFT) 100 mg tablet, Take 2 tablets (200 mg total) by mouth daily, Disp: 180 tablet, Rfl: 0    No Known Allergies    Physical Exam:    /85   Pulse 83   Temp 98.2 °F (36.8 °C) (Temporal)   Resp 16   Ht 5' 6\" (1.676 m)   Wt 88.5 kg (195 lb)   LMP 10/10/2016   SpO2 99%   BMI 31.47 kg/m²     Constitutional:normal, well developed, well nourished, alert, in no distress and non-toxic and no overt pain behavior.  Eyes:anicteric  HEENT:grossly intact  Neck:supple, symmetric, trachea midline and no masses   Pulmonary:even and unlabored  Cardiovascular:No edema or pitting edema present  Skin:Normal without rashes or lesions and well hydrated  Psychiatric:Mood and affect appropriate  Neurologic:Cranial Nerves II-XII grossly intact  Musculoskeletal: Gait is slow and guarded.    Imaging  No orders to display       No orders of the defined types were placed in this encounter.     Impression    IMPRESSION:  Status post instrumented lumbar fusion, L3-S1.  See comment for  details.    COMPARISON: None  available.    COMMENT:  AP and lateral views of the lumbar spine are completed  postoperatively.    Osseous detail is limited at the fused levels on the lateral projection, likely  due to technical factors.  Posterior instrumented lumbar fusion noted spanning L3-S1, noting paired pedicle  screws with interlocking vertical bars.  There are interbody spacers at the  L3-L4, L4-L5 and L5-S1 disc spaces.  The L4-L5 spacer is not well demonstrated  on lateral projection.  Posterior decompression has been completed at the fused levels.  There is a surgical drain in place posteriorly.  Narrative    CLINICAL HISTORY:  L3-S1 Posterior Lumbar Decompressin, Posterior Lumbar  Interbody " Fusion, Instrumentation, Cage, Allograft, BMP, Autograft

## 2024-10-23 ENCOUNTER — TELEPHONE (OUTPATIENT)
Dept: FAMILY MEDICINE CLINIC | Facility: CLINIC | Age: 54
End: 2024-10-23

## 2024-10-23 DIAGNOSIS — U07.1 COVID-19: Primary | ICD-10-CM

## 2024-10-23 RX ORDER — NIRMATRELVIR AND RITONAVIR 300-100 MG
3 KIT ORAL 2 TIMES DAILY
Qty: 30 TABLET | Refills: 0 | Status: SHIPPED | OUTPATIENT
Start: 2024-10-23 | End: 2024-10-28

## 2024-10-23 NOTE — TELEPHONE ENCOUNTER
Patient started with sick symptoms on Monday, sore throat, sinus congestion, bad cough, headache, took covid test today and was Positive. Fidelia is out of office until next week. Patient would like Paxlovid sent to [facility].

## 2024-12-02 ENCOUNTER — OFFICE VISIT (OUTPATIENT)
Dept: PAIN MEDICINE | Facility: CLINIC | Age: 54
End: 2024-12-02
Payer: COMMERCIAL

## 2024-12-02 ENCOUNTER — APPOINTMENT (OUTPATIENT)
Dept: RADIOLOGY | Facility: MEDICAL CENTER | Age: 54
End: 2024-12-02
Payer: COMMERCIAL

## 2024-12-02 VITALS
HEIGHT: 66 IN | WEIGHT: 200.6 LBS | RESPIRATION RATE: 16 BRPM | OXYGEN SATURATION: 99 % | TEMPERATURE: 97.9 F | BODY MASS INDEX: 32.24 KG/M2 | DIASTOLIC BLOOD PRESSURE: 96 MMHG | SYSTOLIC BLOOD PRESSURE: 154 MMHG | HEART RATE: 76 BPM

## 2024-12-02 DIAGNOSIS — G89.4 CHRONIC PAIN SYNDROME: ICD-10-CM

## 2024-12-02 DIAGNOSIS — M79.605 LUMBAR PAIN WITH RADIATION DOWN LEFT LEG: ICD-10-CM

## 2024-12-02 DIAGNOSIS — F11.20 UNCOMPLICATED OPIOID DEPENDENCE (HCC): ICD-10-CM

## 2024-12-02 DIAGNOSIS — Z79.891 LONG-TERM CURRENT USE OF OPIATE ANALGESIC: ICD-10-CM

## 2024-12-02 DIAGNOSIS — M54.9 MID BACK PAIN: ICD-10-CM

## 2024-12-02 DIAGNOSIS — M54.16 LUMBAR RADICULOPATHY: ICD-10-CM

## 2024-12-02 DIAGNOSIS — M54.50 LUMBAR PAIN WITH RADIATION DOWN LEFT LEG: ICD-10-CM

## 2024-12-02 DIAGNOSIS — Z98.1 STATUS POST LUMBAR SPINAL FUSION: ICD-10-CM

## 2024-12-02 DIAGNOSIS — Z98.1 STATUS POST LUMBAR SPINAL FUSION: Primary | ICD-10-CM

## 2024-12-02 DIAGNOSIS — M51.26 LUMBAR DISC HERNIATION: ICD-10-CM

## 2024-12-02 DIAGNOSIS — M43.16 ANTEROLISTHESIS OF LUMBAR SPINE: ICD-10-CM

## 2024-12-02 PROCEDURE — 99214 OFFICE O/P EST MOD 30 MIN: CPT | Performed by: ANESTHESIOLOGY

## 2024-12-02 PROCEDURE — 72072 X-RAY EXAM THORAC SPINE 3VWS: CPT

## 2024-12-02 RX ORDER — OXYCODONE HYDROCHLORIDE 5 MG/1
5 TABLET ORAL 3 TIMES DAILY PRN
Qty: 90 TABLET | Refills: 0 | Status: SHIPPED | OUTPATIENT
Start: 2024-12-02 | End: 2025-01-01

## 2024-12-02 NOTE — PATIENT INSTRUCTIONS
Patient Education     Core Strengthening Exercises on Back or on Hands and Knees   About this topic   Your core muscles are in your chest, back, buttock, and stomach area. They are your abdominal, back, and pelvis muscles. These muscles help keep your body stable when using your arms or legs. They also help with balance and posture. There are many exercises you can do to keep these muscles strong.  If you have back problems like a compression fracture or a ruptured disc, doing some of these exercises could make your problem worse. Some of these exercises may cause lower back pain.  General   Before starting with a program, ask your doctor if you are healthy enough to do these exercises. Your doctor may have you work with a , chiropractor, or physical therapist to make a safe exercise program to meet your needs.  Strengthening Exercises   Strengthening exercises keep your muscles firm and strong. Start by repeating each exercise 2 to 3 times. Work up to doing each exercise 10 times. Try to do the exercises 2 to 3 times each day. Hold each exercise for 3 to 5 seconds. Do all exercises slowly.  Hip lifts ? Lie on your back with your knees bent and feet flat on the floor. Tighten your stomach muscles and push your heels into the floor to lift your buttocks off the floor. Relax.  Pelvic tilts ? Lie on your back with your knees bent and feet flat on the floor. Tighten your stomach muscles and press your lower back down to the floor. Relax.  Straight leg raises lying down ? Lie on your back with one leg straight. Bend your other knee so the foot is flat on the bed. Keeping your leg straight, lift the leg up to the level of your other knee. Lower it back down. Repeat with the other leg.  Knee flex lying down ? Lie on your back with both knees bent and your feet flat on the floor. Tighten your belly muscles. Raise one leg up and back down as if you are marching in slow motion. Keep belly muscles tight while you move  your leg. Switch legs. To make this exercise harder, raise both arms straight up in the air. Tighten your belly muscles. When you raise one leg up, reach the opposite arm over your head. Switch, moving the opposite arm and leg until you have done 10 repetitions on each side.  Abdominal crunches ? Lie on your back with both knees bent. Keep your feet flat on the floor. Place your hands in one of these positions. Try starting with the first position since it is the easiest. As you get better, use the other positions to make it harder.  Crunches with arms at sides.  Crunches with arms across chest.  Crunches with arms behind head. Be careful not to interlock your fingers behind your neck or head while doing crunches. This may add tension to your neck and cause strain.  Look at the ceiling. Tighten your belly muscles and lift your shoulders and upper back off the floor. Breathe out while you are doing this. Lower your shoulders to the floor. Breathe in while you are doing this. Relax your belly muscles all the way before starting another crunch.  Arm and leg lifts on hands and knees ? Start on your hands and knees. With all of these exercises, keep your back as level as possible. If you are having trouble with this, you may want to put a small object on your back such as a book. If it falls off, you are not keeping your back level enough during the exercise.  Lift one arm up to shoulder level and hold. Lower it back down. Now, lift up the other arm and hold.  Lift one leg up and kick it straight out until it is in line with your back and hold. Lower it back down. Now, lift up the other leg and hold.  Lift one arm and the OPPOSITE leg up at the same time and hold. Lower them down. Now, repeat using the other arm and leg. This is a very hard exercise. It may take time to be able to do this.               What will the results be?   Stronger core  Better balance  More toned belly and back muscles  Easier to do daily  activities  Better sports performance  Less low back pain  Helpful tips   Stay active and work out to keep your muscles strong and flexible.  Keep a healthy weight to avoid putting too much stress on your spine. Eat a healthy diet to keep your muscles healthy.  Be sure you do not hold your breath when exercising. This can raise your blood pressure. If you tend to hold your breath, try counting out loud when exercising. If any exercise bothers you, stop right away.  Try walking or cycling at an easy pace for a few minutes to warm up your muscles. Do this again after exercising.  Exercise may be slightly uncomfortable, but you should not have sharp pains. If you do get sharp pains, stop what you are doing. If the sharp pains continue, call your doctor.  Last Reviewed Date   2021-03-18  Consumer Information Use and Disclaimer   This generalized information is a limited summary of diagnosis, treatment, and/or medication information. It is not meant to be comprehensive and should be used as a tool to help the user understand and/or assess potential diagnostic and treatment options. It does NOT include all information about conditions, treatments, medications, side effects, or risks that may apply to a specific patient. It is not intended to be medical advice or a substitute for the medical advice, diagnosis, or treatment of a health care provider based on the health care provider's examination and assessment of a patient’s specific and unique circumstances. Patients must speak with a health care provider for complete information about their health, medical questions, and treatment options, including any risks or benefits regarding use of medications. This information does not endorse any treatments or medications as safe, effective, or approved for treating a specific patient. [employer] and its affiliates disclaim any warranty or liability relating to this information or the use thereof. The use of this information  is governed by the Terms of Use, available at https://www.woltersDoAppuwer.com/en/know/clinical-effectiveness-terms   Copyright   Copyright © 2024 [employer] and its affiliates and/or licensors. All rights reserved.

## 2024-12-02 NOTE — PROGRESS NOTES
Assessment:  1. Status post lumbar spinal fusion    2. Lumbar pain with radiation down left leg    3. Lumbar disc herniation    4. Lumbar radiculopathy    5. Long-term current use of opiate analgesic    6. Chronic pain syndrome    7. Mid back pain        Plan:  Patient is a 54-year-old female complains of low back pain and bilateral pain chronic pain syndrome secondary to lumbar degenerative disease, lumbar radiculopathy status post lumbar fusion and anterior listhesis of the spine presents to office for follow-up visit.  Patient patient denies any significant alleviation of symptoms from epidural steroid injection and notes some severe pain and decreased functionality status post fusion.  Patient reports in the morning and she is functional but once midday hits her left leg starts getting weak and crampy low back pain starts to hurt difficulty sitting difficulty standing.  At this time feels pertinent to try a more interventional approach to control patient's pain  1.  Nevro spinal cord stimulator brochure provided to the patient  2.  We will prepare patient in regards to spinal cord stimulator trial and implantation  3.  We will order new MRI of thoracic spine in preparation for surgical intervention  4.  I will order an x-ray of thoracic spine    History of Present Illness:  The patient is a 54 y.o. female who presents for a follow up office visit in regards to Back Pain.   The patient’s current symptoms include 7 out of 10 constant sharp, pressure-like, shooting, numbness, pins-and-needles worse in the evening and at nighttime.    Current pain medications includes: Oxycodone 5 mg Q 3 times daily.  The patient reports that this regimen is providing 30% pain relief.  The patient is reporting no side effects from this pain medication regimen.    I have personally reviewed and/or updated the patient's past medical history, past surgical history, family history, social history, current medications, allergies, and  vital signs today.         Review of Systems  Review of Systems   Musculoskeletal:  Positive for back pain and gait problem.   All other systems reviewed and are negative.          Past Medical History:   Diagnosis Date    Anxiety     Back pain     Depression     Insomnia        Past Surgical History:   Procedure Laterality Date    BACK SURGERY  2006    cage in lumbar L5 area    BACK SURGERY      CAUDAL BLOCK N/A 11/15/2022    Procedure: BLOCK / INJECTION CAUDAL;  Surgeon: Nuno Farrar MD;  Location: [facility];  Service: Pain Management     CHOLECYSTECTOMY      DECOMPRESSION SPINE LUMBAR POSTERIOR  2024    LUMBAR DISC SURGERY      POSTERIOR FUSION LUMBAR SPINE  2024    IA REPAIR FIRST ABDOMINAL WALL HERNIA N/A 2018    Procedure: REPAIR HERNIA VENTRAL with mesh;  Surgeon: David Chicas DO;  Location: [facility];  Service: General    IA SURGICAL ARTHROSCOPY SHOULDER W/ROTATOR CUFF RPR Left 2020    Procedure: SHOULDER ARTHROSCOPY, ROTATOR CUFF REPAIR, SYNEVECTOMY, ACROMIOPLASTY DEBRIDEMENT, INJECTION;  Surgeon: Brigido Mota DO;  Location:  MAIN OR;  Service: Orthopedics       Family History   Adopted: Yes   Family history unknown: Yes       Social History     Occupational History    Not on file   Tobacco Use    Smoking status: Former     Current packs/day: 0.00     Types: Cigarettes     Quit date: 10/17/2010     Years since quittin.1    Smokeless tobacco: Never    Tobacco comments:     Recently quit   Vaping Use    Vaping status: Never Used   Substance and Sexual Activity    Alcohol use: No    Drug use: No    Sexual activity: Not Currently     Partners: Female         Current Outpatient Medications:     busPIRone (BUSPAR) 15 mg tablet, Take 1 tablet (15 mg total) by mouth 3 (three) times a day, Disp: 270 tablet, Rfl: 0    celecoxib (CeleBREX) 200 mg capsule, , Disp: , Rfl:     gabapentin (Neurontin) 600 MG tablet, 1 PO BID and 3 PO QHS, Disp: 150 tablet, Rfl: 1    methocarbamol  "(ROBAXIN) 750 mg tablet, Take 1 tablet (750 mg total) by mouth 3 (three) times a day As needed for pain/spasms, Disp: 90 tablet, Rfl: 1    naloxone (NARCAN) 4 mg/0.1 mL nasal spray, Administer 1 spray into a nostril. If no response after 2-3 minutes, give another dose in the other nostril using a new spray., Disp: 1 each, Rfl: 1    oxyCODONE (Roxicodone) 5 immediate release tablet, Take 1 tablet (5 mg total) by mouth every 4 (four) hours as needed for moderate pain Max Daily Amount: 30 mg Do not start before November 11, 2024., Disp: 90 tablet, Rfl: 0    sertraline (ZOLOFT) 100 mg tablet, Take 2 tablets (200 mg total) by mouth daily, Disp: 180 tablet, Rfl: 0    No Known Allergies    Physical Exam:    /96   Pulse 76   Temp 97.9 °F (36.6 °C)   Resp 16   Ht 5' 6\" (1.676 m)   Wt 91 kg (200 lb 9.6 oz)   LMP 10/10/2016   SpO2 99%   BMI 32.38 kg/m²     Constitutional:normal, well developed, well nourished, alert, in no distress and non-toxic and no overt pain behavior.  Eyes:anicteric  HEENT:grossly intact  Neck:supple, symmetric, trachea midline and no masses   Pulmonary:even and unlabored  Cardiovascular:No edema or pitting edema present  Skin:Normal without rashes or lesions and well hydrated  Psychiatric:Mood and affect appropriate  Neurologic:Cranial Nerves II-XII grossly intact  Musculoskeletal:antalgic    Thoracic Spine examination demonstrates. Bilateral thoracic paraspinal musculature tender to palpation with muscle spasms noted throughout her paraspinals.    Lumbar/Sacral Spine examination demonstrates.  Decreased range of motion lumbar spine with pain upon: flexion, lateral rotation to the left/right, and bending to the left/right.  Bilateral lumbar paraspinals tender to palpation. Muscle spasms noted in the lumbar area bilaterally. 4/5 lower extremity strength in all muscle groups bilaterally. Positive seated straight leg raise for bilateral lower extremities.  Sensitivity to light touch intact " bilateral lower extremities. 2+ reflexes in the patella and Achilles.  No ankle clonus    Imaging      Study Result    Narrative & Impression   MRI LUMBAR SPINE WITH AND WITHOUT CONTRAST     INDICATION: M54.16: Radiculopathy, lumbar region  Z98.1: Arthrodesis status.     COMPARISON: MRI lumbar spine 12/28/2023     TECHNIQUE:  Multiplanar, multisequence imaging of the lumbar spine was performed before and after gadolinium administration. .        IV Contrast:  8 mL of Gadobutrol injection (SINGLE-DOSE)     IMAGE QUALITY:  Diagnostic     FINDINGS:     POSTSURGICAL FINDINGS: Previous PLIF L4-5 with now L3-S1 Posterior lumbar decompression and interbody fusion. Interbody spacer devices are noted at the L3-4 through the L5-S1 levels with bilateral decompressive laminectomies. There is a peripherally   enhancing postoperative fluid collection within the laminectomy bed .     LUMBAR DISC SPACES:        L1-2: No focal disc herniation, central canal stenosis, or neural foraminal narrowing.     L2-3: Mild diffuse disc bulge is again noted with superimposed bilateral foraminal disc protrusions. Bilateral ligamentum flavum and facet hypertrophy.. Mild central canal and bilateral subarticular/lateral recess narrowing, unchanged.  No neural   foraminal stenosis.     L3-4: New interbody spacer device.. The central canal is now patent status post bilateral decompressive laminectomies. No neural foraminal stenosis. There is enhancing granulation tissue within the laminectomy bed and central canal without impingement of   the thecal sac.     L4-5: Stable interbody spacer device. The central canal remains patent status post bilateral decompressive laminectomies. Unchanged mild bilateral neural foraminal stenosis.There is enhancing granulation tissue within the laminectomy bed and central   canal without impingement of the thecal sac.     L5-S1: Stable grade 1 anterolisthesis. New interbody spacer device.. The central canal is now  patent status post bilateral decompressive laminectomies. Persistent moderate right and moderate to severe left neural foraminal stenosis with encroachment of   the exiting left L5 nerve root.There is enhancing granulation tissue within the laminectomy bed and central canal without impingement of the thecal sac.     OTHER FINDINGS: Small bilateral parapelvic renal cysts are noted.     IMPRESSION:     Previous PLIF L4-5 with now L3-S1 Posterior lumbar decompression and interbody fusion.     L3-4 through L5-S1 decompressive laminectomies with improved patency of the central canal at the L3-4 and L5-S1 levels. A peripherally enhancing postoperative fluid collection is noted within the laminectomy bed, probable postoperative seroma. Moderate   to severe left L5-S1 neural foraminal narrowing is again noted with encroachment of the exiting left L5 nerve root.     Stable L2-3 disc bulge with mild central canal narrowing. There is no new disc herniation.                       Workstation performed: [id number]             No orders to display       No orders of the defined types were placed in this encounter.

## 2024-12-03 DIAGNOSIS — F32.A ANXIETY AND DEPRESSION: ICD-10-CM

## 2024-12-03 DIAGNOSIS — G89.4 CHRONIC PAIN SYNDROME: ICD-10-CM

## 2024-12-03 DIAGNOSIS — F41.9 ANXIETY AND DEPRESSION: ICD-10-CM

## 2024-12-03 DIAGNOSIS — Z98.1 STATUS POST LUMBAR SPINAL FUSION: ICD-10-CM

## 2024-12-03 DIAGNOSIS — M54.16 LUMBAR RADICULOPATHY: ICD-10-CM

## 2024-12-03 DIAGNOSIS — M51.26 LUMBAR DISC HERNIATION: ICD-10-CM

## 2024-12-03 RX ORDER — METHOCARBAMOL 750 MG/1
TABLET, FILM COATED ORAL
Qty: 90 TABLET | Refills: 1 | Status: SHIPPED | OUTPATIENT
Start: 2024-12-03

## 2024-12-03 RX ORDER — GABAPENTIN 600 MG/1
TABLET ORAL
Qty: 150 TABLET | Refills: 1 | Status: SHIPPED | OUTPATIENT
Start: 2024-12-03

## 2024-12-04 LAB
6MAM UR QL CFM: NEGATIVE NG/ML
7AMINOCLONAZEPAM UR QL CFM: NEGATIVE NG/ML
A-OH ALPRAZ UR QL CFM: NEGATIVE NG/ML
ACCEPTABLE CREAT UR QL: NORMAL MG/DL
ACCEPTIBLE SP GR UR QL: NORMAL
AMITRIP UR QL CFM: NEGATIVE NG/ML
AMPHET UR QL CFM: NEGATIVE NG/ML
AMPHET UR QL CFM: NEGATIVE NG/ML
BUPRENORPHINE UR QL CFM: NEGATIVE NG/ML
BZE UR QL CFM: NEGATIVE NG/ML
DESIPRAMINE UR QL CFM: NEGATIVE NG/ML
EDDP UR QL CFM: NEGATIVE NG/ML
ETHYL GLUCURONIDE UR QL CFM: NEGATIVE NG/ML
ETHYL SULFATE UR QL SCN: NEGATIVE NG/ML
FENTANYL UR QL CFM: NEGATIVE NG/ML
FLUOXETINE UR QL CFM: NEGATIVE NG/ML
GLIADIN IGG SER IA-ACNC: NEGATIVE NG/ML
GLUCOSE 30M P 50 G LAC PO SERPL-MCNC: NEGATIVE NG/ML
HYDROCODONE UR QL CFM: NEGATIVE NG/ML
HYDROMORPHONE UR QL CFM: NEGATIVE NG/ML
LORAZEPAM UR QL CFM: NEGATIVE NG/ML
MDMA UR QL CFM: NEGATIVE NG/ML
ME-PHENIDATE UR QL CFM: NEGATIVE NG/ML
MEPERIDINE UR QL CFM: NEGATIVE NG/ML
METHADONE UR QL CFM: NEGATIVE NG/ML
METHAMPHET UR QL CFM: NEGATIVE NG/ML
MORPHINE UR QL CFM: NEGATIVE NG/ML
NITRITE UR QL: NORMAL UG/ML
NORBUPRENORPHINE UR QL CFM: NEGATIVE NG/ML
NORDIAZEPAM UR QL CFM: NEGATIVE NG/ML
NORFENTANYL UR QL CFM: NEGATIVE NG/ML
NORFLUOXETINE UR QL CFM: NEGATIVE NG/ML
NORHYDROCODONE UR QL CFM: NEGATIVE NG/ML
NORMEPERIDINE UR QL CFM: NEGATIVE NG/ML
NOROXYCODONE UR QL CFM: NORMAL NG/ML
NORTRIP UR QL CFM: NEGATIVE NG/ML
OLANZAPINE QUANTIFICATION: NEGATIVE NG/ML
OPC-3373 QUANTIFICATION: NEGATIVE NG/ML
OXAZEPAM UR QL CFM: NEGATIVE NG/ML
OXYCODONE UR QL CFM: NORMAL NG/ML
OXYMORPHONE UR QL CFM: NORMAL NG/ML
OXYMORPHONE UR QL CFM: NORMAL NG/ML
PARA-FLUOROFENTANYL QUANTIFICATION: NORMAL NG/ML
PROLACTIN SERPL-MCNC: NEGATIVE NG/ML
RESULT ALL_PRESCRIBED MEDS AND SPECIAL INSTRUCTIONS: NORMAL
SECOBARBITAL UR QL CFM: NEGATIVE NG/ML
SL AMB 7-OH-MITRAGYNINE (KRATOM ALKALOID) QUANTIFICATION: NEGATIVE NG/ML
SL AMB ACETYL FENTANYL QUANTIFICATION: NORMAL NG/ML
SL AMB ACETYL NORFENTANYL QUANTIFICATION: NORMAL NG/ML
SL AMB ACRYL FENTANYL QUANTIFICATION: NORMAL NG/ML
SL AMB CARFENTANIL QUANTIFICATION: NORMAL NG/ML
SL AMB CITALOPRAM/ESCITALOPRAM QUANTIFICATION: NEGATIVE NG/ML
SL AMB CITALOPRAM/ESCITALOPRAM QUANTIFICATION: NEGATIVE NG/ML
SL AMB CLOZAPINE QUANTIFICATION: NEGATIVE NG/ML
SL AMB CTHC (MARIJUANA METABOLITE) QUANTIFICATION: NEGATIVE NG/ML
SL AMB DEXTRORPHAN (DEXTROMETHORPHAN METABOLITE) QUANT: NEGATIVE NG/ML
SL AMB HALOPERIDOL  QUANTIFICATION: NEGATIVE NG/ML
SL AMB HALOPERIDOL METABOLITE QUANTIFICATION: NEGATIVE NG/ML
SL AMB HYDROXYBUPROPION QUANTIFICATION: NEGATIVE NG/ML
SL AMB HYDROXYRISPERIDONE QUANTIFICATION: NEGATIVE NG/ML
SL AMB N-DESMETHYL-TRAMADOL QUANTIFICATION: NEGATIVE NG/ML
SL AMB N-DESMETHYLCLOZAPINE QUANTIFICATION: NEGATIVE NG/ML
SL AMB NORQUETIAPINE QUANTIFICATION: NEGATIVE NG/ML
SL AMB PHENTERMINE QUANTIFICATION: NEGATIVE NG/ML
SL AMB PREGABALIN QUANTIFICATION: NEGATIVE NG/ML
SL AMB QUETIAPINE QUANTIFICATION: NEGATIVE NG/ML
SL AMB RISPERIDONE QUANTIFICATION: NEGATIVE NG/ML
SL AMB RITALINIC ACID QUANTIFICATION: NEGATIVE NG/ML
SPECIMEN PH ACCEPTABLE UR: NORMAL
TAPENTADOL UR QL CFM: NEGATIVE NG/ML
TEMAZEPAM UR QL CFM: NEGATIVE NG/ML
TEMAZEPAM UR QL CFM: NEGATIVE NG/ML
TRAMADOL UR QL CFM: NEGATIVE NG/ML
URATE/CREAT 24H UR: NEGATIVE NG/ML

## 2024-12-04 RX ORDER — BUSPIRONE HYDROCHLORIDE 15 MG/1
TABLET ORAL
Qty: 270 TABLET | Refills: 1 | Status: SHIPPED | OUTPATIENT
Start: 2024-12-04

## 2024-12-04 RX ORDER — SERTRALINE HYDROCHLORIDE 100 MG/1
TABLET, FILM COATED ORAL
Qty: 180 TABLET | Refills: 1 | Status: SHIPPED | OUTPATIENT
Start: 2024-12-04

## 2024-12-17 ENCOUNTER — OFFICE VISIT (OUTPATIENT)
Dept: FAMILY MEDICINE CLINIC | Facility: CLINIC | Age: 54
End: 2024-12-17
Payer: COMMERCIAL

## 2024-12-17 VITALS
RESPIRATION RATE: 18 BRPM | BODY MASS INDEX: 32.02 KG/M2 | TEMPERATURE: 97.5 F | WEIGHT: 199.2 LBS | OXYGEN SATURATION: 97 % | HEART RATE: 80 BPM | DIASTOLIC BLOOD PRESSURE: 78 MMHG | HEIGHT: 66 IN | SYSTOLIC BLOOD PRESSURE: 126 MMHG

## 2024-12-17 DIAGNOSIS — R05.1 ACUTE COUGH: Primary | ICD-10-CM

## 2024-12-17 PROCEDURE — 99213 OFFICE O/P EST LOW 20 MIN: CPT | Performed by: INTERNAL MEDICINE

## 2024-12-17 PROCEDURE — 87636 SARSCOV2 & INF A&B AMP PRB: CPT | Performed by: INTERNAL MEDICINE

## 2024-12-17 PROCEDURE — G2211 COMPLEX E/M VISIT ADD ON: HCPCS | Performed by: INTERNAL MEDICINE

## 2024-12-17 RX ORDER — AMOXICILLIN 500 MG/1
500 CAPSULE ORAL EVERY 8 HOURS SCHEDULED
Qty: 21 CAPSULE | Refills: 0 | Status: SHIPPED | OUTPATIENT
Start: 2024-12-17 | End: 2024-12-24

## 2024-12-17 RX ORDER — DEXTROMETHORPHAN HYDROBROMIDE AND PROMETHAZINE HYDROCHLORIDE 15; 6.25 MG/5ML; MG/5ML
5 SYRUP ORAL 4 TIMES DAILY PRN
Qty: 240 ML | Refills: 1 | Status: SHIPPED | OUTPATIENT
Start: 2024-12-17

## 2024-12-17 RX ORDER — BENZONATATE 100 MG/1
100 CAPSULE ORAL 3 TIMES DAILY PRN
Qty: 20 CAPSULE | Refills: 0 | Status: SHIPPED | OUTPATIENT
Start: 2024-12-17 | End: 2024-12-24

## 2024-12-17 NOTE — PROGRESS NOTES
"Name: Calista Umana      : 1970      MRN: [id number]  Encounter Provider: Sandhya Zelaya DO  Encounter Date: 2024   Encounter department: [facility]  :  Assessment & Plan  Acute cough    Orders:  •  amoxicillin (AMOXIL) 500 mg capsule; Take 1 capsule (500 mg total) by mouth every 8 (eight) hours for 7 days  •  benzonatate (TESSALON PERLES) 100 mg capsule; Take 1 capsule (100 mg total) by mouth 3 (three) times a day as needed for cough for up to 7 days  •  promethazine-dextromethorphan (PHENERGAN-DM) 6.25-15 mg/5 mL oral syrup; Take 5 mL by mouth 4 (four) times a day as needed for cough  •  Covid/Flu- Office Collect Normal; Future  Swab for completion although pt had flu shot   Stay hydrated rest Start meds today pending swab          History of Present Illness     HPI  Pt with sore throat, diarrhea, achiness, fever, cough since  A friend has been ill and feels she caught from her She still has loose stools and tolerating liquids but not much food Has cough which intereferes with sleep taking ibuprofen as needed Last dose last pm   Review of Systems   Constitutional:  Positive for chills, fatigue and fever.   HENT:  Positive for congestion and sore throat.    Respiratory:  Positive for cough. Negative for shortness of breath.    Cardiovascular:  Negative for chest pain, palpitations and leg swelling.   Gastrointestinal:  Positive for diarrhea.   Genitourinary: Negative.    Musculoskeletal:  Positive for myalgias.   Neurological:  Positive for light-headedness and headaches. Negative for dizziness.   Psychiatric/Behavioral:  Positive for sleep disturbance.      Objective   /78   Pulse 80   Temp 97.5 °F (36.4 °C) (Temporal)   Resp 18   Ht 5' 6\" (1.676 m)   Wt 90.4 kg (199 lb 3.2 oz)   LMP 10/10/2016   SpO2 97%   BMI 32.15 kg/m²      Physical Exam  Vitals and nursing note reviewed.   Constitutional:       General: She is not in acute distress.     Appearance: Normal " appearance. She is ill-appearing. She is not toxic-appearing or diaphoretic.   HENT:      Head: Normocephalic and atraumatic.      Right Ear: Tympanic membrane and external ear normal.      Left Ear: Tympanic membrane and external ear normal.      Nose: Congestion present.      Mouth/Throat:      Mouth: Mucous membranes are dry.      Pharynx: Posterior oropharyngeal erythema present.   Eyes:      General: No scleral icterus.     Extraocular Movements: Extraocular movements intact.      Pupils: Pupils are equal, round, and reactive to light.   Cardiovascular:      Rate and Rhythm: Normal rate and regular rhythm.      Pulses: Normal pulses.   Pulmonary:      Effort: Pulmonary effort is normal. No respiratory distress.      Breath sounds: Normal breath sounds. No wheezing.   Abdominal:      General: Bowel sounds are normal.      Palpations: Abdomen is soft.      Tenderness: There is abdominal tenderness.   Musculoskeletal:      Cervical back: Neck supple.      Right lower leg: No edema.      Left lower leg: No edema.   Lymphadenopathy:      Cervical: No cervical adenopathy.   Skin:     General: Skin is warm and dry.      Coloration: Skin is not jaundiced or pale.   Neurological:      General: No focal deficit present.      Mental Status: She is alert. Mental status is at baseline.      Cranial Nerves: No cranial nerve deficit.      Motor: No weakness.   Psychiatric:         Mood and Affect: Mood normal.         Behavior: Behavior normal.         Thought Content: Thought content normal.         Judgment: Judgment normal.

## 2024-12-18 ENCOUNTER — RESULTS FOLLOW-UP (OUTPATIENT)
Dept: FAMILY MEDICINE CLINIC | Facility: CLINIC | Age: 54
End: 2024-12-18

## 2024-12-18 LAB
FLUAV RNA RESP QL NAA+PROBE: NEGATIVE
FLUBV RNA RESP QL NAA+PROBE: NEGATIVE
SARS-COV-2 RNA RESP QL NAA+PROBE: NEGATIVE

## 2024-12-28 ENCOUNTER — HOSPITAL ENCOUNTER (OUTPATIENT)
Dept: MRI IMAGING | Facility: HOSPITAL | Age: 54
Discharge: HOME | End: 2024-12-28
Attending: ANESTHESIOLOGY
Payer: COMMERCIAL

## 2024-12-28 DIAGNOSIS — M51.26 LUMBAR DISC HERNIATION: ICD-10-CM

## 2024-12-28 DIAGNOSIS — M54.16 LUMBAR RADICULOPATHY: ICD-10-CM

## 2024-12-28 DIAGNOSIS — M54.9 MID BACK PAIN: ICD-10-CM

## 2024-12-28 DIAGNOSIS — Z98.1 STATUS POST LUMBAR SPINAL FUSION: ICD-10-CM

## 2024-12-28 DIAGNOSIS — M79.605 LUMBAR PAIN WITH RADIATION DOWN LEFT LEG: ICD-10-CM

## 2024-12-28 DIAGNOSIS — M54.50 LUMBAR PAIN WITH RADIATION DOWN LEFT LEG: ICD-10-CM

## 2024-12-28 DIAGNOSIS — G89.4 CHRONIC PAIN SYNDROME: ICD-10-CM

## 2024-12-28 DIAGNOSIS — Z79.891 LONG-TERM CURRENT USE OF OPIATE ANALGESIC: ICD-10-CM

## 2024-12-28 PROCEDURE — 72146 MRI CHEST SPINE W/O DYE: CPT

## 2025-01-03 ENCOUNTER — OFFICE VISIT (OUTPATIENT)
Dept: PAIN MEDICINE | Facility: CLINIC | Age: 55
End: 2025-01-03
Payer: COMMERCIAL

## 2025-01-03 VITALS — RESPIRATION RATE: 16 BRPM | BODY MASS INDEX: 31.92 KG/M2 | HEIGHT: 66 IN | WEIGHT: 198.6 LBS

## 2025-01-03 DIAGNOSIS — M54.50 LUMBAR PAIN WITH RADIATION DOWN LEFT LEG: ICD-10-CM

## 2025-01-03 DIAGNOSIS — M43.16 ANTEROLISTHESIS OF LUMBAR SPINE: ICD-10-CM

## 2025-01-03 DIAGNOSIS — M79.605 LUMBAR PAIN WITH RADIATION DOWN LEFT LEG: ICD-10-CM

## 2025-01-03 DIAGNOSIS — Z79.891 LONG-TERM CURRENT USE OF OPIATE ANALGESIC: ICD-10-CM

## 2025-01-03 DIAGNOSIS — G89.4 CHRONIC PAIN SYNDROME: ICD-10-CM

## 2025-01-03 DIAGNOSIS — M54.16 LUMBAR RADICULOPATHY: ICD-10-CM

## 2025-01-03 DIAGNOSIS — M54.9 MID BACK PAIN: ICD-10-CM

## 2025-01-03 DIAGNOSIS — Z98.1 STATUS POST LUMBAR SPINAL FUSION: ICD-10-CM

## 2025-01-03 DIAGNOSIS — M51.26 LUMBAR DISC HERNIATION: ICD-10-CM

## 2025-01-03 PROCEDURE — G2211 COMPLEX E/M VISIT ADD ON: HCPCS | Performed by: ANESTHESIOLOGY

## 2025-01-03 PROCEDURE — 99214 OFFICE O/P EST MOD 30 MIN: CPT | Performed by: ANESTHESIOLOGY

## 2025-01-03 RX ORDER — OXYCODONE HYDROCHLORIDE 5 MG/1
5 TABLET ORAL 3 TIMES DAILY PRN
Qty: 90 TABLET | Refills: 0 | Status: SHIPPED | OUTPATIENT
Start: 2025-02-02 | End: 2025-03-04

## 2025-01-03 RX ORDER — GABAPENTIN 600 MG/1
TABLET ORAL
Qty: 150 TABLET | Refills: 1 | Status: SHIPPED | OUTPATIENT
Start: 2025-01-03

## 2025-01-03 RX ORDER — OXYCODONE HYDROCHLORIDE 5 MG/1
5 TABLET ORAL 3 TIMES DAILY PRN
Qty: 90 TABLET | Refills: 0 | Status: SHIPPED | OUTPATIENT
Start: 2025-01-03 | End: 2025-02-02

## 2025-01-03 RX ORDER — METHOCARBAMOL 750 MG/1
750 TABLET, FILM COATED ORAL 3 TIMES DAILY PRN
Qty: 90 TABLET | Refills: 1 | Status: SHIPPED | OUTPATIENT
Start: 2025-01-03 | End: 2025-02-02

## 2025-01-03 NOTE — PROGRESS NOTES
Assessment:  1. Chronic pain syndrome    2. Lumbar disc herniation    3. Lumbar radiculopathy    4. Status post lumbar spinal fusion    5. Anterolisthesis of lumbar spine    6. Lumbar pain with radiation down left leg    7. Long-term current use of opiate analgesic    8. Mid back pain        Plan:  Patient is a 54-year-old female complains of low back pain and bilateral pain chronic pain syndrome secondary to lumbar degenerative disease, lumbar radiculopathy status post lumbar fusion and anterior listhesis of the spine presents to office for follow-up visit.  Oxycodone 5 mg p.o. 3 times daily.  At last office visit we did discuss proceeding with a spinal cord stimulator trial.  MRI thoracic spine did show a T5-T6 central disc protrusion in addition to a T7-T8 disc bulge with a left subarticular disc protrusion and mild canal narrowing.  There is ample space for the placement of this spinal cord stimulator leads.  1.  We will refill oxycodone 5 mg p.o. 3 times daily for facet agenic and discogenic and lumbar radicular symptoms  2.  Refill gabapentin 600 mg p.o. 3 times daily for lumbar radiculopathy  3.  Will refill Robaxin-750 milligrams p.o. 3 times daily for myofascial pain syndrome          History of Present Illness:  The patient is a 54 y.o. female who presents for a follow up office visit in regards to Back Pain.   The patient’s current symptoms include 8 out of 10 constant sharp, throbbing, shooting, numbness, pins-and-needles worse in the left leg without a particular time pattern.      Current pain medications includes: Oxycodone 5 mg p.o. 3 times daily.  The patient reports that this regimen is providing 20% pain relief.  The patient is reporting no side effects from this pain medication regimen.    I have personally reviewed and/or updated the patient's past medical history, past surgical history, family history, social history, current medications, allergies, and vital signs today.         Review of  Systems  Review of Systems   Musculoskeletal:  Positive for arthralgias, back pain and myalgias.   Neurological:  Positive for weakness.   All other systems reviewed and are negative.          Past Medical History:   Diagnosis Date    Anxiety     Back pain     Depression     Insomnia        Past Surgical History:   Procedure Laterality Date    BACK SURGERY  2006    cage in lumbar L5 area    BACK SURGERY      CAUDAL BLOCK N/A 11/15/2022    Procedure: BLOCK / INJECTION CAUDAL;  Surgeon: Nuno Farrar MD;  Location: [facility];  Service: Pain Management     CHOLECYSTECTOMY      DECOMPRESSION SPINE LUMBAR POSTERIOR  2024    LUMBAR DISC SURGERY      POSTERIOR FUSION LUMBAR SPINE  2024    AK REPAIR FIRST ABDOMINAL WALL HERNIA N/A 2018    Procedure: REPAIR HERNIA VENTRAL with mesh;  Surgeon: David Chicas DO;  Location: [facility];  Service: General    AK SURGICAL ARTHROSCOPY SHOULDER W/ROTATOR CUFF RPR Left 2020    Procedure: SHOULDER ARTHROSCOPY, ROTATOR CUFF REPAIR, SYNEVECTOMY, ACROMIOPLASTY DEBRIDEMENT, INJECTION;  Surgeon: Brigido Mota DO;  Location:  MAIN OR;  Service: Orthopedics       Family History   Adopted: Yes   Family history unknown: Yes       Social History     Occupational History    Not on file   Tobacco Use    Smoking status: Former     Current packs/day: 0.00     Types: Cigarettes     Quit date: 10/17/2010     Years since quittin.2    Smokeless tobacco: Never    Tobacco comments:     Recently quit   Vaping Use    Vaping status: Never Used   Substance and Sexual Activity    Alcohol use: No    Drug use: No    Sexual activity: Not Currently     Partners: Female         Current Outpatient Medications:     busPIRone (BUSPAR) 15 mg tablet, TAKE ONE (1) TABLET (15 MG TOTAL) BY MOUTH THREE (3) (THREE) TIMES A DAY, Disp: 270 tablet, Rfl: 1    celecoxib (CeleBREX) 200 mg capsule, , Disp: , Rfl:     gabapentin (NEURONTIN) 600 MG tablet, ONE (1) BY MOUTH TWICE A DAY AND THREE  "(3) BY MOUTH EVERY NIGHT AT BEDTIME, Disp: 150 tablet, Rfl: 1    methocarbamol (ROBAXIN) 750 mg tablet, TAKE ONE (1) TABLET (750 MG TOTAL) BY MOUTH THREE (3) (THREE) TIMES A DAY AS NEEDED FOR PAIN/SPASMS, Disp: 90 tablet, Rfl: 1    naloxone (NARCAN) 4 mg/0.1 mL nasal spray, Administer 1 spray into a nostril. If no response after 2-3 minutes, give another dose in the other nostril using a new spray., Disp: 1 each, Rfl: 1    promethazine-dextromethorphan (PHENERGAN-DM) 6.25-15 mg/5 mL oral syrup, Take 5 mL by mouth 4 (four) times a day as needed for cough, Disp: 240 mL, Rfl: 1    sertraline (ZOLOFT) 100 mg tablet, TAKE TWO (2) TABLETS (200 MG TOTAL) BY MOUTH DAILY, Disp: 180 tablet, Rfl: 1    No Known Allergies    Physical Exam:    Resp 16   Ht 5' 6\" (1.676 m)   Wt 90.1 kg (198 lb 9.6 oz)   LMP 10/10/2016   BMI 32.05 kg/m²     Constitutional:normal, well developed, well nourished, alert, in no distress and non-toxic and no overt pain behavior. and obese  Eyes:anicteric  HEENT:grossly intact  Neck:supple, symmetric, trachea midline and no masses   Pulmonary:even and unlabored  Cardiovascular:No edema or pitting edema present  Skin:Normal without rashes or lesions and well hydrated  Psychiatric:Mood and affect appropriate  Neurologic:Cranial Nerves II-XII grossly intact  Musculoskeletal:antalgic    Lumbar/Sacral Spine examination demonstrates.  Full range of motion lumbar spine with pain upon: flexion, lateral rotation to the left/right, and bending to the left/right.  Bilateral lumbar paraspinals tender to palpation. Muscle spasms noted in the lumbar area bilaterally. 4/5 lower extremity strength in all muscle groups bilaterally. Positive seated straight leg raise for bilateral lower extremities.  Sensitivity to light touch intact bilateral lower extremities. 2+ reflexes in the patella and Achilles.  No ankle clonus    Imaging      Study Result    Narrative & Impression   MRI THORACIC SPINE WITHOUT CONTRAST   "   INDICATION: Presurgical evaluation of the thoracic spine. Patient previously had lumbar spinal fusion.     COMPARISON: Thoracic spine MRI 11/2/2020 and thoracic spine radiographs 12/2/2024. Additional correlation made with CT abdomen/pelvis with contrast 2/6/2023.     TECHNIQUE:  Multiplanar, multisequence imaging of the thoracic spine was performed. .  Imaging performed on 1.5T MRI     IMAGE QUALITY: Diagnostic.     FINDINGS:     ALIGNMENT: Normal alignment of the thoracic spine.  No compression fracture.  No subluxation.  No scoliosis.     MARROW SIGNAL: There are type II Modic endplate changes at T6-T7,  and T12-L1. Type I Modic endplate changes are present at T8-T9. Mixed type I and II Modic endplate changes at T11-12. Vertebral body hemangioma at T6.     T12 inferior endplate Schmorl's node. Tiny T11 inferior endplate Schmorl's node.     THORACIC CORD: Normal signal within the thoracic cord. The conus terminates at the L1 level.     PARAVERTEBRAL SOFT TISSUES:  Normal.     THORACIC DEGENERATIVE CHANGE: Redemonstrated multilevel degenerative changes. Most pronounced levels include:     T5-6: Central disc protrusion with mild canal narrowing, grossly unchanged.  T7-8: Disc bulge and superimposed left subarticular disc protrusion with mild canal narrowing, slightly progressed.  T8-9: Disc bulge and superimposed bilateral subarticular disc protrusion (right greater than left), stable or slightly progressed.        Scattered lesser degree disc bulge or herniation without significant canal stenosis.     OTHER FINDINGS: Bilateral simple renal cysts, similar to prior abdominal CT.     IMPRESSION:     Multilevel noncompressive degenerative changes as described worse at levels T5-6, T7-8, and T8-9. No high-grade canal or foraminal stenosis.     Resident: CORTEZ SAMS I, the attending radiologist, have reviewed the images and agree with the final report above.     Workstation performed: [id number]           No  orders to display       No orders of the defined types were placed in this encounter.

## 2025-01-03 NOTE — PATIENT INSTRUCTIONS
Spinal Cord and Peripheral Nerve Stimulation      What is this procedure and why is it helpful?  Neurostimulation works by intercepting pain signals before they reach the brain. To do this, a small system is implanted within the body. A trial is performed before permenant implantation to determine if this is the right system for the patient. This system, similar to a cardiac pacemaker, is used to replace pain with a different feeling. Some people describe this feeling as a gentle massaging sensation or, in some cases, simply the absence of pain.    What steps are needed for the implanted device?  Prior to the permanent placement of this device, there is a trial period during which you assess your response to the stimulator leads which are placed through a needle into the epidural space in the spine or along the nerves lying just below the skin. If you obtain significant pain relief during the trial with improved function, the next step would involve permanent implantation of the lead, a minor outpatient surgical procedure.

## 2025-01-08 ENCOUNTER — TELEPHONE (OUTPATIENT)
Dept: RADIOLOGY | Facility: CLINIC | Age: 55
End: 2025-01-08

## 2025-01-08 NOTE — TELEPHONE ENCOUNTER
Email sent to Joseph to call patient for Nev[employer] education.   Will update once education completed.

## 2025-01-08 NOTE — TELEPHONE ENCOUNTER
----- Message from Abimbola GENAO sent at 1/3/2025  8:55 AM EST -----  Regarding: STIM  Patient is interested in have a STIM placement. Please reach out to her. Thank you

## 2025-01-09 NOTE — TELEPHONE ENCOUNTER
[facility] number provided to patient to schedule

## 2025-01-09 NOTE — TELEPHONE ENCOUNTER
Anna from [facility] called me to make me aware that Dr. Adorno who competed the STIM evals has retired. They are no longer able to accomodates the evals.  Called patient back made her aware- provided the number to the [facility].   I told her I would reach out to Ali to see if there are any other provides added to the list that are in that area.   [facility] not [facility]

## 2025-02-11 DIAGNOSIS — M51.26 LUMBAR DISC HERNIATION: ICD-10-CM

## 2025-02-11 DIAGNOSIS — Z98.1 STATUS POST LUMBAR SPINAL FUSION: ICD-10-CM

## 2025-02-11 DIAGNOSIS — M54.16 LUMBAR RADICULOPATHY: ICD-10-CM

## 2025-02-11 DIAGNOSIS — G89.4 CHRONIC PAIN SYNDROME: ICD-10-CM

## 2025-02-13 RX ORDER — GABAPENTIN 600 MG/1
TABLET ORAL
Qty: 150 TABLET | Refills: 0 | Status: SHIPPED | OUTPATIENT
Start: 2025-02-13

## 2025-02-13 RX ORDER — METHOCARBAMOL 750 MG/1
750 TABLET, FILM COATED ORAL 3 TIMES DAILY PRN
Qty: 90 TABLET | Refills: 0 | Status: SHIPPED | OUTPATIENT
Start: 2025-02-13 | End: 2025-03-15

## 2025-02-27 ENCOUNTER — TELEPHONE (OUTPATIENT)
Dept: PAIN MEDICINE | Facility: CLINIC | Age: 55
End: 2025-02-27

## 2025-02-27 DIAGNOSIS — Z98.1 STATUS POST LUMBAR SPINAL FUSION: ICD-10-CM

## 2025-02-27 DIAGNOSIS — M43.16 ANTEROLISTHESIS OF LUMBAR SPINE: ICD-10-CM

## 2025-02-27 DIAGNOSIS — M54.16 LUMBAR RADICULOPATHY: ICD-10-CM

## 2025-02-27 DIAGNOSIS — M51.26 LUMBAR DISC HERNIATION: ICD-10-CM

## 2025-02-27 DIAGNOSIS — G89.4 CHRONIC PAIN SYNDROME: ICD-10-CM

## 2025-02-27 RX ORDER — OXYCODONE HYDROCHLORIDE 5 MG/1
5 TABLET ORAL 3 TIMES DAILY PRN
Qty: 12 TABLET | Refills: 0 | Status: SHIPPED | OUTPATIENT
Start: 2025-03-03 | End: 2025-03-07 | Stop reason: SDUPTHER

## 2025-02-27 NOTE — TELEPHONE ENCOUNTER
Caller: Kimberly    Doctor: Dr. Kocher     Reason for call: patient will be out of Oxycodone on 3/3 her appt is on 3/7/2025 will she be ok she is aware she has to take a urine exam     I checked schedule nothing sooner     Call back#: [phone]

## 2025-03-07 ENCOUNTER — OFFICE VISIT (OUTPATIENT)
Dept: PAIN MEDICINE | Facility: CLINIC | Age: 55
End: 2025-03-07
Payer: COMMERCIAL

## 2025-03-07 VITALS
TEMPERATURE: 97.6 F | HEIGHT: 66 IN | WEIGHT: 196 LBS | HEART RATE: 77 BPM | OXYGEN SATURATION: 99 % | BODY MASS INDEX: 31.5 KG/M2 | RESPIRATION RATE: 16 BRPM

## 2025-03-07 DIAGNOSIS — M54.16 LUMBAR RADICULOPATHY: ICD-10-CM

## 2025-03-07 DIAGNOSIS — Z98.1 STATUS POST LUMBAR SPINAL FUSION: ICD-10-CM

## 2025-03-07 DIAGNOSIS — G89.4 CHRONIC PAIN SYNDROME: ICD-10-CM

## 2025-03-07 DIAGNOSIS — F11.20 UNCOMPLICATED OPIOID DEPENDENCE (HCC): ICD-10-CM

## 2025-03-07 DIAGNOSIS — M43.16 ANTEROLISTHESIS OF LUMBAR SPINE: ICD-10-CM

## 2025-03-07 DIAGNOSIS — Z79.891 LONG-TERM CURRENT USE OF OPIATE ANALGESIC: Primary | ICD-10-CM

## 2025-03-07 DIAGNOSIS — M51.26 LUMBAR DISC HERNIATION: ICD-10-CM

## 2025-03-07 PROCEDURE — G2211 COMPLEX E/M VISIT ADD ON: HCPCS | Performed by: NURSE PRACTITIONER

## 2025-03-07 PROCEDURE — 99214 OFFICE O/P EST MOD 30 MIN: CPT | Performed by: NURSE PRACTITIONER

## 2025-03-07 RX ORDER — GABAPENTIN 600 MG/1
TABLET ORAL
Qty: 150 TABLET | Refills: 0 | Status: SHIPPED | OUTPATIENT
Start: 2025-03-07

## 2025-03-07 RX ORDER — OXYCODONE HYDROCHLORIDE 5 MG/1
5 TABLET ORAL 3 TIMES DAILY PRN
Qty: 90 TABLET | Refills: 0 | Status: SHIPPED | OUTPATIENT
Start: 2025-04-04 | End: 2025-05-04

## 2025-03-07 RX ORDER — OXYCODONE HYDROCHLORIDE 5 MG/1
5 TABLET ORAL EVERY 8 HOURS PRN
Qty: 90 TABLET | Refills: 0 | Status: SHIPPED | OUTPATIENT
Start: 2025-03-07

## 2025-03-07 RX ORDER — METHOCARBAMOL 750 MG/1
750 TABLET, FILM COATED ORAL 3 TIMES DAILY PRN
Qty: 90 TABLET | Refills: 0 | Status: SHIPPED | OUTPATIENT
Start: 2025-03-07 | End: 2025-04-06

## 2025-03-07 NOTE — PROGRESS NOTES
Assessment:  1. Chronic pain syndrome    2. Lumbar disc herniation    3. Lumbar radiculopathy    4. Status post lumbar spinal fusion    5. Anterolisthesis of lumbar spine        Plan:  While the patient was in the office today, I did have a thorough conversation regarding their chronic pain syndrome, medication management, and treatment plan options.  Patient is being seen for a follow-up visit.  She is interested in pursuing trial spinal cord stimulator.  She tells me that she has had the educational meeting with the spinal cord stimulator representative.  She had a psych eval.  Unfortunately, I do not think that we received the report.  She has had more eye of her thoracic spine.    I sent a note to our spinal cord stimulator coordinator today to reach out to patient regarding trial spinal cord stimulator as it seems that she has met all the prerequisites.    Continue oxycodone 5 mg 3 times daily if needed for pain.  The patient's opioid scripts were sent to their pharmacy electronically and was given a 2 month supply of prescriptions with a Do Not Fill date(s) of 3/7/2025, 4/4/2025.    Continue gabapentin 600 mg 3 times daily.  A prescription was sent to her pharmacy with a refill.    Continue Robaxin-750 milligrams 3 times daily if needed for spasms.  A prescription was sent to her pharmacy with a refill.    There are risks associated with opioid medications, including dependence, addiction and tolerance. The patient understands and agrees to use these medications only as prescribed. Potential side effects of the medications include, but are not limited to, constipation, drowsiness, addiction, impaired judgment and risk of fatal overdose if not taken as prescribed. The patient was warned against driving while taking sedation medications.  Sharing medications is a felony. At this point in time, the patient is showing no signs of addiction, abuse, diversion or suicidal ideation.    A urine drug screen was  collected at today's office visit as part of our medication management protocol. The point of care testing results were appropriate for what was being prescribed. The specimen will be sent for confirmatory testing. The drug screen is medically necessary because the patient is either dependent on opioid medication or is being considered for opioid medication therapy and the results could impact ongoing or future treatment. The drug screen is to evaluate for the presences or absence of prescribed, non-prescribed, and/or illicit drugs/substances.    [place] Prescription Drug Monitoring Program report was reviewed and was appropriate     The patient will follow-up in 2 months for medication prescription refill and reevaluation. The patient was advised to contact the office should their symptoms worsen in the interim. The patient was agreeable and verbalized an understanding.    History of Present Illness:  The patient is a 54 y.o. female who presents for a follow up office visit in regards to Back Pain (Back pain and left leg pain.).   The patient’s current symptoms include complaints of low back pain and left leg pain.  Current pain level is a 5/10.  Quality pain is described as sharp, throbbing, shooting, numb, pins-and-needles.    Current pain medications includes: Oxycodone 5 mg 3 times daily if needed for pain, gabapentin 600 mg 3 times daily, Robaxin-750 milligrams 3 times daily if needed for spasms.  The patient reports that this regimen is providing 20% pain relief.  The patient is reporting no side effects from this pain medication regimen.    I have personally reviewed and/or updated the patient's past medical history, past surgical history, family history, social history, current medications, allergies, and vital signs today.         Review of Systems  Review of Systems   Constitutional: Negative.    HENT: Negative.     Eyes: Negative.    Respiratory: Negative.     Cardiovascular: Negative.     Gastrointestinal: Negative.    Endocrine: Negative.    Genitourinary: Negative.    Musculoskeletal:  Positive for back pain.        Left leg   Skin: Negative.    Allergic/Immunologic: Negative.    Neurological: Negative.    Hematological: Negative.    Psychiatric/Behavioral: Negative.             Past Medical History:   Diagnosis Date    Anxiety     Back pain     Depression     Insomnia        Past Surgical History:   Procedure Laterality Date    BACK SURGERY      cage in lumbar L5 area    BACK SURGERY      CAUDAL BLOCK N/A 11/15/2022    Procedure: BLOCK / INJECTION CAUDAL;  Surgeon: Nuno Farrar MD;  Location: [facility];  Service: Pain Management     CHOLECYSTECTOMY      DECOMPRESSION SPINE LUMBAR POSTERIOR  2024    LUMBAR DISC SURGERY      POSTERIOR FUSION LUMBAR SPINE  2024    OK REPAIR FIRST ABDOMINAL WALL HERNIA N/A 2018    Procedure: REPAIR HERNIA VENTRAL with mesh;  Surgeon: David Chicas DO;  Location: [facility];  Service: General    OK SURGICAL ARTHROSCOPY SHOULDER W/ROTATOR CUFF RPR Left 2020    Procedure: SHOULDER ARTHROSCOPY, ROTATOR CUFF REPAIR, SYNEVECTOMY, ACROMIOPLASTY DEBRIDEMENT, INJECTION;  Surgeon: Brigido Mtoa DO;  Location:  MAIN OR;  Service: Orthopedics       Family History   Adopted: Yes   Family history unknown: Yes       Social History     Occupational History    Not on file   Tobacco Use    Smoking status: Former     Current packs/day: 0.00     Types: Cigarettes     Quit date: 10/17/2010     Years since quittin.3    Smokeless tobacco: Never    Tobacco comments:     Recently quit   Vaping Use    Vaping status: Never Used   Substance and Sexual Activity    Alcohol use: No    Drug use: No    Sexual activity: Not Currently     Partners: Female         Current Outpatient Medications:     busPIRone (BUSPAR) 15 mg tablet, TAKE ONE (1) TABLET (15 MG TOTAL) BY MOUTH THREE (3) (THREE) TIMES A DAY, Disp: 270 tablet, Rfl: 1    celecoxib (CeleBREX) 200  "mg capsule, , Disp: , Rfl:     gabapentin (NEURONTIN) 600 MG tablet, ONE (1) BY MOUTH TWICE A DAY AND THREE (3) BY MOUTH EVERY NIGHT AT BEDTIME, Disp: 150 tablet, Rfl: 0    methocarbamol (ROBAXIN) 750 mg tablet, Take 1 tablet (750 mg total) by mouth 3 (three) times a day as needed for muscle spasms, Disp: 90 tablet, Rfl: 0    [START ON 4/4/2025] oxyCODONE (Roxicodone) 5 immediate release tablet, Take 1 tablet (5 mg total) by mouth 3 (three) times a day as needed for moderate pain Max Daily Amount: 15 mg Do not start before April 4, 2025., Disp: 90 tablet, Rfl: 0    oxyCODONE (Roxicodone) 5 immediate release tablet, Take 1 tablet (5 mg total) by mouth every 8 (eight) hours as needed for moderate pain Max Daily Amount: 15 mg, Disp: 90 tablet, Rfl: 0    promethazine-dextromethorphan (PHENERGAN-DM) 6.25-15 mg/5 mL oral syrup, Take 5 mL by mouth 4 (four) times a day as needed for cough, Disp: 240 mL, Rfl: 1    sertraline (ZOLOFT) 100 mg tablet, TAKE TWO (2) TABLETS (200 MG TOTAL) BY MOUTH DAILY, Disp: 180 tablet, Rfl: 1    naloxone (NARCAN) 4 mg/0.1 mL nasal spray, Administer 1 spray into a nostril. If no response after 2-3 minutes, give another dose in the other nostril using a new spray., Disp: 1 each, Rfl: 1    No Known Allergies    Physical Exam:    Pulse 77   Temp 97.6 °F (36.4 °C) (Temporal)   Resp 16   Ht 5' 6\" (1.676 m)   Wt 88.9 kg (196 lb)   LMP 10/10/2016   SpO2 99%   BMI 31.64 kg/m²     Constitutional:normal, well developed, well nourished, alert, in no distress and non-toxic and no overt pain behavior.  Eyes:anicteric  HEENT:grossly intact  Neck:supple, symmetric, trachea midline and no masses   Pulmonary:even and unlabored  Cardiovascular:No edema or pitting edema present  Skin:Normal without rashes or lesions and well hydrated  Psychiatric:Mood and affect appropriate  Neurologic:Cranial Nerves II-XII grossly intact  Musculoskeletal: Gait is slow and guarded.    Imaging  No orders to display       No " orders of the defined types were placed in this encounter.    MRI THORACIC SPINE WITHOUT CONTRAST     INDICATION: Presurgical evaluation of the thoracic spine. Patient previously had lumbar spinal fusion.     COMPARISON: Thoracic spine MRI 11/2/2020 and thoracic spine radiographs 12/2/2024. Additional correlation made with CT abdomen/pelvis with contrast 2/6/2023.     TECHNIQUE:  Multiplanar, multisequence imaging of the thoracic spine was performed. .  Imaging performed on 1.5T MRI     IMAGE QUALITY: Diagnostic.     FINDINGS:     ALIGNMENT: Normal alignment of the thoracic spine.  No compression fracture.  No subluxation.  No scoliosis.     MARROW SIGNAL: There are type II Modic endplate changes at T6-T7,  and T12-L1. Type I Modic endplate changes are present at T8-T9. Mixed type I and II Modic endplate changes at T11-12. Vertebral body hemangioma at T6.     T12 inferior endplate Schmorl's node. Tiny T11 inferior endplate Schmorl's node.     THORACIC CORD: Normal signal within the thoracic cord. The conus terminates at the L1 level.     PARAVERTEBRAL SOFT TISSUES:  Normal.     THORACIC DEGENERATIVE CHANGE: Redemonstrated multilevel degenerative changes. Most pronounced levels include:     T5-6: Central disc protrusion with mild canal narrowing, grossly unchanged.  T7-8: Disc bulge and superimposed left subarticular disc protrusion with mild canal narrowing, slightly progressed.  T8-9: Disc bulge and superimposed bilateral subarticular disc protrusion (right greater than left), stable or slightly progressed.        Scattered lesser degree disc bulge or herniation without significant canal stenosis.     OTHER FINDINGS: Bilateral simple renal cysts, similar to prior abdominal CT.     IMPRESSION:     Multilevel noncompressive degenerative changes as described worse at levels T5-6, T7-8, and T8-9. No high-grade canal or foraminal stenosis.     Resident: CORTEZ SAMS I, the attending radiologist, have reviewed the  images and agree with the final report above.       Narrative & Impression         THORACIC SPINE     INDICATION:   Arthrodesis status. Low back pain, unspecified. Pain in left leg. Other intervertebral disc displacement, lumbar region. Radiculopathy, lumbar region. Long term (current) use of opiate analgesic. Chronic pain syndrome. Dorsalgia,   unspecified.      COMPARISON:  None.     VIEWS:  XR SPINE THORACIC 3 VW  Images: 3     FINDINGS:     There is no fracture or pathologic bone lesion.     Thoracic vertebral alignment is within normal limits.     There is no displacement of the paraspinal line.      The pedicles appear intact.     IMPRESSION:        No acute osseous abnormality.     MRI LUMBAR SPINE WITH AND WITHOUT CONTRAST     INDICATION: M54.16: Radiculopathy, lumbar region  Z98.1: Arthrodesis status.     COMPARISON: MRI lumbar spine 12/28/2023     TECHNIQUE:  Multiplanar, multisequence imaging of the lumbar spine was performed before and after gadolinium administration. .        IV Contrast:  8 mL of Gadobutrol injection (SINGLE-DOSE)     IMAGE QUALITY:  Diagnostic     FINDINGS:     POSTSURGICAL FINDINGS: Previous PLIF L4-5 with now L3-S1 Posterior lumbar decompression and interbody fusion. Interbody spacer devices are noted at the L3-4 through the L5-S1 levels with bilateral decompressive laminectomies. There is a peripherally   enhancing postoperative fluid collection within the laminectomy bed .     LUMBAR DISC SPACES:        L1-2: No focal disc herniation, central canal stenosis, or neural foraminal narrowing.     L2-3: Mild diffuse disc bulge is again noted with superimposed bilateral foraminal disc protrusions. Bilateral ligamentum flavum and facet hypertrophy.. Mild central canal and bilateral subarticular/lateral recess narrowing, unchanged.  No neural   foraminal stenosis.     L3-4: New interbody spacer device.. The central canal is now patent status post bilateral decompressive laminectomies. No  neural foraminal stenosis. There is enhancing granulation tissue within the laminectomy bed and central canal without impingement of   the thecal sac.     L4-5: Stable interbody spacer device. The central canal remains patent status post bilateral decompressive laminectomies. Unchanged mild bilateral neural foraminal stenosis.There is enhancing granulation tissue within the laminectomy bed and central   canal without impingement of the thecal sac.     L5-S1: Stable grade 1 anterolisthesis. New interbody spacer device.. The central canal is now patent status post bilateral decompressive laminectomies. Persistent moderate right and moderate to severe left neural foraminal stenosis with encroachment of   the exiting left L5 nerve root.There is enhancing granulation tissue within the laminectomy bed and central canal without impingement of the thecal sac.     OTHER FINDINGS: Small bilateral parapelvic renal cysts are noted.     IMPRESSION:     Previous PLIF L4-5 with now L3-S1 Posterior lumbar decompression and interbody fusion.     L3-4 through L5-S1 decompressive laminectomies with improved patency of the central canal at the L3-4 and L5-S1 levels. A peripherally enhancing postoperative fluid collection is noted within the laminectomy bed, probable postoperative seroma. Moderate   to severe left L5-S1 neural foraminal narrowing is again noted with encroachment of the exiting left L5 nerve root.     Stable L2-3 disc bulge with mild central canal narrowing. There is no new disc herniation.               IMPRESSION:  Status post instrumented lumbar fusion, L3-S1.  See comment for   details.     COMPARISON: None  available.     COMMENT:  AP and lateral views of the lumbar spine are completed   postoperatively.     Osseous detail is limited at the fused levels on the lateral projection, likely   due to technical factors.   Posterior instrumented lumbar fusion noted spanning L3-S1, noting paired pedicle   screws with  interlocking vertical bars.  There are interbody spacers at the   L3-L4, L4-L5 and L5-S1 disc spaces.  The L4-L5 spacer is not well demonstrated   on lateral projection.   Posterior decompression has been completed at the fused levels.   There is a surgical drain in place posteriorly.   Narrative & Impression   XR ENTIRE SPINE (SCOLIOSIS) 1 VW     INDICATION: M51.36: Other intervertebral disc degeneration, lumbar region  Z98.1: Arthrodesis status  M43.17: Spondylolisthesis, lumbosacral region  M51.36: Other intervertebral disc degeneration, lumbar region  M54.16: Radiculopathy, lumbar region  Z98.1: Arthrodesis status.     COMPARISON: 1/9/2024, 12/28/2023     FINDINGS:     Single lateral view was obtained. Stable appearance of posterior metallic fusion at L4 and L5 with intervertebral fusion device. Grade 1 anterolisthesis at L5-S1, similar to prior. Grade 1 retrolisthesis at L2-L3, L3-L4, L4-L5, similar to prior. Neutral   sagittal balance. No evidence of congenital vertebral anomaly.     IMPRESSION:     Stable appearance of posterior metallic fusion at L4 and L5 with intervertebral fusion device.     Grade 1 anterolisthesis at L5-S1, similar to prior.     Grade 1 retrolisthesis at L2-L3, L3-L4, L4-L5, similar to prior.

## 2025-03-11 LAB
6MAM UR QL CFM: NEGATIVE NG/ML
7AMINOCLONAZEPAM UR QL CFM: NEGATIVE NG/ML
A-OH ALPRAZ UR QL CFM: NEGATIVE NG/ML
ACCEPTABLE CREAT UR QL: NORMAL MG/DL
ACCEPTIBLE SP GR UR QL: NORMAL
AMITRIP UR QL CFM: NEGATIVE NG/ML
AMPHET UR QL CFM: NORMAL NG/ML
AMPHET UR QL CFM: NORMAL NG/ML
BUPRENORPHINE UR QL CFM: NEGATIVE NG/ML
BZE UR QL CFM: NEGATIVE NG/ML
DESIPRAMINE UR QL CFM: NEGATIVE NG/ML
EDDP UR QL CFM: NEGATIVE NG/ML
ETHYL GLUCURONIDE UR QL CFM: NEGATIVE NG/ML
ETHYL SULFATE UR QL SCN: NEGATIVE NG/ML
FENTANYL UR QL CFM: NEGATIVE NG/ML
FLUOXETINE UR QL CFM: NEGATIVE NG/ML
GLIADIN IGG SER IA-ACNC: NEGATIVE NG/ML
GLUCOSE 30M P 50 G LAC PO SERPL-MCNC: NEGATIVE NG/ML
HYDROCODONE UR QL CFM: NEGATIVE NG/ML
HYDROMORPHONE UR QL CFM: NEGATIVE NG/ML
LORAZEPAM UR QL CFM: NEGATIVE NG/ML
MDMA UR QL CFM: NEGATIVE NG/ML
ME-PHENIDATE UR QL CFM: NEGATIVE NG/ML
MEPERIDINE UR QL CFM: NEGATIVE NG/ML
METHADONE UR QL CFM: NEGATIVE NG/ML
METHAMPHET UR QL CFM: NEGATIVE NG/ML
MORPHINE UR QL CFM: NEGATIVE NG/ML
NITRITE UR QL: NORMAL UG/ML
NORBUPRENORPHINE UR QL CFM: NEGATIVE NG/ML
NORDIAZEPAM UR QL CFM: NEGATIVE NG/ML
NORFENTANYL UR QL CFM: NEGATIVE NG/ML
NORFLUOXETINE UR QL CFM: NEGATIVE NG/ML
NORHYDROCODONE UR QL CFM: NEGATIVE NG/ML
NORMEPERIDINE UR QL CFM: NEGATIVE NG/ML
NOROXYCODONE UR QL CFM: NORMAL NG/ML
NORTRIP UR QL CFM: NEGATIVE NG/ML
OLANZAPINE QUANTIFICATION: NEGATIVE NG/ML
OPC-3373 QUANTIFICATION: NEGATIVE NG/ML
OXAZEPAM UR QL CFM: NEGATIVE NG/ML
OXYCODONE UR QL CFM: NORMAL NG/ML
OXYMORPHONE UR QL CFM: NORMAL NG/ML
OXYMORPHONE UR QL CFM: NORMAL NG/ML
PARA-FLUOROFENTANYL QUANTIFICATION: NORMAL NG/ML
PROLACTIN SERPL-MCNC: NEGATIVE NG/ML
RESULT ALL_PRESCRIBED MEDS AND SPECIAL INSTRUCTIONS: NORMAL
SECOBARBITAL UR QL CFM: NEGATIVE NG/ML
SL AMB 7-OH-MITRAGYNINE (KRATOM ALKALOID) QUANTIFICATION: NEGATIVE NG/ML
SL AMB ACETYL FENTANYL QUANTIFICATION: NORMAL NG/ML
SL AMB ACETYL NORFENTANYL QUANTIFICATION: NORMAL NG/ML
SL AMB ACRYL FENTANYL QUANTIFICATION: NORMAL NG/ML
SL AMB CARFENTANIL QUANTIFICATION: NORMAL NG/ML
SL AMB CITALOPRAM/ESCITALOPRAM QUANTIFICATION: NEGATIVE NG/ML
SL AMB CITALOPRAM/ESCITALOPRAM QUANTIFICATION: NEGATIVE NG/ML
SL AMB CLOZAPINE QUANTIFICATION: NEGATIVE NG/ML
SL AMB CTHC (MARIJUANA METABOLITE) QUANTIFICATION: NEGATIVE NG/ML
SL AMB DEXTRORPHAN (DEXTROMETHORPHAN METABOLITE) QUANT: NEGATIVE NG/ML
SL AMB HALOPERIDOL  QUANTIFICATION: NEGATIVE NG/ML
SL AMB HALOPERIDOL METABOLITE QUANTIFICATION: NEGATIVE NG/ML
SL AMB HYDROXYBUPROPION QUANTIFICATION: NEGATIVE NG/ML
SL AMB HYDROXYRISPERIDONE QUANTIFICATION: NEGATIVE NG/ML
SL AMB N-DESMETHYL-TRAMADOL QUANTIFICATION: NEGATIVE NG/ML
SL AMB N-DESMETHYLCLOZAPINE QUANTIFICATION: NEGATIVE NG/ML
SL AMB NORQUETIAPINE QUANTIFICATION: NEGATIVE NG/ML
SL AMB PHENTERMINE QUANTIFICATION: NEGATIVE NG/ML
SL AMB PREGABALIN QUANTIFICATION: NEGATIVE NG/ML
SL AMB QUETIAPINE QUANTIFICATION: NEGATIVE NG/ML
SL AMB RISPERIDONE QUANTIFICATION: NEGATIVE NG/ML
SL AMB RITALINIC ACID QUANTIFICATION: NEGATIVE NG/ML
SPECIMEN PH ACCEPTABLE UR: NORMAL
TAPENTADOL UR QL CFM: NEGATIVE NG/ML
TEMAZEPAM UR QL CFM: NEGATIVE NG/ML
TEMAZEPAM UR QL CFM: NEGATIVE NG/ML
TRAMADOL UR QL CFM: NEGATIVE NG/ML
URATE/CREAT 24H UR: NEGATIVE NG/ML

## 2025-03-13 DIAGNOSIS — F32.A ANXIETY AND DEPRESSION: ICD-10-CM

## 2025-03-13 DIAGNOSIS — F41.9 ANXIETY AND DEPRESSION: ICD-10-CM

## 2025-03-14 ENCOUNTER — TELEPHONE (OUTPATIENT)
Age: 55
End: 2025-03-14

## 2025-03-14 RX ORDER — BUSPIRONE HYDROCHLORIDE 15 MG/1
15 TABLET ORAL 3 TIMES DAILY
Qty: 270 TABLET | Refills: 0 | Status: SHIPPED | OUTPATIENT
Start: 2025-03-14

## 2025-03-14 RX ORDER — SERTRALINE HYDROCHLORIDE 100 MG/1
200 TABLET, FILM COATED ORAL DAILY
Qty: 180 TABLET | Refills: 0 | Status: SHIPPED | OUTPATIENT
Start: 2025-03-14

## 2025-03-14 NOTE — TELEPHONE ENCOUNTER
Caller: yesenia Wood    Doctor: Dr. Saldivar     Reason for call: pt requesting to speak to Stim coordinator. In reference the psy eval report that was done on 1/29/25 at the [facility], that [facility] never received. Please Advise       Call back#: [phone]

## 2025-03-18 ENCOUNTER — TRANSCRIBE ORDERS (OUTPATIENT)
Dept: PAIN MEDICINE | Facility: CLINIC | Age: 55
End: 2025-03-18

## 2025-03-27 ENCOUNTER — TELEPHONE (OUTPATIENT)
Dept: PAIN MEDICINE | Facility: CLINIC | Age: 55
End: 2025-03-27

## 2025-03-27 ENCOUNTER — PATIENT MESSAGE (OUTPATIENT)
Dept: PAIN MEDICINE | Facility: CLINIC | Age: 55
End: 2025-03-27

## 2025-03-27 NOTE — TELEPHONE ENCOUNTER
Pt asking for update below. Pls advise. Thank you!          Kimberly stokes [facility] (supporting Barbara Kocher, CRNP)       3/27/25  9:47 AM  Still waiting to hear from [facility] about the psychiatrist exam results. So we can move forward with the trial for my back.

## 2025-03-27 NOTE — TELEPHONE ENCOUNTER
Lov 3/7/25 BK  Pls review msg below and pts mcm and advise on any med recs at this time. Thank you!

## 2025-03-27 NOTE — TELEPHONE ENCOUNTER
Spoke to pt, I submitted her auth request on 03/25/25 to [facility], advised once that is approved will call her to schedule the SCS trial procedures.    I mentioned that he does book out a bit, currently his soonest is in May at the [facility] OR, and she is asking if there's anything to be done to help her pain in the meantime. She said that her current pain meds are not helping anymore.

## 2025-04-13 DIAGNOSIS — Z98.1 STATUS POST LUMBAR SPINAL FUSION: ICD-10-CM

## 2025-04-13 DIAGNOSIS — G89.4 CHRONIC PAIN SYNDROME: ICD-10-CM

## 2025-04-13 DIAGNOSIS — M54.16 LUMBAR RADICULOPATHY: ICD-10-CM

## 2025-04-13 DIAGNOSIS — M51.26 LUMBAR DISC HERNIATION: ICD-10-CM

## 2025-04-14 RX ORDER — GABAPENTIN 600 MG/1
TABLET ORAL
Qty: 150 TABLET | Refills: 2 | Status: SHIPPED | OUTPATIENT
Start: 2025-04-14

## 2025-04-14 RX ORDER — METHOCARBAMOL 750 MG/1
750 TABLET, FILM COATED ORAL 3 TIMES DAILY PRN
Qty: 90 TABLET | Refills: 2 | Status: SHIPPED | OUTPATIENT
Start: 2025-04-14 | End: 2025-05-14

## 2025-04-28 ENCOUNTER — OFFICE VISIT (OUTPATIENT)
Dept: PAIN MEDICINE | Facility: CLINIC | Age: 55
End: 2025-04-28
Payer: COMMERCIAL

## 2025-04-28 VITALS
TEMPERATURE: 98.9 F | OXYGEN SATURATION: 98 % | HEIGHT: 66 IN | WEIGHT: 193 LBS | HEART RATE: 68 BPM | BODY MASS INDEX: 31.02 KG/M2

## 2025-04-28 DIAGNOSIS — M54.16 LUMBAR RADICULOPATHY: ICD-10-CM

## 2025-04-28 DIAGNOSIS — M43.16 ANTEROLISTHESIS OF LUMBAR SPINE: ICD-10-CM

## 2025-04-28 DIAGNOSIS — Z98.1 STATUS POST LUMBAR SPINAL FUSION: ICD-10-CM

## 2025-04-28 DIAGNOSIS — M51.26 LUMBAR DISC HERNIATION: ICD-10-CM

## 2025-04-28 DIAGNOSIS — G89.4 CHRONIC PAIN SYNDROME: ICD-10-CM

## 2025-04-28 PROCEDURE — 99214 OFFICE O/P EST MOD 30 MIN: CPT | Performed by: NURSE PRACTITIONER

## 2025-04-28 RX ORDER — OXYCODONE HYDROCHLORIDE 5 MG/1
5 TABLET ORAL 3 TIMES DAILY PRN
Qty: 90 TABLET | Refills: 0 | Status: SHIPPED | OUTPATIENT
Start: 2025-05-29 | End: 2025-06-28

## 2025-04-28 RX ORDER — OXYCODONE HYDROCHLORIDE 5 MG/1
5 TABLET ORAL EVERY 8 HOURS PRN
Qty: 90 TABLET | Refills: 0 | Status: SHIPPED | OUTPATIENT
Start: 2025-05-01

## 2025-04-28 NOTE — H&P (VIEW-ONLY)
Assessment:  1. Chronic pain syndrome    2. Lumbar disc herniation    3. Lumbar radiculopathy    4. Status post lumbar spinal fusion    5. Anterolisthesis of lumbar spine        Plan:  While the patient was in the office today, I did have a thorough conversation regarding their chronic pain syndrome, medication management, and treatment plan options.  Patient is being seen for a 2-month follow-up visit.  She is in the process of trial spinal cord stimulator.  It looks like she should be ready to be scheduled.  She brought to the office and note from her insurance stating that the spinal cord stimulator trial is approved.  While patient was in the office today, I sent a message to our spinal cord stimulator coordinator to reach out to her regarding setting up the trial spinal cord stimulator.    Renewed oxycodone 5 mg 3 times daily if needed for pain.  The patient's opioid scripts were sent to their pharmacy electronically and was given a 2 month supply of prescriptions with a Do Not Fill date(s) of 5/1/2025, 5/29/2025.    Continue gabapentin 600 mg 3 times daily.  She did not require refill of this medication during today's visit.    Continue Robaxin-750 milligrams 3 times daily if needed for spasms.  She did not require refill of this medication during today's visit.    There are risks associated with opioid medications, including dependence, addiction and tolerance. The patient understands and agrees to use these medications only as prescribed. Potential side effects of the medications include, but are not limited to, constipation, drowsiness, addiction, impaired judgment and risk of fatal overdose if not taken as prescribed. The patient was warned against driving while taking sedation medications.  Sharing medications is a felony. At this point in time, the patient is showing no signs of addiction, abuse, diversion or suicidal ideation.    [place] Prescription Drug Monitoring Program report was reviewed and  was appropriate     The patient will follow-up in 2 months for medication prescription refill and reevaluation. The patient was advised to contact the office should their symptoms worsen in the interim. The patient was agreeable and verbalized an understanding.    History of Present Illness:  The patient is a 54 y.o. female who presents for a follow up office visit in regards to Back Pain (Pt can't sit, or stand for more than 20 minutes without getting uncomfortable. Getting out of bed is a struggling. ).   The patient’s current symptoms include complaints of low back and bilateral lower extremity pain.  Current pain level is a 7-8/10.  Quality pain is described as sharp, throbbing, shooting, numb, pins-and-needles.    Current pain medications includes: Oxycodone 5 mg 3 times daily if needed for pain, gabapentin 600 mg 3 times daily, Robaxin-750 milligrams 3 times daily if needed for spasms.  The patient reports that this regimen is providing 0% pain relief.  The patient is reporting no side effects from this pain medication regimen.    I have personally reviewed and/or updated the patient's past medical history, past surgical history, family history, social history, current medications, allergies, and vital signs today.         Review of Systems  Review of Systems   Musculoskeletal:  Positive for back pain.        Difficulty walking  Decreased range of motion   Pain in extremity, both legs.           Past Medical History:   Diagnosis Date    Anxiety     Back pain     Depression     Insomnia        Past Surgical History:   Procedure Laterality Date    BACK SURGERY  2006    cage in lumbar L5 area    BACK SURGERY      CAUDAL BLOCK N/A 11/15/2022    Procedure: BLOCK / INJECTION CAUDAL;  Surgeon: Nuno Farrar MD;  Location: [facility];  Service: Pain Management     CHOLECYSTECTOMY      DECOMPRESSION SPINE LUMBAR POSTERIOR  04/24/2024    LUMBAR DISC SURGERY      POSTERIOR FUSION LUMBAR SPINE  04/24/2024    [place]  REPAIR FIRST ABDOMINAL WALL HERNIA N/A 2018    Procedure: REPAIR HERNIA VENTRAL with mesh;  Surgeon: David Chicas DO;  Location: [facility];  Service: General    AL SURGICAL ARTHROSCOPY SHOULDER W/ROTATOR CUFF RPR Left 2020    Procedure: SHOULDER ARTHROSCOPY, ROTATOR CUFF REPAIR, SYNEVECTOMY, ACROMIOPLASTY DEBRIDEMENT, INJECTION;  Surgeon: Brigido Mota DO;  Location:  MAIN OR;  Service: Orthopedics       Family History   Adopted: Yes   Family history unknown: Yes       Social History     Occupational History    Not on file   Tobacco Use    Smoking status: Former     Current packs/day: 0.00     Types: Cigarettes     Quit date: 10/17/2010     Years since quittin.5    Smokeless tobacco: Never    Tobacco comments:     Recently quit   Vaping Use    Vaping status: Never Used   Substance and Sexual Activity    Alcohol use: No    Drug use: No    Sexual activity: Not Currently     Partners: Female         Current Outpatient Medications:     busPIRone (BUSPAR) 15 mg tablet, Take 1 tablet (15 mg total) by mouth 3 (three) times a day, Disp: 270 tablet, Rfl: 0    celecoxib (CeleBREX) 200 mg capsule, , Disp: , Rfl:     gabapentin (NEURONTIN) 600 MG tablet, ONE (1) BY MOUTH TWICE A DAY AND THREE (3) BY MOUTH EVERY NIGHT AT BEDTIME, Disp: 150 tablet, Rfl: 2    methocarbamol (ROBAXIN) 750 mg tablet, Take 1 tablet (750 mg total) by mouth 3 (three) times a day as needed for muscle spasms, Disp: 90 tablet, Rfl: 2    [START ON 2025] oxyCODONE (Roxicodone) 5 immediate release tablet, Take 1 tablet (5 mg total) by mouth every 8 (eight) hours as needed for moderate pain Max Daily Amount: 15 mg Do not start before May 1, 2025., Disp: 90 tablet, Rfl: 0    [START ON 2025] oxyCODONE (Roxicodone) 5 immediate release tablet, Take 1 tablet (5 mg total) by mouth 3 (three) times a day as needed for moderate pain Max Daily Amount: 15 mg Do not start before May 29, 2025., Disp: 90 tablet, Rfl: 0     "promethazine-dextromethorphan (PHENERGAN-DM) 6.25-15 mg/5 mL oral syrup, Take 5 mL by mouth 4 (four) times a day as needed for cough, Disp: 240 mL, Rfl: 1    sertraline (ZOLOFT) 100 mg tablet, Take 2 tablets (200 mg total) by mouth daily, Disp: 180 tablet, Rfl: 0    No Known Allergies    Physical Exam:    Pulse 68   Temp 98.9 °F (37.2 °C)   Ht 5' 6\" (1.676 m)   Wt 87.5 kg (193 lb)   LMP 10/10/2016   SpO2 98%   BMI 31.15 kg/m²     Constitutional:normal, well developed, well nourished, alert, in no distress and non-toxic and no overt pain behavior.  Eyes:anicteric  HEENT:grossly intact  Neck:supple, symmetric, trachea midline and no masses   Pulmonary:even and unlabored  Cardiovascular:No edema or pitting edema present  Skin:Normal without rashes or lesions and well hydrated  Psychiatric:Mood and affect appropriate  Neurologic:Cranial Nerves II-XII grossly intact  Musculoskeletal: Gait is slow and guarded.    Imaging  No orders to display     Narrative & Impression   MRI THORACIC SPINE WITHOUT CONTRAST     INDICATION: Presurgical evaluation of the thoracic spine. Patient previously had lumbar spinal fusion.     COMPARISON: Thoracic spine MRI 11/2/2020 and thoracic spine radiographs 12/2/2024. Additional correlation made with CT abdomen/pelvis with contrast 2/6/2023.     TECHNIQUE:  Multiplanar, multisequence imaging of the thoracic spine was performed. .  Imaging performed on 1.5T MRI     IMAGE QUALITY: Diagnostic.     FINDINGS:     ALIGNMENT: Normal alignment of the thoracic spine.  No compression fracture.  No subluxation.  No scoliosis.     MARROW SIGNAL: There are type II Modic endplate changes at T6-T7,  and T12-L1. Type I Modic endplate changes are present at T8-T9. Mixed type I and II Modic endplate changes at T11-12. Vertebral body hemangioma at T6.     T12 inferior endplate Schmorl's node. Tiny T11 inferior endplate Schmorl's node.     THORACIC CORD: Normal signal within the thoracic cord. The conus " terminates at the L1 level.     PARAVERTEBRAL SOFT TISSUES:  Normal.     THORACIC DEGENERATIVE CHANGE: Redemonstrated multilevel degenerative changes. Most pronounced levels include:     T5-6: Central disc protrusion with mild canal narrowing, grossly unchanged.  T7-8: Disc bulge and superimposed left subarticular disc protrusion with mild canal narrowing, slightly progressed.  T8-9: Disc bulge and superimposed bilateral subarticular disc protrusion (right greater than left), stable or slightly progressed.        Scattered lesser degree disc bulge or herniation without significant canal stenosis.     OTHER FINDINGS: Bilateral simple renal cysts, similar to prior abdominal CT.     IMPRESSION:     Multilevel noncompressive degenerative changes as described worse at levels T5-6, T7-8, and T8-9. No high-grade canal or foraminal stenosis.     Resident: CORTEZ SAMS I, the attending radiologist, have reviewed the images and agree with the final report above.     Workstation performed: [id number]       No orders of the defined types were placed in this encounter.

## 2025-04-28 NOTE — PROGRESS NOTES
Assessment:  1. Chronic pain syndrome    2. Lumbar disc herniation    3. Lumbar radiculopathy    4. Status post lumbar spinal fusion    5. Anterolisthesis of lumbar spine        Plan:  While the patient was in the office today, I did have a thorough conversation regarding their chronic pain syndrome, medication management, and treatment plan options.  Patient is being seen for a 2-month follow-up visit.  She is in the process of trial spinal cord stimulator.  It looks like she should be ready to be scheduled.  She brought to the office and note from her insurance stating that the spinal cord stimulator trial is approved.  While patient was in the office today, I sent a message to our spinal cord stimulator coordinator to reach out to her regarding setting up the trial spinal cord stimulator.    Renewed oxycodone 5 mg 3 times daily if needed for pain.  The patient's opioid scripts were sent to their pharmacy electronically and was given a 2 month supply of prescriptions with a Do Not Fill date(s) of 5/1/2025, 5/29/2025.    Continue gabapentin 600 mg 3 times daily.  She did not require refill of this medication during today's visit.    Continue Robaxin-750 milligrams 3 times daily if needed for spasms.  She did not require refill of this medication during today's visit.    There are risks associated with opioid medications, including dependence, addiction and tolerance. The patient understands and agrees to use these medications only as prescribed. Potential side effects of the medications include, but are not limited to, constipation, drowsiness, addiction, impaired judgment and risk of fatal overdose if not taken as prescribed. The patient was warned against driving while taking sedation medications.  Sharing medications is a felony. At this point in time, the patient is showing no signs of addiction, abuse, diversion or suicidal ideation.    [place] Prescription Drug Monitoring Program report was reviewed and  was appropriate     The patient will follow-up in 2 months for medication prescription refill and reevaluation. The patient was advised to contact the office should their symptoms worsen in the interim. The patient was agreeable and verbalized an understanding.    History of Present Illness:  The patient is a 54 y.o. female who presents for a follow up office visit in regards to Back Pain (Pt can't sit, or stand for more than 20 minutes without getting uncomfortable. Getting out of bed is a struggling. ).   The patient’s current symptoms include complaints of low back and bilateral lower extremity pain.  Current pain level is a 7-8/10.  Quality pain is described as sharp, throbbing, shooting, numb, pins-and-needles.    Current pain medications includes: Oxycodone 5 mg 3 times daily if needed for pain, gabapentin 600 mg 3 times daily, Robaxin-750 milligrams 3 times daily if needed for spasms.  The patient reports that this regimen is providing 0% pain relief.  The patient is reporting no side effects from this pain medication regimen.    I have personally reviewed and/or updated the patient's past medical history, past surgical history, family history, social history, current medications, allergies, and vital signs today.         Review of Systems  Review of Systems   Musculoskeletal:  Positive for back pain.        Difficulty walking  Decreased range of motion   Pain in extremity, both legs.           Past Medical History:   Diagnosis Date    Anxiety     Back pain     Depression     Insomnia        Past Surgical History:   Procedure Laterality Date    BACK SURGERY  2006    cage in lumbar L5 area    BACK SURGERY      CAUDAL BLOCK N/A 11/15/2022    Procedure: BLOCK / INJECTION CAUDAL;  Surgeon: Nuno Farrar MD;  Location: [facility];  Service: Pain Management     CHOLECYSTECTOMY      DECOMPRESSION SPINE LUMBAR POSTERIOR  04/24/2024    LUMBAR DISC SURGERY      POSTERIOR FUSION LUMBAR SPINE  04/24/2024    [facility]  REPAIR FIRST ABDOMINAL WALL HERNIA N/A 2018    Procedure: REPAIR HERNIA VENTRAL with mesh;  Surgeon: David Chicas DO;  Location: [facility];  Service: General    [place] SURGICAL ARTHROSCOPY SHOULDER W/ROTATOR CUFF RPR Left 2020    Procedure: SHOULDER ARTHROSCOPY, ROTATOR CUFF REPAIR, SYNEVECTOMY, ACROMIOPLASTY DEBRIDEMENT, INJECTION;  Surgeon: Brigido Mota DO;  Location:  MAIN OR;  Service: Orthopedics       Family History   Adopted: Yes   Family history unknown: Yes       Social History     Occupational History    Not on file   Tobacco Use    Smoking status: Former     Current packs/day: 0.00     Types: Cigarettes     Quit date: 10/17/2010     Years since quittin.5    Smokeless tobacco: Never    Tobacco comments:     Recently quit   Vaping Use    Vaping status: Never Used   Substance and Sexual Activity    Alcohol use: No    Drug use: No    Sexual activity: Not Currently     Partners: Female         Current Outpatient Medications:     busPIRone (BUSPAR) 15 mg tablet, Take 1 tablet (15 mg total) by mouth 3 (three) times a day, Disp: 270 tablet, Rfl: 0    celecoxib (CeleBREX) 200 mg capsule, , Disp: , Rfl:     gabapentin (NEURONTIN) 600 MG tablet, ONE (1) BY MOUTH TWICE A DAY AND THREE (3) BY MOUTH EVERY NIGHT AT BEDTIME, Disp: 150 tablet, Rfl: 2    methocarbamol (ROBAXIN) 750 mg tablet, Take 1 tablet (750 mg total) by mouth 3 (three) times a day as needed for muscle spasms, Disp: 90 tablet, Rfl: 2    [START ON 2025] oxyCODONE (Roxicodone) 5 immediate release tablet, Take 1 tablet (5 mg total) by mouth every 8 (eight) hours as needed for moderate pain Max Daily Amount: 15 mg Do not start before May 1, 2025., Disp: 90 tablet, Rfl: 0    [START ON 2025] oxyCODONE (Roxicodone) 5 immediate release tablet, Take 1 tablet (5 mg total) by mouth 3 (three) times a day as needed for moderate pain Max Daily Amount: 15 mg Do not start before May 29, 2025., Disp: 90 tablet, Rfl: 0     "promethazine-dextromethorphan (PHENERGAN-DM) 6.25-15 mg/5 mL oral syrup, Take 5 mL by mouth 4 (four) times a day as needed for cough, Disp: 240 mL, Rfl: 1    sertraline (ZOLOFT) 100 mg tablet, Take 2 tablets (200 mg total) by mouth daily, Disp: 180 tablet, Rfl: 0    No Known Allergies    Physical Exam:    Pulse 68   Temp 98.9 °F (37.2 °C)   Ht 5' 6\" (1.676 m)   Wt 87.5 kg (193 lb)   LMP 10/10/2016   SpO2 98%   BMI 31.15 kg/m²     Constitutional:normal, well developed, well nourished, alert, in no distress and non-toxic and no overt pain behavior.  Eyes:anicteric  HEENT:grossly intact  Neck:supple, symmetric, trachea midline and no masses   Pulmonary:even and unlabored  Cardiovascular:No edema or pitting edema present  Skin:Normal without rashes or lesions and well hydrated  Psychiatric:Mood and affect appropriate  Neurologic:Cranial Nerves II-XII grossly intact  Musculoskeletal: Gait is slow and guarded.    Imaging  No orders to display     Narrative & Impression   MRI THORACIC SPINE WITHOUT CONTRAST     INDICATION: Presurgical evaluation of the thoracic spine. Patient previously had lumbar spinal fusion.     COMPARISON: Thoracic spine MRI 11/2/2020 and thoracic spine radiographs 12/2/2024. Additional correlation made with CT abdomen/pelvis with contrast 2/6/2023.     TECHNIQUE:  Multiplanar, multisequence imaging of the thoracic spine was performed. .  Imaging performed on 1.5T MRI     IMAGE QUALITY: Diagnostic.     FINDINGS:     ALIGNMENT: Normal alignment of the thoracic spine.  No compression fracture.  No subluxation.  No scoliosis.     MARROW SIGNAL: There are type II Modic endplate changes at T6-T7,  and T12-L1. Type I Modic endplate changes are present at T8-T9. Mixed type I and II Modic endplate changes at T11-12. Vertebral body hemangioma at T6.     T12 inferior endplate Schmorl's node. Tiny T11 inferior endplate Schmorl's node.     THORACIC CORD: Normal signal within the thoracic cord. The conus " terminates at the L1 level.     PARAVERTEBRAL SOFT TISSUES:  Normal.     THORACIC DEGENERATIVE CHANGE: Redemonstrated multilevel degenerative changes. Most pronounced levels include:     T5-6: Central disc protrusion with mild canal narrowing, grossly unchanged.  T7-8: Disc bulge and superimposed left subarticular disc protrusion with mild canal narrowing, slightly progressed.  T8-9: Disc bulge and superimposed bilateral subarticular disc protrusion (right greater than left), stable or slightly progressed.        Scattered lesser degree disc bulge or herniation without significant canal stenosis.     OTHER FINDINGS: Bilateral simple renal cysts, similar to prior abdominal CT.     IMPRESSION:     Multilevel noncompressive degenerative changes as described worse at levels T5-6, T7-8, and T8-9. No high-grade canal or foraminal stenosis.     Resident: CORTEZ SAMS I, the attending radiologist, have reviewed the images and agree with the final report above.     Workstation performed: [id number]       No orders of the defined types were placed in this encounter.

## 2025-05-06 DIAGNOSIS — Z79.01 CHRONIC ANTICOAGULATION: ICD-10-CM

## 2025-05-06 DIAGNOSIS — Z79.899 ENCOUNTER FOR LONG-TERM (CURRENT) USE OF MEDICATIONS: ICD-10-CM

## 2025-05-06 DIAGNOSIS — G89.4 CHRONIC PAIN SYNDROME: Primary | ICD-10-CM

## 2025-05-07 ENCOUNTER — APPOINTMENT (OUTPATIENT)
Dept: LAB | Facility: MEDICAL CENTER | Age: 55
End: 2025-05-07
Attending: ANESTHESIOLOGY
Payer: COMMERCIAL

## 2025-05-07 ENCOUNTER — OFFICE VISIT (OUTPATIENT)
Dept: PAIN MEDICINE | Facility: CLINIC | Age: 55
End: 2025-05-07
Payer: COMMERCIAL

## 2025-05-07 VITALS
RESPIRATION RATE: 16 BRPM | OXYGEN SATURATION: 97 % | BODY MASS INDEX: 31.02 KG/M2 | HEIGHT: 66 IN | TEMPERATURE: 97.9 F | HEART RATE: 79 BPM | WEIGHT: 193 LBS

## 2025-05-07 DIAGNOSIS — Z79.01 CHRONIC ANTICOAGULATION: ICD-10-CM

## 2025-05-07 DIAGNOSIS — M51.26 LUMBAR DISC HERNIATION: ICD-10-CM

## 2025-05-07 DIAGNOSIS — G89.4 CHRONIC PAIN SYNDROME: ICD-10-CM

## 2025-05-07 DIAGNOSIS — M54.50 LUMBAR PAIN WITH RADIATION DOWN LEFT LEG: Primary | ICD-10-CM

## 2025-05-07 DIAGNOSIS — M79.605 LUMBAR PAIN WITH RADIATION DOWN LEFT LEG: Primary | ICD-10-CM

## 2025-05-07 DIAGNOSIS — Z98.1 STATUS POST LUMBAR SPINAL FUSION: ICD-10-CM

## 2025-05-07 DIAGNOSIS — Z79.899 ENCOUNTER FOR LONG-TERM (CURRENT) USE OF MEDICATIONS: ICD-10-CM

## 2025-05-07 LAB
ALBUMIN SERPL BCG-MCNC: 4.4 G/DL (ref 3.5–5)
ALP SERPL-CCNC: 86 U/L (ref 34–104)
ALT SERPL W P-5'-P-CCNC: 17 U/L (ref 7–52)
ANION GAP SERPL CALCULATED.3IONS-SCNC: 7 MMOL/L (ref 4–13)
APTT PPP: 31 SECONDS (ref 23–34)
AST SERPL W P-5'-P-CCNC: 14 U/L (ref 13–39)
BASOPHILS # BLD AUTO: 0.02 THOUSANDS/ÂΜL (ref 0–0.1)
BASOPHILS NFR BLD AUTO: 0 % (ref 0–1)
BILIRUB SERPL-MCNC: 0.37 MG/DL (ref 0.2–1)
BUN SERPL-MCNC: 15 MG/DL (ref 5–25)
CALCIUM SERPL-MCNC: 9.7 MG/DL (ref 8.4–10.2)
CHLORIDE SERPL-SCNC: 106 MMOL/L (ref 96–108)
CO2 SERPL-SCNC: 29 MMOL/L (ref 21–32)
CREAT SERPL-MCNC: 0.6 MG/DL (ref 0.6–1.3)
EOSINOPHIL # BLD AUTO: 0.19 THOUSAND/ÂΜL (ref 0–0.61)
EOSINOPHIL NFR BLD AUTO: 3 % (ref 0–6)
ERYTHROCYTE [DISTWIDTH] IN BLOOD BY AUTOMATED COUNT: 14.4 % (ref 11.6–15.1)
EST. AVERAGE GLUCOSE BLD GHB EST-MCNC: 103 MG/DL
GFR SERPL CREATININE-BSD FRML MDRD: 102 ML/MIN/1.73SQ M
GLUCOSE P FAST SERPL-MCNC: 83 MG/DL (ref 65–99)
HBA1C MFR BLD: 5.2 %
HCT VFR BLD AUTO: 49.2 % (ref 34.8–46.1)
HGB BLD-MCNC: 15.5 G/DL (ref 11.5–15.4)
IMM GRANULOCYTES # BLD AUTO: 0.03 THOUSAND/UL (ref 0–0.2)
IMM GRANULOCYTES NFR BLD AUTO: 1 % (ref 0–2)
INR PPP: 0.9 (ref 0.85–1.19)
LYMPHOCYTES # BLD AUTO: 1.53 THOUSANDS/ÂΜL (ref 0.6–4.47)
LYMPHOCYTES NFR BLD AUTO: 24 % (ref 14–44)
MCH RBC QN AUTO: 30 PG (ref 26.8–34.3)
MCHC RBC AUTO-ENTMCNC: 31.5 G/DL (ref 31.4–37.4)
MCV RBC AUTO: 95 FL (ref 82–98)
MONOCYTES # BLD AUTO: 0.52 THOUSAND/ÂΜL (ref 0.17–1.22)
MONOCYTES NFR BLD AUTO: 8 % (ref 4–12)
NEUTROPHILS # BLD AUTO: 4.23 THOUSANDS/ÂΜL (ref 1.85–7.62)
NEUTS SEG NFR BLD AUTO: 64 % (ref 43–75)
NRBC BLD AUTO-RTO: 0 /100 WBCS
PLATELET # BLD AUTO: 269 THOUSANDS/UL (ref 149–390)
PMV BLD AUTO: 10.8 FL (ref 8.9–12.7)
POTASSIUM SERPL-SCNC: 3.9 MMOL/L (ref 3.5–5.3)
PROT SERPL-MCNC: 7.1 G/DL (ref 6.4–8.4)
PROTHROMBIN TIME: 12.4 SECONDS (ref 12.3–15)
RBC # BLD AUTO: 5.16 MILLION/UL (ref 3.81–5.12)
SODIUM SERPL-SCNC: 142 MMOL/L (ref 135–147)
WBC # BLD AUTO: 6.52 THOUSAND/UL (ref 4.31–10.16)

## 2025-05-07 PROCEDURE — 85730 THROMBOPLASTIN TIME PARTIAL: CPT

## 2025-05-07 PROCEDURE — 85610 PROTHROMBIN TIME: CPT

## 2025-05-07 PROCEDURE — 36415 COLL VENOUS BLD VENIPUNCTURE: CPT

## 2025-05-07 PROCEDURE — 83036 HEMOGLOBIN GLYCOSYLATED A1C: CPT

## 2025-05-07 PROCEDURE — 99214 OFFICE O/P EST MOD 30 MIN: CPT | Performed by: ANESTHESIOLOGY

## 2025-05-07 PROCEDURE — 80053 COMPREHEN METABOLIC PANEL: CPT

## 2025-05-07 PROCEDURE — 85025 COMPLETE CBC W/AUTO DIFF WBC: CPT

## 2025-05-07 NOTE — PROGRESS NOTES
Assessment    1. Lumbar pain with radiation down left leg        2. Lumbar disc herniation        3. Status post lumbar spinal fusion        4. Chronic pain syndrome            Plan  The spinal cord stimulator trial was discussed in depth with the patient. The patient was advised that a stimulator lead will be placed percutaneously through an epidural needle, with intra-op stimulation done to confirm appropriate lead placement.  The lead will then be connected to an external generator and secured to the skin.  The patient was advised that an industry clinical specialist will be present for the trial, program the stimulator and explain how to use it.  During the trial, the patient was instructed to perform their activities of daily living, but limit twisting and bending motions, and no lifting greater than 10 pounds.  The patient was advised to refrain from tub baths, showers, swimming, and hot tubs during the trial. The patient will return to the office for trial evaluation and lead removal on 5/20/2025. At that time, if the trial is successful, the patient will be referred to Dr. riddle for permanent spinal cord stimulator placement.     Pre-procedure instructions were reviewed with the patient. The patient was given a prescription for blood work to be done by 5/7/2025. Complete risks and benefits including bleeding, infection, headache, tissue reaction, nerve injury and allergic reaction were discussed. The approach was demonstrated using models and literature was provided. The patient was advised that they would receive pre-procedure antibiotics and a prescription to take during the week of the trial.        The patient will next follow- up on 5/20/2025 for the stimulator trial.    No orders of the defined types were placed in this encounter.      History of Present Illness  The patient is a 54 y.o. female [facility] treat chronic pain from low back and bilateral leg  pain.  The current pain pattern includes 7 out of 10 constant sharp, stabbing, throbbing, shooting, dull/aching pain without particular time pattern..   The patient's goals for the trial include increase ability to stand and walk and do redundant activities for periods greater than 1 hour.    Pain Assessment Measures   Numeric Rating Scale 7   Oswestry Disability Index Score/Neck Disability Index Score 40   PROMIS-29   Physical Function 8   Anxiety 4   Depression 4   Fatigue 9   Sleep Disturbance 15   Ability to Participate in Social Roles And Activities 4   Pain Interference 20       I have personally reviewed and/or updated the patient's past medical history, past surgical history, family history, social history, current medications, allergies, and vital signs today.     Review of Systems   Musculoskeletal:  Positive for back pain and gait problem.        Decreased ROM  Pain legs   All other systems reviewed and are negative.       Past Medical History:   Diagnosis Date    Anxiety     Back pain     Depression     Insomnia        Past Surgical History:   Procedure Laterality Date    BACK SURGERY  2006    cage in lumbar L5 area    BACK SURGERY      CAUDAL BLOCK N/A 11/15/2022    Procedure: BLOCK / INJECTION CAUDAL;  Surgeon: Nuno Farrar MD;  Location: [facility];  Service: Pain Management     CHOLECYSTECTOMY      DECOMPRESSION SPINE LUMBAR POSTERIOR  04/24/2024    LUMBAR DISC SURGERY      POSTERIOR FUSION LUMBAR SPINE  04/24/2024    TN REPAIR FIRST ABDOMINAL WALL HERNIA N/A 03/16/2018    Procedure: REPAIR HERNIA VENTRAL with mesh;  Surgeon: David Chicas DO;  Location: [facility];  Service: General    [place] SURGICAL ARTHROSCOPY SHOULDER W/ROTATOR CUFF RPR Left 11/18/2020    Procedure: SHOULDER ARTHROSCOPY, ROTATOR CUFF REPAIR, SYNEVECTOMY, ACROMIOPLASTY DEBRIDEMENT, INJECTION;  Surgeon: Brigido Mota DO;  Location:  MAIN OR;  Service: Orthopedics       Family History   Adopted: Yes   Family history unknown:  "Yes       Social History     Occupational History    Not on file   Tobacco Use    Smoking status: Former     Current packs/day: 0.00     Types: Cigarettes     Quit date: 10/17/2010     Years since quittin.5    Smokeless tobacco: Never    Tobacco comments:     Recently quit   Vaping Use    Vaping status: Never Used   Substance and Sexual Activity    Alcohol use: No    Drug use: No    Sexual activity: Not Currently     Partners: Female         Current Outpatient Medications:     busPIRone (BUSPAR) 15 mg tablet, Take 1 tablet (15 mg total) by mouth 3 (three) times a day, Disp: 270 tablet, Rfl: 0    celecoxib (CeleBREX) 200 mg capsule, , Disp: , Rfl:     gabapentin (NEURONTIN) 600 MG tablet, ONE (1) BY MOUTH TWICE A DAY AND THREE (3) BY MOUTH EVERY NIGHT AT BEDTIME, Disp: 150 tablet, Rfl: 2    methocarbamol (ROBAXIN) 750 mg tablet, Take 1 tablet (750 mg total) by mouth 3 (three) times a day as needed for muscle spasms, Disp: 90 tablet, Rfl: 2    oxyCODONE (Roxicodone) 5 immediate release tablet, Take 1 tablet (5 mg total) by mouth every 8 (eight) hours as needed for moderate pain Max Daily Amount: 15 mg Do not start before May 1, 2025., Disp: 90 tablet, Rfl: 0    [START ON 2025] oxyCODONE (Roxicodone) 5 immediate release tablet, Take 1 tablet (5 mg total) by mouth 3 (three) times a day as needed for moderate pain Max Daily Amount: 15 mg Do not start before May 29, 2025., Disp: 90 tablet, Rfl: 0    promethazine-dextromethorphan (PHENERGAN-DM) 6.25-15 mg/5 mL oral syrup, Take 5 mL by mouth 4 (four) times a day as needed for cough, Disp: 240 mL, Rfl: 1    sertraline (ZOLOFT) 100 mg tablet, Take 2 tablets (200 mg total) by mouth daily, Disp: 180 tablet, Rfl: 0    No Known Allergies    Physical Exam    Pulse 79   Temp 97.9 °F (36.6 °C)   Resp 16   Ht 5' 6\" (1.676 m)   Wt 87.5 kg (193 lb)   LMP 10/10/2016   SpO2 97%   BMI 31.15 kg/m²     Constitutional:normal, well developed, well nourished, alert, in no " distress and non-toxic and no overt pain behavior. and obese  Eyes:anicteric  HEENT:grossly intact  Neck:supple, symmetric, trachea midline and no masses   Pulmonary:even and unlabored  Cardiovascular:No edema or pitting edema present  Skin:Normal without rashes or lesions and well hydrated  Psychiatric:Mood and affect appropriate  Neurologic:Cranial Nerves II-XII grossly intact  Musculoskeletal:antalgic    Imaging      Study Result    Narrative & Impression   MRI THORACIC SPINE WITHOUT CONTRAST     INDICATION: Presurgical evaluation of the thoracic spine. Patient previously had lumbar spinal fusion.     COMPARISON: Thoracic spine MRI 11/2/2020 and thoracic spine radiographs 12/2/2024. Additional correlation made with CT abdomen/pelvis with contrast 2/6/2023.     TECHNIQUE:  Multiplanar, multisequence imaging of the thoracic spine was performed. .  Imaging performed on 1.5T MRI     IMAGE QUALITY: Diagnostic.     FINDINGS:     ALIGNMENT: Normal alignment of the thoracic spine.  No compression fracture.  No subluxation.  No scoliosis.     MARROW SIGNAL: There are type II Modic endplate changes at T6-T7,  and T12-L1. Type I Modic endplate changes are present at T8-T9. Mixed type I and II Modic endplate changes at T11-12. Vertebral body hemangioma at T6.     T12 inferior endplate Schmorl's node. Tiny T11 inferior endplate Schmorl's node.     THORACIC CORD: Normal signal within the thoracic cord. The conus terminates at the L1 level.     PARAVERTEBRAL SOFT TISSUES:  Normal.     THORACIC DEGENERATIVE CHANGE: Redemonstrated multilevel degenerative changes. Most pronounced levels include:     T5-6: Central disc protrusion with mild canal narrowing, grossly unchanged.  T7-8: Disc bulge and superimposed left subarticular disc protrusion with mild canal narrowing, slightly progressed.  T8-9: Disc bulge and superimposed bilateral subarticular disc protrusion (right greater than left), stable or slightly progressed.         Scattered lesser degree disc bulge or herniation without significant canal stenosis.     OTHER FINDINGS: Bilateral simple renal cysts, similar to prior abdominal CT.     IMPRESSION:     Multilevel noncompressive degenerative changes as described worse at levels T5-6, T7-8, and T8-9. No high-grade canal or foraminal stenosis.     Resident: CORTEZ SAMS I, the attending radiologist, have reviewed the images and agree with the final report above.     Workstation performed: [id number]

## 2025-05-12 ENCOUNTER — TELEPHONE (OUTPATIENT)
Dept: FAMILY MEDICINE CLINIC | Facility: CLINIC | Age: 55
End: 2025-05-12

## 2025-05-13 ENCOUNTER — PREP FOR PROCEDURE (OUTPATIENT)
Dept: PAIN MEDICINE | Facility: CLINIC | Age: 55
End: 2025-05-13

## 2025-05-13 DIAGNOSIS — G89.4 CHRONIC PAIN SYNDROME: Primary | ICD-10-CM

## 2025-05-13 DIAGNOSIS — M51.26 DISPLACEMENT OF LUMBAR INTERVERTEBRAL DISC WITHOUT MYELOPATHY: ICD-10-CM

## 2025-05-13 DIAGNOSIS — M54.16 LUMBAR RADICULOPATHY: ICD-10-CM

## 2025-05-15 ENCOUNTER — APPOINTMENT (OUTPATIENT)
Dept: RADIOLOGY | Facility: HOSPITAL | Age: 55
End: 2025-05-15
Payer: COMMERCIAL

## 2025-05-15 ENCOUNTER — HOSPITAL ENCOUNTER (OUTPATIENT)
Facility: HOSPITAL | Age: 55
Setting detail: OUTPATIENT SURGERY
Discharge: HOME | End: 2025-05-15
Attending: ANESTHESIOLOGY | Admitting: ANESTHESIOLOGY
Payer: COMMERCIAL

## 2025-05-15 ENCOUNTER — TELEPHONE (OUTPATIENT)
Dept: PAIN MEDICINE | Facility: CLINIC | Age: 55
End: 2025-05-15

## 2025-05-15 VITALS
SYSTOLIC BLOOD PRESSURE: 161 MMHG | TEMPERATURE: 98.6 F | WEIGHT: 190 LBS | OXYGEN SATURATION: 99 % | BODY MASS INDEX: 30.53 KG/M2 | DIASTOLIC BLOOD PRESSURE: 78 MMHG | HEART RATE: 62 BPM | HEIGHT: 66 IN | RESPIRATION RATE: 18 BRPM

## 2025-05-15 DIAGNOSIS — Z29.9 UNSPECIFIED PROPHYLACTIC OR TREATMENT MEASURE: Primary | ICD-10-CM

## 2025-05-15 DIAGNOSIS — M54.16 LUMBAR RADICULOPATHY: ICD-10-CM

## 2025-05-15 DIAGNOSIS — M51.26 DISPLACEMENT OF LUMBAR INTERVERTEBRAL DISC WITHOUT MYELOPATHY: Primary | ICD-10-CM

## 2025-05-15 PROCEDURE — 63650 IMPLANT NEUROELECTRODES: CPT | Performed by: ANESTHESIOLOGY

## 2025-05-15 PROCEDURE — C1897 LEAD, NEUROSTIM TEST KIT: HCPCS | Performed by: ANESTHESIOLOGY

## 2025-05-15 DEVICE — TRIAL LEAD KIT, 70CM WITH 5MM SPACING
Type: IMPLANTABLE DEVICE | Site: SPINE LUMBAR | Status: FUNCTIONAL
Brand: NEVRO®

## 2025-05-15 RX ORDER — BUPIVACAINE HYDROCHLORIDE 2.5 MG/ML
INJECTION, SOLUTION EPIDURAL; INFILTRATION; INTRACAUDAL; PERINEURAL AS NEEDED
Status: DISCONTINUED | OUTPATIENT
Start: 2025-05-15 | End: 2025-05-15 | Stop reason: HOSPADM

## 2025-05-15 RX ORDER — CEFAZOLIN SODIUM 2 G/50ML
2000 SOLUTION INTRAVENOUS ONCE
Status: COMPLETED | OUTPATIENT
Start: 2025-05-15 | End: 2025-05-15

## 2025-05-15 RX ORDER — LIDOCAINE HYDROCHLORIDE 10 MG/ML
INJECTION, SOLUTION EPIDURAL; INFILTRATION; INTRACAUDAL; PERINEURAL AS NEEDED
Status: DISCONTINUED | OUTPATIENT
Start: 2025-05-15 | End: 2025-05-15 | Stop reason: HOSPADM

## 2025-05-15 RX ORDER — CEPHALEXIN 500 MG/1
500 CAPSULE ORAL EVERY 6 HOURS SCHEDULED
Qty: 28 CAPSULE | Refills: 0 | Status: SHIPPED | OUTPATIENT
Start: 2025-05-15 | End: 2025-05-23

## 2025-05-15 RX ORDER — SODIUM CHLORIDE, SODIUM LACTATE, POTASSIUM CHLORIDE, CALCIUM CHLORIDE 600; 310; 30; 20 MG/100ML; MG/100ML; MG/100ML; MG/100ML
50 INJECTION, SOLUTION INTRAVENOUS ONCE
Status: COMPLETED | OUTPATIENT
Start: 2025-05-15 | End: 2025-05-15

## 2025-05-15 RX ADMIN — CEFAZOLIN SODIUM 2000 MG: 2 SOLUTION INTRAVENOUS at 13:27

## 2025-05-15 RX ADMIN — SODIUM CHLORIDE, SODIUM LACTATE, POTASSIUM CHLORIDE, AND CALCIUM CHLORIDE 50 ML/HR: .6; .31; .03; .02 INJECTION, SOLUTION INTRAVENOUS at 13:13

## 2025-05-15 NOTE — OP NOTE
OPERATIVE REPORT  PATIENT NAME: Calista Umana    :  1970  MRN: [id number]  Pt Location: [facility] IR    SURGERY DATE: 5/15/2025    Surgeons and Role:     * Nuno Farrar MD - Primary    Preop Diagnosis:  Chronic pain syndrome [G89.4]  Lumbar radiculopathy [M54.16]  Displacement of lumbar intervertebral disc without myelopathy [M51.26]    Post-Op Diagnosis Codes:     * Chronic pain syndrome [G89.4]     * Lumbar radiculopathy [M54.16]     * Displacement of lumbar intervertebral disc without myelopathy [M51.26]    Procedure(s):  Bilateral - NEVRO SCS TRIAL    Specimen(s):  * No specimens in log *    Estimated Blood Loss:   Minimal    Drains:  * No LDAs found *    Anesthesia Type:   Local    Operative Indications:  Chronic pain syndrome [G89.4]  Lumbar radiculopathy [M54.16]  Displacement of lumbar intervertebral disc without myelopathy [M51.26]      Operative Findings:  same      Complications:   None    Procedure and Technique:  2-lead Nevro spinal Cord Stimulator Trial    The patient was identified and evaluated in the preoperative holing area. Risks, benefits, and alternatives, and a team approach were discussed with the patient, and the pertinent surgical site was verified and marked with initials. The patient had the opportunity to ask questions, and wished to proceed.    The patient was transported to the procedure room, at which time they were placed in the prone position after appropriate IV placement. The patient received preoperative antibiotics in the form of 2 g Ancef.  The patient was then prepped and draped with Chloraprep, Prevail, and sterile drapes. A procedural pause was conducted to verify: patient identification, patient allergies, completion of antibiotic prophylaxis, and site of entry.    Under fluoroscopic guidance, the T12-L1 interspace was identified. The skin and underlying subcutaneous tissue was anesthetized by an infiltration of a 1:1 mixture of 1% lidocaine and 0.25%  bupivacaine with 1:200,000 epinephrine additive. A 14-gauge Coude epidural needle was advanced into the interlaminar space at T12-L1 without difficulty. Epidural access was obtained using a loss of resistance technique to saline. The epidural spinal cord stimulation lead was advanced to the bottom of T7. The lead was midline.    Under fluoroscopic guidance, the T12-L1 interspace was identified. The skin and underlying subcutaneous tissue was anesthetized by an infiltration of a 1:1 mixture of 1% lidocaine and 0.25% bupivacaine with 1:200,000 epinephrine additive. A 14-gauge Coude epidural needle was advanced into the interlaminar space at T12-L1 without difficulty. Epidural access was obtained using a loss of resistance technique to saline. The epidural spinal cord stimulation lead was advanced to the middle of T9. The lead was midline.    Impedance was checked and multiple electric combinations were utilized to provide paresthesia coverage of the patientâ€™s area of pain. The needle was removed and he lead was secured to the back with the use of Steri-strips, StayFix dressing, and Tegaderm. The patient was taken to the recovery area in excellent condition.     The patient tolerated the procedure well without evidence of complication. The patient was transferred from the procedure table without difficulty. After an appropriate amount of observation in the recovery area, the patient was dismissed home.                I was present for the entire procedure.    Patient Disposition:  hemodynamically stable             SIGNATURE: Nuno Farrar MD  DATE: May 15, 2025  TIME: 2:33 PM

## 2025-05-15 NOTE — DISCHARGE INSTR - AVS FIRST PAGE
"SPINE AND PAIN: SPINAL CORD STIMULATION/PERIPHERAL NERVE STIMULATION TRIAL/ PERMANENT IMPLANT DISCHARGE INSTRUCTIONS    ACTIVITY RESTRICTIONS DURING TRIAL PERIOD  Do not drive with the SCS \"on\".  Do not bend or twist at the waist.  Do not lift anything that weighs over 5 pounds.  When you lie down, \"log roll\" (keep spine aligned) to change positions. Do not sleep on your stomach.  Limit stair climbing and strenuous activity.    CARE OF THE INJECTION SITE  CHECK THE DRESSING DAILY. It should intact.  Notify the [facility] if you have any of the following: redness, drainage, swelling, chills or fever over 100°F.  Do not change dressing, only reinforce edges if necessary.  Keep the dressing dry. Do not shower, (sponge baths only) until evaluated in office.    SPECIAL INSTRUCTIONS  Take your temperature daily.  Follow-up for lead removal scheduled for 5/22/2025  The  box is expensive. DO NOT drop or get wet.  Do not pull on the cable or leads. Do not disconnect the cable and lead.   Do activities that normally cause you to have pain and try different  settings.  Obtain post procedure x-ray shortly after procedure if ordered by physician.     MEDICATIONS  Continue to take all routine medications.  Our office may have instructed you to hold some medications.  You may resume _______________________________________________.  Take antiobiotic as prescribed:____________ Keflex 500 mg p.o. 4 times daily _________________________.    If you have any problems specifically related to your procedure, please call our office at [phone]. Problems not related to your procedure should be directed at your primary care physician.    Contact the company representative with any questions regarding settings or operation of the external  box.    As no general anesthesia was used in today's procedure, you should not experience any side effects related to anesthesia.    "

## 2025-05-15 NOTE — TELEPHONE ENCOUNTER
NEVRO SCS  Placement 5/15/25 RM @ [facility]    Lead Removal: 5/21/25 10:30am RM @ [facility]  ABX: Keflex 500mg QID x 7 days    Pls call pt 5/16/25 to assess

## 2025-05-15 NOTE — INTERVAL H&P NOTE
H&P reviewed. After examining the patient I find no changes in the patients condition since the H&P had been written.    Vitals:    05/15/25 1259   BP: (!) 174/92   Pulse: 75   Resp: 18   Temp: 98.3 °F (36.8 °C)   SpO2: 97%

## 2025-05-18 DIAGNOSIS — M54.16 LUMBAR RADICULOPATHY: ICD-10-CM

## 2025-05-18 DIAGNOSIS — M51.26 LUMBAR DISC HERNIATION: ICD-10-CM

## 2025-05-18 DIAGNOSIS — Z98.1 STATUS POST LUMBAR SPINAL FUSION: ICD-10-CM

## 2025-05-18 DIAGNOSIS — G89.4 CHRONIC PAIN SYNDROME: ICD-10-CM

## 2025-05-18 RX ORDER — GABAPENTIN 600 MG/1
TABLET ORAL
Qty: 150 TABLET | Refills: 0 | Status: CANCELLED | OUTPATIENT
Start: 2025-05-18

## 2025-05-18 RX ORDER — METHOCARBAMOL 750 MG/1
750 TABLET, FILM COATED ORAL 3 TIMES DAILY PRN
Qty: 90 TABLET | Refills: 0 | Status: CANCELLED | OUTPATIENT
Start: 2025-05-18 | End: 2025-06-17

## 2025-05-19 NOTE — TELEPHONE ENCOUNTER
"How are you feeling after your procedure? \"No different\". Has spoken to the rep twice, adjustments made and waiting for another call this morning.  Are you having any soreness?  Minimal.  It can take 2-3 days for relief and burning is normal from the incision. Tylenol will help. Verity no NSAIDS or aspirin -  verified  Is the stimulator covering your pain? NO   rates pain 6/10.  The stimulator team should be in contact with you today  How does the dressing look? Reinforced by roommate once   Any s/s of infection?  No  Fever? NO ( Remember to check temp daily)  Have you started the antibiotic? Yes taking as prescribed  Appointment for Lead removal confirmed  "

## 2025-05-21 ENCOUNTER — APPOINTMENT (OUTPATIENT)
Dept: RADIOLOGY | Facility: MEDICAL CENTER | Age: 55
End: 2025-05-21
Attending: ANESTHESIOLOGY
Payer: COMMERCIAL

## 2025-05-21 ENCOUNTER — OFFICE VISIT (OUTPATIENT)
Dept: PAIN MEDICINE | Facility: CLINIC | Age: 55
End: 2025-05-21

## 2025-05-21 VITALS
HEART RATE: 70 BPM | TEMPERATURE: 97.6 F | HEIGHT: 66 IN | RESPIRATION RATE: 16 BRPM | OXYGEN SATURATION: 97 % | BODY MASS INDEX: 31.66 KG/M2 | WEIGHT: 197 LBS

## 2025-05-21 DIAGNOSIS — M54.50 LUMBAR PAIN WITH RADIATION DOWN LEFT LEG: ICD-10-CM

## 2025-05-21 DIAGNOSIS — M54.9 MID BACK PAIN: ICD-10-CM

## 2025-05-21 DIAGNOSIS — M54.9 MID BACK PAIN: Primary | ICD-10-CM

## 2025-05-21 DIAGNOSIS — G89.4 CHRONIC PAIN SYNDROME: ICD-10-CM

## 2025-05-21 DIAGNOSIS — Z98.1 STATUS POST LUMBAR SPINAL FUSION: ICD-10-CM

## 2025-05-21 DIAGNOSIS — M79.605 LUMBAR PAIN WITH RADIATION DOWN LEFT LEG: ICD-10-CM

## 2025-05-21 DIAGNOSIS — M51.26 LUMBAR DISC HERNIATION: ICD-10-CM

## 2025-05-21 PROCEDURE — 99024 POSTOP FOLLOW-UP VISIT: CPT | Performed by: ANESTHESIOLOGY

## 2025-05-21 PROCEDURE — 72072 X-RAY EXAM THORAC SPINE 3VWS: CPT

## 2025-05-21 NOTE — PROGRESS NOTES
Assessment  1. Mid back pain  XR spine thoracic 3 vw      2. Lumbar pain with radiation down left leg        3. Lumbar disc herniation        4. Status post lumbar spinal fusion        5. Chronic pain syndrome            Plan  The patient had a unsuccessful Nevro spinal cord stimulator trial.  The patient will be referred to [facility] for permanent implantation.  X-ray of thoracic spine was performed and noted the shifting of spinal cord stimulator leads from the T7-T8 displaced down to the T9-T10 disc space.  1.  We will release spinal cord stimulator in place and removing on 5/23/2025 since reprogram will need to be done to adjust for the movement of the spinal cord stimulator leads.    No orders of the defined types were placed in this encounter.        History of Present Illness    The patient is a 54 y.o. female status post Nevro spinal cord stimulator percutaneous lead placement on 5/15/2025.  The patient's reported an overall reduction in pain of 0% during the trial.  The patient reported functional improvement included no significant improvement.    Pain Assessment Measures   Numeric Rating Scale 6   Oswestry Disability Index Score/Neck Disability Index Score 26   PROMIS-29   Physical Function 12   Anxiety 4   Depression 4   Fatigue 8   Sleep Disturbance 17   Ability to Participate in Social Roles And Activities 8   Pain Interference 16     Review of Systems   Musculoskeletal:  Positive for back pain and gait problem.   Neurological:  Positive for weakness.        In both legs but left is worse   All other systems reviewed and are negative.      Problem List[1]     Past Medical History:   Diagnosis Date    Anxiety     Back pain     Depression     Insomnia        Past Surgical History:   Procedure Laterality Date    BACK SURGERY  2006    cage in lumbar L5 area    BACK SURGERY      CAUDAL BLOCK N/A 11/15/2022    Procedure: BLOCK / INJECTION CAUDAL;  Surgeon: Nuno Farrar MD;  Location: [facility];  Service:  "Pain Management     CHOLECYSTECTOMY      DECOMPRESSION SPINE LUMBAR POSTERIOR  2024    LUMBAR DISC SURGERY      POSTERIOR FUSION LUMBAR SPINE  2024    [place] PRQ IMPLTJ NSTIM ELECTRODE ARRAY EPIDURAL Bilateral 5/15/2025    Procedure: NEVRO SCS TRIAL;  Surgeon: Nuno Farrar MD;  Location: [facility];  Service: Pain Management     [place] REPAIR FIRST ABDOMINAL WALL HERNIA N/A 2018    Procedure: REPAIR HERNIA VENTRAL with mesh;  Surgeon: David Chicas DO;  Location: [facility];  Service: General    [place] SURGICAL ARTHROSCOPY SHOULDER W/ROTATOR CUFF RPR Left 2020    Procedure: SHOULDER ARTHROSCOPY, ROTATOR CUFF REPAIR, SYNEVECTOMY, ACROMIOPLASTY DEBRIDEMENT, INJECTION;  Surgeon: Brigido Mota DO;  Location:  MAIN OR;  Service: Orthopedics       Family History   Adopted: Yes   Family history unknown: Yes       Social History     Occupational History    Not on file   Tobacco Use    Smoking status: Former     Current packs/day: 0.00     Types: Cigarettes     Quit date: 10/17/2010     Years since quittin.6    Smokeless tobacco: Never    Tobacco comments:     Recently quit   Vaping Use    Vaping status: Never Used   Substance and Sexual Activity    Alcohol use: No    Drug use: No    Sexual activity: Not Currently     Partners: Female       Current Medications[2]    Allergies[3]      Physical Exam    Resp 16   Ht 5' 6\" (1.676 m)   LMP 10/10/2016   BMI 30.67 kg/m²     Thoracic Spine:  Spinal cord stimulator lead removed with tip intact; no erythema, tenderness or signs of infection; area cleansed with alcohol and bandage placed          [1]   Patient Active Problem List  Diagnosis    Supraumbilical hernia    Lumbar pain with radiation down left leg    Lumbar disc herniation    Status post lumbar spinal fusion    CRPS (complex regional pain syndrome type I)    Traumatic incomplete tear of left rotator cuff    Chronic pain syndrome    Lumbar radiculopathy    Dyspnea on exertion    S/P shoulder " surgery    Restrictive lung disease    Class 1 obesity    Anxiety and depression    Long-term current use of opiate analgesic    Vitamin D deficiency    Other fatigue    Left medial knee pain    Anterolisthesis of lumbar spine   [2]   Current Outpatient Medications:     busPIRone (BUSPAR) 15 mg tablet, Take 1 tablet (15 mg total) by mouth 3 (three) times a day, Disp: 270 tablet, Rfl: 0    celecoxib (CeleBREX) 200 mg capsule, , Disp: , Rfl:     cephalexin (KEFLEX) 500 mg capsule, Take 1 capsule (500 mg total) by mouth every 6 (six) hours for 7 days, Disp: 28 capsule, Rfl: 0    gabapentin (NEURONTIN) 600 MG tablet, ONE (1) BY MOUTH TWICE A DAY AND THREE (3) BY MOUTH EVERY NIGHT AT BEDTIME, Disp: 150 tablet, Rfl: 2    oxyCODONE (Roxicodone) 5 immediate release tablet, Take 1 tablet (5 mg total) by mouth every 8 (eight) hours as needed for moderate pain Max Daily Amount: 15 mg Do not start before May 1, 2025., Disp: 90 tablet, Rfl: 0    [START ON 5/29/2025] oxyCODONE (Roxicodone) 5 immediate release tablet, Take 1 tablet (5 mg total) by mouth 3 (three) times a day as needed for moderate pain Max Daily Amount: 15 mg Do not start before May 29, 2025., Disp: 90 tablet, Rfl: 0    promethazine-dextromethorphan (PHENERGAN-DM) 6.25-15 mg/5 mL oral syrup, Take 5 mL by mouth 4 (four) times a day as needed for cough, Disp: 240 mL, Rfl: 1    sertraline (ZOLOFT) 100 mg tablet, Take 2 tablets (200 mg total) by mouth daily, Disp: 180 tablet, Rfl: 0    methocarbamol (ROBAXIN) 750 mg tablet, Take 1 tablet (750 mg total) by mouth 3 (three) times a day as needed for muscle spasms, Disp: 90 tablet, Rfl: 2  [3] No Known Allergies

## 2025-05-23 ENCOUNTER — OFFICE VISIT (OUTPATIENT)
Dept: PAIN MEDICINE | Facility: CLINIC | Age: 55
End: 2025-05-23

## 2025-05-23 VITALS
WEIGHT: 190 LBS | OXYGEN SATURATION: 99 % | BODY MASS INDEX: 30.53 KG/M2 | TEMPERATURE: 97.9 F | HEART RATE: 72 BPM | HEIGHT: 66 IN

## 2025-05-23 DIAGNOSIS — M51.26 LUMBAR DISC HERNIATION: Primary | ICD-10-CM

## 2025-05-23 DIAGNOSIS — Z98.1 STATUS POST LUMBAR SPINAL FUSION: ICD-10-CM

## 2025-05-23 DIAGNOSIS — M54.16 LUMBAR RADICULOPATHY: ICD-10-CM

## 2025-05-23 DIAGNOSIS — G89.4 CHRONIC PAIN SYNDROME: ICD-10-CM

## 2025-05-23 NOTE — PROGRESS NOTES
"Assessment:  No diagnosis found.    Plan:  ***    {Oral Swab Statement:84541}    {Pain Management Procedure Statement:64253}    {Opioid Statement:87729}    {UDS Statement:60069}    {PDMP Statement:15578}      History of Present Illness:  The patient is a 54 y.o. female who presents for a follow up office visit in regards to No chief complaint on file..   The patient’s current symptoms include ***    Current pain medications includes:  .  The patient reports that this regimen is providing ***% pain relief.  The patient is reporting {Side Effects:96522::\"no side effects\"} from this pain medication regimen.    I have personally reviewed and/or updated the patient's past medical history, past surgical history, family history, social history, current medications, allergies, and vital signs today.         Review of Systems  Review of Systems   Musculoskeletal:         Difficulty walking   Pain in legs           Past Medical History[1]    Past Surgical History[2]    Family History[3]    Social History     Occupational History    Not on file   Tobacco Use    Smoking status: Former     Current packs/day: 0.00     Types: Cigarettes     Quit date: 10/17/2010     Years since quittin.6    Smokeless tobacco: Never    Tobacco comments:     Recently quit   Vaping Use    Vaping status: Never Used   Substance and Sexual Activity    Alcohol use: No    Drug use: No    Sexual activity: Not Currently     Partners: Female       Current Medications[4]    Allergies[5]    Physical Exam:    [facility] 10/10/2016     Constitutional:{General Appearance:51422::\"normal, well developed, well nourished, alert, in no distress and non-toxic and no overt pain behavior.\"}  Eyes:{Sclera:84432::\"anicteric\"}  HEENT:{Hearin::\"grossly intact\"}  Neck:{Neck:43892::\"supple, symmetric, trachea midline and no masses \"}  Pulmonary:{Respiratory effort:77302::\"even and unlabored\"}  Cardiovascular:{Examination of Extremities:02874::\"No edema or pitting edema " "present\"}  Skin:{Skin and Subcutaneous tissues:12081::\"Normal without rashes or lesions and well hydrated\"}  Psychiatric:{Mood and Affect:73963::\"Mood and affect appropriate\"}  Neurologic:{Cranial Nerves:43618::\"Cranial Nerves II-XII grossly intact\"}  Musculoskeletal:{Gair and Station:33028::\"normal\"}    Imaging  No orders to display     Narrative & Impression   MRI THORACIC SPINE WITHOUT CONTRAST     INDICATION: Presurgical evaluation of the thoracic spine. Patient previously had lumbar spinal fusion.     COMPARISON: Thoracic spine MRI 11/2/2020 and thoracic spine radiographs 12/2/2024. Additional correlation made with CT abdomen/pelvis with contrast 2/6/2023.     TECHNIQUE:  Multiplanar, multisequence imaging of the thoracic spine was performed. .  Imaging performed on 1.5T MRI     IMAGE QUALITY: Diagnostic.     FINDINGS:     ALIGNMENT: Normal alignment of the thoracic spine.  No compression fracture.  No subluxation.  No scoliosis.     MARROW SIGNAL: There are type II Modic endplate changes at T6-T7,  and T12-L1. Type I Modic endplate changes are present at T8-T9. Mixed type I and II Modic endplate changes at T11-12. Vertebral body hemangioma at T6.     T12 inferior endplate Schmorl's node. Tiny T11 inferior endplate Schmorl's node.     THORACIC CORD: Normal signal within the thoracic cord. The conus terminates at the L1 level.     PARAVERTEBRAL SOFT TISSUES:  Normal.     THORACIC DEGENERATIVE CHANGE: Redemonstrated multilevel degenerative changes. Most pronounced levels include:     T5-6: Central disc protrusion with mild canal narrowing, grossly unchanged.  T7-8: Disc bulge and superimposed left subarticular disc protrusion with mild canal narrowing, slightly progressed.  T8-9: Disc bulge and superimposed bilateral subarticular disc protrusion (right greater than left), stable or slightly progressed.        Scattered lesser degree disc bulge or herniation without significant canal stenosis.     OTHER FINDINGS: " Bilateral simple renal cysts, similar to prior abdominal CT.     IMPRESSION:     Multilevel noncompressive degenerative changes as described worse at levels T5-6, T7-8, and T8-9. No high-grade canal or foraminal stenosis.     Resident: CORTEZ SAMS I, the attending radiologist, have reviewed the images and agree with the final report above.       No orders of the defined types were placed in this encounter.            [1]   Past Medical History:  Diagnosis Date    Anxiety     Back pain     Depression     Insomnia    [2]   Past Surgical History:  Procedure Laterality Date    BACK SURGERY  2006    cage in lumbar L5 area    BACK SURGERY      CAUDAL BLOCK N/A 11/15/2022    Procedure: BLOCK / INJECTION CAUDAL;  Surgeon: Nuno Farrar MD;  Location: [facility];  Service: Pain Management     CHOLECYSTECTOMY      DECOMPRESSION SPINE LUMBAR POSTERIOR  04/24/2024    LUMBAR DISC SURGERY      POSTERIOR FUSION LUMBAR SPINE  04/24/2024    VT PRQ IMPLTJ NSTIM ELECTRODE ARRAY EPIDURAL Bilateral 5/15/2025    Procedure: NEVRO SCS TRIAL;  Surgeon: Nuno Farrar MD;  Location: [facility];  Service: Pain Management     VT REPAIR FIRST ABDOMINAL WALL HERNIA N/A 03/16/2018    Procedure: REPAIR HERNIA VENTRAL with mesh;  Surgeon: David Chicas DO;  Location: [facility];  Service: General    VT SURGICAL ARTHROSCOPY SHOULDER W/ROTATOR CUFF RPR Left 11/18/2020    Procedure: SHOULDER ARTHROSCOPY, ROTATOR CUFF REPAIR, SYNEVECTOMY, ACROMIOPLASTY DEBRIDEMENT, INJECTION;  Surgeon: Brigido Mota DO;  Location:  MAIN OR;  Service: Orthopedics   [3]   Family History  Adopted: Yes   Family history unknown: Yes   [4]   Current Outpatient Medications:     busPIRone (BUSPAR) 15 mg tablet, Take 1 tablet (15 mg total) by mouth 3 (three) times a day, Disp: 270 tablet, Rfl: 0    celecoxib (CeleBREX) 200 mg capsule, , Disp: , Rfl:     gabapentin (NEURONTIN) 600 MG tablet, ONE (1) BY MOUTH TWICE A DAY AND THREE (3) BY MOUTH EVERY NIGHT AT  BEDTIME, Disp: 150 tablet, Rfl: 2    methocarbamol (ROBAXIN) 750 mg tablet, Take 1 tablet (750 mg total) by mouth 3 (three) times a day as needed for muscle spasms, Disp: 90 tablet, Rfl: 2    oxyCODONE (Roxicodone) 5 immediate release tablet, Take 1 tablet (5 mg total) by mouth every 8 (eight) hours as needed for moderate pain Max Daily Amount: 15 mg Do not start before May 1, 2025., Disp: 90 tablet, Rfl: 0    [START ON 5/29/2025] oxyCODONE (Roxicodone) 5 immediate release tablet, Take 1 tablet (5 mg total) by mouth 3 (three) times a day as needed for moderate pain Max Daily Amount: 15 mg Do not start before May 29, 2025., Disp: 90 tablet, Rfl: 0    promethazine-dextromethorphan (PHENERGAN-DM) 6.25-15 mg/5 mL oral syrup, Take 5 mL by mouth 4 (four) times a day as needed for cough, Disp: 240 mL, Rfl: 1    sertraline (ZOLOFT) 100 mg tablet, Take 2 tablets (200 mg total) by mouth daily, Disp: 180 tablet, Rfl: 0  [5] No Known Allergies

## 2025-05-23 NOTE — PROGRESS NOTES
Name: Calista Umana      : 1970      MRN: [id number]  Encounter Provider: Nuno Farrar MD  Encounter Date: 2025   Encounter department: [facility] [place]  :  Assessment & Plan  Lumbar disc herniation         Status post lumbar spinal fusion         Lumbar radiculopathy         Chronic pain syndrome             Plan  The patient had a unsuccessful Nevro spinal cord stimulator trial.  The patient will not be referred for permanent implantation.  The patient will then follow back in our office 6 weeks after permanent implantation for re-evaluation.      History of Present Illness     The patient is a 54 y.o. female status post Nevro spinal cord stimulator percutaneous lead placement on 5/15/2025 .  The patient's reported an overall reduction in pain of 20% during the trial.  The patient reported functional improvement included nothing significant.    Pain Assessment Measures   Numeric Rating Scale 6   Oswestry Disability Index Score/Neck Disability Index Score 26   PROMIS-29   Physical Function 12   Anxiety 4   Depression 4   Fatigue 8   Sleep Disturbance 17   Ability to Participate in Social Roles And Activities 8   Pain Interference 16     Review of Systems   Musculoskeletal:  Positive for arthralgias and back pain.   All other systems reviewed and are negative.      Medical History Reviewed by provider this encounter:     .  Medical History Reviewed by provider this encounter:     .  Past Medical History   Past Medical History[1]  Past Surgical History[2]  Family History[3]   reports that she quit smoking about 14 years ago. Her smoking use included cigarettes. She has never used smokeless tobacco. She reports that she does not drink alcohol and does not use drugs.  Current Outpatient Medications   Medication Instructions   • busPIRone (BUSPAR) 15 mg, Oral, 3 times daily   • celecoxib (CeleBREX) 200 mg capsule    • cephalexin (KEFLEX) 500 mg, Oral, Every 6 hours scheduled  "  • gabapentin (NEURONTIN) 600 MG tablet ONE (1) BY MOUTH TWICE A DAY AND THREE (3) BY MOUTH EVERY NIGHT AT BEDTIME   • methocarbamol (ROBAXIN) 750 mg, Oral, 3 times daily PRN   • oxyCODONE (ROXICODONE) 5 mg, Oral, Every 8 hours PRN   • [START ON 5/29/2025] oxyCODONE (ROXICODONE) 5 mg, Oral, 3 times daily PRN   • promethazine-dextromethorphan (PHENERGAN-DM) 6.25-15 mg/5 mL oral syrup 5 mL, Oral, 4 times daily PRN   • sertraline (ZOLOFT) 200 mg, Oral, Daily   Allergies[4]   Medications Ordered Prior to Encounter[5]   Social History[6]     Objective   Pulse 72   Temp 97.9 °F (36.6 °C)   Ht 5' 6\" (1.676 m)   Wt 86.2 kg (190 lb)   LMP 10/10/2016   SpO2 99%   BMI 30.67 kg/m²         Physical Exam  Thoracic Spine:  Spinal cord stimulator lead removed with tip intact; no erythema, tenderness or signs of infection; area cleansed with alcohol and bandage placed                         [1]  Past Medical History:  Diagnosis Date   • Anxiety    • Back pain    • Depression    • Insomnia    [2]  Past Surgical History:  Procedure Laterality Date   • BACK SURGERY  2006    cage in lumbar L5 area   • BACK SURGERY     • CAUDAL BLOCK N/A 11/15/2022    Procedure: BLOCK / INJECTION CAUDAL;  Surgeon: Nuno Farrar MD;  Location: [facility];  Service: Pain Management    • CHOLECYSTECTOMY     • DECOMPRESSION SPINE LUMBAR POSTERIOR  04/24/2024   • LUMBAR DISC SURGERY     • POSTERIOR FUSION LUMBAR SPINE  04/24/2024   • OK PRQ IMPLTJ NSTIM ELECTRODE ARRAY EPIDURAL Bilateral 5/15/2025    Procedure: NEVRO SCS TRIAL;  Surgeon: Nuno Farrar MD;  [facility]: [facility];  Service: Pain Management    • OK REPAIR FIRST ABDOMINAL WALL HERNIA N/A 03/16/2018    Procedure: REPAIR HERNIA VENTRAL with mesh;  Surgeon: David Chicas DO;  Location: [facility];  Service: General   • OK SURGICAL ARTHROSCOPY SHOULDER W/ROTATOR CUFF RPR Left 11/18/2020    Procedure: SHOULDER ARTHROSCOPY, ROTATOR CUFF REPAIR, SYNEVECTOMY, ACROMIOPLASTY " DEBRIDEMENT, INJECTION;  Surgeon: Brigido Mota DO;  Location:  MAIN OR;  Service: Orthopedics   [3]  Family History  Adopted: Yes   Family history unknown: Yes   [4]  No Known Allergies[5]  Current Outpatient Medications on File Prior to Visit   Medication Sig Dispense Refill   • busPIRone (BUSPAR) 15 mg tablet Take 1 tablet (15 mg total) by mouth 3 (three) times a day 270 tablet 0   • celecoxib (CeleBREX) 200 mg capsule      • cephalexin (KEFLEX) 500 mg capsule Take 1 capsule (500 mg total) by mouth every 6 (six) hours for 7 days 28 capsule 0   • gabapentin (NEURONTIN) 600 MG tablet ONE (1) BY MOUTH TWICE A DAY AND THREE (3) BY MOUTH EVERY NIGHT AT BEDTIME 150 tablet 2   • methocarbamol (ROBAXIN) 750 mg tablet Take 1 tablet (750 mg total) by mouth 3 (three) times a day as needed for muscle spasms 90 tablet 2   • oxyCODONE (Roxicodone) 5 immediate release tablet Take 1 tablet (5 mg total) by mouth every 8 (eight) hours as needed for moderate pain Max Daily Amount: 15 mg Do not start before May 1, 2025. 90 tablet 0   • [START ON 2025] oxyCODONE (Roxicodone) 5 immediate release tablet Take 1 tablet (5 mg total) by mouth 3 (three) times a day as needed for moderate pain Max Daily Amount: 15 mg Do not start before May 29, 2025. 90 tablet 0   • promethazine-dextromethorphan (PHENERGAN-DM) 6.25-15 mg/5 mL oral syrup Take 5 mL by mouth 4 (four) times a day as needed for cough 240 mL 1   • sertraline (ZOLOFT) 100 mg tablet Take 2 tablets (200 mg total) by mouth daily 180 tablet 0     No current facility-administered medications on file prior to visit.   [6]  Social History  Tobacco Use   • Smoking status: Former     Current packs/day: 0.00     Types: Cigarettes     Quit date: 10/17/2010     Years since quittin.6   • Smokeless tobacco: Never   • Tobacco comments:     Recently quit   Vaping Use   • Vaping status: Never Used   Substance and Sexual Activity   • Alcohol use: No   • Drug use: No   • Sexual activity:  Not Currently     Partners: Female

## 2025-05-23 NOTE — PROGRESS NOTES
"Assessment:  1. Lumbar disc herniation    2. Status post lumbar spinal fusion    3. Lumbar radiculopathy    4. Chronic pain syndrome        Plan:  ***    {Oral Swab Statement:07681}    {Pain Management Procedure Statement:26728}    {Opioid Statement:00151}    {UDS Statement:86132}    {PDMP Statement:25776}      History of Present Illness:  The patient is a 54 y.o. female who presents for a follow up office visit in regards to No chief complaint on file..   The patient’s current symptoms include ***    Current pain medications includes:  .  The patient reports that this regimen is providing ***% pain relief.  The patient is reporting {Side Effects:56663::\"no side effects\"} from this pain medication regimen.    I have personally reviewed and/or updated the patient's past medical history, past surgical history, family history, social history, current medications, allergies, and vital signs today.         Review of Systems  Review of Systems        Past Medical History[1]    Past Surgical History[2]    Family History[3]    Social History     Occupational History   • Not on file   Tobacco Use   • Smoking status: Former     Current packs/day: 0.00     Types: Cigarettes     Quit date: 10/17/2010     Years since quittin.6   • Smokeless tobacco: Never   • Tobacco comments:     Recently quit   Vaping Use   • Vaping status: Never Used   Substance and Sexual Activity   • Alcohol use: No   • Drug use: No   • Sexual activity: Not Currently     Partners: Female       Current Medications[4]    Allergies[5]    Physical Exam:    Pulse 72   Temp 97.9 °F (36.6 °C)   Ht 5' 6\" (1.676 m)   Wt 86.2 kg (190 lb)   LMP 10/10/2016   SpO2 99%   BMI 30.67 kg/m²     Constitutional:{General Appearance:42035::\"normal, well developed, well nourished, alert, in no distress and non-toxic and no overt pain behavior.\"}  Eyes:{Sclera:30550::\"anicteric\"}  HEENT:{Hearin::\"grossly intact\"}  Neck:{Neck:08011::\"supple, symmetric, trachea " How Severe Is It?: moderate "midline and no masses \"}  Pulmonary:{Respiratory effort:16759::\"even and unlabored\"}  Cardiovascular:{Examination of Extremities:15946::\"No edema or pitting edema present\"}  Skin:{Skin and Subcutaneous tissues:64941::\"Normal without rashes or lesions and well hydrated\"}  Psychiatric:{Mood and Affect:74887::\"Mood and affect appropriate\"}  Neurologic:{Cranial Nerves:77214::\"Cranial Nerves II-XII grossly intact\"}  Musculoskeletal:{Gair and Station:78858::\"normal\"}    Imaging  No orders to display   Study Result    Narrative & Impression   MRI THORACIC SPINE WITHOUT CONTRAST     INDICATION: Presurgical evaluation of the thoracic spine. Patient previously had lumbar spinal fusion.     COMPARISON: Thoracic spine MRI 11/2/2020 and thoracic spine radiographs 12/2/2024. Additional correlation made with CT abdomen/pelvis with contrast 2/6/2023.     TECHNIQUE:  Multiplanar, multisequence imaging of the thoracic spine was performed. .  Imaging performed on 1.5T MRI     IMAGE QUALITY: Diagnostic.     FINDINGS:     ALIGNMENT: Normal alignment of the thoracic spine.  No compression fracture.  No subluxation.  No scoliosis.     MARROW SIGNAL: There are type II Modic endplate changes at T6-T7,  and T12-L1. Type I Modic endplate changes are present at T8-T9. Mixed type I and II Modic endplate changes at T11-12. Vertebral body hemangioma at T6.     T12 inferior endplate Schmorl's node. Tiny T11 inferior endplate Schmorl's node.     THORACIC CORD: Normal signal within the thoracic cord. The conus terminates at the L1 level.     PARAVERTEBRAL SOFT TISSUES:  Normal.     THORACIC DEGENERATIVE CHANGE: Redemonstrated multilevel degenerative changes. Most pronounced levels include:     T5-6: Central disc protrusion with mild canal narrowing, grossly unchanged.  T7-8: Disc bulge and superimposed left subarticular disc protrusion with mild canal narrowing, slightly progressed.  T8-9: Disc bulge and superimposed bilateral subarticular disc " Is This A New Presentation, Or A Follow-Up?: Follow Up Folliculitis Additional History: The patient states that her daughters were recently treated for head lice, and she is not sure if she also has them. protrusion (right greater than left), stable or slightly progressed.        Scattered lesser degree disc bulge or herniation without significant canal stenosis.     OTHER FINDINGS: Bilateral simple renal cysts, similar to prior abdominal CT.     IMPRESSION:     Multilevel noncompressive degenerative changes as described worse at levels T5-6, T7-8, and T8-9. No high-grade canal or foraminal stenosis.     Resident: CORTEZ SAMS I, the attending radiologist, have reviewed the images and agree with the final report above.       No orders of the defined types were placed in this encounter.              [1]  Past Medical History:  Diagnosis Date   • Anxiety    • Back pain    • Depression    • Insomnia    [2]  Past Surgical History:  Procedure Laterality Date   • BACK SURGERY  2006    cage in lumbar L5 area   • BACK SURGERY     • CAUDAL BLOCK N/A 11/15/2022    Procedure: BLOCK / INJECTION CAUDAL;  Surgeon: Nuno Farrar MD;  [facility]: [facility];  Service: Pain Management    • CHOLECYSTECTOMY     • DECOMPRESSION SPINE LUMBAR POSTERIOR  04/24/2024   • LUMBAR DISC SURGERY     • POSTERIOR FUSION LUMBAR SPINE  04/24/2024   • NJ PRQ IMPLTJ NSTIM ELECTRODE ARRAY EPIDURAL Bilateral 5/15/2025    Procedure: NEVRO SCS TRIAL;  Surgeon: Nuno Farrar MD;  [facility]: [facility];  Service: Pain Management    • NJ REPAIR FIRST ABDOMINAL WALL HERNIA N/A 03/16/2018    Procedure: REPAIR HERNIA VENTRAL with mesh;  Surgeon: David Chicas DO;  Location: [facility];  Service: General   • NJ SURGICAL ARTHROSCOPY SHOULDER W/ROTATOR CUFF RPR Left 11/18/2020    Procedure: SHOULDER ARTHROSCOPY, ROTATOR CUFF REPAIR, SYNEVECTOMY, ACROMIOPLASTY DEBRIDEMENT, INJECTION;  Surgeon: Brigido Mota DO;  Location:  MAIN OR;  Service: Orthopedics   [3]  Family History  Adopted: Yes   Family history unknown: Yes   [4]    Current Outpatient Medications:   •  busPIRone (BUSPAR) 15 mg tablet, Take 1 tablet (15 mg total) by mouth 3 (three) times  a day, Disp: 270 tablet, Rfl: 0  •  celecoxib (CeleBREX) 200 mg capsule, , Disp: , Rfl:   •  cephalexin (KEFLEX) 500 mg capsule, Take 1 capsule (500 mg total) by mouth every 6 (six) hours for 7 days, Disp: 28 capsule, Rfl: 0  •  gabapentin (NEURONTIN) 600 MG tablet, ONE (1) BY MOUTH TWICE A DAY AND THREE (3) BY MOUTH EVERY NIGHT AT BEDTIME, Disp: 150 tablet, Rfl: 2  •  methocarbamol (ROBAXIN) 750 mg tablet, Take 1 tablet (750 mg total) by mouth 3 (three) times a day as needed for muscle spasms, Disp: 90 tablet, Rfl: 2  •  oxyCODONE (Roxicodone) 5 immediate release tablet, Take 1 tablet (5 mg total) by mouth every 8 (eight) hours as needed for moderate pain Max Daily Amount: 15 mg Do not start before May 1, 2025., Disp: 90 tablet, Rfl: 0  •  [START ON 5/29/2025] oxyCODONE (Roxicodone) 5 immediate release tablet, Take 1 tablet (5 mg total) by mouth 3 (three) times a day as needed for moderate pain Max Daily Amount: 15 mg Do not start before May 29, 2025., Disp: 90 tablet, Rfl: 0  •  promethazine-dextromethorphan (PHENERGAN-DM) 6.25-15 mg/5 mL oral syrup, Take 5 mL by mouth 4 (four) times a day as needed for cough, Disp: 240 mL, Rfl: 1  •  sertraline (ZOLOFT) 100 mg tablet, Take 2 tablets (200 mg total) by mouth daily, Disp: 180 tablet, Rfl: 0[5]  No Known Allergies

## 2025-05-23 NOTE — PATIENT INSTRUCTIONS
Patient Education     Core Strengthening Exercises on Back or on Hands and Knees   About this topic   Your core muscles are in your chest, back, buttock, and stomach area. They are your abdominal, back, and pelvis muscles. These muscles help keep your body stable when using your arms or legs. They also help with balance and posture. There are many exercises you can do to keep these muscles strong.  If you have back problems like a compression fracture or a ruptured disc, doing some of these exercises could make your problem worse. Some of these exercises may cause lower back pain.  General   Before starting with a program, ask your doctor if you are healthy enough to do these exercises. Your doctor may have you work with a , chiropractor, or physical therapist to make a safe exercise program to meet your needs.  Strengthening Exercises   Strengthening exercises keep your muscles firm and strong. Start by repeating each exercise 2 to 3 times. Work up to doing each exercise 10 times. Try to do the exercises 2 to 3 times each day. Hold each exercise for 3 to 5 seconds. Do all exercises slowly.  Hip lifts ? Lie on your back with your knees bent and feet flat on the floor. Tighten your stomach muscles and push your heels into the floor to lift your buttocks off the floor. Relax.  Pelvic tilts ? Lie on your back with your knees bent and feet flat on the floor. Tighten your stomach muscles and press your lower back down to the floor. Relax.  Straight leg raises lying down ? Lie on your back with one leg straight. Bend your other knee so the foot is flat on the bed. Keeping your leg straight, lift the leg up to the level of your other knee. Lower it back down. Repeat with the other leg.  Knee flex lying down ? Lie on your back with both knees bent and your feet flat on the floor. Tighten your belly muscles. Raise one leg up and back down as if you are marching in slow motion. Keep belly muscles tight while you move  your leg. Switch legs. To make this exercise harder, raise both arms straight up in the air. Tighten your belly muscles. When you raise one leg up, reach the opposite arm over your head. Switch, moving the opposite arm and leg until you have done 10 repetitions on each side.  Abdominal crunches ? Lie on your back with both knees bent. Keep your feet flat on the floor. Place your hands in one of these positions. Try starting with the first position since it is the easiest. As you get better, use the other positions to make it harder.  Crunches with arms at sides.  Crunches with arms across chest.  Crunches with arms behind head. Be careful not to interlock your fingers behind your neck or head while doing crunches. This may add tension to your neck and cause strain.  Look at the ceiling. Tighten your belly muscles and lift your shoulders and upper back off the floor. Breathe out while you are doing this. Lower your shoulders to the floor. Breathe in while you are doing this. Relax your belly muscles all the way before starting another crunch.  Arm and leg lifts on hands and knees ? Start on your hands and knees. With all of these exercises, keep your back as level as possible. If you are having trouble with this, you may want to put a small object on your back such as a book. If it falls off, you are not keeping your back level enough during the exercise.  Lift one arm up to shoulder level and hold. Lower it back down. Now, lift up the other arm and hold.  Lift one leg up and kick it straight out until it is in line with your back and hold. Lower it back down. Now, lift up the other leg and hold.  Lift one arm and the OPPOSITE leg up at the same time and hold. Lower them down. Now, repeat using the other arm and leg. This is a very hard exercise. It may take time to be able to do this.               What will the results be?   Stronger core  Better balance  More toned belly and back muscles  Easier to do daily  activities  Better sports performance  Less low back pain  Helpful tips   Stay active and work out to keep your muscles strong and flexible.  Keep a healthy weight to avoid putting too much stress on your spine. Eat a healthy diet to keep your muscles healthy.  Be sure you do not hold your breath when exercising. This can raise your blood pressure. If you tend to hold your breath, try counting out loud when exercising. If any exercise bothers you, stop right away.  Try walking or cycling at an easy pace for a few minutes to warm up your muscles. Do this again after exercising.  Exercise may be slightly uncomfortable, but you should not have sharp pains. If you do get sharp pains, stop what you are doing. If the sharp pains continue, call your doctor.  Last Reviewed Date   2021-03-18  Consumer Information Use and Disclaimer   This generalized information is a limited summary of diagnosis, treatment, and/or medication information. It is not meant to be comprehensive and should be used as a tool to help the user understand and/or assess potential diagnostic and treatment options. It does NOT include all information about conditions, treatments, medications, side effects, or risks that may apply to a specific patient. It is not intended to be medical advice or a substitute for the medical advice, diagnosis, or treatment of a health care provider based on the health care provider's examination and assessment of a patient’s specific and unique circumstances. Patients must speak with a health care provider for complete information about their health, medical questions, and treatment options, including any risks or benefits regarding use of medications. This information does not endorse any treatments or medications as safe, effective, or approved for treating a specific patient. [employer] and its affiliates disclaim any warranty or liability relating to this information or the use thereof. The use of this information  is governed by the Terms of Use, available at https://www.woltersSpotigouwer.com/en/know/clinical-effectiveness-terms   Copyright   Copyright © 2024 [employer] and its affiliates and/or licensors. All rights reserved.

## 2025-06-13 DIAGNOSIS — F41.9 ANXIETY AND DEPRESSION: ICD-10-CM

## 2025-06-13 DIAGNOSIS — F32.A ANXIETY AND DEPRESSION: ICD-10-CM

## 2025-06-14 RX ORDER — SERTRALINE HYDROCHLORIDE 100 MG/1
200 TABLET, FILM COATED ORAL DAILY
Qty: 180 TABLET | Refills: 0 | Status: SHIPPED | OUTPATIENT
Start: 2025-06-14

## 2025-06-14 RX ORDER — BUSPIRONE HYDROCHLORIDE 15 MG/1
15 TABLET ORAL 3 TIMES DAILY
Qty: 270 TABLET | Refills: 0 | Status: SHIPPED | OUTPATIENT
Start: 2025-06-14

## 2025-06-30 ENCOUNTER — OFFICE VISIT (OUTPATIENT)
Dept: PAIN MEDICINE | Facility: CLINIC | Age: 55
End: 2025-06-30
Payer: COMMERCIAL

## 2025-06-30 VITALS
WEIGHT: 193.8 LBS | HEART RATE: 73 BPM | BODY MASS INDEX: 31.15 KG/M2 | OXYGEN SATURATION: 98 % | TEMPERATURE: 98 F | RESPIRATION RATE: 16 BRPM | HEIGHT: 66 IN

## 2025-06-30 DIAGNOSIS — F11.20 UNCOMPLICATED OPIOID DEPENDENCE (HCC): ICD-10-CM

## 2025-06-30 DIAGNOSIS — M54.16 LUMBAR RADICULOPATHY: ICD-10-CM

## 2025-06-30 DIAGNOSIS — M51.26 LUMBAR DISC HERNIATION: ICD-10-CM

## 2025-06-30 DIAGNOSIS — Z79.891 LONG-TERM CURRENT USE OF OPIATE ANALGESIC: ICD-10-CM

## 2025-06-30 DIAGNOSIS — G89.4 CHRONIC PAIN SYNDROME: Primary | ICD-10-CM

## 2025-06-30 DIAGNOSIS — Z98.1 STATUS POST LUMBAR SPINAL FUSION: ICD-10-CM

## 2025-06-30 PROCEDURE — 99214 OFFICE O/P EST MOD 30 MIN: CPT | Performed by: NURSE PRACTITIONER

## 2025-06-30 RX ORDER — BUPRENORPHINE AND NALOXONE 2; .5 MG/1; MG/1
2 FILM, SOLUBLE BUCCAL; SUBLINGUAL EVERY 8 HOURS
Qty: 90 FILM | Refills: 0 | Status: SHIPPED | OUTPATIENT
Start: 2025-06-30 | End: 2025-07-02 | Stop reason: SINTOL

## 2025-06-30 RX ORDER — GABAPENTIN 600 MG/1
TABLET ORAL
Qty: 150 TABLET | Refills: 2 | Status: SHIPPED | OUTPATIENT
Start: 2025-06-30

## 2025-06-30 RX ORDER — METHOCARBAMOL 750 MG/1
750 TABLET, FILM COATED ORAL 3 TIMES DAILY PRN
Qty: 90 TABLET | Refills: 2 | Status: SHIPPED | OUTPATIENT
Start: 2025-06-30 | End: 2025-07-30

## 2025-06-30 NOTE — ASSESSMENT & PLAN NOTE
While the patient was in the office today, I did have a thorough conversation regarding their chronic pain syndrome, medication management, and treatment plan options.  Patient is being seen for a follow-up visit.  She continues with significant low back and bilateral leg pain.  She underwent L3-S1 fusion/revision in April 2024 without additional improvement in her symptoms.  She underwent a left-sided L5-S1 transforaminal epidural steroid injection which failed to provide her with relief.  She underwent a trial spinal cord stimulator which was not helpful.    She denies significant relief with current medication regimen.  She told me that, on occasion, she takes 2 oxycodone at a time and it is a bit more helpful.  At this point, we will plan to discontinue oxycodone and trial her on Suboxone.  Advised patient to hold oxycodone 12 hours prior to first Suboxone dosage.  Will try Suboxone 2 mg film.  1 film sublingual every 8 hours.    Continue gabapentin 600 mg twice daily and 1800 mg at bedtime.    Continue Robaxin-750 milligrams 3 times daily if needed for spasms.    Provided patient with prescription for Narcan.  She states that she will not fill it at pharmacy.  She plans to get it at [facility].    Follow-up in 3 weeks.    Orders:    buprenorphine-naloxone (Suboxone) 2-0.5 mg; Place 1 Film (2 mg total) under the tongue every 8 (eight) hours    naloxone (NARCAN) 4 mg/0.1 mL nasal spray; Administer 1 spray into a nostril. If no response after 2-3 minutes, give another dose in the other nostril using a new spray.    gabapentin (NEURONTIN) 600 MG tablet; ONE (1) BY MOUTH TWICE A DAY AND THREE (3) BY MOUTH EVERY NIGHT AT BEDTIME    methocarbamol (ROBAXIN) 750 mg tablet; Take 1 tablet (750 mg total) by mouth 3 (three) times a day as needed for muscle spasms

## 2025-06-30 NOTE — ASSESSMENT & PLAN NOTE
While the patient was in the office today, I did have a thorough conversation regarding their chronic pain syndrome, medication management, and treatment plan options.  Patient is being seen for a follow-up visit.  She continues with significant low back and bilateral leg pain.  She underwent L3-S1 fusion/revision in April 2024 without additional improvement in her symptoms.  She underwent a left-sided L5-S1 transforaminal epidural steroid injection which failed to provide her with relief.  She underwent a trial spinal cord stimulator which was not helpful.    She denies significant relief with current medication regimen.  She told me that, on occasion, she takes 2 oxycodone at a time and it is a bit more helpful.  At this point, we will plan to discontinue oxycodone and trial her on Suboxone.  Advised patient to hold oxycodone 12 hours prior to first Suboxone dosage.  Will try Suboxone 2 mg film.  1 film sublingual every 8 hours.    Continue gabapentin 600 mg twice daily and 1800 mg at bedtime.    Continue Robaxin-750 milligrams 3 times daily if needed for spasms.    Provided patient with prescription for Narcan.  She states that she will not fill it at pharmacy.  She plans to get it at [facility].    Follow-up in 3 weeks.    Orders:    buprenorphine-naloxone (Suboxone) 2-0.5 mg; Place 1 Film (2 mg total) under the tongue every 8 (eight) hours    naloxone (NARCAN) 4 mg/0.1 mL nasal spray; Administer 1 spray into a nostril. If no response after 2-3 minutes, give another dose in the other nostril using a new spray.    gabapentin (NEURONTIN) 600 MG tablet; ONE (1) BY MOUTH TWICE A DAY AND THREE (3) BY MOUTH EVERY NIGHT AT BEDTIME    methocarbamol (ROBAXIN) 750 mg tablet; Take 1 tablet (750 mg total) by mouth 3 (three) times a day as needed for muscle spasms

## 2025-06-30 NOTE — PROGRESS NOTES
Name: Calista Umana      : 1970      MRN: [id number]  Encounter Provider: Barbara Kocher, CRNP  Encounter Date: 2025   Encounter department: [facility] [place]  :  Assessment & Plan  Chronic pain syndrome  Lumbar disc herniation  Status post lumbar spinal fusion  Lumbar radiculopathy    While the patient was in the office today, I did have a thorough conversation regarding their chronic pain syndrome, medication management, and treatment plan options.  Patient is being seen for a follow-up visit.  She continues with significant low back and bilateral leg pain.  She underwent L3-S1 fusion/revision in 2024 without additional improvement in her symptoms.  She underwent a left-sided L5-S1 transforaminal epidural steroid injection which failed to provide her with relief.  She underwent a trial spinal cord stimulator which was not helpful.    She denies significant relief with current medication regimen.  She told me that, on occasion, she takes 2 oxycodone at a time and it is a bit more helpful.  At this point, we will plan to discontinue oxycodone and trial her on Suboxone.  Advised patient to hold oxycodone 12 hours prior to first Suboxone dosage.  Will try Suboxone 2 mg film.  1 film sublingual every 8 hours.    Continue gabapentin 600 mg twice daily and 1800 mg at bedtime.    Continue Robaxin-750 milligrams 3 times daily if needed for spasms.    Provided patient with prescription for Narcan.  She states that she will not fill it at pharmacy.  She plans to get it at [facility].    Follow-up in 3 weeks.    Orders:    buprenorphine-naloxone (Suboxone) 2-0.5 mg; Place 1 Film (2 mg total) under the tongue every 8 (eight) hours    naloxone (NARCAN) 4 mg/0.1 mL nasal spray; Administer 1 spray into a nostril. If no response after 2-3 minutes, give another dose in the other nostril using a new spray.    gabapentin (NEURONTIN) 600 MG tablet; ONE (1) BY MOUTH TWICE A DAY AND THREE (3)  BY MOUTH EVERY NIGHT AT BEDTIME    methocarbamol (ROBAXIN) 750 mg tablet; Take 1 tablet (750 mg total) by mouth 3 (three) times a day as needed for muscle spasms    There are risks associated with opioid medications, including dependence, addiction and tolerance. The patient understands and agrees to use these medications only as prescribed. Potential side effects of the medications include, but are not limited to, constipation, drowsiness, addiction, impaired judgment and risk of fatal overdose if not taken as prescribed. The patient was warned against driving while taking sedation medications.  Sharing medications is a felony. At this point in time, the patient is showing no signs of addiction, abuse, diversion or suicidal ideation.  A urine drug screen was collected at today's office visit as part of our medication management protocol. The point of care testing results were appropriate for what was being prescribed. The specimen will be sent for confirmatory testing. The drug screen is medically necessary because the patient is either dependent on opioid medication or is being considered for opioid medication therapy and the results could impact ongoing or future treatment. The drug screen is to evaluate for the presences or absence of prescribed, non-prescribed, and/or illicit drugs/substances.  [place] Prescription Drug Monitoring Program report was reviewed and was appropriate      My impressions and treatment recommendations were discussed in detail with the patient who verbalized understanding and had no further questions.  Discharge instructions were provided. I personally saw and examined the patient and I agree with the above discussed plan of care.    History of Present Illness     Calista Umana is a 55 y.o. female who presents for a follow up office visit in regards to Back Pain (Pain of 10 in back.) and Leg Pain (Bilateral leg pain. Pain in left leg is 7 and right leg pain is 4.). The  "patient’s current symptoms include complaints of low back and bilateral leg pain.  Current pain level is a 10/10.  Quality of pain is described as constant, sharp, shooting, numb, pins-and-needles.    Current pain medications includes:  Oxycodone 5 mg every 8 hours as needed for pain, gabapentin 600 mg twice daily and 1800 mg at bedtime, Robaxin-750 milligrams 3 times daily if needed for spasms.  The patient reports that this regimen is providing \" very little \"% pain relief.  The patient is reporting no side effects from this pain medication regimen.    Opioid contract date 3/7/2025    Last UDS Date: 6/30/2025    Review of Systems   Constitutional: Negative.    HENT: Negative.     Eyes: Negative.    Respiratory: Negative.     Cardiovascular: Negative.    Gastrointestinal: Negative.    Endocrine: Negative.    Genitourinary: Negative.    Musculoskeletal:  Positive for back pain, gait problem and myalgias.        Bilateral leg pain   Skin: Negative.    Allergic/Immunologic: Negative.    Hematological: Negative.    Psychiatric/Behavioral: Negative.         Medical History Reviewed by provider this encounter:  Tobacco  Allergies  Meds  Problems  Med Hx  Surg Hx  Fam Hx     .  Medications Ordered Prior to Encounter[1]      Objective   Pulse 73   Temp 98 °F (36.7 °C) (Temporal)   Resp 16   Ht 5' 6\" (1.676 m)   Wt 87.9 kg (193 lb 12.8 oz)   LMP 10/10/2016   SpO2 98%   BMI 31.28 kg/m²      Pain Score: 10-Worst pain ever  Physical Exam  Constitutional: Patient appears to be in moderate distress due to pain.  Eyes: anicteric  HEENT: grossly intact  Neck: supple, symmetric, trachea midline and no masses   Pulmonary: even and unlabored  Cardiovascular: No edema or pitting edema present  Skin: Normal without rashes or lesions and well hydrated  Psychiatric: Mood and affect appropriate  Neurologic: Cranial Nerves II-XII grossly intact  Musculoskeletal: antalgic      XR SPINE THORACIC 3 VW     INDICATION: M54.9: " Dorsalgia, unspecified.     COMPARISON: Thoracic spine x-ray dated 12/2/2024     FINDINGS:     No acute fracture. Intact pedicles.     Normal alignment.     Age-related degenerative changes are seen. There is a spinal cord stimulator electrode with the tip at the upper T9 vertebral body level.     No displacement of the paraspinal line.     IMPRESSION:     No acute osseous abnormality.     Spinal cord stimulator electrode with the tip at the T9 vertebral body level.      MRI THORACIC SPINE WITHOUT CONTRAST     INDICATION: Presurgical evaluation of the thoracic spine. Patient previously had lumbar spinal fusion.     COMPARISON: Thoracic spine MRI 11/2/2020 and thoracic spine radiographs 12/2/2024. Additional correlation made with CT abdomen/pelvis with contrast 2/6/2023.     TECHNIQUE:  Multiplanar, multisequence imaging of the thoracic spine was performed. .  Imaging performed on 1.5T MRI     IMAGE QUALITY: Diagnostic.     FINDINGS:     ALIGNMENT: Normal alignment of the thoracic spine.  No compression fracture.  No subluxation.  No scoliosis.     MARROW SIGNAL: There are type II Modic endplate changes at T6-T7,  and T12-L1. Type I Modic endplate changes are present at T8-T9. Mixed type I and II Modic endplate changes at T11-12. Vertebral body hemangioma at T6.     T12 inferior endplate Schmorl's node. Tiny T11 inferior endplate Schmorl's node.     THORACIC CORD: Normal signal within the thoracic cord. The conus terminates at the L1 level.     PARAVERTEBRAL SOFT TISSUES:  Normal.     THORACIC DEGENERATIVE CHANGE: Redemonstrated multilevel degenerative changes. Most pronounced levels include:     T5-6: Central disc protrusion with mild canal narrowing, grossly unchanged.  T7-8: Disc bulge and superimposed left subarticular disc protrusion with mild canal narrowing, slightly progressed.  T8-9: Disc bulge and superimposed bilateral subarticular disc protrusion (right greater than left), stable or slightly progressed.         Scattered lesser degree disc bulge or herniation without significant canal stenosis.     OTHER FINDINGS: Bilateral simple renal cysts, similar to prior abdominal CT.     IMPRESSION:     Multilevel noncompressive degenerative changes as described worse at levels T5-6, T7-8, and T8-9. No high-grade canal or foraminal stenosis.     Resident: CORTEZ SAMS I, the attending radiologist, have reviewed the images and agree with the final report above.       Narrative & Impression   MRI LUMBAR SPINE WITH AND WITHOUT CONTRAST     INDICATION: M54.16: Radiculopathy, lumbar region  Z98.1: Arthrodesis status.     COMPARISON: MRI lumbar spine 12/28/2023     TECHNIQUE:  Multiplanar, multisequence imaging of the lumbar spine was performed before and after gadolinium administration. .        IV Contrast:  8 mL of Gadobutrol injection (SINGLE-DOSE)     IMAGE QUALITY:  Diagnostic     FINDINGS:     POSTSURGICAL FINDINGS: Previous PLIF L4-5 with now L3-S1 Posterior lumbar decompression and interbody fusion. Interbody spacer devices are noted at the L3-4 through the L5-S1 levels with bilateral decompressive laminectomies. There is a peripherally   enhancing postoperative fluid collection within the laminectomy bed .     LUMBAR DISC SPACES:        L1-2: No focal disc herniation, central canal stenosis, or neural foraminal narrowing.     L2-3: Mild diffuse disc bulge is again noted with superimposed bilateral foraminal disc protrusions. Bilateral ligamentum flavum and facet hypertrophy.. Mild central canal and bilateral subarticular/lateral recess narrowing, unchanged.  No neural   foraminal stenosis.     L3-4: New interbody spacer device.. The central canal is now patent status post bilateral decompressive laminectomies. No neural foraminal stenosis. There is enhancing granulation tissue within the laminectomy bed and central canal without impingement of   the thecal sac.     L4-5: Stable interbody spacer device. The central  canal remains patent status post bilateral decompressive laminectomies. Unchanged mild bilateral neural foraminal stenosis.There is enhancing granulation tissue within the laminectomy bed and central   canal without impingement of the thecal sac.     L5-S1: Stable grade 1 anterolisthesis. New interbody spacer device.. The central canal is now patent status post bilateral decompressive laminectomies. Persistent moderate right and moderate to severe left neural foraminal stenosis with encroachment of   the exiting left L5 nerve root.There is enhancing granulation tissue within the laminectomy bed and central canal without impingement of the thecal sac.     OTHER FINDINGS: Small bilateral parapelvic renal cysts are noted.     IMPRESSION:     Previous PLIF L4-5 with now L3-S1 Posterior lumbar decompression and interbody fusion.     L3-4 through L5-S1 decompressive laminectomies with improved patency of the central canal at the L3-4 and L5-S1 levels. A peripherally enhancing postoperative fluid collection is noted within the laminectomy bed, probable postoperative seroma. Moderate   to severe left L5-S1 neural foraminal narrowing is again noted with encroachment of the exiting left L5 nerve root.     Stable L2-3 disc bulge with mild central canal narrowing. There is no new disc herniation.                 IMPRESSION:  Status post instrumented lumbar fusion, L3-S1.  See comment for  details.    COMPARISON: None  available.    COMMENT:  AP and lateral views of the lumbar spine are completed  postoperatively.    Osseous detail is limited at the fused levels on the lateral projection, likely  due to technical factors.  Posterior instrumented lumbar fusion noted spanning L3-S1, noting paired pedicle  screws with interlocking vertical bars.  There are interbody spacers at the  L3-L4, L4-L5 and L5-S1 disc spaces.  The L4-L5 spacer is not well demonstrated  on lateral projection.  Posterior decompression has been completed at  the fused levels.  There is a surgical drain in place posteriorly.  Narrative    CLINICAL HISTORY:  L3-S1 Posterior Lumbar Decompressin, Posterior Lumbar  Interbody Fusion, Instrumentation, Cage, Allograft, BMP, Autograft      Narrative & Impression   XR ENTIRE SPINE (SCOLIOSIS) 1 VW     INDICATION: M51.36: Other intervertebral disc degeneration, lumbar region  Z98.1: Arthrodesis status  M43.17: Spondylolisthesis, lumbosacral region  M51.36: Other intervertebral disc degeneration, lumbar region  M54.16: Radiculopathy, lumbar region  Z98.1: Arthrodesis status.     COMPARISON: 1/9/2024, 12/28/2023     FINDINGS:     Single lateral view was obtained. Stable appearance of posterior metallic fusion at L4 and L5 with intervertebral fusion device. Grade 1 anterolisthesis at L5-S1, similar to prior. Grade 1 retrolisthesis at L2-L3, L3-L4, L4-L5, similar to prior. Neutral   sagittal balance. No evidence of congenital vertebral anomaly.     IMPRESSION:     Stable appearance of posterior metallic fusion at L4 and L5 with intervertebral fusion device.     Grade 1 anterolisthesis at L5-S1, similar to prior.     Grade 1 retrolisthesis at L2-L3, L3-L4, L4-L5, similar to prior.                  [1]   Current Outpatient Medications on File Prior to Visit   Medication Sig Dispense Refill    busPIRone (BUSPAR) 15 mg tablet Take 1 tablet (15 mg total) by mouth 3 (three) times a day 270 tablet 0    celecoxib (CeleBREX) 200 mg capsule       promethazine-dextromethorphan (PHENERGAN-DM) 6.25-15 mg/5 mL oral syrup Take 5 mL by mouth 4 (four) times a day as needed for cough 240 mL 1    sertraline (ZOLOFT) 100 mg tablet Take 2 tablets (200 mg total) by mouth daily 180 tablet 0    [DISCONTINUED] gabapentin (NEURONTIN) 600 MG tablet ONE (1) BY MOUTH TWICE A DAY AND THREE (3) BY MOUTH EVERY NIGHT AT BEDTIME 150 tablet 2    [DISCONTINUED] methocarbamol (ROBAXIN) 750 mg tablet Take 1 tablet (750 mg total) by mouth 3 (three) times a day as needed  for muscle spasms 90 tablet 2    [DISCONTINUED] oxyCODONE (Roxicodone) 5 immediate release tablet Take 1 tablet (5 mg total) by mouth every 8 (eight) hours as needed for moderate pain Max Daily Amount: 15 mg Do not start before May 1, 2025. 90 tablet 0    [DISCONTINUED] oxyCODONE (Roxicodone) 5 immediate release tablet Take 1 tablet (5 mg total) by mouth 3 (three) times a day as needed for moderate pain Max Daily Amount: 15 mg Do not start before May 29, 2025. 90 tablet 0     No current facility-administered medications on file prior to visit.

## 2025-06-30 NOTE — ASSESSMENT & PLAN NOTE
While the patient was in the office today, I did have a thorough conversation regarding their chronic pain syndrome, medication management, and treatment plan options.  Patient is being seen for a follow-up visit.  She continues with significant low back and bilateral leg pain.  She underwent L3-S1 fusion/revision in April 2024 without additional improvement in her symptoms.  She underwent a left-sided L5-S1 transforaminal epidural steroid injection which failed to provide her with relief.  She underwent a trial spinal cord stimulator which was not helpful.    She denies significant relief with current medication regimen.  She told me that, on occasion, she takes 2 oxycodone at a time and it is a bit more helpful.  At this point, we will plan to discontinue oxycodone and trial her on Suboxone.  Advised patient to hold oxycodone 12 hours prior to first Suboxone dosage.  Will try Suboxone 2 mg film.  1 film sublingual every 8 hours.    Continue gabapentin 600 mg twice daily and 1800 mg at bedtime.    Continue Robaxin-750 milligrams 3 times daily if needed for spasms.    Provided patient with prescription for Narcan.  She states that she will not fill it at pharmacy.  She plans to get it at [facility].    Follow-up in 3 weeks.    Orders:    buprenorphine-naloxone (Suboxone) 2-0.5 mg; Place 1 Film (2 mg total) under the tongue every 8 (eight) hours    naloxone (NARCAN) 4 mg/0.1 mL nasal spray; Administer 1 spray into a nostril. If no response after 2-3 minutes, give another dose in the other nostril using a new spray.    gabapentin (NEURONTIN) 600 MG tablet; ONE (1) BY MOUTH TWICE A DAY AND THREE (3) BY MOUTH EVERY NIGHT AT BEDTIME    methocarbamol (ROBAXIN) 750 mg tablet; Take 1 tablet (750 mg total) by mouth 3 (three) times a day as needed for muscle spasms

## 2025-06-30 NOTE — ASSESSMENT & PLAN NOTE
While the patient was in the office today, I did have a thorough conversation regarding their chronic pain syndrome, medication management, and treatment plan options.  Patient is being seen for a follow-up visit.  She continues with significant low back and bilateral leg pain.  She underwent L3-S1 fusion/revision in April 2024 without additional improvement in her symptoms.  She underwent a left-sided L5-S1 transforaminal epidural steroid injection which failed to provide her with relief.  She underwent a trial spinal cord stimulator which was not helpful.    She denies significant relief with current medication regimen.  She told me that, on occasion, she takes 2 oxycodone at a time and it is a bit more helpful.  At this point, we will plan to discontinue oxycodone and trial her on Suboxone.  Advised patient to hold oxycodone 12 hours prior to first Suboxone dosage.  Will try Suboxone 2 mg film.  1 film sublingual every 8 hours.    Continue gabapentin 600 mg twice daily and 1800 mg at bedtime.    Continue Robaxin-750 milligrams 3 times daily if needed for spasms.    Provided patient with prescription for Narcan.  She states that she will not fill it at pharmacy.  She plans to get it at [facility] [address] [facility].    Follow-up in 3 weeks.    Orders:    buprenorphine-naloxone (Suboxone) 2-0.5 mg; Place 1 Film (2 mg total) under the tongue every 8 (eight) hours    naloxone (NARCAN) 4 mg/0.1 mL nasal spray; Administer 1 spray into a nostril. If no response after 2-3 minutes, give another dose in the other nostril using a new spray.    gabapentin (NEURONTIN) 600 MG tablet; ONE (1) BY MOUTH TWICE A DAY AND THREE (3) BY MOUTH EVERY NIGHT AT BEDTIME    methocarbamol (ROBAXIN) 750 mg tablet; Take 1 tablet (750 mg total) by mouth 3 (three) times a day as needed for muscle spasms

## 2025-07-02 ENCOUNTER — TELEPHONE (OUTPATIENT)
Age: 55
End: 2025-07-02

## 2025-07-02 DIAGNOSIS — M54.16 LUMBAR RADICULOPATHY: ICD-10-CM

## 2025-07-02 DIAGNOSIS — G89.4 CHRONIC PAIN SYNDROME: ICD-10-CM

## 2025-07-02 DIAGNOSIS — M43.16 ANTEROLISTHESIS OF LUMBAR SPINE: ICD-10-CM

## 2025-07-02 DIAGNOSIS — M51.26 LUMBAR DISC HERNIATION: ICD-10-CM

## 2025-07-02 DIAGNOSIS — Z98.1 STATUS POST LUMBAR SPINAL FUSION: ICD-10-CM

## 2025-07-02 RX ORDER — OXYCODONE HYDROCHLORIDE 5 MG/1
5 TABLET ORAL 3 TIMES DAILY PRN
Qty: 90 TABLET | Refills: 0 | Status: SHIPPED | OUTPATIENT
Start: 2025-07-02 | End: 2025-08-01

## 2025-07-02 NOTE — TELEPHONE ENCOUNTER
RN attempted several times to reach pt but teams calling cont to ring w/o connecting to pt. RN will attempt later today.

## 2025-07-02 NOTE — TELEPHONE ENCOUNTER
S/w [facility] pharm and advised pt not tolerating Suboxone and returning to Oxy IR at this time. Pharm verb understanding and apprec of RN.

## 2025-07-02 NOTE — TELEPHONE ENCOUNTER
Okay.  We will discontinue Suboxone and go back to oxycodone.  I sent a prescription to oxycodone to her pharmacy.  Please advise her to keep follow-up next month.

## 2025-07-03 LAB
6MAM UR QL CFM: NEGATIVE NG/ML
7AMINOCLONAZEPAM UR QL CFM: NEGATIVE NG/ML
A-OH ALPRAZ UR QL CFM: NEGATIVE NG/ML
ACCEPTABLE CREAT UR QL: NORMAL MG/DL
ACCEPTIBLE SP GR UR QL: NORMAL
AMITRIP UR QL CFM: NEGATIVE NG/ML
AMPHET UR QL CFM: NEGATIVE NG/ML
AMPHET UR QL CFM: NEGATIVE NG/ML
BUPRENORPHINE UR QL CFM: ABNORMAL NG/ML
BZE UR QL CFM: NEGATIVE NG/ML
DESIPRAMINE UR QL CFM: NEGATIVE NG/ML
EDDP UR QL CFM: NEGATIVE NG/ML
ETHYL GLUCURONIDE UR QL CFM: NEGATIVE NG/ML
ETHYL SULFATE UR QL SCN: NEGATIVE NG/ML
FENTANYL UR QL CFM: NEGATIVE NG/ML
FLUOXETINE UR QL CFM: NEGATIVE NG/ML
GLIADIN IGG SER IA-ACNC: NEGATIVE NG/ML
GLUCOSE 30M P 50 G LAC PO SERPL-MCNC: NEGATIVE NG/ML
HYDROCODONE UR QL CFM: NEGATIVE NG/ML
HYDROMORPHONE UR QL CFM: NEGATIVE NG/ML
LORAZEPAM UR QL CFM: NEGATIVE NG/ML
MDMA UR QL CFM: NEGATIVE NG/ML
ME-PHENIDATE UR QL CFM: NEGATIVE NG/ML
MEPERIDINE UR QL CFM: NEGATIVE NG/ML
METHADONE UR QL CFM: NEGATIVE NG/ML
METHAMPHET UR QL CFM: NEGATIVE NG/ML
MORPHINE UR QL CFM: NEGATIVE NG/ML
NITRITE UR QL: NORMAL UG/ML
NORBUPRENORPHINE UR QL CFM: ABNORMAL NG/ML
NORDIAZEPAM UR QL CFM: NEGATIVE NG/ML
NORFENTANYL UR QL CFM: NEGATIVE NG/ML
NORFLUOXETINE UR QL CFM: NEGATIVE NG/ML
NORHYDROCODONE UR QL CFM: NEGATIVE NG/ML
NORMEPERIDINE UR QL CFM: NEGATIVE NG/ML
NOROXYCODONE UR QL CFM: ABNORMAL NG/ML
NORTRIP UR QL CFM: NEGATIVE NG/ML
OLANZAPINE QUANTIFICATION: NEGATIVE NG/ML
OPC-3373 QUANTIFICATION: NEGATIVE NG/ML
OXAZEPAM UR QL CFM: NEGATIVE NG/ML
OXYCODONE UR QL CFM: NEGATIVE NG/ML
OXYMORPHONE UR QL CFM: ABNORMAL NG/ML
OXYMORPHONE UR QL CFM: ABNORMAL NG/ML
PARA-FLUOROFENTANYL QUANTIFICATION: NORMAL NG/ML
PROLACTIN SERPL-MCNC: NEGATIVE NG/ML
RESULT ALL_PRESCRIBED MEDS AND SPECIAL INSTRUCTIONS: NORMAL
SECOBARBITAL UR QL CFM: NEGATIVE NG/ML
SL AMB 7-OH-MITRAGYNINE (KRATOM ALKALOID) QUANTIFICATION: NEGATIVE NG/ML
SL AMB ACETYL FENTANYL QUANTIFICATION: NORMAL NG/ML
SL AMB ACETYL NORFENTANYL QUANTIFICATION: NORMAL NG/ML
SL AMB ACRYL FENTANYL QUANTIFICATION: NORMAL NG/ML
SL AMB CARFENTANIL QUANTIFICATION: NORMAL NG/ML
SL AMB CITALOPRAM/ESCITALOPRAM QUANTIFICATION: NEGATIVE NG/ML
SL AMB CITALOPRAM/ESCITALOPRAM QUANTIFICATION: NEGATIVE NG/ML
SL AMB CLOZAPINE QUANTIFICATION: NEGATIVE NG/ML
SL AMB CTHC (MARIJUANA METABOLITE) QUANTIFICATION: NEGATIVE NG/ML
SL AMB DEXTRORPHAN (DEXTROMETHORPHAN METABOLITE) QUANT: ABNORMAL NG/ML
SL AMB HALOPERIDOL  QUANTIFICATION: NEGATIVE NG/ML
SL AMB HALOPERIDOL METABOLITE QUANTIFICATION: NEGATIVE NG/ML
SL AMB HYDROXYBUPROPION QUANTIFICATION: NEGATIVE NG/ML
SL AMB HYDROXYRISPERIDONE QUANTIFICATION: NEGATIVE NG/ML
SL AMB N-DESMETHYL-TRAMADOL QUANTIFICATION: NEGATIVE NG/ML
SL AMB N-DESMETHYLCLOZAPINE QUANTIFICATION: NEGATIVE NG/ML
SL AMB NORQUETIAPINE QUANTIFICATION: NEGATIVE NG/ML
SL AMB PHENTERMINE QUANTIFICATION: NEGATIVE NG/ML
SL AMB PREGABALIN QUANTIFICATION: NEGATIVE NG/ML
SL AMB QUETIAPINE QUANTIFICATION: NEGATIVE NG/ML
SL AMB RISPERIDONE QUANTIFICATION: NEGATIVE NG/ML
SL AMB RITALINIC ACID QUANTIFICATION: NEGATIVE NG/ML
SPECIMEN PH ACCEPTABLE UR: NORMAL
TAPENTADOL UR QL CFM: NEGATIVE NG/ML
TEMAZEPAM UR QL CFM: NEGATIVE NG/ML
TEMAZEPAM UR QL CFM: NEGATIVE NG/ML
TRAMADOL UR QL CFM: NEGATIVE NG/ML
URATE/CREAT 24H UR: NEGATIVE NG/ML

## 2025-07-12 ENCOUNTER — APPOINTMENT (EMERGENCY)
Dept: CT IMAGING | Facility: HOSPITAL | Age: 55
End: 2025-07-12
Payer: COMMERCIAL

## 2025-07-12 ENCOUNTER — HOSPITAL ENCOUNTER (EMERGENCY)
Facility: HOSPITAL | Age: 55
Discharge: HOME | End: 2025-07-12
Attending: EMERGENCY MEDICINE | Admitting: EMERGENCY MEDICINE
Payer: COMMERCIAL

## 2025-07-12 VITALS
OXYGEN SATURATION: 99 % | RESPIRATION RATE: 18 BRPM | BODY MASS INDEX: 32.42 KG/M2 | DIASTOLIC BLOOD PRESSURE: 77 MMHG | WEIGHT: 200.84 LBS | HEART RATE: 59 BPM | TEMPERATURE: 98.2 F | SYSTOLIC BLOOD PRESSURE: 171 MMHG

## 2025-07-12 DIAGNOSIS — S06.0XAA CONCUSSION: ICD-10-CM

## 2025-07-12 DIAGNOSIS — S09.90XA INJURY OF HEAD, INITIAL ENCOUNTER: ICD-10-CM
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
## 2025-07-12 PROCEDURE — 70450 CT HEAD/BRAIN W/O DYE: CPT

## 2025-07-12 PROCEDURE — 99284 EMERGENCY DEPT VISIT MOD MDM: CPT

## 2025-07-12 PROCEDURE — 70486 CT MAXILLOFACIAL W/O DYE: CPT

## 2025-07-12 NOTE — ED PROVIDER NOTES
"Time reflects when diagnosis was documented in both MDM as applicable and the Disposition within this note       Time User Action Codes Description Comment    7/12/2025  7:05 PM Deandra Williamson Add [W19.XXXA] Fall, initial encounter     7/12/2025  7:48 PM Deandra Williamson Add [S06.0XAA] Concussion     7/12/2025  7:48 PM Deandra Williamson Add [S09.90XA] Injury of head, initial encounter           ED Disposition       ED Disposition   Discharge    Condition   Stable    Date/Time   Sat Jul 12, 2025  8:03 PM    Comment   Calista Umana discharge to home/self care.                   Assessment & Plan       Medical Decision Making  Patient is a 55-year-old female presenting for evaluation of a head injury after falling 3 days ago.  Upon examination, patient is well-appearing does not appear in acute distress.  Vital signs are stable.  Pupils are 3+ and reactive bilaterally. EOM intact and no pain with ocular movement.  No proptosis present.  There is tenderness along bilateral nasal bridge without any No midline cervical tenderness.  Normal heart and lung sounds.     Patient is at risk for: ICH, concussion, facial fractures    Imaging:  CT head: \"No acute intracranial abnormality. Tiny frontal scalp hematoma.\"  CT facial bones: \"No maxillofacial bone fracture identified. Hypodense, likely ingested material within the left oral cavity at the level of the mandible, which may represent chewing gum. Partial opacification right mastoid air cells. Correlation for signs and symptoms of acute infection is recommended.\"    Results were discussed with patient.  Suspect concussion at this time due to patient's continuing symptoms of mental fog, difficulties concentrating her eyes, headache, and occasional nausea.  Referral was placed to comprehensive concussion program.  Patient declined needing any antiemetics for home.  Discussed alternating Tylenol and Motrin as needed for pain and strict ED return precautions were reviewed. " Encouraged follow-up with PCP.  Patient verbalizes understanding of discharge instructions and follow-up care at this time.     Amount and/or Complexity of Data Reviewed  Radiology: ordered. Decision-making details documented in ED Course.     Details: Reviewed         ED Course as of 07/13/25 0926   Sat Jul 12, 2025 1947 CT head without contrast  No acute intracranial abnormality. Tiny frontal scalp hematoma.       Medications - No data to display    ED Risk Strat Scores                    No data recorded        SBIRT 22yo+      Flowsheet Row Most Recent Value   Initial Alcohol Screen: US AUDIT-C     1. How often do you have a drink containing alcohol? 0 Filed at: 07/12/2025 1844   2. How many drinks containing alcohol do you have on a typical day you are drinking?  0 Filed at: 07/12/2025 1844   3b. FEMALE Any Age, or MALE 65+: How often do you have 4 or more drinks on one occassion? 0 Filed at: 07/12/2025 1844   Audit-C Score 0 Filed at: 07/12/2025 1844   CORWIN: How many times in the past year have you...    Used an illegal drug or used a prescription medication for non-medical reasons? Never Filed at: 07/12/2025 1844                            History of Present Illness       Chief Complaint   Patient presents with    Fall     States that she doesn't have feeling in her left leg normally, and thinks that she may have lost her balance. States that she fell on Thursday. Hit her head. Unknown LOC. No thinners. Bruising to her forehead          Past Medical History[1]   Past Surgical History[2]   Family History[3]   Social History[4]   E-Cigarette/Vaping    E-Cigarette Use Never User       E-Cigarette/Vaping Substances    Nicotine No     THC No     CBD No     Flavoring No     Other No     Unknown No       I have reviewed and agree with the history as documented.     Patient is a 55-year-old female with a past medical history including chronic back pain, anxiety, and depression. Presents today for evaluation  "post-fall. 3 days ago, patient states that she was taking her garbage out around 130 in the morning in an area that is not well lit when she had fallen onto the sidewalk. No LOC. She notes that her left leg is numb and weaker at baseline due to her history of back surgeries and believes that she did not pick it up high enough causing her to fall. She denies any new or increased numbness into the leg. No lightlessness, dizziness, chest pain, or palpitations causing the fall. Since the fall, she notes a frontal headache and unable to focus her eyes at times. Noting that the right one tears and it \"becomes like when you don't have your wipers on when it rains\" and vision improves when she wipes her eye. Denies any loss of vision, blurry vision, or double vision.      Fall  Associated symptoms: headaches    Associated symptoms: no chest pain and no neck pain        Review of Systems   Constitutional:  Negative for chills and fever.   HENT:  Negative for nosebleeds and sinus pain.    Eyes:  Negative for photophobia, pain and visual disturbance.   Respiratory:  Negative for shortness of breath.    Cardiovascular:  Negative for chest pain.   Musculoskeletal:  Negative for neck pain.   Neurological:  Positive for headaches. Negative for dizziness, weakness and light-headedness.   All other systems reviewed and are negative.          Objective       ED Triage Vitals [07/12/25 1843]   Temperature Pulse Blood Pressure Respirations SpO2 Patient Position - Orthostatic VS   98.2 °F (36.8 °C) 72 127/98 18 98 % Sitting      Temp src Heart Rate Source BP Location FiO2 (%) Pain Score    -- Monitor Left arm -- 5      Vitals      Date and Time Temp Pulse SpO2 Resp BP Pain Score FACES Pain Rating User   07/12/25 2000 -- 59 99 % -- 171/77 -- --    07/12/25 1945 -- 66 99 % -- -- -- --    07/12/25 1930 -- 66 96 % -- 151/73 -- --    07/12/25 1900 -- 65 97 % -- 162/93 -- --    07/12/25 1845 -- 67 97 % -- 127/98 -- --    07/12/25 " 1843 98.2 °F (36.8 °C) 72 98 % 18 127/98 5 -- [place]            Physical Exam  Vitals and nursing note reviewed.   Constitutional:       Appearance: Normal appearance.   HENT:      Head: Normocephalic. No raccoon eyes or Kimball's sign.      Jaw: No tenderness or pain on movement.        Right Ear: Tympanic membrane, ear canal and external ear normal.      Left Ear: Tympanic membrane, ear canal and external ear normal.      Nose: Nasal tenderness present. No nasal deformity or signs of injury.        Comments: Tenderness over bilateral nasal bridge    Eyes:      Comments: Pupils 3+ reactive bilaterally.  EOM intact.   Pulmonary:      Effort: Pulmonary effort is normal.     Musculoskeletal:         General: Normal range of motion.     Skin:     General: Skin is warm and dry.      Capillary Refill: Capillary refill takes less than 2 seconds.      Findings: Bruising present.      Comments: Healing bruising to the forehead and above the nasal bridges bilaterally     Neurological:      General: No focal deficit present.      Mental Status: She is alert and oriented to person, place, and time.     Psychiatric:         Mood and Affect: Mood normal.         Behavior: Behavior normal.         Results Reviewed       None            CT head without contrast   ED Interpretation by YAYA Wheat (07/12 1947)   Negative for ICH      Final Interpretation by Elis Torres MD (07/12 1943)      No acute intracranial abnormality. Tiny frontal scalp hematoma.                  Workstation performed: [id number]         CT facial bones without contrast   ED Interpretation by YAYA Wheat (07/12 2003)   Negative for fracture      Final Interpretation by Elis Torres MD (07/12 1947)      No maxillofacial bone fracture identified.      Hypodense, likely ingested material within the left oral cavity at the level of the mandible, which may represent chewing gum.      Partial opacification right mastoid air cells. Correlation  for signs and symptoms of acute infection is recommended.               Workstation performed: [id number]             Procedures    ED Medication and Procedure Management   Prior to Admission Medications   Prescriptions Last Dose Informant Patient Reported? Taking?   busPIRone (BUSPAR) 15 mg tablet  Self No No   Sig: Take 1 tablet (15 mg total) by mouth 3 (three) times a day   celecoxib (CeleBREX) 200 mg capsule  Self Yes No   gabapentin (NEURONTIN) 600 MG tablet   No No   Sig: ONE (1) BY MOUTH TWICE A DAY AND THREE (3) BY MOUTH EVERY NIGHT AT BEDTIME   methocarbamol (ROBAXIN) 750 mg tablet   No No   Sig: Take 1 tablet (750 mg total) by mouth 3 (three) times a day as needed for muscle spasms   naloxone (NARCAN) 4 mg/0.1 mL nasal spray   No No   Sig: Administer 1 spray into a nostril. If no response after 2-3 minutes, give another dose in the other nostril using a new spray.   oxyCODONE (Roxicodone) 5 immediate release tablet   No No   Sig: Take 1 tablet (5 mg total) by mouth 3 (three) times a day as needed for moderate pain Max Daily Amount: 15 mg   promethazine-dextromethorphan (PHENERGAN-DM) 6.25-15 mg/5 mL oral syrup  Self No No   Sig: Take 5 mL by mouth 4 (four) times a day as needed for cough   sertraline (ZOLOFT) 100 mg tablet  Self No No   Sig: Take 2 tablets (200 mg total) by mouth daily      Facility-Administered Medications: None     Discharge Medication List as of 7/12/2025  8:04 PM        CONTINUE these medications which have NOT CHANGED    Details   busPIRone (BUSPAR) 15 mg tablet Take 1 tablet (15 mg total) by mouth 3 (three) times a day, Starting Sat 6/14/2025, Normal      celecoxib (CeleBREX) 200 mg capsule Historical Med      gabapentin (NEURONTIN) 600 MG tablet ONE (1) BY MOUTH TWICE A DAY AND THREE (3) BY MOUTH EVERY NIGHT AT BEDTIME, Normal      methocarbamol (ROBAXIN) 750 mg tablet Take 1 tablet (750 mg total) by mouth 3 (three) times a day as needed for muscle spasms, Starting Mon 6/30/2025,  Until Wed 7/30/2025 at 2359, Normal      naloxone (NARCAN) 4 mg/0.1 mL nasal spray Administer 1 spray into a nostril. If no response after 2-3 minutes, give another dose in the other nostril using a new spray., No Print      oxyCODONE (Roxicodone) 5 immediate release tablet Take 1 tablet (5 mg total) by mouth 3 (three) times a day as needed for moderate pain Max Daily Amount: 15 mg, Starting Wed 7/2/2025, Until Fri 8/1/2025 at 2359, Normal      promethazine-dextromethorphan (PHENERGAN-DM) 6.25-15 mg/5 mL oral syrup Take 5 mL by mouth 4 (four) times a day as needed for cough, Starting Tue 12/17/2024, Normal      sertraline (ZOLOFT) 100 mg tablet Take 2 tablets (200 mg total) by mouth daily, Starting Sat 6/14/2025, Normal             ED SEPSIS DOCUMENTATION   Time reflects when diagnosis was documented in both MDM as applicable and the Disposition within this note       Time User Action Codes Description Comment    7/12/2025  7:05 PM Deandra Williamson Add [W19.XXXA] Fall, initial encounter     7/12/2025  7:48 PM Deandra Williamson Add [S06.0XAA] Concussion     7/12/2025  7:48 PM Deandra Williamson Add [S09.90XA] Injury of head, initial encounter                      [1]   Past Medical History:  Diagnosis Date    Anxiety     Back pain     Depression     Insomnia    [2]   Past Surgical History:  Procedure Laterality Date    BACK SURGERY  2006    cage in lumbar L5 area    BACK SURGERY      CAUDAL BLOCK N/A 11/15/2022    Procedure: BLOCK / INJECTION CAUDAL;  Surgeon: Nuno Farrar MD;  Location: [facility];  Service: Pain Management     CHOLECYSTECTOMY      DECOMPRESSION SPINE LUMBAR POSTERIOR  04/24/2024    LUMBAR DISC SURGERY      POSTERIOR FUSION LUMBAR SPINE  04/24/2024    MI PRQ [id number] NSTIM ELECTRODE ARRAY EPIDURAL Bilateral 5/15/2025    Procedure: NEVRO SCS TRIAL;  Surgeon: Nuno Farrar MD;  Location: [facility];  Service: Pain Management     MI REPAIR FIRST ABDOMINAL WALL HERNIA N/A 03/16/2018     Procedure: REPAIR HERNIA VENTRAL with mesh;  Surgeon: Dvaid Chicas DO;  Location: [facility];  Service: General    [facility] SURGICAL ARTHROSCOPY SHOULDER W/ROTATOR CUFF RPR Left 2020    Procedure: SHOULDER ARTHROSCOPY, ROTATOR CUFF REPAIR, SYNEVECTOMY, ACROMIOPLASTY DEBRIDEMENT, INJECTION;  Surgeon: Brigido Mota DO;  Location:  MAIN OR;  Service: Orthopedics   [3]   Family History  Adopted: Yes   Family history unknown: Yes   [4]   Social History  Tobacco Use    Smoking status: Former     Current packs/day: 0.00     Types: Cigarettes     Quit date: 10/17/2010     Years since quittin.7    Smokeless tobacco: Never    Tobacco comments:     Recently quit   Vaping Use    Vaping status: Never Used   Substance Use Topics    Alcohol use: No    Drug use: No        YAYA Wheat  25 0927

## 2025-07-13 NOTE — DISCHARGE INSTRUCTIONS
Symptoms today likely due to a concussion  -may alternate Tylenol 1000mg every 6 hours and ibuprofen 400-600mg every 6 hours  -referral to comprehensive concussion program has been placed  -continue to monitor your symptoms and return to the ED for any severe headaches, lightheadedness, nausea and vomiting, or visual changes

## 2025-07-22 ENCOUNTER — OFFICE VISIT (OUTPATIENT)
Dept: PAIN MEDICINE | Facility: CLINIC | Age: 55
End: 2025-07-22
Payer: COMMERCIAL

## 2025-07-22 VITALS
BODY MASS INDEX: 31.27 KG/M2 | RESPIRATION RATE: 16 BRPM | WEIGHT: 194.6 LBS | HEIGHT: 66 IN | TEMPERATURE: 98.3 F | OXYGEN SATURATION: 99 % | HEART RATE: 68 BPM

## 2025-07-22 DIAGNOSIS — M54.50 LUMBAR PAIN WITH RADIATION DOWN LEFT LEG: ICD-10-CM

## 2025-07-22 DIAGNOSIS — Z98.1 STATUS POST LUMBAR SPINAL FUSION: ICD-10-CM

## 2025-07-22 DIAGNOSIS — G89.4 CHRONIC PAIN SYNDROME: Primary | ICD-10-CM

## 2025-07-22 DIAGNOSIS — M79.605 LUMBAR PAIN WITH RADIATION DOWN LEFT LEG: ICD-10-CM

## 2025-07-22 DIAGNOSIS — M54.16 LUMBAR RADICULOPATHY: ICD-10-CM

## 2025-07-22 PROCEDURE — 99214 OFFICE O/P EST MOD 30 MIN: CPT | Performed by: NURSE PRACTITIONER

## 2025-07-22 RX ORDER — OXYCODONE AND ACETAMINOPHEN 7.5; 325 MG/1; MG/1
1 TABLET ORAL EVERY 8 HOURS PRN
Qty: 90 TABLET | Refills: 0 | Status: SHIPPED | OUTPATIENT
Start: 2025-07-22

## 2025-07-22 RX ORDER — OXYCODONE AND ACETAMINOPHEN 7.5; 325 MG/1; MG/1
1 TABLET ORAL EVERY 8 HOURS PRN
Qty: 90 TABLET | Refills: 0 | Status: SHIPPED | OUTPATIENT
Start: 2025-08-19

## 2025-07-22 NOTE — ASSESSMENT & PLAN NOTE
Orders:    oxyCODONE-acetaminophen (Percocet) 7.5-325 MG per tablet; Take 1 tablet by mouth every 8 (eight) hours as needed for moderate pain Max Daily Amount: 3 tablets Do not start before August 19, 2025.    oxyCODONE-acetaminophen (Percocet) 7.5-325 MG per tablet; Take 1 tablet by mouth every 8 (eight) hours as needed for moderate pain Max Daily Amount: 3 tablets

## 2025-07-22 NOTE — PROGRESS NOTES
Name: Calista Umana      : 1970      MRN: [id number]  Encounter Provider: Barbara Kocher, CRNP  Encounter Date: 2025   Encounter department: [facility] [place]  :  Assessment & Plan  Chronic pain syndrome  Lumbar pain with radiation down left leg  Status post lumbar spinal fusion  Lumbar radiculopathy  Orders:    oxyCODONE-acetaminophen (Percocet) 7.5-325 MG per tablet; Take 1 tablet by mouth every 8 (eight) hours as needed for moderate pain Max Daily Amount: 3 tablets Do not start before 2025.    oxyCODONE-acetaminophen (Percocet) 7.5-325 MG per tablet; Take 1 tablet by mouth every 8 (eight) hours as needed for moderate pain Max Daily Amount: 3 tablets    While the patient was in the office today, I did have a thorough conversation regarding their chronic pain syndrome, medication management, and treatment plan options.  Patient is being seen for a follow-up visit.  She was last seen here on 2025 at which time she was switched from oxycodone 5 mg 3 times daily to Suboxone 2 mg film sublingual every 8 hours if needed.  Patient discontinued Suboxone due to nausea.  She states that pain relief was equal to oxycodone.  She continues to report minimal relief with oxycodone.  At this point, we will increase oxycodone to 7.5/325 every 8 hours as needed for pain.  The patient's opioid scripts were sent to their pharmacy electronically and was given a 2 month supply of prescriptions with a Do Not Fill date(s) of 2025, 2025.    Continue Robaxin-750 milligrams 3 times daily if needed for spasms.  She did not require refill of this medication during today's visit.    Continue gabapentin 600/600/1800.  She did not require refill of this medication during today's visit.    There are risks associated with opioid medications, including dependence, addiction and tolerance. The patient understands and agrees to use these medications only as prescribed. Potential side effects  "of the medications include, but are not limited to, constipation, drowsiness, addiction, impaired judgment and risk of fatal overdose if not taken as prescribed. The patient was warned against driving while taking sedation medications.  Sharing medications is a felony. At this point in time, the patient is showing no signs of addiction, abuse, diversion or suicidal ideation.  [place] Prescription Drug Monitoring Program report was reviewed and was appropriate      The patient will follow-up in 2 months for medication prescription refill and reevaluation. The patient was advised to contact the office should their symptoms worsen in the interim. The patient was agreeable and verbalized an understanding.    My impressions and treatment recommendations were discussed in detail with the patient who verbalized understanding and had no further questions.  Discharge instructions were provided. I personally saw and examined the patient and I agree with the above discussed plan of care.    History of Present Illness     Calista Umana is a 55 y.o. female who presents for a follow up office visit in regards to Back Pain and Leg Pain. The patient’s current symptoms include complaints of low back and leg pain and bilateral leg pain, left worse than right.  Current pain level is a 6/10.  Quality of pain is described as constant    Current pain medications includes:  Oxycodone 5 mg every 8 hours as needed for pain, gabapentin 600/600/1800, methocarbamol 750 mg 3 times daily if needed for spasms.  The patient reports that this regimen is providing \" very little \"% pain relief.  The patient is reporting no side effects from this pain medication regimen.    Opioid contract date 3/7/2025    Last UDS Date: 6/30/2025    Review of Systems   Musculoskeletal:  Positive for back pain and gait problem.   All other systems reviewed and are negative.      Medical History Reviewed by provider this encounter:  Tobacco  Allergies  Meds " " Problems  Med Hx  Surg Hx  Fam Hx     .  Medications Ordered Prior to Encounter[1]      Objective   Pulse 68   Temp 98.3 °F (36.8 °C)   Resp 16   Ht 5' 6\" (1.676 m)   Wt 88.3 kg (194 lb 9.6 oz)   LMP 10/10/2016   SpO2 99%   BMI 31.41 kg/m²      Pain Score:   6  Physical Exam  Constitutional: normal, well developed, well nourished, alert, in no distress and non-toxic and no overt pain behavior.  Eyes: anicteric  HEENT: grossly intact  Neck: supple, symmetric, trachea midline and no masses   Pulmonary: even and unlabored  Cardiovascular: No edema or pitting edema present  Skin: Normal without rashes or lesions and well hydrated  Psychiatric: Mood and affect appropriate  Neurologic: Cranial Nerves II-XII grossly intact  Musculoskeletal: antalgic      Study Result    Narrative & Impression   MRI LUMBAR SPINE WITH AND WITHOUT CONTRAST     INDICATION: M54.16: Radiculopathy, lumbar region  Z98.1: Arthrodesis status.     COMPARISON: MRI lumbar spine 12/28/2023     TECHNIQUE:  Multiplanar, multisequence imaging of the lumbar spine was performed before and after gadolinium administration. .        IV Contrast:  8 mL of Gadobutrol injection (SINGLE-DOSE)     IMAGE QUALITY:  Diagnostic     FINDINGS:     POSTSURGICAL FINDINGS: Previous PLIF L4-5 with now L3-S1 Posterior lumbar decompression and interbody fusion. Interbody spacer devices are noted at the L3-4 through the L5-S1 levels with bilateral decompressive laminectomies. There is a peripherally   enhancing postoperative fluid collection within the laminectomy bed .     LUMBAR DISC SPACES:        L1-2: No focal disc herniation, central canal stenosis, or neural foraminal narrowing.     L2-3: Mild diffuse disc bulge is again noted with superimposed bilateral foraminal disc protrusions. Bilateral ligamentum flavum and facet hypertrophy.. Mild central canal and bilateral subarticular/lateral recess narrowing, unchanged.  No neural   foraminal stenosis.     L3-4: " New interbody spacer device.. The central canal is now patent status post bilateral decompressive laminectomies. No neural foraminal stenosis. There is enhancing granulation tissue within the laminectomy bed and central canal without impingement of   the thecal sac.     L4-5: Stable interbody spacer device. The central canal remains patent status post bilateral decompressive laminectomies. Unchanged mild bilateral neural foraminal stenosis.There is enhancing granulation tissue within the laminectomy bed and central   canal without impingement of the thecal sac.     L5-S1: Stable grade 1 anterolisthesis. New interbody spacer device.. The central canal is now patent status post bilateral decompressive laminectomies. Persistent moderate right and moderate to severe left neural foraminal stenosis with encroachment of   the exiting left L5 nerve root.There is enhancing granulation tissue within the laminectomy bed and central canal without impingement of the thecal sac.     OTHER FINDINGS: Small bilateral parapelvic renal cysts are noted.     IMPRESSION:     Previous PLIF L4-5 with now L3-S1 Posterior lumbar decompression and interbody fusion.     L3-4 through L5-S1 decompressive laminectomies with improved patency of the central canal at the L3-4 and L5-S1 levels. A peripherally enhancing postoperative fluid collection is noted within the laminectomy bed, probable postoperative seroma. Moderate   to severe left L5-S1 neural foraminal narrowing is again noted with encroachment of the exiting left L5 nerve root.     Stable L2-3 disc bulge with mild central canal narrowing. There is no new disc herniation.                       Workstation performed: [id number]                  [1]   Current Outpatient Medications on File Prior to Visit   Medication Sig Dispense Refill    busPIRone (BUSPAR) 15 mg tablet Take 1 tablet (15 mg total) by mouth 3 (three) times a day 270 tablet 0    celecoxib (CeleBREX) 200 mg capsule        gabapentin (NEURONTIN) 600 MG tablet ONE (1) BY MOUTH TWICE A DAY AND THREE (3) BY MOUTH EVERY NIGHT AT BEDTIME 150 tablet 2    methocarbamol (ROBAXIN) 750 mg tablet Take 1 tablet (750 mg total) by mouth 3 (three) times a day as needed for muscle spasms 90 tablet 2    naloxone (NARCAN) 4 mg/0.1 mL nasal spray Administer 1 spray into a nostril. If no response after 2-3 minutes, give another dose in the other nostril using a new spray.      promethazine-dextromethorphan (PHENERGAN-DM) 6.25-15 mg/5 mL oral syrup Take 5 mL by mouth 4 (four) times a day as needed for cough 240 mL 1    sertraline (ZOLOFT) 100 mg tablet Take 2 tablets (200 mg total) by mouth daily 180 tablet 0    [DISCONTINUED] oxyCODONE (Roxicodone) 5 immediate release tablet Take 1 tablet (5 mg total) by mouth 3 (three) times a day as needed for moderate pain Max Daily Amount: 15 mg 90 tablet 0     No current facility-administered medications on file prior to visit.

## (undated) DEVICE — 3M™ STERI-DRAPE™ U-DRAPE 1015: Brand: STERI-DRAPE™

## (undated) DEVICE — GLOVE SRG BIOGEL ECLIPSE 7

## (undated) DEVICE — CHLORAPREP HI-LITE 10.5ML ORANGE

## (undated) DEVICE — NEEDLE 21 G X 1 1/2 SAFETY

## (undated) DEVICE — AMBIENT MEGAVAC 90: Brand: COBLATION

## (undated) DEVICE — NEEDLE 18 G X 1 1/2

## (undated) DEVICE — PENEVAC1 NONSTICK SMOKE EVAC

## (undated) DEVICE — SURGIFLO HEMOSTATIC MATRIX 8ML

## (undated) DEVICE — GLOVE SZ 7.5 LINER PROTEXIS PI BL

## (undated) DEVICE — PACK PBDS SHOULDER ARTHROSCOPY RF

## (undated) DEVICE — MICRO HVTSA, 0.5G AND HVTSA SOURCEMARK PRODUCT CODE M1206 AND M1206-01: Brand: EXOFIN MICRO HVTSA, 0.5G

## (undated) DEVICE — TRAY EPIDURAL SINGLE SHOT

## (undated) DEVICE — MANIFOLD FOUR PORT NEPTUNE

## (undated) DEVICE — SYRINGE 50ML LL

## (undated) DEVICE — CHLORAPREP HI-LITE 26ML ORANGE

## (undated) DEVICE — INTENDED FOR TISSUE SEPARATION, AND OTHER PROCEDURES THAT REQUIRE A SHARP SURGICAL BLADE TO PUNCTURE OR CUT.: Brand: BARD-PARKER ® CARBON RIB-BACK BLADES

## (undated) DEVICE — GLOVE INDICATOR PI UNDERGLOVE SZ 8 BLUE

## (undated) DEVICE — POSITIONER HEAD DISP STRL

## (undated) DEVICE — BURR  OVAL 4MM 13CM 8 FLUTE COOLCUT

## (undated) DEVICE — 3000CC GUARDIAN II: Brand: GUARDIAN

## (undated) DEVICE — READY WET SKIN SCRUB TRAY-LF: Brand: MEDLINE INDUSTRIES, INC.

## (undated) DEVICE — GAUZE SPONGES,16 PLY: Brand: CURITY

## (undated) DEVICE — SYRINGE 10ML LL

## (undated) DEVICE — RESERVOIR DRAIN 100ML

## (undated) DEVICE — NEEDLE SUT SCORPION MULTIFIRE

## (undated) DEVICE — SCD SEQUENTIAL COMPRESSION COMFORT SLEEVE MEDIUM KNEE LENGTH: Brand: KENDALL SCD

## (undated) DEVICE — BLADE BONE MILL DISP MEDIUM

## (undated) DEVICE — 2963 MEDIPORE SOFT CLOTH TAPE 3 IN X 10 YD 12 RLS/CS: Brand: 3M™ MEDIPORE™

## (undated) DEVICE — TRIAL STIMULATOR 3500 KIT: Brand: SENZA

## (undated) DEVICE — SYRINGE 5ML LL

## (undated) DEVICE — SUT ETHILON 3-0 INFS-1 30 IN 669H

## (undated) DEVICE — 3M™ TEGADERM™ TRANSPARENT FILM DRESSING FRAME STYLE, 1628, 6 IN X 8 IN (15 CM X 20 CM), 10/CT 8CT/CASE: Brand: 3M™ TEGADERM™

## (undated) DEVICE — C-ARM: Brand: UNBRANDED

## (undated) DEVICE — SPINE TABLE COVER ULTRA COMFORT MEDIUM

## (undated) DEVICE — DRESSING SPONGE 4X4 SOF-WICK

## (undated) DEVICE — BANDAID SHEER SPOT

## (undated) DEVICE — 3M™ STERI-STRIP™ REINFORCED ADHESIVE SKIN CLOSURES, R1547, 1/2 IN X 4 IN (12 MM X 100 MM), 6 STRIPS/ENVELOPE: Brand: 3M™ STERI-STRIP™

## (undated) DEVICE — THREE-QUARTER SHEET: Brand: CONVERTORS

## (undated) DEVICE — TUBING SUCTION 5MM X 12 FT

## (undated) DEVICE — SUTURE POLYSORB 2-0 UNDYED 5X18 GS-10 D-TACH

## (undated) DEVICE — NEEDLE SPINAL 18G X 3IN DISP

## (undated) DEVICE — SUT PROLENE 0 SH 30 IN 8834H

## (undated) DEVICE — SYRINGE DISP LUER-LOK  3 CC

## (undated) DEVICE — COVER BACK TABLE REINFORCED

## (undated) DEVICE — TIBURON LAPAROTOMY DRAPE: Brand: CONVERTORS

## (undated) DEVICE — SKIN MARKER DUAL TIP WITH RULER CAP, FLEXIBLE RULER AND LABELS: Brand: DEVON

## (undated) DEVICE — SUT PROLENE 0 CT-1 30 IN 8424H

## (undated) DEVICE — INTENDED FOR TISSUE SEPARATION, AND OTHER PROCEDURES THAT REQUIRE A SHARP SURGICAL BLADE TO PUNCTURE OR CUT.: Brand: BARD-PARKER SAFETY BLADES SIZE 15, STERILE

## (undated) DEVICE — ADHESIVE SKN CLSR HISTOACRYL FLEX 0.5ML LF

## (undated) DEVICE — DRESSING MEPILEX AG BORDER 4 X 8 IN

## (undated) DEVICE — AMBIENT COVAC 50 IFS: Brand: COBLATION

## (undated) DEVICE — ABDOMINAL PAD: Brand: DERMACEA

## (undated) DEVICE — 3M™ TEGADERM™ TRANSPARENT FILM DRESSING FRAME STYLE, 1627, 4 IN X 10 IN (10 CM X 25 CM), 20/CT 4CT/CASE: Brand: 3M™ TEGADERM™

## (undated) DEVICE — GLOVE SRG BIOGEL 8

## (undated) DEVICE — NEEDLE 25G X 1 1/2

## (undated) DEVICE — BLADE SHAVER EXCALIBUR 4MM 13CM COOLCUT

## (undated) DEVICE — TUBING ARTHROSCOPIC WAVE  MAIN PUMP

## (undated) DEVICE — SUT PDS II 1 CT-1 27 IN Z347H

## (undated) DEVICE — SOLN DURAPREP WAND

## (undated) DEVICE — SUT MONOCRYL 4-0 PS-2 27 IN Y426H

## (undated) DEVICE — PACK SPINE LUMBAR

## (undated) DEVICE — DRAIN FLAT 10MM

## (undated) DEVICE — GLOVE SRG BIOGEL 7.5

## (undated) DEVICE — NEEDLE 18 G X 1 1/2 SAFETY

## (undated) DEVICE — MAT FLOOR STEP DRI 24 X 36 IN N-STRL

## (undated) DEVICE — 3M™ DURAPORE™ SURGICAL TAPE 1538-3, 3 INCH X 10 YARD (7,5CM X 9,1M), 4 ROLLS/BOX: Brand: 3M™ DURAPORE™

## (undated) DEVICE — GLOVE SZ 7.5 PROTEXIS PI

## (undated) DEVICE — PREP SURGICAL PURPREP 26ML

## (undated) DEVICE — STOCKINETTE,IMPERVIOUS,12X48,STERILE: Brand: MEDLINE

## (undated) DEVICE — 3M™ STERI-STRIP™ REINFORCED ADHESIVE SKIN CLOSURES, R1546, 1/4 IN X 4 IN (6 MM X 100 MM), 10 STRIPS/ENVELOPE: Brand: 3M™ STERI-STRIP™

## (undated) DEVICE — HEAVY DUTY TABLE COVER: Brand: CONVERTORS

## (undated) DEVICE — 3M™ TEGADERM™ TRANSPARENT FILM DRESSING FRAME STYLE, 1626W, 4 IN X 4-3/4 IN (10 CM X 12 CM), 50/CT 4CT/CASE: Brand: 3M™ TEGADERM™

## (undated) DEVICE — PACK SET UP NO DRAPE 12/CS

## (undated) DEVICE — RX-2™ COUDÉ®  EPIDURAL NEEDLE 14G TW X 4.0": Brand: EPIMED

## (undated) DEVICE — 4-PORT MANIFOLD: Brand: NEPTUNE 2

## (undated) DEVICE — Device

## (undated) DEVICE — PACK OR TOWEL

## (undated) DEVICE — NEEDLE SPINAL 22G X 3.5IN  QUINCKE

## (undated) DEVICE — INTENDED FOR TISSUE SEPARATION, AND OTHER PROCEDURES THAT REQUIRE A SHARP SURGICAL BLADE TO PUNCTURE OR CUT.: Brand: BARD-PARKER SAFETY BLADES SIZE 10, STERILE

## (undated) DEVICE — SUTURE MONOSOF 2-0 BLACK 1X18 C-15

## (undated) DEVICE — GLOVE INDICATOR PI UNDERGLOVE SZ 7 BLUE

## (undated) DEVICE — *T* 4.0MM PRECISION ROUND BUR

## (undated) DEVICE — GLOVE INDICATOR PI UNDERGLOVE SZ 7.5 BLUE

## (undated) DEVICE — REM POLYHESIVE ADULT PATIENT RETURN ELECTRODE: Brand: VALLEYLAB

## (undated) DEVICE — DISPOSABLE CANNULA WITH OBTURATOR, SMOOTH, 6.0 MM X 75 MM: Brand: CONMED

## (undated) DEVICE — DRESSING SPONGE ALL GAUZE 4X4 STER 10PK

## (undated) DEVICE — PLUMEPEN PRO 10FT

## (undated) DEVICE — GAUZE XEROFORM 1X8 NON-STERILE PACKET

## (undated) DEVICE — PAD GROUND ELECTROSURGICAL W/CORD

## (undated) DEVICE — DRESSING MEPILEX AG BORDER 4 X 12 IN

## (undated) DEVICE — IMPERVIOUS SURGICAL GOWN, LG: Brand: CONVERTORS

## (undated) DEVICE — SUTURE MONOSOF 3-0 BLACK 1X30 C-17

## (undated) DEVICE — TRAY URINE METER FOLEY NON-SILVER

## (undated) DEVICE — GLOVE INDICATOR PI UNDERGLOVE SZ 8.5 BLUE

## (undated) DEVICE — SUTURE POLYSORB 1 VIOLET 1X30 GS-11

## (undated) DEVICE — SHEET DRAPE 3/4 REVERSEFOLD 53X77

## (undated) DEVICE — STRL UNIVERSAL MINOR GENERAL: Brand: CARDINAL HEALTH

## (undated) DEVICE — SPECIMEN TISSUE TRAP 12MM RIGID